# Patient Record
Sex: MALE | Race: WHITE | NOT HISPANIC OR LATINO | Employment: UNEMPLOYED | URBAN - METROPOLITAN AREA
[De-identification: names, ages, dates, MRNs, and addresses within clinical notes are randomized per-mention and may not be internally consistent; named-entity substitution may affect disease eponyms.]

---

## 2018-03-29 RX ORDER — ASPIRIN 325 MG
325 TABLET ORAL DAILY
COMMUNITY
End: 2020-02-09 | Stop reason: HOSPADM

## 2018-03-29 RX ORDER — ATORVASTATIN CALCIUM 10 MG/1
10 TABLET, FILM COATED ORAL EVERY EVENING
COMMUNITY
End: 2020-02-09 | Stop reason: HOSPADM

## 2018-03-29 RX ORDER — LISINOPRIL AND HYDROCHLOROTHIAZIDE 25; 20 MG/1; MG/1
1 TABLET ORAL DAILY
COMMUNITY
End: 2019-04-02 | Stop reason: HOSPADM

## 2018-03-30 ENCOUNTER — ANESTHESIA EVENT (OUTPATIENT)
Dept: PERIOP | Facility: HOSPITAL | Age: 48
End: 2018-03-30
Payer: COMMERCIAL

## 2018-04-02 ENCOUNTER — ANESTHESIA (OUTPATIENT)
Dept: PERIOP | Facility: HOSPITAL | Age: 48
End: 2018-04-02
Payer: COMMERCIAL

## 2018-04-02 ENCOUNTER — HOSPITAL ENCOUNTER (OUTPATIENT)
Facility: HOSPITAL | Age: 48
Setting detail: OUTPATIENT SURGERY
Discharge: HOME | End: 2018-04-02
Attending: SURGERY | Admitting: SURGERY
Payer: COMMERCIAL

## 2018-04-02 VITALS
TEMPERATURE: 98 F | HEART RATE: 94 BPM | SYSTOLIC BLOOD PRESSURE: 130 MMHG | BODY MASS INDEX: 32.14 KG/M2 | WEIGHT: 200 LBS | DIASTOLIC BLOOD PRESSURE: 80 MMHG | RESPIRATION RATE: 16 BRPM | HEIGHT: 66 IN | OXYGEN SATURATION: 92 %

## 2018-04-02 PROBLEM — K40.90 INDIRECT INGUINAL HERNIA: Status: ACTIVE | Noted: 2018-04-02

## 2018-04-02 PROCEDURE — C1781 MESH (IMPLANTABLE): HCPCS | Performed by: SURGERY

## 2018-04-02 DEVICE — LAPAROSCOPIC SELF-FIXATING MESH, LEFT ANATOMICAL
Type: IMPLANTABLE DEVICE | Site: INGUINAL | Status: FUNCTIONAL
Brand: PROGRIP

## 2018-04-02 RX ORDER — GLYCOPYRROLATE 0.2 MG/ML
INJECTION INTRAMUSCULAR; INTRAVENOUS AS NEEDED
Status: DISCONTINUED | OUTPATIENT
Start: 2018-04-02 | End: 2018-04-02 | Stop reason: SURG

## 2018-04-02 RX ORDER — PROPOFOL 10 MG/ML
INJECTION, EMULSION INTRAVENOUS AS NEEDED
Status: DISCONTINUED | OUTPATIENT
Start: 2018-04-02 | End: 2018-04-02 | Stop reason: SURG

## 2018-04-02 RX ORDER — ONDANSETRON 2 MG/ML
INJECTION INTRAMUSCULAR; INTRAVENOUS AS NEEDED
Status: DISCONTINUED | OUTPATIENT
Start: 2018-04-02 | End: 2018-04-02 | Stop reason: SURG

## 2018-04-02 RX ORDER — ROCURONIUM BROMIDE 10 MG/ML
INJECTION, SOLUTION INTRAVENOUS AS NEEDED
Status: DISCONTINUED | OUTPATIENT
Start: 2018-04-02 | End: 2018-04-02 | Stop reason: SURG

## 2018-04-02 RX ORDER — KETOROLAC TROMETHAMINE 30 MG/ML
INJECTION, SOLUTION INTRAMUSCULAR; INTRAVENOUS AS NEEDED
Status: DISCONTINUED | OUTPATIENT
Start: 2018-04-02 | End: 2018-04-02 | Stop reason: SURG

## 2018-04-02 RX ORDER — OXYCODONE HYDROCHLORIDE AND ACETAMINOPHEN 5; 325 MG/1; MG/1
2 TABLET ORAL EVERY 4 HOURS PRN
Status: DISCONTINUED | OUTPATIENT
Start: 2018-04-02 | End: 2018-04-02 | Stop reason: HOSPADM

## 2018-04-02 RX ORDER — SUCCINYLCHOLINE/SOD CL,ISO/PF 100 MG/5ML
SYRINGE (ML) INTRAVENOUS AS NEEDED
Status: DISCONTINUED | OUTPATIENT
Start: 2018-04-02 | End: 2018-04-02 | Stop reason: SURG

## 2018-04-02 RX ORDER — LIDOCAINE HYDROCHLORIDE 10 MG/ML
INJECTION, SOLUTION INFILTRATION; PERINEURAL AS NEEDED
Status: DISCONTINUED | OUTPATIENT
Start: 2018-04-02 | End: 2018-04-02 | Stop reason: SURG

## 2018-04-02 RX ORDER — SODIUM CHLORIDE 9 MG/ML
125 INJECTION, SOLUTION INTRAVENOUS CONTINUOUS
Status: DISCONTINUED | OUTPATIENT
Start: 2018-04-02 | End: 2018-04-02 | Stop reason: HOSPADM

## 2018-04-02 RX ORDER — MIDAZOLAM HYDROCHLORIDE 1 MG/ML
INJECTION INTRAMUSCULAR; INTRAVENOUS AS NEEDED
Status: DISCONTINUED | OUTPATIENT
Start: 2018-04-02 | End: 2018-04-02 | Stop reason: SURG

## 2018-04-02 RX ORDER — FENTANYL CITRATE 50 UG/ML
INJECTION, SOLUTION INTRAMUSCULAR; INTRAVENOUS AS NEEDED
Status: DISCONTINUED | OUTPATIENT
Start: 2018-04-02 | End: 2018-04-02 | Stop reason: SURG

## 2018-04-02 RX ORDER — ONDANSETRON 2 MG/ML
4 INJECTION INTRAMUSCULAR; INTRAVENOUS ONCE AS NEEDED
Status: DISCONTINUED | OUTPATIENT
Start: 2018-04-02 | End: 2018-04-02 | Stop reason: HOSPADM

## 2018-04-02 RX ORDER — FENTANYL CITRATE/PF 50 MCG/ML
25 SYRINGE (ML) INJECTION
Status: DISCONTINUED | OUTPATIENT
Start: 2018-04-02 | End: 2018-04-02 | Stop reason: HOSPADM

## 2018-04-02 RX ORDER — BUPIVACAINE HYDROCHLORIDE AND EPINEPHRINE 5; 5 MG/ML; UG/ML
INJECTION, SOLUTION EPIDURAL; INTRACAUDAL; PERINEURAL AS NEEDED
Status: DISCONTINUED | OUTPATIENT
Start: 2018-04-02 | End: 2018-04-02 | Stop reason: HOSPADM

## 2018-04-02 RX ORDER — MAGNESIUM HYDROXIDE 1200 MG/15ML
LIQUID ORAL AS NEEDED
Status: DISCONTINUED | OUTPATIENT
Start: 2018-04-02 | End: 2018-04-02 | Stop reason: HOSPADM

## 2018-04-02 RX ADMIN — CEFAZOLIN SODIUM 2000 MG: 2 SOLUTION INTRAVENOUS at 11:45

## 2018-04-02 RX ADMIN — SODIUM CHLORIDE 125 ML/HR: 0.9 INJECTION, SOLUTION INTRAVENOUS at 08:36

## 2018-04-02 RX ADMIN — ROCURONIUM BROMIDE 50 MG: 10 INJECTION INTRAVENOUS at 11:42

## 2018-04-02 RX ADMIN — NEOSTIGMINE METHYLSULFATE 3 MG: 1 INJECTION, SOLUTION INTRAMUSCULAR; INTRAVENOUS; SUBCUTANEOUS at 12:51

## 2018-04-02 RX ADMIN — SODIUM CHLORIDE: 0.9 INJECTION, SOLUTION INTRAVENOUS at 13:06

## 2018-04-02 RX ADMIN — GLYCOPYRROLATE 0.6 MG: 0.2 INJECTION, SOLUTION INTRAMUSCULAR; INTRAVENOUS at 12:51

## 2018-04-02 RX ADMIN — MIDAZOLAM HYDROCHLORIDE 2 MG: 1 INJECTION, SOLUTION INTRAMUSCULAR; INTRAVENOUS at 11:29

## 2018-04-02 RX ADMIN — Medication 100 MG: at 11:37

## 2018-04-02 RX ADMIN — FENTANYL CITRATE 100 MCG: 50 INJECTION, SOLUTION INTRAMUSCULAR; INTRAVENOUS at 11:31

## 2018-04-02 RX ADMIN — PROPOFOL 200 MG: 10 INJECTION, EMULSION INTRAVENOUS at 11:37

## 2018-04-02 RX ADMIN — ONDANSETRON HYDROCHLORIDE 4 MG: 2 INJECTION, SOLUTION INTRAVENOUS at 11:49

## 2018-04-02 RX ADMIN — DEXAMETHASONE SODIUM PHOSPHATE 4 MG: 10 INJECTION INTRAMUSCULAR; INTRAVENOUS at 11:49

## 2018-04-02 RX ADMIN — LIDOCAINE HYDROCHLORIDE 50 MG: 10 INJECTION, SOLUTION INFILTRATION; PERINEURAL at 11:37

## 2018-04-02 RX ADMIN — KETOROLAC TROMETHAMINE 30 MG: 30 INJECTION, SOLUTION INTRAMUSCULAR at 12:57

## 2018-04-02 RX ADMIN — FENTANYL CITRATE 50 MCG: 50 INJECTION, SOLUTION INTRAMUSCULAR; INTRAVENOUS at 11:50

## 2018-04-02 RX ADMIN — FENTANYL CITRATE 50 MCG: 50 INJECTION, SOLUTION INTRAMUSCULAR; INTRAVENOUS at 12:53

## 2018-04-02 NOTE — ANESTHESIA POSTPROCEDURE EVALUATION
Post-Op Assessment Note      CV Status:  Stable    Mental Status:  Alert and awake    Hydration Status:  Euvolemic    PONV Controlled:  Controlled    Airway Patency:  Patent  Airway: intubated    Post Op Vitals Reviewed: Yes          Staff: Anesthesiologist           /73 (04/02/18 1429)    Temp      Pulse 99 (04/02/18 1429)   Resp 15 (04/02/18 1429)    SpO2 99 % (04/02/18 1429)

## 2018-04-02 NOTE — DISCHARGE INSTRUCTIONS
Inguinal Hernia Repair   WHAT YOU NEED TO KNOW:   An inguinal hernia repair may be done open or laparoscopically  Open means your healthcare provider will make 1 large incision and fix your hernia  Laparoscopically means he will make 2 to 3 small incisions and fix your hernia  DISCHARGE INSTRUCTIONS:   Call 911 for any of the following:   · You feel lightheaded, short of breath, and have chest pain  · You cough up blood  · You have trouble breathing  Seek care immediately if:   · Your arm or leg feels warm, tender, and painful  It may look swollen and red  · Blood soaks through your bandage  · Your abdomen or groin feels hard and looks bigger than usual      · Your bowel movements are black, bloody, or tarry-looking  Contact your healthcare provider if:   · You have a fever above 101°F      · You develop a skin rash, hives, or itching  · Your incision is swollen, red, or draining pus or fluid  · You have nausea, or you are vomiting  · You cannot have a bowel movement  · You have trouble urinating  · Your pain does not get better after you take pain medicine  · You have questions or concerns about your condition or care  Medicines: You may need any of the following:  · Prescription pain medicine  may be given  Ask your healthcare provider how to take this medicine safely  Some prescription pain medicines contain acetaminophen  Do not take other medicines that contain acetaminophen without talking to your healthcare provider  Too much acetaminophen may cause liver damage  Prescription pain medicine may cause constipation  Ask your healthcare provider how to prevent or treat constipation  · NSAIDs , such as ibuprofen, help decrease swelling, pain, and fever  This medicine is available with or without a doctor's order  NSAIDs can cause stomach bleeding or kidney problems in certain people   If you take blood thinner medicine, always ask your healthcare provider if NSAIDs are safe for you  Always read the medicine label and follow directions  · Acetaminophen  decreases pain and fever  It is available without a doctor's order  Ask how much to take and how often to take it  Follow directions  Read the labels of all other medicines you are using to see if they also contain acetaminophen, or ask your doctor or pharmacist  Acetaminophen can cause liver damage if not taken correctly  Do not use more than 4 grams (4,000 milligrams) total of acetaminophen in one day  · Take your medicine as directed  Contact your healthcare provider if you think your medicine is not helping or if you have side effects  Tell him or her if you are allergic to any medicine  Keep a list of the medicines, vitamins, and herbs you take  Include the amounts, and when and why you take them  Bring the list or the pill bottles to follow-up visits  Carry your medicine list with you in case of an emergency  Care for your wound as directed: You can shower in 48 hours  Remove your bandage before you shower  It is normal to see a small amount of blood under the bandage  Carefully wash around your wound  It is okay to let soap and water run over your wound  Do not  scrub your wound  Gently pay your wound dry  If you have strips of medical tape over your incision, allow them to fall off on their own  It may take 7 to 10 days for them to fall off  Do not get in a bathtub, swimming pool, or hot tub until your healthcare provider says it is okay  Self-care:   · Eat a variety of healthy foods  Healthy foods include fruits, vegetables, whole-grain breads, low-fat dairy products, beans, lean meats, and fish  Healthy foods may help you heal faster  Ask if you need to be on a special diet  · Drink liquids as directed  Liquids may prevent constipation and straining during a bowel movement  This will help prevent pressure on your incision, and prevent another hernia   Ask how much liquid to drink each day and which liquids are best for you  · Apply ice  on your incision for 15 to 20 minutes every hour or as directed  Use an ice pack, or put crushed ice in a plastic bag  Cover it with a towel  Ice helps prevent tissue damage and decreases swelling and pain  · Take deep breaths and cough  10 times each hour  This will decrease your risk for a lung infection  Take a deep breath and hold it for as long as you can  Let the air out and then cough strongly  Deep breaths help open your airway  You may be given an incentive spirometer to help you take deep breaths  Put the plastic piece in your mouth and take a slow, deep breath  Then let the air out and cough  Repeat these steps 10 times every hour  Press a pillow lightly against your incision when you cough  This may decrease pain or discomfort  · Do not smoke  Nicotine and other chemicals in cigarettes and cigars can prevent your wound from healing  It can also increase your risk for another inguinal hernia  Ask your healthcare provider for information if you currently smoke and need help to quit  E-cigarettes or smokeless tobacco still contain nicotine  Talk to your healthcare provider before you use these products  Driving:  Do not drive for at least 1 week after surgery  Do not drive if you are taking prescription pain medication  Do not drive until it is comfortable to wear a seatbelt across your abdomen  Ask your healthcare provider when it is safe for you to drive  Activity: It is important to get out of bed and walk the day after your surgery  This will help prevent blood clots, move your bowels after surgery, and increase healing  Do not lift anything heavy until your healthcare provider says it is okay  This may put too much pressure on your incision and cause it to come apart  It may also increase your risk for another hernia  Do not play sports for 2 to 3 weeks  Ask your healthcare provider when you can return to work, school, and your normal activities  Follow up with your healthcare provider as directed:  Write down your questions so you remember to ask them during your visits  © 2017 [facility] Information is for End User's use only and may not be sold, redistributed or otherwise used for commercial purposes  All illustrations and images included in CareNotes® are the copyrighted property of [employer]  or [address]  The above information is an  only  It is not intended as medical advice for individual conditions or treatments  Talk to your doctor, nurse or pharmacist before following any medical regimen to see if it is safe and effective for you

## 2018-04-02 NOTE — OP NOTE
OPERATIVE REPORT  PATIENT NAME: Ingrid German    :  1970  MRN: [id number]  Pt Location: [facility] OR ROOM 03    SURGERY DATE: 2018    Surgeon(s) and Role:     * Rudy Alicea MD - Primary     * Cris Caruso MD - Assisting    Preop Diagnosis:  Unilateral inguinal hernia without obstruction or gangrene [K40 90]    Post-Op Diagnosis Codes:     * Unilateral inguinal hernia without obstruction or gangrene [K40 90]     * Indirect inguinal hernia [K40 90]  Left    Procedure(s) (LRB):  LAPAROSCOPIC INGUINAL HERNIA REPAIR WITH MESH (Left)    Specimen(s):  * No specimens in log *    Estimated Blood Loss:   Minimal    Drains:       Anesthesia Type:   General    Operative Indications:  Unilateral inguinal hernia without obstruction or gangrene [K40 90]  The patient is a pleasant 15-year-old male presenting with a symptomatic left inguinal hernia for which definitive repair is now indicated by a laparoscopic technique  Operative Findings:  The patient was found to have moderate-sized indirect left inguinal hernia  A left inguinal herniorrhaphy with mesh performed laparoscopically and the procedure completed uneventfully  The patient tolerated the procedure well  Complications:   None    Procedure and Technique:  The patient was taken to the operating room where they were properly identified, monitored and anesthetized with general endotracheal anesthesia  The received antibiotics perioperatively  Venodynes were placed prior to the induction of anesthesia for DVT prophylaxis  Herrera catheter placed  Abdomen prepped and draped under sterile conditions using aseptic technique  Time-out performed  Skin incised below the umbilicus  The anterior rectus sheath scored and the properitoneal space developed bluntly  A balloon advanced and inflated under direct laparoscopic visualization  The balloon removed and a 12 mm trocar placed  The pneumo-properitoneal space developed to 15 mm of mercury    Two additional working ports were placed  These were 5 mm ports  These were placed under direct laparoscopic visualization  The pubic tubercle defined medially  The dissection carried lateral to the internal ring and the anterior wall defined laterally  The hernia sac then dissected free from the cord structures  Careful attention paid to preserve the blood supply and the vas deferens  After complete reduction of the indirect sac, the hernia repair completed with placement of Pro  mesh to cover the indirect space, the direct space and the femoral space  The pneumopreperitoneum released  Trocars removed  The defect in the anterior rectus sheath was closed with a figure-of-eight suture of 0 Vicryl  Skin closed with subcuticular 4-0 Monocryl suture  Wounds infiltrated with 0 5% Marcaine  They were dressed sterilely  Patient extubated taken to recovery in stable condition     I was present for the entire procedure    Patient Disposition:  PACU     SIGNATURE: Meenakshi Colindres MD  DATE: April 2, 2018  TIME: 1:04 PM

## 2018-04-02 NOTE — ANESTHESIA PREPROCEDURE EVALUATION
Review of Systems/Medical History  Patient summary reviewed  Chart reviewed  No history of anesthetic complications     Cardiovascular  Hyperlipidemia, Hypertension controlled,    Pulmonary  Smoker ex-smoker  , COPD mild- PRN medicaiton ,        GI/Hepatic  Negative GI/hepatic ROS     Comment: Mild HB     Negative  ROS        Endo/Other    Obesity    GYN       Hematology  Negative hematology ROS      Musculoskeletal  Back pain , lumbar pain,        Neurology    CVA , no residual symptoms,    Psychology   Negative psychology ROS              Physical Exam    Airway    Mallampati score: II  TM Distance: >3 FB  Neck ROM: full     Dental   No notable dental hx     Cardiovascular  Rhythm: regular, Rate: normal, Cardiovascular exam normal    Pulmonary  Pulmonary exam normal Breath sounds clear to auscultation,     Other Findings        Anesthesia Plan  ASA Score- 2     Anesthesia Type- general with ASA Monitors  Additional Monitors:   Airway Plan: ETT  Plan Factors-Patient not instructed to abstain from smoking on day of procedure  Patient did not smoke on day of surgery  Induction- intravenous  Postoperative Plan- Plan for postoperative opioid use  Informed Consent- Anesthetic plan and risks discussed with patient

## 2018-04-02 NOTE — DISCHARGE SUMMARY
The patient underwent a laparoscopic left inguinal herniorrhaphy for the definitive treatment of their indirect left inguinal hernia  The convalesced that afternoon and were discharged home same day

## 2018-10-13 ENCOUNTER — HOSPITAL ENCOUNTER (EMERGENCY)
Facility: HOSPITAL | Age: 48
Discharge: HOME | End: 2018-10-13
Attending: EMERGENCY MEDICINE | Admitting: EMERGENCY MEDICINE
Payer: COMMERCIAL

## 2018-10-13 ENCOUNTER — APPOINTMENT (EMERGENCY)
Dept: CT IMAGING | Facility: HOSPITAL | Age: 48
End: 2018-10-13
Payer: COMMERCIAL

## 2018-10-13 VITALS
SYSTOLIC BLOOD PRESSURE: 111 MMHG | HEART RATE: 90 BPM | BODY MASS INDEX: 33.75 KG/M2 | TEMPERATURE: 98.7 F | WEIGHT: 210 LBS | RESPIRATION RATE: 18 BRPM | HEIGHT: 66 IN | OXYGEN SATURATION: 99 % | DIASTOLIC BLOOD PRESSURE: 66 MMHG

## 2018-10-13 DIAGNOSIS — M54.9 BACK PAIN: Primary | ICD-10-CM

## 2018-10-13 LAB
ANION GAP SERPL CALCULATED.3IONS-SCNC: 13 MMOL/L (ref 4–13)
BASOPHILS # BLD AUTO: 0.1 THOUSANDS/ΜL (ref 0–0.1)
BASOPHILS NFR BLD AUTO: 1 % (ref 0–1)
BILIRUB UR QL STRIP: NEGATIVE
BUN SERPL-MCNC: 24 MG/DL (ref 7–25)
CALCIUM SERPL-MCNC: 10.1 MG/DL (ref 8.6–10.5)
CHLORIDE SERPL-SCNC: 98 MMOL/L (ref 98–107)
CLARITY UR: CLEAR
CO2 SERPL-SCNC: 25 MMOL/L (ref 21–31)
COLOR UR: YELLOW
CREAT SERPL-MCNC: 1.19 MG/DL (ref 0.7–1.3)
EOSINOPHIL # BLD AUTO: 0.2 THOUSAND/ΜL (ref 0–0.61)
EOSINOPHIL NFR BLD AUTO: 2 % (ref 0–6)
ERYTHROCYTE [DISTWIDTH] IN BLOOD BY AUTOMATED COUNT: 12.9 % (ref 11.6–15.1)
GFR SERPL CREATININE-BSD FRML MDRD: 72 ML/MIN/1.73SQ M
GLUCOSE SERPL-MCNC: 103 MG/DL (ref 65–140)
GLUCOSE UR STRIP-MCNC: NEGATIVE MG/DL
HCT VFR BLD AUTO: 46.2 % (ref 42–52)
HGB BLD-MCNC: 15.8 G/DL (ref 12–17)
HGB UR QL STRIP.AUTO: NEGATIVE
KETONES UR STRIP-MCNC: NEGATIVE MG/DL
LEUKOCYTE ESTERASE UR QL STRIP: NEGATIVE
LYMPHOCYTES # BLD AUTO: 1.8 THOUSANDS/ΜL (ref 0.6–4.47)
LYMPHOCYTES NFR BLD AUTO: 21 % (ref 14–44)
MCH RBC QN AUTO: 31.3 PG (ref 26.8–34.3)
MCHC RBC AUTO-ENTMCNC: 34.2 G/DL (ref 31.4–37.4)
MCV RBC AUTO: 91 FL (ref 82–98)
MONOCYTES # BLD AUTO: 0.7 THOUSAND/ΜL (ref 0.17–1.22)
MONOCYTES NFR BLD AUTO: 8 % (ref 4–12)
NEUTROPHILS # BLD AUTO: 5.9 THOUSANDS/ΜL (ref 1.85–7.62)
NEUTS SEG NFR BLD AUTO: 68 % (ref 43–75)
NITRITE UR QL STRIP: NEGATIVE
NRBC BLD AUTO-RTO: 0 /100 WBCS
PH UR STRIP.AUTO: 6 [PH] (ref 5–8)
PLATELET # BLD AUTO: 256 THOUSANDS/UL (ref 149–390)
PMV BLD AUTO: 8.3 FL (ref 8.9–12.7)
POTASSIUM SERPL-SCNC: 3.6 MMOL/L (ref 3.5–5.5)
PROT UR STRIP-MCNC: NEGATIVE MG/DL
RBC # BLD AUTO: 5.05 MILLION/UL (ref 3.88–5.62)
SODIUM SERPL-SCNC: 136 MMOL/L (ref 134–143)
SP GR UR STRIP.AUTO: >=1.03 (ref 1–1.03)
UROBILINOGEN UR QL STRIP.AUTO: 0.2 E.U./DL
WBC # BLD AUTO: 8.7 THOUSAND/UL (ref 4.31–10.16)

## 2018-10-13 PROCEDURE — 36415 COLL VENOUS BLD VENIPUNCTURE: CPT | Performed by: EMERGENCY MEDICINE

## 2018-10-13 PROCEDURE — 81003 URINALYSIS AUTO W/O SCOPE: CPT | Performed by: EMERGENCY MEDICINE

## 2018-10-13 PROCEDURE — 80048 BASIC METABOLIC PNL TOTAL CA: CPT | Performed by: EMERGENCY MEDICINE

## 2018-10-13 PROCEDURE — 74176 CT ABD & PELVIS W/O CONTRAST: CPT

## 2018-10-13 PROCEDURE — 99284 EMERGENCY DEPT VISIT MOD MDM: CPT

## 2018-10-13 PROCEDURE — 96374 THER/PROPH/DIAG INJ IV PUSH: CPT

## 2018-10-13 PROCEDURE — 85025 COMPLETE CBC W/AUTO DIFF WBC: CPT | Performed by: EMERGENCY MEDICINE

## 2018-10-13 RX ORDER — KETOROLAC TROMETHAMINE 30 MG/ML
30 INJECTION, SOLUTION INTRAMUSCULAR; INTRAVENOUS ONCE
Status: COMPLETED | OUTPATIENT
Start: 2018-10-13 | End: 2018-10-13

## 2018-10-13 RX ORDER — IBUPROFEN 600 MG/1
600 TABLET ORAL EVERY 6 HOURS PRN
Qty: 30 TABLET | Refills: 0 | OUTPATIENT
Start: 2018-10-13

## 2018-10-13 RX ORDER — IBUPROFEN 600 MG/1
600 TABLET ORAL EVERY 8 HOURS PRN
Qty: 20 TABLET | Refills: 0 | Status: SHIPPED | OUTPATIENT
Start: 2018-10-13 | End: 2019-03-30

## 2018-10-13 RX ADMIN — KETOROLAC TROMETHAMINE 30 MG: 30 INJECTION, SOLUTION INTRAMUSCULAR at 17:03

## 2018-10-13 NOTE — DISCHARGE INSTRUCTIONS
Acute Low Back Pain   WHAT YOU NEED TO KNOW:   Acute low back pain is sudden discomfort in your lower back area that lasts for up to 6 weeks  The discomfort makes it difficult to tolerate activity  DISCHARGE INSTRUCTIONS:   Return to the emergency department if:   · You have severe pain  · You have sudden stiffness and heaviness on both buttocks down to both legs  · You have numbness or weakness in one leg, or pain in both legs  · You have numbness in your genital area or across your lower back  · You cannot control your urine or bowel movements  Contact your healthcare provider if:   · You have a fever  · You have pain at night or when you rest     · Your pain does not get better with treatment  · You have pain that worsens when you cough or sneeze  · You suddenly feel something pop or snap in your back  · You have questions or concerns about your condition or care  Medicines: The following medicines may be ordered by your healthcare provider:  · Acetaminophen  decreases pain  It is available without a doctor's order  Ask how much to take and how often to take it  Follow directions  Acetaminophen can cause liver damage if not taken correctly  · NSAIDs  help decrease swelling and pain  This medicine is available with or without a doctor's order  NSAIDs can cause stomach bleeding or kidney problems in certain people  If you take blood thinner medicine, always ask your healthcare provider if NSAIDs are safe for you  Always read the medicine label and follow directions  · Prescription pain medicine  may be given  Ask your healthcare provider how to take this medicine safely  · Muscle relaxers  decrease pain by relaxing the muscles in your lower spine  · Take your medicine as directed  Contact your healthcare provider if you think your medicine is not helping or if you have side effects  Tell him of her if you are allergic to any medicine   Keep a list of the medicines, vitamins, and herbs you take  Include the amounts, and when and why you take them  Bring the list or the pill bottles to follow-up visits  Carry your medicine list with you in case of an emergency  Self-care:   · Stay active  as much as you can without causing more pain  Bed rest could make your back pain worse  Start with some light exercises such as walking  Avoid heavy lifting until your pain is gone  Ask for more information about the activities or exercises that are right for you  · Ice  helps decrease swelling, pain, and muscle spams  Put crushed ice in a plastic bag  Cover it with a towel  Place it on your lower back for 20 to 30 minutes every 2 hours  Do this for about 2 to 3 days after your pain starts, or as directed  · Heat  helps decrease pain and muscle spasms  Start to use heat after treatment with ice has stopped  Use a small towel dampened with warm water or a heating pad, or sit in a warm bath  Apply heat on the area for 20 to 30 minutes every 2 hours for as many days as directed  Alternate heat and ice  Prevent acute low back pain:   · Use proper body mechanics  ¨ Bend at the hips and knees when you  objects  Do not bend from the waist  Use your leg muscles as you lift the load  Do not use your back  Keep the object close to your chest as you lift it  Try not to twist or lift anything above your waist     ¨ Change your position often when you stand for long periods of time  Rest one foot on a small box or footrest, and then switch to the other foot often  ¨ Try not to sit for long periods of time  When you do, sit in a straight-backed chair with your feet flat on the floor  Never reach, pull, or push while you are sitting  · Do exercises that strengthen your back muscles  Warm up before you exercise  Ask your healthcare provider the best exercises for you  · Maintain a healthy weight  Ask your healthcare provider how much you should weigh   Ask him to help you create a weight loss plan if you are overweight  Follow up with your healthcare provider as directed:  Return for a follow-up visit if you still have pain after 1 to 3 weeks of treatment  You may need to visit an orthopedist if your back pain lasts more than 12 weeks  Write down your questions so you remember to ask them during your visits  © 2017 [address]  Information is for End User's use only and may not be sold, redistributed or otherwise used for commercial purposes  All illustrations and images included in CareNotes® are the copyrighted property of [employer]  or [employer]  The above information is an  only  It is not intended as medical advice for individual conditions or treatments  Talk to your doctor, nurse or pharmacist before following any medical regimen to see if it is safe and effective for you

## 2018-10-13 NOTE — ED PROVIDER NOTES
History  Chief Complaint   Patient presents with    Flank Pain     42-year-old male with complaints of intermittent sharp left flank pain radiating into his left side of his abdomen x2 days  No nausea vomiting or diarrhea  No fever chills  No dysuria, frequency or hematuria  No cough or congestion  No chest pain or shortness of breath  The pain does not radiate to his legs  No urinary incontinence  No focal numbness or weakness  History provided by:  Patient  Flank Pain   Pain location:  L flank  Pain quality: sharp    Pain radiates to:  LUQ  Pain severity:  Moderate  Onset quality:  Sudden  Duration:  2 days  Timing:  Intermittent  Progression:  Worsening  Relieved by:  Nothing  Worsened by: Movement  Associated symptoms: no chest pain, no chills, no cough, no diarrhea, no dysuria, no fever, no hematuria, no nausea, no shortness of breath, no sore throat and no vomiting        Prior to Admission Medications   Prescriptions Last Dose Informant Patient Reported? Taking?   aspirin 325 mg tablet   Yes No   Sig: Take 325 mg by mouth daily   atorvastatin (LIPITOR) 10 mg tablet   Yes No   Sig: Take 10 mg by mouth every evening   lisinopril-hydrochlorothiazide (PRINZIDE,ZESTORETIC) 20-25 MG per tablet   Yes No   Sig: Take 1 tablet by mouth daily      Facility-Administered Medications: None       Past Medical History:   Diagnosis Date    Back pain     COPD (chronic obstructive pulmonary disease) (HCC)     Hyperlipidemia     Hypertension     Inguinal hernia, left     Mild heartburn     Obesity     Stroke ([address] ) approx 2015    Denies residual problems  Initially he only had trouble with two fingers on left side      Wears glasses        Past Surgical History:   Procedure Laterality Date    NO PAST SURGERIES      MI LAP,INGUINAL HERNIA REPR,INITIAL Left 4/2/2018    Procedure: LAPAROSCOPIC INGUINAL HERNIA REPAIR WITH MESH;  Surgeon: Roxanne Jimenez MD;  Location: [facility];  Service: General History reviewed  No pertinent family history  I have reviewed and agree with the history as documented  Social History   Substance Use Topics    Smoking status: Former Smoker     Packs/day: 1 00     Years: 15 00     Types: Cigarettes     Quit date: 2010    Smokeless tobacco: Never Used    Alcohol use No        Review of Systems   Constitutional: Negative for chills and fever  HENT: Negative for rhinorrhea and sore throat  Eyes: Negative for visual disturbance  Respiratory: Negative for cough and shortness of breath  Cardiovascular: Negative for chest pain and leg swelling  Gastrointestinal: Negative for abdominal pain, diarrhea, nausea and vomiting  Genitourinary: Positive for flank pain  Negative for dysuria and hematuria  Musculoskeletal: Negative for back pain and myalgias  Skin: Negative for rash  Neurological: Negative for dizziness and headaches  Psychiatric/Behavioral: Negative for confusion  All other systems reviewed and are negative  Physical Exam  Physical Exam   Constitutional: He is oriented to person, place, and time  He appears well-developed and well-nourished  HENT:   Nose: Nose normal    Mouth/Throat: Oropharynx is clear and moist  No oropharyngeal exudate  Eyes: Pupils are equal, round, and reactive to light  Conjunctivae and EOM are normal  No scleral icterus  Neck: Normal range of motion  Neck supple  No JVD present  No tracheal deviation present  Cardiovascular: Normal rate, regular rhythm and normal heart sounds  No murmur heard  Pulmonary/Chest: Effort normal and breath sounds normal  No respiratory distress  He has no wheezes  He has no rales  Abdominal: Soft  Bowel sounds are normal  There is no tenderness  There is no guarding  No CVA tenderness   Musculoskeletal: Normal range of motion  He exhibits no edema or tenderness     No spinal tenderness, negative straight leg raise   Neurological: He is alert and oriented to person, place, and time  No cranial nerve deficit or sensory deficit  He exhibits normal muscle tone  5/5 motor, nl sens, normal gait   Skin: Skin is warm and dry  Psychiatric: He has a normal mood and affect  His behavior is normal    Nursing note and vitals reviewed        Vital Signs  ED Triage Vitals [10/13/18 1648]   Temperature Pulse Respirations Blood Pressure SpO2   98 °F (36 7 °C) 100 18 139/72 95 %      Temp Source Heart Rate Source Patient Position - Orthostatic VS BP Location FiO2 (%)   Tympanic Monitor Sitting Left arm --      Pain Score       6           Vitals:    10/13/18 1648   BP: 139/72   Pulse: 100   Patient Position - Orthostatic VS: Sitting       Visual Acuity      ED Medications  Medications   ketorolac (TORADOL) injection 30 mg (30 mg Intravenous Given 10/13/18 1703)       Diagnostic Studies  Results Reviewed     Procedure Component Value Units Date/Time    UA w Reflex to Microscopic [[id number]]  (Normal) Collected:  10/13/18 1754    Lab Status:  Final result Specimen:  Urine from Urine, Clean Catch Updated:  10/13/18 1803     Color, UA Yellow     Clarity, UA Clear     Specific Gravity, UA >=1 030     pH, UA 6 0     Leukocytes, UA Negative     Nitrite, UA Negative     Protein, UA Negative mg/dl      Glucose, UA Negative mg/dl      Ketones, UA Negative mg/dl      Urobilinogen, UA 0 2 E U /dl      Bilirubin, UA Negative     Blood, UA Negative    Basic metabolic panel [[id number]] Collected:  10/13/18 1703    Lab Status:  Final result Specimen:  Blood from Arm, Left Updated:  10/13/18 1734     Sodium 136 mmol/L      Potassium 3 6 mmol/L      Chloride 98 mmol/L      CO2 25 mmol/L      ANION GAP 13 mmol/L      BUN 24 mg/dL      Creatinine 1 19 mg/dL      Glucose 103 mg/dL      Calcium 10 1 mg/dL      eGFR 72 ml/min/1 73sq m     Narrative:         National Kidney Disease Education Program recommendations are as follows:  GFR calculation is accurate only with a steady state creatinine  Chronic Kidney disease less than 60 ml/min/1 73 sq  meters  Kidney failure less than 15 ml/min/1 73 sq  meters  CBC and differential [[id number]]  (Abnormal) Collected:  10/13/18 1703    Lab Status:  Final result Specimen:  Blood from Arm, Left Updated:  10/13/18 1721     WBC 8 70 Thousand/uL      RBC 5 05 Million/uL      Hemoglobin 15 8 g/dL      Hematocrit 46 2 %      MCV 91 fL      MCH 31 3 pg      MCHC 34 2 g/dL      RDW 12 9 %      MPV 8 3 (L) fL      Platelets 039 Thousands/uL      nRBC 0 /100 WBCs      Neutrophils Relative 68 %      Lymphocytes Relative 21 %      Monocytes Relative 8 %      Eosinophils Relative 2 %      Basophils Relative 1 %      Neutrophils Absolute 5 90 Thousands/µL      Lymphocytes Absolute 1 80 Thousands/µL      Monocytes Absolute 0 70 Thousand/µL      Eosinophils Absolute 0 20 Thousand/µL      Basophils Absolute 0 10 Thousands/µL                  CT renal stone study abdomen pelvis wo contrast   Final Result by Yazan Hodges (10/13 1744)      1  No evidence of acute inflammatory process in the abdomen or pelvis on   this noncontrast exam   No nephrolithiasis, hydronephrosis, or visualized   ureteral stone  2   Hepatic steatosis  Signed by Yazan Hodges MD                 Procedures  Procedures       Phone Contacts  ED Phone Contact    ED Course  ED Course as of Oct 13 1807   Sat Oct 13, 2018   1805 Pt feels better after toradol - d/w with Rx ibuprofen                                MDM  CritCare Time    Disposition  Final diagnoses:   Back pain     Time reflects when diagnosis was documented in both MDM as applicable and the Disposition within this note     Time User Action Codes Description Comment    10/13/2018  5:24 PM Herbert Marina Add [M54 9] Back pain       ED Disposition     ED Disposition Condition Comment    Discharge  Katheryn Canales discharge to home/self care      Condition at discharge: Stable        Follow-up Information     Follow up With Specialties Details Why [place] Jose Mosqueda MD Family Medicine Schedule an appointment as soon as possible for a visit in 2 days Return if you worsen or any new problems occur  [address]            Patient's Medications   Discharge Prescriptions    IBUPROFEN (MOTRIN) 600 MG TABLET    Take 1 tablet (600 mg total) by mouth every 8 (eight) hours as needed for mild pain (with food)       Start Date: 10/13/2018End Date: --       Order Dose: 600 mg       Quantity: 20 tablet    Refills: 0     No discharge procedures on file      ED Provider  Electronically Signed by           Jessica Martines MD  10/13/18 5436

## 2018-10-16 ENCOUNTER — HOSPITAL ENCOUNTER (EMERGENCY)
Facility: HOSPITAL | Age: 48
Discharge: HOME | End: 2018-10-16
Attending: INTERNAL MEDICINE | Admitting: INTERNAL MEDICINE
Payer: COMMERCIAL

## 2018-10-16 VITALS
OXYGEN SATURATION: 96 % | BODY MASS INDEX: 33.75 KG/M2 | HEART RATE: 80 BPM | WEIGHT: 210 LBS | DIASTOLIC BLOOD PRESSURE: 78 MMHG | SYSTOLIC BLOOD PRESSURE: 123 MMHG | TEMPERATURE: 98.1 F | HEIGHT: 66 IN | RESPIRATION RATE: 16 BRPM

## 2018-10-16 DIAGNOSIS — R10.9 LEFT FLANK PAIN: Primary | ICD-10-CM

## 2018-10-16 DIAGNOSIS — S39.012A STRAIN OF MUSCLE, FASCIA AND TENDON OF LOWER BACK, INITIAL ENCOUNTER: ICD-10-CM

## 2018-10-16 PROCEDURE — 99283 EMERGENCY DEPT VISIT LOW MDM: CPT

## 2018-10-16 RX ORDER — METHOCARBAMOL 500 MG/1
500 TABLET, FILM COATED ORAL 2 TIMES DAILY
Qty: 20 TABLET | Refills: 1 | Status: SHIPPED | OUTPATIENT
Start: 2018-10-16 | End: 2019-04-09 | Stop reason: HOSPADM

## 2018-10-16 RX ORDER — METHOCARBAMOL 500 MG/1
500 TABLET, FILM COATED ORAL ONCE
Status: COMPLETED | OUTPATIENT
Start: 2018-10-16 | End: 2018-10-16

## 2018-10-16 RX ADMIN — METHOCARBAMOL 500 MG: 500 TABLET ORAL at 21:35

## 2018-10-17 NOTE — DISCHARGE INSTRUCTIONS
Musculoskeletal Pain   Musculoskeletal Pain   WHAT YOU NEED TO KNOW:   Muscle pain can be dull, achy, or sharp  You may have pain and tenderness to the touch as well  It can occur anywhere on your body and is often brought on by exercise  Muscle pain may occur from an injury, such as a sprain, tendonitis, or bone fracture  Muscle pain can also be the result of medical conditions, such as polymyositis, fibromyalgia, and connective tissue disorders  DISCHARGE INSTRUCTIONS:   Self care:   · Rest  as directed and avoid activity that causes pain  You may be able to return to normal activity when you can move without pain  Follow directions for rest and activity  You are at risk for injury for 3 weeks after your symptoms go away  · Ice  your painful muscle area to decrease pain and swelling  Use an ice pack, or put ice in a plastic bag and cover it with a towel  Always  put a cloth between the ice and your skin  Apply the ice as often as directed for the first 24 to 48 hours  · Compression  with a splint, brace, or elastic bandage helps decrease pain and swelling  This may be needed for muscle pain in arms or legs  A splint, brace, or bandage will also help protect the painful area when you move around  · Elevate  a painful arm or leg to reduce swelling and pain  Elevate your limb while you are sitting or lying down  Prop a painful leg on pillows to keep it above the level of your heart  Medicines:   · NSAIDs  help decrease swelling and pain or fever  This medicine is available with or without a doctor's order  NSAIDs can cause stomach bleeding or kidney problems in certain people  If you take blood thinner medicine, always ask your healthcare provider if NSAIDs are safe for you  Always read the medicine label and follow directions  · Acetaminophen  is used to decrease pain  It is available without a doctor's order  Ask your healthcare provider how much to take and when to take it  Follow directions  Acetaminophen can cause liver damage if not taken correctly  Do not take more than one medicine that contains acetaminophen unless directed  · Muscle relaxers  help relax your muscles to decrease pain and muscle spasms  · Steroids  may be given to decrease redness, pain, and swelling  · Take your medicine as directed  Contact your healthcare provider if you think your medicine is not helping or if you have side effects  Tell him if you are allergic to any medicine  Keep a list of the medicines, vitamins, and herbs you take  Include the amounts, and when and why you take them  Bring the list or the pill bottles to follow-up visits  Carry your medicine list with you in case of an emergency  Follow up with your healthcare provider as directed: You may need more tests to help healthcare providers find the cause of your muscle pain  You may need physical therapy to learn muscle strengthening exercises  Write down your questions so you remember to ask them during your visits  Contact your healthcare provider if:   · You have a fever  · You have trouble sleeping because of your pain  · Your painful area becomes more tender, red, and warm to the touch  · You have decreased movement of the painful area  · You have questions or concerns about your condition or care  Return to the emergency department if:   · You have increased severe pain when you move the painful muscle area  · You lose feeling in your painful muscle area  · You have new or worse swelling in the painful area  Your skin may feel tight  · You have increased muscle pain or pain that does not improve with treatment  © 2017 [employer] Information is for End User's use only and may not be sold, redistributed or otherwise used for commercial purposes  All illustrations and images included in CareNotes® are the copyrighted property of [employer]  or [employer]    The above information is an  only  It is not intended as medical advice for individual conditions or treatments  Talk to your doctor, nurse or pharmacist before following any medical regimen to see if it is safe and effective for you

## 2018-10-17 NOTE — ED NOTES
IV NOT PRESENT, APPEARS TO HAVE BEEN REMOVED UPON D/C DURING PRIOR ER VISIT       Saravanan Garay RN  79/58/93 4703

## 2018-10-17 NOTE — ED PROVIDER NOTES
History  Chief Complaint   Patient presents with    Flank Pain     69-year-old white male here with similar complaints he had 3 nights ago nights ago when he was evaluated upon return hospital   The area had a CT scan of his abdomen and pelvis for kidney stone, incomplete labs all of which were normal  He describes his pain being present for roughly 2 weeks  It is in the left flank area  More paraspinal in nature  He does not remember any injury, but has been caring for his mother who has had a recent amputation any requires a lot a lifting  He has chronic low back pain but this was higher up  Seems to radiate slightly to the left left upper quadrant area but not reproducible  Able to press on his abdomen without difficulty  No issues with bowel or bladder  He denies hematuria  Denies any fevers or chills  He appears quite comfortable  History provided by:  Patient   used: No        Prior to Admission Medications   Prescriptions Last Dose Informant Patient Reported? Taking?   aspirin 325 mg tablet   Yes Yes   Sig: Take 325 mg by mouth daily   atorvastatin (LIPITOR) 10 mg tablet   Yes Yes   Sig: Take 10 mg by mouth every evening   ibuprofen (MOTRIN) 600 mg tablet   No No   Sig: Take 1 tablet (600 mg total) by mouth every 8 (eight) hours as needed for mild pain (with food)   lisinopril-hydrochlorothiazide (PRINZIDE,ZESTORETIC) 20-25 MG per tablet   Yes Yes   Sig: Take 1 tablet by mouth daily      Facility-Administered Medications: None       Past Medical History:   Diagnosis Date    Back pain     [facility]chronic obstructive [facility] disease) ([facility] [address]     Hyperlipidemia     Hypertension     Inguinal hernia, left     Mild heartburn     Obesity     Stroke ([facility] [address] ) approx 2015    Denies residual problems  Initially he only had trouble with two fingers on left side      Wears glasses        Past Surgical History:   Procedure Laterality Date    NO PAST SURGERIES      IN LAP,INGUINAL HERNIA REPR,INITIAL Left 4/2/2018    Procedure: LAPAROSCOPIC INGUINAL HERNIA REPAIR WITH MESH;  Surgeon: Juana Coyne MD;  Location: [facility] OR;  Service: General       History reviewed  No pertinent family history  I have reviewed and agree with the history as documented  Social History   Substance Use Topics    Smoking status: Former Smoker     Packs/day: 1 00     Years: 15 00     Types: Cigarettes     Quit date: 2010    Smokeless tobacco: Never Used    Alcohol use No        Review of Systems   Constitutional: Negative for fatigue  HENT: Negative for sinus pain and sinus pressure  Respiratory: Negative for cough, chest tightness and shortness of breath  Cardiovascular: Negative for chest pain and palpitations  Gastrointestinal: Negative for abdominal pain, diarrhea and vomiting  Genitourinary: Negative for difficulty urinating and dysuria  Musculoskeletal: Positive for arthralgias and back pain  Skin: Negative for rash  Neurological: Negative for tremors and headaches  Psychiatric/Behavioral: Negative for behavioral problems  Physical Exam  Physical Exam   Constitutional: He is oriented to person, place, and time  He appears well-developed and well-nourished  HENT:   Nose: Nose normal    Mouth/Throat: Oropharynx is clear and moist  No oropharyngeal exudate  Eyes: Pupils are equal, round, and reactive to light  Conjunctivae and EOM are normal  No scleral icterus  Neck: Normal range of motion  Neck supple  No JVD present  No tracheal deviation present  Cardiovascular: Normal rate, regular rhythm and normal heart sounds  No murmur heard  Pulmonary/Chest: Effort normal and breath sounds normal  No respiratory distress  He has no wheezes  He has no rales  Abdominal: Soft  Bowel sounds are normal  There is no tenderness  There is no guarding  Obese   Musculoskeletal: Normal range of motion  He exhibits no edema or tenderness     Tenderness over floating rib left lower thoracic area  Reproducible with muscle spasm noted  There is discomfort with movement and flexion of the LS spine   Neurological: He is alert and oriented to person, place, and time  No cranial nerve deficit or sensory deficit  He exhibits normal muscle tone  5/5 motor, nl sens   Skin: Skin is warm and dry  Psychiatric: He has a normal mood and affect  His behavior is normal    Nursing note and vitals reviewed  Vital Signs  ED Triage Vitals [10/16/18 2033]   Temperature Pulse Respirations Blood Pressure SpO2   98 1 °F (36 7 °C) 86 16 123/78 94 %      Temp src Heart Rate Source Patient Position - Orthostatic VS BP Location FiO2 (%)   -- -- -- -- --      Pain Score       --           Vitals:    10/16/18 2033   BP: 123/78   Pulse: 86       Visual Acuity      ED Medications  Medications - No data to display    Diagnostic Studies  Results Reviewed     None                 No orders to display              Procedures  Procedures       Phone Contacts  ED Phone Contact    ED Course                               [facility]  CritCare Time    Disposition  Final diagnoses:   None     ED Disposition     None      Follow-up Information    None         Patient's Medications   Discharge Prescriptions    No medications on file     No discharge procedures on file      ED Provider  Electronically Signed by           Beau Hager DO  10/16/18 8954

## 2018-10-17 NOTE — ED NOTES
Ptm c/o pain in L flank, was seen at [facility] recently for same       Camilo Pinto RN  83/41/85 [id number]

## 2018-11-03 ENCOUNTER — APPOINTMENT (EMERGENCY)
Dept: CT IMAGING | Facility: HOSPITAL | Age: 48
End: 2018-11-03
Payer: COMMERCIAL

## 2018-11-03 ENCOUNTER — APPOINTMENT (EMERGENCY)
Dept: RADIOLOGY | Facility: HOSPITAL | Age: 48
End: 2018-11-03
Payer: COMMERCIAL

## 2018-11-03 ENCOUNTER — HOSPITAL ENCOUNTER (EMERGENCY)
Facility: HOSPITAL | Age: 48
Discharge: HOME | End: 2018-11-03
Attending: EMERGENCY MEDICINE | Admitting: EMERGENCY MEDICINE
Payer: COMMERCIAL

## 2018-11-03 VITALS
SYSTOLIC BLOOD PRESSURE: 103 MMHG | RESPIRATION RATE: 18 BRPM | HEART RATE: 68 BPM | TEMPERATURE: 97.8 F | WEIGHT: 210 LBS | OXYGEN SATURATION: 95 % | DIASTOLIC BLOOD PRESSURE: 65 MMHG | HEIGHT: 66 IN | BODY MASS INDEX: 33.75 KG/M2

## 2018-11-03 DIAGNOSIS — R42 DIZZINESS: Primary | ICD-10-CM

## 2018-11-03 LAB
ANION GAP SERPL CALCULATED.3IONS-SCNC: 9 MMOL/L (ref 4–13)
APTT PPP: 33 SECONDS (ref 24–36)
ATRIAL RATE: 83 BPM
BASOPHILS # BLD AUTO: 0.1 THOUSANDS/ΜL (ref 0–0.1)
BASOPHILS NFR BLD AUTO: 1 % (ref 0–1)
BILIRUB UR QL STRIP: NEGATIVE
BUN SERPL-MCNC: 17 MG/DL (ref 7–25)
CALCIUM SERPL-MCNC: 9.5 MG/DL (ref 8.6–10.5)
CHLORIDE SERPL-SCNC: 95 MMOL/L (ref 98–107)
CLARITY UR: CLEAR
CO2 SERPL-SCNC: 27 MMOL/L (ref 21–31)
COLOR UR: YELLOW
CREAT SERPL-MCNC: 1.14 MG/DL (ref 0.7–1.3)
EOSINOPHIL # BLD AUTO: 0.1 THOUSAND/ΜL (ref 0–0.61)
EOSINOPHIL NFR BLD AUTO: 2 % (ref 0–6)
ERYTHROCYTE [DISTWIDTH] IN BLOOD BY AUTOMATED COUNT: 12.6 % (ref 11.6–15.1)
GFR SERPL CREATININE-BSD FRML MDRD: 76 ML/MIN/1.73SQ M
GLUCOSE SERPL-MCNC: 127 MG/DL (ref 65–140)
GLUCOSE UR STRIP-MCNC: NEGATIVE MG/DL
HCT VFR BLD AUTO: 46.7 % (ref 42–52)
HGB BLD-MCNC: 16.2 G/DL (ref 12–17)
HGB UR QL STRIP.AUTO: NEGATIVE
INR PPP: 1.09 (ref 0.9–1.5)
KETONES UR STRIP-MCNC: NEGATIVE MG/DL
LEUKOCYTE ESTERASE UR QL STRIP: NEGATIVE
LYMPHOCYTES # BLD AUTO: 1.4 THOUSANDS/ΜL (ref 0.6–4.47)
LYMPHOCYTES NFR BLD AUTO: 24 % (ref 14–44)
MCH RBC QN AUTO: 31.6 PG (ref 26.8–34.3)
MCHC RBC AUTO-ENTMCNC: 34.7 G/DL (ref 31.4–37.4)
MCV RBC AUTO: 91 FL (ref 82–98)
MONOCYTES # BLD AUTO: 0.5 THOUSAND/ΜL (ref 0.17–1.22)
MONOCYTES NFR BLD AUTO: 8 % (ref 4–12)
NEUTROPHILS # BLD AUTO: 3.9 THOUSANDS/ΜL (ref 1.85–7.62)
NEUTS SEG NFR BLD AUTO: 65 % (ref 43–75)
NITRITE UR QL STRIP: NEGATIVE
NRBC BLD AUTO-RTO: 0 /100 WBCS
P AXIS: 49 DEGREES
PH UR STRIP.AUTO: 5.5 [PH] (ref 5–8)
PLATELET # BLD AUTO: 269 THOUSANDS/UL (ref 149–390)
PMV BLD AUTO: 8.6 FL (ref 8.9–12.7)
POTASSIUM SERPL-SCNC: 3.4 MMOL/L (ref 3.5–5.5)
PR INTERVAL: 146 MS
PROT UR STRIP-MCNC: NEGATIVE MG/DL
PROTHROMBIN TIME: 12.6 SECONDS (ref 10.1–12.9)
QRS AXIS: 18 DEGREES
QRSD INTERVAL: 98 MS
QT INTERVAL: 364 MS
QTC INTERVAL: 427 MS
RBC # BLD AUTO: 5.14 MILLION/UL (ref 3.88–5.62)
SODIUM SERPL-SCNC: 131 MMOL/L (ref 134–143)
SP GR UR STRIP.AUTO: 1.01 (ref 1–1.03)
T WAVE AXIS: 40 DEGREES
TROPONIN I SERPL-MCNC: <0.03 NG/ML
UROBILINOGEN UR QL STRIP.AUTO: 0.2 E.U./DL
VENTRICULAR RATE: 83 BPM
WBC # BLD AUTO: 6.1 THOUSAND/UL (ref 4.31–10.16)

## 2018-11-03 PROCEDURE — 93005 ELECTROCARDIOGRAM TRACING: CPT

## 2018-11-03 PROCEDURE — 70450 CT HEAD/BRAIN W/O DYE: CPT

## 2018-11-03 PROCEDURE — 96360 HYDRATION IV INFUSION INIT: CPT

## 2018-11-03 PROCEDURE — 36415 COLL VENOUS BLD VENIPUNCTURE: CPT | Performed by: EMERGENCY MEDICINE

## 2018-11-03 PROCEDURE — 99285 EMERGENCY DEPT VISIT HI MDM: CPT

## 2018-11-03 PROCEDURE — 85610 PROTHROMBIN TIME: CPT | Performed by: EMERGENCY MEDICINE

## 2018-11-03 PROCEDURE — 71046 X-RAY EXAM CHEST 2 VIEWS: CPT

## 2018-11-03 PROCEDURE — 84484 ASSAY OF TROPONIN QUANT: CPT | Performed by: EMERGENCY MEDICINE

## 2018-11-03 PROCEDURE — 80048 BASIC METABOLIC PNL TOTAL CA: CPT | Performed by: EMERGENCY MEDICINE

## 2018-11-03 PROCEDURE — 85730 THROMBOPLASTIN TIME PARTIAL: CPT | Performed by: EMERGENCY MEDICINE

## 2018-11-03 PROCEDURE — 93010 ELECTROCARDIOGRAM REPORT: CPT | Performed by: INTERNAL MEDICINE

## 2018-11-03 PROCEDURE — 81003 URINALYSIS AUTO W/O SCOPE: CPT | Performed by: EMERGENCY MEDICINE

## 2018-11-03 PROCEDURE — 85025 COMPLETE CBC W/AUTO DIFF WBC: CPT | Performed by: EMERGENCY MEDICINE

## 2018-11-03 RX ADMIN — SODIUM CHLORIDE 1000 ML: 0.9 INJECTION, SOLUTION INTRAVENOUS at 15:18

## 2018-11-03 NOTE — DISCHARGE INSTRUCTIONS
Dizziness   WHAT YOU NEED TO KNOW:   Dizziness is a feeling of being off balance or unsteady  Common causes of dizziness are an inner ear fluid imbalance or a lack of oxygen in your blood  Dizziness may be acute (lasts 3 days or less) or chronic (lasts longer than 3 days)  You may have dizzy spells that last from seconds to a few hours  DISCHARGE INSTRUCTIONS:   Return to the emergency department if:   · You have a headache and a stiff neck  · You have shaking chills and a fever  · You vomit over and over with no relief  · Your vomit or bowel movements are red or black  · You have pain in your chest, back, or abdomen  · You have numbness, especially in your face, arms, or legs  · You have trouble moving your arms or legs  · You are confused  Contact your healthcare provider if:   · You have a fever  · Your symptoms do not get better with treatment  · You have questions or concerns about your condition or care  Manage your symptoms:   · Do not drive  or operate heavy machinery when you are dizzy  · Get up slowly  from sitting or lying down  · Drink plenty of liquids  Liquids help prevent dehydration  Ask how much liquid to drink each day and which liquids are best for you  Follow up with your healthcare provider as directed:  Write down your questions so you remember to ask them during your visits  © 2017 [employer]  Information is for End User's use only and may not be sold, redistributed or otherwise used for commercial purposes  All illustrations and images included in CareNotes® are the copyrighted property of [employer]  or [employer]  The above information is an  only  It is not intended as medical advice for individual conditions or treatments  Talk to your doctor, nurse or pharmacist before following any medical regimen to see if it is safe and effective for you

## 2018-11-03 NOTE — ED PROVIDER NOTES
History  Chief Complaint   Patient presents with    Dizziness     26-year-old male with complaints of an episode of dizziness approximately 2:00 p m  Lasting several minutes and resolved  Patient states he felt lightheaded  No chest pain or shortness of breath  No nausea vomiting or diaphoresis  No back or abdominal pain  No headache  No focal numbness or weakness  No difficulty with speech or vision  No cough or congestion  Patient denies fever chills  No dysuria  No melena or hematochezia        History provided by:  Patient  Dizziness   Quality:  Lightheadedness  Relieved by:  None tried  Worsened by:  Nothing  Associated symptoms: no blood in stool, no chest pain, no diarrhea, no headaches, no nausea, no shortness of breath, no syncope, no vision changes, no vomiting and no weakness        Prior to Admission Medications   Prescriptions Last Dose Informant Patient Reported? Taking?   aspirin 325 mg tablet 11/2/2018 at Unknown time  Yes Yes   Sig: Take 325 mg by mouth daily   atorvastatin (LIPITOR) 10 mg tablet 11/2/2018 at Unknown time  Yes Yes   Sig: Take 10 mg by mouth every evening   ibuprofen (MOTRIN) 600 mg tablet   No No   Sig: Take 1 tablet (600 mg total) by mouth every 8 (eight) hours as needed for mild pain (with food)   lisinopril-hydrochlorothiazide (PRINZIDE,ZESTORETIC) 20-25 MG per tablet 11/3/2018 at Unknown time  Yes Yes   Sig: Take 1 tablet by mouth daily   methocarbamol (ROBAXIN) 500 mg tablet   No No   Sig: Take 1 tablet (500 mg total) by mouth 2 (two) times a day      Facility-Administered Medications: None       Past Medical History:   Diagnosis Date    Back pain     [facility] obstructive [facility] disease) [facility] [address] [facility]     Hyperlipidemia     Hypertension     Inguinal hernia, left     Mild heartburn     Obesity     Stroke ([facility] [address] ) approx 2015    Denies residual problems  Initially he only had trouble with two fingers on left side      Wears glasses        Past Surgical History: Procedure Laterality Date    NO PAST SURGERIES      ID LAP,INGUINAL HERNIA REPR,INITIAL Left 4/2/2018    Procedure: LAPAROSCOPIC INGUINAL HERNIA REPAIR WITH MESH;  Surgeon: Nehemias Pineda MD;  Location: [facility];  Service: General       History reviewed  No pertinent family history  I have reviewed and agree with the history as documented  Social History   Substance Use Topics    Smoking status: Former Smoker     Packs/day: 1 00     Years: 15 00     Types: Cigarettes     Quit date: 2010    Smokeless tobacco: Never Used    Alcohol use No        Review of Systems   Constitutional: Negative for chills and fever  HENT: Negative for rhinorrhea and sore throat  Eyes: Negative for visual disturbance  Respiratory: Negative for cough and shortness of breath  Cardiovascular: Negative for chest pain, leg swelling and syncope  Gastrointestinal: Negative for abdominal pain, blood in stool, diarrhea, nausea and vomiting  Genitourinary: Negative for dysuria  Musculoskeletal: Negative for back pain and myalgias  Skin: Negative for rash  Neurological: Positive for dizziness  Negative for syncope, facial asymmetry, speech difficulty, weakness, numbness and headaches  Psychiatric/Behavioral: Negative for confusion  All other systems reviewed and are negative  Physical Exam  Physical Exam   Constitutional: He is oriented to person, place, and time  He appears well-developed and well-nourished  HENT:   Nose: Nose normal    Mouth/Throat: Oropharynx is clear and moist  No oropharyngeal exudate  Eyes: Pupils are equal, round, and reactive to light  Conjunctivae and EOM are normal  No scleral icterus  Neck: Normal range of motion  Neck supple  No JVD present  No tracheal deviation present  Cardiovascular: Normal rate, regular rhythm and normal heart sounds  No murmur heard  Pulmonary/Chest: Effort normal and breath sounds normal  No respiratory distress  He has no wheezes   He has no rales    Abdominal: Soft  Bowel sounds are normal  There is no tenderness  There is no guarding  Musculoskeletal: Normal range of motion  He exhibits no edema or tenderness  Neurological: He is alert and oriented to person, place, and time  No cranial nerve deficit or sensory deficit  He exhibits normal muscle tone  Coordination normal    5/5 motor, nl sens, finger-to-nose okay, heal to shin okay   Skin: Skin is warm and dry  Psychiatric: He has a normal mood and affect  His behavior is normal    Nursing note and vitals reviewed        Vital Signs  ED Triage Vitals [11/03/18 1453]   Temperature Pulse Respirations Blood Pressure SpO2   100 2 °F (37 9 °C) 98 18 112/67 95 %      Temp Source Heart Rate Source Patient Position - Orthostatic VS BP Location FiO2 (%)   Tympanic Monitor Sitting Right arm --      Pain Score       No Pain           Vitals:    11/03/18 1453 11/03/18 1534 11/03/18 1535 11/03/18 1536   BP: 112/67 107/70 101/70 109/73   Pulse: 98 90 87 84   Patient Position - Orthostatic VS: Sitting Lying - Orthostatic VS Sitting - Orthostatic VS Standing - Orthostatic VS       Visual Acuity      ED Medications  Medications   sodium chloride 0 9 % bolus 1,000 mL (0 mL Intravenous Stopped 11/3/18 1625)       Diagnostic Studies  Results Reviewed     Procedure Component Value Units Date/Time    UA w Reflex to Microscopic [[id number]]  (Normal) Collected:  11/03/18 1631    Lab Status:  Final result Specimen:  Urine from Urine, Clean Catch Updated:  11/03/18 1645     Color, UA Yellow     Clarity, UA Clear     Specific Gravity, UA 1 010     pH, UA 5 5     Leukocytes, UA Negative     Nitrite, UA Negative     Protein, UA Negative mg/dl      Glucose, UA Negative mg/dl      Ketones, UA Negative mg/dl      Urobilinogen, UA 0 2 E U /dl      Bilirubin, UA Negative     Blood, UA Negative    Troponin I [[id number]]  (Normal) Collected:  11/03/18 1511    Lab Status:  Final result Specimen:  Blood from Arm, Right Updated: 11/03/18 1602     Troponin I <0 03 ng/mL     Basic metabolic panel [[id number]]  (Abnormal) Collected:  11/03/18 1511    Lab Status:  Final result Specimen:  Blood from Arm, Right Updated:  11/03/18 1549     Sodium 131 (L) mmol/L      Potassium 3 4 (L) mmol/L      Chloride 95 (L) mmol/L      CO2 27 mmol/L      ANION GAP 9 mmol/L      BUN 17 mg/dL      Creatinine 1 14 mg/dL      Glucose 127 mg/dL      Calcium 9 5 mg/dL      eGFR 76 ml/min/1 73sq m     Narrative:         National Kidney Disease Education Program recommendations are as follows:  GFR calculation is accurate only with a steady state creatinine  Chronic Kidney disease less than 60 ml/min/1 73 sq  meters  Kidney failure less than 15 ml/min/1 73 sq  meters  Protime-INR [[id number]]  (Normal) Collected:  11/03/18 1511    Lab Status:  Final result Specimen:  Blood from Arm, Right Updated:  11/03/18 1545     Protime 12 6 seconds      INR 1 09    APTT [[id number]]  (Normal) Collected:  11/03/18 1511    Lab Status:  Final result Specimen:  Blood from Arm, Right Updated:  11/03/18 1545     PTT 33 seconds     CBC and differential [[id number]]  (Abnormal) Collected:  11/03/18 1511    Lab Status:  Final result Specimen:  Blood from Arm, Right Updated:  11/03/18 1529     WBC 6 10 Thousand/uL      RBC 5 14 Million/uL      Hemoglobin 16 2 g/dL      Hematocrit 46 7 %      MCV 91 fL      MCH 31 6 pg      MCHC 34 7 g/dL      RDW 12 6 %      MPV 8 6 (L) fL      Platelets 401 Thousands/uL      nRBC 0 /100 WBCs      Neutrophils Relative 65 %      Lymphocytes Relative 24 %      Monocytes Relative 8 %      Eosinophils Relative 2 %      Basophils Relative 1 %      Neutrophils Absolute 3 90 Thousands/µL      Lymphocytes Absolute 1 40 Thousands/µL      Monocytes Absolute 0 50 Thousand/µL      Eosinophils Absolute 0 10 Thousand/µL      Basophils Absolute 0 10 Thousands/µL                  XR chest 2 views   Final Result by Elias Boogie MD (11/03 1612)      1    There is no acute pulmonary infiltrate  2   The heart is enlarged  Signed by Melissa Dural      CT head without contrast   Final Result by Rebecca Castro (11/03 9779)   No acute intracranial findings  If symptoms persist or if further imaging   is clinically indicated, consider follow-up CT or MRI  Signed by LUPIS Forbes  Procedures  Procedures       Phone Contacts  ED Phone Contact    ED Course  ED Course as of Nov 03 1654   Sat Nov 03, 2018   1514 EKG NSR 85bpm, nl QRS, NSSTTW changes    1638 Pt remains asymptomatic - awaiting UA and then d/c                    Stroke Assessment     Row Name 11/03/18 1514             NIH Stroke Scale    Interval Baseline      Level of Consciousness (1a ) 0      LOC Questions (1b ) 0      LOC Commands (1c ) 0      Best Gaze (2 ) 0      Visual (3 ) 0      Facial Palsy (4 ) 0      Motor Arm, Left (5a ) 0      Motor Arm, Right (5b ) 0      Motor Leg, Left (6a ) 0      Motor Leg, Right (6b ) 0      Limb Ataxia (7 ) 0      Sensory (8 ) 0      Best Language (9 ) 0      Dysarthria (10 ) 0      Extinction and Inattention (11 ) (Formerly Neglect) 0      Total 0                          MDM  CritCare Time    Disposition  Final diagnoses:   Dizziness     Time reflects when diagnosis was documented in both MDM as applicable and the Disposition within this note     Time User Action Codes Description Comment    11/3/2018  4:27 PM Herbert Marina Add [R42] Dizziness       ED Disposition     ED Disposition Condition Comment    Discharge  Katheryn Canales discharge to home/self care  Condition at discharge: Stable        Follow-up Information     Follow up With Specialties Details Why Chiqui Teresa MD Family Medicine Schedule an appointment as soon as possible for a visit See her primary care physician in 1-2 days  Return if you worsen any new problems occur   [address] [place], [place] [address]  [phone]            Patient's Medications   Discharge Prescriptions No medications on file     No discharge procedures on file      ED Provider  Electronically Signed by           Lyndsay La MD  11/03/18 9117

## 2018-11-27 ENCOUNTER — APPOINTMENT (EMERGENCY)
Dept: CT IMAGING | Facility: HOSPITAL | Age: 48
End: 2018-11-27
Payer: COMMERCIAL

## 2018-11-27 ENCOUNTER — HOSPITAL ENCOUNTER (EMERGENCY)
Facility: HOSPITAL | Age: 48
Discharge: STILL ADMITTED OR ADMINISTRATIVE | End: 2018-11-28
Attending: EMERGENCY MEDICINE | Admitting: EMERGENCY MEDICINE
Payer: COMMERCIAL

## 2018-11-27 VITALS
TEMPERATURE: 98.6 F | RESPIRATION RATE: 16 BRPM | SYSTOLIC BLOOD PRESSURE: 95 MMHG | HEART RATE: 78 BPM | DIASTOLIC BLOOD PRESSURE: 58 MMHG | OXYGEN SATURATION: 98 % | BODY MASS INDEX: 32.28 KG/M2 | WEIGHT: 200 LBS

## 2018-11-27 DIAGNOSIS — K37 APPENDICITIS: Primary | ICD-10-CM

## 2018-11-27 LAB
ALBUMIN SERPL BCP-MCNC: 4.6 G/DL (ref 3.5–5.7)
ALP SERPL-CCNC: 92 U/L (ref 40–150)
ALT SERPL W P-5'-P-CCNC: 43 U/L (ref 7–52)
ANION GAP SERPL CALCULATED.3IONS-SCNC: 11 MMOL/L (ref 4–13)
AST SERPL W P-5'-P-CCNC: 19 U/L (ref 13–39)
BASOPHILS # BLD AUTO: 0.1 THOUSANDS/ΜL (ref 0–0.1)
BASOPHILS NFR BLD AUTO: 1 % (ref 0–1)
BILIRUB SERPL-MCNC: 0.8 MG/DL (ref 0.2–1)
BILIRUB UR QL STRIP: NEGATIVE
BUN SERPL-MCNC: 22 MG/DL (ref 7–25)
CALCIUM SERPL-MCNC: 9.6 MG/DL (ref 8.6–10.5)
CHLORIDE SERPL-SCNC: 96 MMOL/L (ref 98–107)
CLARITY UR: CLEAR
CO2 SERPL-SCNC: 25 MMOL/L (ref 21–31)
COLOR UR: YELLOW
CREAT SERPL-MCNC: 1.29 MG/DL (ref 0.7–1.3)
EOSINOPHIL # BLD AUTO: 0.1 THOUSAND/ΜL (ref 0–0.61)
EOSINOPHIL NFR BLD AUTO: 1 % (ref 0–6)
ERYTHROCYTE [DISTWIDTH] IN BLOOD BY AUTOMATED COUNT: 12.8 % (ref 11.6–15.1)
GFR SERPL CREATININE-BSD FRML MDRD: 65 ML/MIN/1.73SQ M
GLUCOSE SERPL-MCNC: 133 MG/DL (ref 65–140)
GLUCOSE UR STRIP-MCNC: NEGATIVE MG/DL
HCT VFR BLD AUTO: 47.5 % (ref 42–52)
HGB BLD-MCNC: 16.4 G/DL (ref 12–17)
HGB UR QL STRIP.AUTO: NEGATIVE
KETONES UR STRIP-MCNC: NEGATIVE MG/DL
LEUKOCYTE ESTERASE UR QL STRIP: NEGATIVE
LYMPHOCYTES # BLD AUTO: 1.4 THOUSANDS/ΜL (ref 0.6–4.47)
LYMPHOCYTES NFR BLD AUTO: 11 % (ref 14–44)
MCH RBC QN AUTO: 31 PG (ref 26.8–34.3)
MCHC RBC AUTO-ENTMCNC: 34.4 G/DL (ref 31.4–37.4)
MCV RBC AUTO: 90 FL (ref 82–98)
MONOCYTES # BLD AUTO: 0.9 THOUSAND/ΜL (ref 0.17–1.22)
MONOCYTES NFR BLD AUTO: 7 % (ref 4–12)
NEUTROPHILS # BLD AUTO: 9.9 THOUSANDS/ΜL (ref 1.85–7.62)
NEUTS SEG NFR BLD AUTO: 80 % (ref 43–75)
NITRITE UR QL STRIP: NEGATIVE
NRBC BLD AUTO-RTO: 0 /100 WBCS
PH UR STRIP.AUTO: 6 [PH] (ref 5–8)
PLATELET # BLD AUTO: 292 THOUSANDS/UL (ref 149–390)
PMV BLD AUTO: 8.8 FL (ref 8.9–12.7)
POTASSIUM SERPL-SCNC: 3.9 MMOL/L (ref 3.5–5.5)
PROT SERPL-MCNC: 7.3 G/DL (ref 6.4–8.9)
PROT UR STRIP-MCNC: NEGATIVE MG/DL
RBC # BLD AUTO: 5.29 MILLION/UL (ref 3.88–5.62)
SODIUM SERPL-SCNC: 132 MMOL/L (ref 134–143)
SP GR UR STRIP.AUTO: >=1.03 (ref 1–1.03)
UROBILINOGEN UR QL STRIP.AUTO: 0.2 E.U./DL
WBC # BLD AUTO: 12.3 THOUSAND/UL (ref 4.31–10.16)

## 2018-11-27 PROCEDURE — 96365 THER/PROPH/DIAG IV INF INIT: CPT

## 2018-11-27 PROCEDURE — 81003 URINALYSIS AUTO W/O SCOPE: CPT | Performed by: EMERGENCY MEDICINE

## 2018-11-27 PROCEDURE — 99285 EMERGENCY DEPT VISIT HI MDM: CPT

## 2018-11-27 PROCEDURE — 80053 COMPREHEN METABOLIC PANEL: CPT | Performed by: EMERGENCY MEDICINE

## 2018-11-27 PROCEDURE — 74176 CT ABD & PELVIS W/O CONTRAST: CPT

## 2018-11-27 PROCEDURE — 96361 HYDRATE IV INFUSION ADD-ON: CPT

## 2018-11-27 PROCEDURE — 85025 COMPLETE CBC W/AUTO DIFF WBC: CPT | Performed by: EMERGENCY MEDICINE

## 2018-11-27 PROCEDURE — 36415 COLL VENOUS BLD VENIPUNCTURE: CPT | Performed by: EMERGENCY MEDICINE

## 2018-11-27 RX ORDER — SODIUM CHLORIDE 9 MG/ML
125 INJECTION, SOLUTION INTRAVENOUS CONTINUOUS
Status: DISCONTINUED | OUTPATIENT
Start: 2018-11-27 | End: 2018-11-28 | Stop reason: HOSPADM

## 2018-11-27 RX ADMIN — SODIUM CHLORIDE 125 ML/HR: 0.9 INJECTION, SOLUTION INTRAVENOUS at 21:49

## 2018-11-27 RX ADMIN — Medication 3.38 G: at 23:17

## 2018-11-28 ENCOUNTER — HOSPITAL ENCOUNTER (OUTPATIENT)
Facility: HOSPITAL | Age: 48
LOS: 1 days | Discharge: HOME | End: 2018-11-28
Attending: SURGERY | Admitting: SURGERY
Payer: COMMERCIAL

## 2018-11-28 ENCOUNTER — ANESTHESIA (INPATIENT)
Dept: PERIOP | Facility: HOSPITAL | Age: 48
End: 2018-11-28
Payer: COMMERCIAL

## 2018-11-28 ENCOUNTER — ANESTHESIA EVENT (INPATIENT)
Dept: PERIOP | Facility: HOSPITAL | Age: 48
End: 2018-11-28
Payer: COMMERCIAL

## 2018-11-28 VITALS
OXYGEN SATURATION: 92 % | HEART RATE: 104 BPM | SYSTOLIC BLOOD PRESSURE: 114 MMHG | DIASTOLIC BLOOD PRESSURE: 77 MMHG | BODY MASS INDEX: 32.3 KG/M2 | RESPIRATION RATE: 19 BRPM | TEMPERATURE: 98.9 F | HEIGHT: 66 IN | WEIGHT: 201 LBS

## 2018-11-28 DIAGNOSIS — K35.30 ACUTE APPENDICITIS WITH LOCALIZED PERITONITIS, WITHOUT PERFORATION, ABSCESS, OR GANGRENE: Primary | ICD-10-CM

## 2018-11-28 PROCEDURE — 44970 LAPAROSCOPY APPENDECTOMY: CPT | Performed by: SURGERY

## 2018-11-28 PROCEDURE — 88304 TISSUE EXAM BY PATHOLOGIST: CPT | Performed by: PATHOLOGY

## 2018-11-28 RX ORDER — KETOROLAC TROMETHAMINE 30 MG/ML
15 INJECTION, SOLUTION INTRAMUSCULAR; INTRAVENOUS EVERY 6 HOURS SCHEDULED
Status: DISCONTINUED | OUTPATIENT
Start: 2018-11-28 | End: 2018-11-28 | Stop reason: HOSPADM

## 2018-11-28 RX ORDER — BUPIVACAINE HYDROCHLORIDE 5 MG/ML
INJECTION, SOLUTION PERINEURAL AS NEEDED
Status: DISCONTINUED | OUTPATIENT
Start: 2018-11-28 | End: 2018-11-28 | Stop reason: HOSPADM

## 2018-11-28 RX ORDER — OXYCODONE HYDROCHLORIDE AND ACETAMINOPHEN 5; 325 MG/1; MG/1
2 TABLET ORAL EVERY 4 HOURS PRN
Status: DISCONTINUED | OUTPATIENT
Start: 2018-11-28 | End: 2018-11-28 | Stop reason: HOSPADM

## 2018-11-28 RX ORDER — OXYCODONE HYDROCHLORIDE AND ACETAMINOPHEN 5; 325 MG/1; MG/1
1 TABLET ORAL EVERY 4 HOURS PRN
Qty: 15 TABLET | Refills: 0 | Status: SHIPPED | OUTPATIENT
Start: 2018-11-28 | End: 2018-12-08

## 2018-11-28 RX ORDER — MIDAZOLAM HYDROCHLORIDE 1 MG/ML
INJECTION INTRAMUSCULAR; INTRAVENOUS AS NEEDED
Status: DISCONTINUED | OUTPATIENT
Start: 2018-11-28 | End: 2018-11-28 | Stop reason: SURG

## 2018-11-28 RX ORDER — PROPOFOL 10 MG/ML
INJECTION, EMULSION INTRAVENOUS AS NEEDED
Status: DISCONTINUED | OUTPATIENT
Start: 2018-11-28 | End: 2018-11-28 | Stop reason: SURG

## 2018-11-28 RX ORDER — ROCURONIUM BROMIDE 10 MG/ML
INJECTION, SOLUTION INTRAVENOUS AS NEEDED
Status: DISCONTINUED | OUTPATIENT
Start: 2018-11-28 | End: 2018-11-28 | Stop reason: SURG

## 2018-11-28 RX ORDER — SODIUM CHLORIDE, SODIUM LACTATE, POTASSIUM CHLORIDE, CALCIUM CHLORIDE 600; 310; 30; 20 MG/100ML; MG/100ML; MG/100ML; MG/100ML
125 INJECTION, SOLUTION INTRAVENOUS CONTINUOUS
Status: DISCONTINUED | OUTPATIENT
Start: 2018-11-28 | End: 2018-11-28 | Stop reason: HOSPADM

## 2018-11-28 RX ORDER — FENTANYL CITRATE 50 UG/ML
INJECTION, SOLUTION INTRAMUSCULAR; INTRAVENOUS AS NEEDED
Status: DISCONTINUED | OUTPATIENT
Start: 2018-11-28 | End: 2018-11-28 | Stop reason: SURG

## 2018-11-28 RX ORDER — HYDROMORPHONE HCL/PF 1 MG/ML
0.5 SYRINGE (ML) INJECTION
Status: DISCONTINUED | OUTPATIENT
Start: 2018-11-28 | End: 2018-11-28 | Stop reason: HOSPADM

## 2018-11-28 RX ORDER — ONDANSETRON 2 MG/ML
INJECTION INTRAMUSCULAR; INTRAVENOUS AS NEEDED
Status: DISCONTINUED | OUTPATIENT
Start: 2018-11-28 | End: 2018-11-28 | Stop reason: SURG

## 2018-11-28 RX ORDER — MAGNESIUM HYDROXIDE 1200 MG/15ML
LIQUID ORAL AS NEEDED
Status: DISCONTINUED | OUTPATIENT
Start: 2018-11-28 | End: 2018-11-28 | Stop reason: HOSPADM

## 2018-11-28 RX ORDER — LIDOCAINE HYDROCHLORIDE 10 MG/ML
INJECTION, SOLUTION INFILTRATION; PERINEURAL AS NEEDED
Status: DISCONTINUED | OUTPATIENT
Start: 2018-11-28 | End: 2018-11-28 | Stop reason: SURG

## 2018-11-28 RX ADMIN — ROCURONIUM BROMIDE 50 MG: 10 INJECTION INTRAVENOUS at 06:45

## 2018-11-28 RX ADMIN — FENTANYL CITRATE 50 MCG: 50 INJECTION INTRAMUSCULAR; INTRAVENOUS at 06:45

## 2018-11-28 RX ADMIN — Medication 3.38 G: at 05:54

## 2018-11-28 RX ADMIN — LIDOCAINE HYDROCHLORIDE 100 MG: 10 INJECTION, SOLUTION INFILTRATION; PERINEURAL at 06:45

## 2018-11-28 RX ADMIN — SUGAMMADEX 200 MG: 100 INJECTION, SOLUTION INTRAVENOUS at 07:40

## 2018-11-28 RX ADMIN — MIDAZOLAM HYDROCHLORIDE 1 MG: 1 INJECTION, SOLUTION INTRAMUSCULAR; INTRAVENOUS at 06:35

## 2018-11-28 RX ADMIN — ONDANSETRON HYDROCHLORIDE 4 MG: 2 INJECTION, SOLUTION INTRAVENOUS at 07:25

## 2018-11-28 RX ADMIN — MIDAZOLAM HYDROCHLORIDE 1 MG: 1 INJECTION, SOLUTION INTRAMUSCULAR; INTRAVENOUS at 06:40

## 2018-11-28 RX ADMIN — PROPOFOL 200 MG: 10 INJECTION, EMULSION INTRAVENOUS at 06:45

## 2018-11-28 RX ADMIN — FENTANYL CITRATE 50 MCG: 50 INJECTION INTRAMUSCULAR; INTRAVENOUS at 07:05

## 2018-11-28 RX ADMIN — SODIUM CHLORIDE, POTASSIUM CHLORIDE, SODIUM LACTATE AND CALCIUM CHLORIDE 125 ML/HR: 600; 310; 30; 20 INJECTION, SOLUTION INTRAVENOUS at 01:54

## 2018-11-28 RX ADMIN — PROPOFOL 100 MG: 10 INJECTION, EMULSION INTRAVENOUS at 07:35

## 2018-11-28 RX ADMIN — DEXAMETHASONE SODIUM PHOSPHATE 8 MG: 10 INJECTION INTRAMUSCULAR; INTRAVENOUS at 06:55

## 2018-11-28 RX ADMIN — SODIUM CHLORIDE, POTASSIUM CHLORIDE, SODIUM LACTATE AND CALCIUM CHLORIDE: 600; 310; 30; 20 INJECTION, SOLUTION INTRAVENOUS at 07:15

## 2018-11-28 RX ADMIN — SODIUM CHLORIDE, POTASSIUM CHLORIDE, SODIUM LACTATE AND CALCIUM CHLORIDE: 600; 310; 30; 20 INJECTION, SOLUTION INTRAVENOUS at 06:45

## 2018-11-28 NOTE — EMTALA/ACUTE CARE TRANSFER
[facility]   [address] [place] [id number]  [phone]  Dept: [phone]      [id number] TRANSFER CONSENT    NAME Hipolito CASTILLO 1970                              MRN [id number]    I have been informed of my rights regarding examination, treatment, and transfer   by Dr Angelica Day MD    Benefits: Specialized equipment and/or services available at the receiving facility (Include comment)________________________    Risks: Potential for delay in receiving treatment      Transfer Request   I acknowledge that my medical condition has been evaluated and explained to me by the emergency department physician or other qualified medical person and/or my attending physician who has recommended and offered to me further medical examination and treatment  I understand the Hospital's obligation with respect to the treatment and stabilization of my emergency medical condition  I nevertheless request to be transferred  I release the Hospital, the doctor, and any other persons caring for me from all responsibility or liability for any injury or ill effects that may result from my transfer and agree to accept all responsibility for the consequences of my choice to transfer, rather than receive stabilizing treatment at the Hospital  I understand that because the transfer is my request, my insurance may not provide reimbursement for the services  The Hospital will assist and direct me and my family in how to make arrangements for transfer, but the hospital is not liable for any fees charged by the transport service  In spite of this understanding, I refuse to consent to further medical examination and treatment which has been offered to me, and request transfer to    I authorize the performance of emergency medical procedures and treatments upon me in both transit and upon arrival at the receiving facility    Additionally, I authorize the release of any and all medical records to the receiving facility and request they be transported with me, if possible  I authorize the performance of emergency medical procedures and treatments upon me in both transit and upon arrival at the receiving facility  Additionally, I authorize the release of any and all medical records to the receiving facility and request they be transported with me, if possible  I understand that the safest mode of transportation during a medical emergency is an ambulance and that the Hospital advocates the use of this mode of transport  Risks of traveling to the receiving facility by car, including absence of medical control, life sustaining equipment, such as oxygen, and medical personnel has been explained to me and I fully understand them  (JOE CORRECT BOX BELOW)  [  ]  I consent to the stated transfer and to be transported by ambulance/helicopter  [  ]  I consent to the stated transfer, but refuse transportation by ambulance and accept full responsibility for my transportation by car  I understand the risks of non-ambulance transfers and I exonerate the Hospital and its staff from any deterioration in my condition that results from this refusal     X___________________________________________    DATE  18  TIME________  Signature of patient or legally responsible individual signing on patient behalf           RELATIONSHIP TO PATIENT_________________________          Provider Certification    NAME Memo Jay                                        [facility] 1970                              MRN [id number]    A medical screening exam was performed on the above named patient  Based on the examination:    Condition Necessitating Transfer The encounter diagnosis was Appendicitis      Patient Condition: The patient has been stabilized such that within reasonable medical probability, no material deterioration of the patient condition or the condition of the unborn child(raffi) is likely to result from the transfer    Reason for Transfer: Level of Care needed not available at this facility    Transfer Requirements: Facility     · Space available and qualified personnel available for treatment as acknowledged by    · Agreed to accept transfer and to provide appropriate medical treatment as acknowledged by          · Appropriate medical records of the examination and treatment of the patient are provided at the time of transfer   [facility], [address] _______  · Transfer will be performed by qualified personnel from    and appropriate transfer equipment as required, including the use of necessary and appropriate life support measures  Provider Certification: I have examined the patient and explained the following risks and benefits of being transferred/refusing transfer to the patient/family:  General risk, such as traffic hazards, adverse weather conditions, rough terrain or turbulence, possible failure of equipment (including vehicle or aircraft), or consequences of actions of persons outside the control of the transport personnel      Based on these reasonable risks and benefits to the patient and/or the unborn child(raffi), and based upon the information available at the time of the patients examination, I certify that the medical benefits reasonably to be expected from the provision of appropriate medical treatments at another medical facility outweigh the increasing risks, if any, to the individuals medical condition, and in the case of labor to the unborn child, from effecting the transfer      X____________________________________________ DATE 11/27/18        TIME_______      ORIGINAL - SEND TO MEDICAL RECORDS   COPY - SEND WITH PATIENT DURING TRANSFER

## 2018-11-28 NOTE — ANESTHESIA POSTPROCEDURE EVALUATION
Post-Op Assessment Note      CV Status:  Stable    Mental Status:  Alert    Hydration Status:  Stable    PONV Controlled:  Controlled    Airway Patency:  Patent    Post Op Vitals Reviewed: Yes          Staff: Anesthesiologist           BP (P) 95/52 (11/28/18 0808)    Temp (P) 99 8 °F (37 7 °C) (11/28/18 0808)    Pulse (!) (P) 108 (11/28/18 0808)   Resp (!) (P) 24 (11/28/18 0808)    SpO2 (P) 95 % (11/28/18 0808)

## 2018-11-28 NOTE — DISCHARGE INSTRUCTIONS
Laparoscopic Appendectomy   WHAT YOU NEED TO KNOW:   Laparoscopic appendectomy is surgery to remove your appendix  During this surgery, small incisions are made in your abdomen  A small scope and special tools are inserted through these incisions  A scope is a flexible tube with a light and camera on the end  DISCHARGE INSTRUCTIONS:   Medicines:   · Pain medicine: You may need medicine to take away or decrease pain  ¨ Learn how to take your medicine  Ask what medicine and how much you should take  Be sure you know how, when, and how often to take it  ¨ Do not wait until the pain is severe before you take your medicine  Tell caregivers if your pain does not decrease  ¨ Pain medicine can make you dizzy or sleepy  Prevent falls by calling someone when you get out of bed or if you need help  · Antibiotics: This medicine is given to fight or prevent an infection caused by bacteria  Always take your antibiotics exactly as ordered by your healthcare provider  Do not stop taking your medicine unless directed by your healthcare provider  Never save antibiotics or take leftover antibiotics that were given to you for another illness  · Take your medicine as directed  Contact your healthcare provider if you think your medicine is not helping or if you have side effects  Tell him or her if you are allergic to any medicine  Keep a list of the medicines, vitamins, and herbs you take  Include the amounts, and when and why you take them  Bring the list or the pill bottles to follow-up visits  Carry your medicine list with you in case of an emergency  Follow up with your healthcare provider as directed:  Write down your questions so you remember to ask them during your visits  Eat healthy foods:  Choose healthy foods from all the food groups every day  Include whole-grain bread, cereal, rice, and pasta  Eat a variety of fruits and vegetables, including dark green and orange vegetables   Include dairy products such as low-fat milk, yogurt, and cheese  Choose protein sources, such as lean beef and chicken, fish, beans, eggs, and nuts  Ask how many servings of fats, oils, and sweets you should have each day, and if you need to be on a special diet  Wound care:  When you are allowed to bathe or shower, carefully wash the incisions with soap and water  If you have bandages, change them any time they get wet or dirty  Ask your healthcare provider for more information about wound care  Contact your healthcare provider if:   · You are not able to have a bowel movement  · You have a fever  · You have chills, a cough, or feel weak and achy  · You have nausea or vomiting  · You have questions or concerns about your surgery, condition, or care  Seek care immediately or call 911 if:   · Blood soaks through your bandage  · You feel full and cannot burp or vomit  · You have pus or a foul-smelling odor coming from your wound  · Your vomit is greenish, looks like coffee grounds, or has blood in it  © 2017 [facility] Information is for End User's use only and may not be sold, redistributed or otherwise used for commercial purposes  All illustrations and images included in CareNotes® are the copyrighted property of [employer]  or [employer]  The above information is an  only  It is not intended as medical advice for individual conditions or treatments  Talk to your doctor, nurse or pharmacist before following any medical regimen to see if it is safe and effective for you

## 2018-11-28 NOTE — ED PROVIDER NOTES
History  Chief Complaint   Patient presents with    Abdominal Pain     Patient is a he 45-year-old male with history of hypertension and COPD who says that he has had pain in the right lower quadrant for the past 2 days  He says that his appetite has been normal and he has had no fever chills  He has been nauseous has vomited moves about normally  Patient says the pain is worse when he walks around  The pain is becoming worse he comes in this evening for evaluation            Prior to Admission Medications   Prescriptions Last Dose Informant Patient Reported? Taking?   aspirin 325 mg tablet   Yes No   Sig: Take 325 mg by mouth daily   atorvastatin (LIPITOR) 10 mg tablet   Yes No   Sig: Take 10 mg by mouth every evening   ibuprofen (MOTRIN) 600 mg tablet   No No   Sig: Take 1 tablet (600 mg total) by mouth every 8 (eight) hours as needed for mild pain (with food)   lisinopril-hydrochlorothiazide (PRINZIDE,ZESTORETIC) 20-25 MG per tablet   Yes No   Sig: Take 1 tablet by mouth daily   methocarbamol (ROBAXIN) 500 mg tablet   No No   Sig: Take 1 tablet (500 mg total) by mouth 2 (two) times a day      Facility-Administered Medications: None       Past Medical History:   Diagnosis Date    Back pain     COPD (chronic obstructive pulmonary disease) (HCC)     Hyperlipidemia     Hypertension     Inguinal hernia, left     Mild heartburn     Obesity     Stroke ([address] ) approx 2015    Denies residual problems  Initially he only had trouble with two fingers on left side   Wears glasses        Past Surgical History:   Procedure Laterality Date    NO PAST SURGERIES      [place] LAP,INGUINAL HERNIA REPR,INITIAL Left 4/2/2018    Procedure: LAPAROSCOPIC INGUINAL HERNIA REPAIR WITH MESH;  Surgeon: Nydia Hendrickson MD;  Location: [facility];  Service: General       History reviewed  No pertinent family history  I have reviewed and agree with the history as documented      Social History   Substance Use Topics    Smoking status: Former Smoker     Packs/day: 1 00     Years: 15 00     Types: Cigarettes     Quit date: 2010    Smokeless tobacco: Never Used    Alcohol use No        Review of Systems   Constitutional: Negative for chills, fatigue, fever and unexpected weight change  HENT: Negative for congestion and nosebleeds  Eyes: Negative for visual disturbance  Respiratory: Negative for chest tightness and shortness of breath  Cardiovascular: Negative for chest pain, palpitations and leg swelling  Gastrointestinal: Negative for abdominal pain, blood in stool, diarrhea, nausea and vomiting  Endocrine: Negative for cold intolerance and heat intolerance  Genitourinary: Negative for difficulty urinating  Musculoskeletal: Negative for arthralgias, back pain, gait problem, joint swelling and myalgias  Skin: Negative for rash  Neurological: Negative for dizziness, speech difficulty, weakness and headaches  Psychiatric/Behavioral: Negative for behavioral problems, confusion, self-injury and suicidal ideas  All other systems reviewed and are negative  Physical Exam  Physical Exam   Constitutional: He is oriented to person, place, and time  He appears well-developed and well-nourished  HENT:   Head: Normocephalic and atraumatic  Nose: Nose normal    Eyes: Pupils are equal, round, and reactive to light  EOM are normal    Neck: Normal range of motion  Neck supple  Cardiovascular: Normal rate, regular rhythm and normal heart sounds  Exam reveals no gallop and no friction rub  No murmur heard  Pulmonary/Chest: Effort normal and breath sounds normal  No respiratory distress  He has no wheezes  He has no rales  Abdominal: Soft  He exhibits no distension  There is no tenderness  There is no rebound and no guarding  Patient has moderate tenderness in the right lower quadrant  There is no rebound or guarding  He does not have CVA tenderness  Negative Rovsing's   Musculoskeletal: Normal range of motion  He exhibits no edema  Neurological: He is alert and oriented to person, place, and time  Skin: Skin is warm and dry  Psychiatric: He has a normal mood and affect  His behavior is normal  Judgment and thought content normal    Nursing note and vitals reviewed  Vital Signs  ED Triage Vitals   Temperature Pulse Respirations Blood Pressure SpO2   11/27/18 2124 11/27/18 2126 11/27/18 2126 11/27/18 2126 11/27/18 2126   98 7 °F (37 1 °C) 96 16 105/59 98 %      Temp src Heart Rate Source Patient Position - Orthostatic VS BP Location FiO2 (%)   -- -- -- -- --             Pain Score       11/27/18 2126       3           Vitals:    11/27/18 2126 11/27/18 2327   BP: 105/59 95/58   Pulse: 96 78       Visual Acuity      ED Medications  Medications   sodium chloride 0 9 % infusion (125 mL/hr Intravenous New Bag 11/27/18 2149)   piperacillin-tazobactam (ZOSYN) IVPB 3 375 g (3 375 g Intravenous New Bag 11/27/18 2317)       Diagnostic Studies  Results Reviewed     Procedure Component Value Units Date/Time    Comprehensive metabolic panel [[id number]]  (Abnormal) Collected:  11/27/18 2149    Lab Status:  Final result Specimen:  Blood from Arm, Right Updated:  11/27/18 2222     Sodium 132 (L) mmol/L      Potassium 3 9 mmol/L      Chloride 96 (L) mmol/L      CO2 25 mmol/L      ANION GAP 11 mmol/L      BUN 22 mg/dL      Creatinine 1 29 mg/dL      Glucose 133 mg/dL      Calcium 9 6 mg/dL      AST 19 U/L      ALT 43 U/L      Alkaline Phosphatase 92 U/L      Total Protein 7 3 g/dL      Albumin 4 6 g/dL      Total Bilirubin 0 80 mg/dL      eGFR 65 ml/min/1 73sq m     Narrative:         National Kidney Disease Education Program recommendations are as follows:  GFR calculation is accurate only with a steady state creatinine  Chronic Kidney disease less than 60 ml/min/1 73 sq  meters  Kidney failure less than 15 ml/min/1 73 sq  meters      UA w Reflex to Microscopic [[id number]]  (Normal) Collected:  11/27/18 2149    Lab Status:  Final result Specimen:  Urine from Urine, Clean Catch Updated:  11/27/18 2204     Color, UA Yellow     Clarity, UA Clear     Specific Gravity, UA >=1 030     pH, UA 6 0     Leukocytes, UA Negative     Nitrite, UA Negative     Protein, UA Negative mg/dl      Glucose, UA Negative mg/dl      Ketones, UA Negative mg/dl      Urobilinogen, UA 0 2 E U /dl      Bilirubin, UA Negative     Blood, UA Negative    CBC and differential [[id number]]  (Abnormal) Collected:  11/27/18 2149    Lab Status:  Final result Specimen:  Blood from Arm, Right Updated:  11/27/18 2203     WBC 12 30 (H) Thousand/uL      RBC 5 29 Million/uL      Hemoglobin 16 4 g/dL      Hematocrit 47 5 %      MCV 90 fL      MCH 31 0 pg      MCHC 34 4 g/dL      RDW 12 8 %      MPV 8 8 (L) fL      Platelets 572 Thousands/uL      nRBC 0 /100 WBCs      Neutrophils Relative 80 (H) %      Lymphocytes Relative 11 (L) %      Monocytes Relative 7 %      Eosinophils Relative 1 %      Basophils Relative 1 %      Neutrophils Absolute 9 90 (H) Thousands/µL      Lymphocytes Absolute 1 40 Thousands/µL      Monocytes Absolute 0 90 Thousand/µL      Eosinophils Absolute 0 10 Thousand/µL      Basophils Absolute 0 10 Thousands/µL                  CT abdomen pelvis wo contrast   Final Result by Zack Matos (11/27 2258)      1  Acute appendicitis  Case discussed with   [facility] at approximately 10:55 PM on 11/27/2018  Signed by LUPIS Orourke  Procedures  Procedures       Phone Contacts  ED Phone Contact    ED Course  ED Course as of Nov 27 2354   Tue Nov 27, 2018   2300 Discussed with Dr Álvarez    Will admit  to the hospital CT abdomen pelvis wo contrast                               MDM  CritCare Time    Disposition  Final diagnoses:   Appendicitis     Time reflects when diagnosis was documented in both MDM as applicable and the Disposition within this note     Time User Action Codes Description Comment    11/27/2018 11:02 PM Savanna Garcia Appendicitis       ED Disposition     ED Disposition Condition Comment    Transfer to Another Facility  Laxmi Rater should be transferred out to [facility]      MD Documentation      Most Recent Value   Patient Condition  The patient has been stabilized such that within reasonable medical probability, no material deterioration of the patient condition or the condition of the unborn child(raffi) is likely to result from the transfer   Reason for Transfer  Level of Care needed not available at this facility   Benefits of Transfer  Specialized equipment and/or services available at the receiving facility (Include comment)________________________   Risks of Transfer  Potential for delay in receiving treatment   Provider Certification  General risk, such as traffic hazards, adverse weather conditions, rough terrain or turbulence, possible failure of equipment (including vehicle or aircraft), or consequences of actions of persons outside the control of the transport personnel      Follow-up Information    None         Patient's Medications   Discharge Prescriptions    No medications on file     No discharge procedures on file      ED Provider  Electronically Signed by           Neftali Fournier MD  11/27/18 6442

## 2018-11-28 NOTE — ANESTHESIA PREPROCEDURE EVALUATION
Review of Systems/Medical History  Patient summary reviewed  Chart reviewed  No history of anesthetic complications     Cardiovascular  EKG reviewed, Exercise tolerance (METS): >4,  Hyperlipidemia, Hypertension ,    Pulmonary  COPD ,        GI/Hepatic            Endo/Other     GYN       Hematology   Musculoskeletal       Neurology    CVA , no residual symptoms,    Psychology           Physical Exam    Airway    Mallampati score: II  TM Distance: <3 FB  Neck ROM: full     Dental       Cardiovascular  Rhythm: regular, Rate: normal,     Pulmonary  Breath sounds clear to auscultation,     Other Findings  Poor deantition  Anesthesia Plan  ASA Score- 3     Anesthesia Type- general with ASA Monitors  Additional Monitors:   Airway Plan: ETT  Plan Factors-  Patient did not smoke on day of surgery  Induction- intravenous and rapid sequence induction  Postoperative Plan- Plan for postoperative opioid use  Planned trial extubation    Informed Consent- Anesthetic plan and risks discussed with patient

## 2018-11-28 NOTE — OP NOTE
OPERATIVE REPORT  PATIENT NAME: Andreina Radford    :  1970  MRN: [id number]  Pt Location: [facility] OR ROOM 01    SURGERY DATE: 2018    Surgeon(s) and Role:     Theresia Dancer, MD - Primary    Preop Diagnosis:  Acute appendicitis with localized peritonitis, without perforation, abscess, or gangrene [K35 30]    Post-Op Diagnosis Codes:     * Acute appendicitis with localized peritonitis, without perforation, abscess, or gangrene [K35 30]    Procedure(s) (LRB):  APPENDECTOMY LAPAROSCOPIC (N/A)    Specimen(s):  ID Type Source Tests Collected by Time Destination   1 : appendix Tissue Appendix TISSUE Meka Reardon MD 2018 0729        Estimated Blood Loss:   Minimal    Drains:  [REMOVED] Urethral Catheter Latex (Removed)   Site Assessment Clean;Skin intact 2018  7:52 AM   Collection Container Standard drainage bag 2018  7:52 AM   Securement Method Securing device (Describe) 2018  7:52 AM   Output (mL) 150 mL 2018  7:52 AM   Number of days: 0       Anesthesia Type:   General    Operative Indications:  Acute appendicitis with localized peritonitis, without perforation, abscess, or gangrene [K35 30]  The patient is a pleasant 45-year-old male presenting with signs and symptoms of acute appendicitis for which definitive treatment by laparoscopic appendectomy is now indicated  Operative Findings: At the time of laparoscopy, 4 quadrants of the abdomen were inspected  The right upper quadrant was normal   The left upper quadrant was normal   The left lower quadrant was normal   The right lower quadrant harbor an acutely inflamed appendix  A laparoscopic appendectomy performed in the routine fashion after which the right lower quadrant wound inspected  The good hemostasis was found and the procedure completed uneventfully      Complications:   None    Procedure and Technique:  The patient was taken to the operating room where they were properly identified monitored and anesthetized  They received antibiotics perioperatively  Venodyne's used for DVT prophylaxis  Herrera catheter placed preoperatively  Abdomen prepped and draped under sterile conditions using aseptic technique  Timeout performed  Skin incised at the umbilicus  Peritoneal cavity entered bluntly with a Usha clamp  12 mm trocar inserted and pneumoperitoneum created to 15 mmHg  5 mm 30° scope advanced  4 quadrants of the abdomen and inspected laparoscopically as well as the pelvis  Patient placed in steep Trendelenburg, left side down  2 additional working ports placed  These were 5 mm ports in the hypogastrium and left lower quadrant  The acutely inflamed appendix exposed  It was grasped  Window made in the mesoappendix  The mesoappendix divided with the Endo LEE ANN stapler  The base of the appendix controlled with the Endo LEE ANN stapler  The appendix placed in an Endobag and delivered through the umbilical trocar site  Pneumoperitoneum reestablished  The right lower quadrant wound irrigated and aspirated as was the pelvis  All pooled liquid aspirated out of the right upper quadrant  The patient flattened  Procedure completed with closure of the fascial defect at the umbilicus with a figure-of-eight suture of 0 Vicryl  Skin closed with subcuticular 4-0 Monocryl suture  Wounds infiltrated with half percent Marcaine and dressed sterilely  The patient extubated and taken to recovery in stable condition  Patient tolerated the procedure well       I was present for the entire procedure    Patient Disposition:  PACU     SIGNATURE: Ana Red MD  DATE: November 28, 2018  TIME: 9:02 AM

## 2018-11-28 NOTE — DISCHARGE INSTR - AVS FIRST PAGE
Discharge Instructions  Light activity for 2 weeks  No heavy lifting for 2 weeks  Max 10 lbs for 2 weeks  No driving for 3-7 days or until pain is well controlled  Remove dressing in 3-5 days  Surgical glue will fall off with time  You may shower over dressing, replace if soiled  Take discharge medications as prescribed  Notify our office for nausea, vomiting, fever, diarrhea, chest pain, trouble breathing  Follow up in our office in 2 weeks or sooner if needed    Call with additional questions or concerns [phone]

## 2018-11-28 NOTE — H&P
H&P Exam - General Surgery   Quinn Hatchet 50 y o  male MRN: [id number]  Unit/Bed#: -01 Encounter: [id number]    Assessment/Plan     Assessment:  The patient is a 68-year-old male presenting to the hospital signs and symptoms of acute appendicitis for which definitive treatment by laparoscopic appendectomy is now indicated  Plan:  The options, benefits, risks and alternatives to laparoscopic appendectomy were explained to the patient including the option for antibiotic therapy alone as an alternative to surgery  Risks related to anesthesia, bleeding, infection in 5-10% of cases necessitating additional surgical interventions were all explained in clear simple terms  All questions answered to the satisfaction of the patient and informed consent obtained to proceed  History of Present Illness     HPI:  Quinn Hatchet is a 50 y o  male who presents with signs and symptoms of acute appendicitis  Review of Systems   Constitutional: Positive for activity change and appetite change  HENT: Negative  Eyes: Negative  Respiratory: Negative  Cardiovascular: Negative  Gastrointestinal: Positive for abdominal distention and abdominal pain  Endocrine: Negative  Genitourinary: Negative  Musculoskeletal: Negative  Skin: Negative  Allergic/Immunologic: Negative  Neurological: Negative  Hematological: Negative  Psychiatric/Behavioral: Negative  Historical Information   Past Medical History:   Diagnosis Date    Back pain     [facility]     Hyperlipidemia     Hypertension     Inguinal hernia, left     Mild heartburn     Obesity     Stroke ([facility] [address] ) approx 2015    Denies residual problems  Initially he only had trouble with two fingers on left side      Wears glasses      Past Surgical History:   Procedure Laterality Date    NO PAST SURGERIES      [place] LAP,INGUINAL HERNIA REPR,INITIAL Left 4/2/2018    Procedure: LAPAROSCOPIC INGUINAL HERNIA REPAIR WITH MESH;  Surgeon: Myesha Singh MD;  Location: [facility];  Service: General     Social History   History   Alcohol Use No     History   Drug Use No     History   Smoking Status    Former Smoker    Packs/day: 1 00    Years: 15 00    Types: Cigarettes    Quit date: 2010   Smokeless Tobacco    Never Used     Family History: non-contributory    Meds/Allergies   all medications and allergies reviewed  No Known Allergies    Objective   First Vitals:   Blood Pressure: 93/71 (11/28/18 0055)  Pulse: 99 (11/28/18 0055)  Temperature: 97 8 °F (36 6 °C) (11/28/18 0055)  Temp Source: Temporal (11/28/18 0055)  Respirations: 18 (11/28/18 0055)  Height: 5' 6" (167 6 cm) (11/28/18 0055)  Weight - Scale: 91 2 kg (201 lb) (11/28/18 0055)  SpO2: 95 % (11/28/18 0055)    Current Vitals:   Blood Pressure: 93/71 (11/28/18 0055)  Pulse: 99 (11/28/18 0055)  Temperature: 97 8 °F (36 6 °C) (11/28/18 0055)  Temp Source: Temporal (11/28/18 0055)  Respirations: 18 (11/28/18 0055)  Height: 5' 6" (167 6 cm) (11/28/18 0055)  Weight - Scale: 91 2 kg (201 lb) (11/28/18 0055)  SpO2: 95 % (11/28/18 0055)    No intake or output data in the 24 hours ending 11/28/18 0610    Invasive Devices     Peripheral Intravenous Line            Peripheral IV 11/28/18 Right Antecubital less than 1 day                Physical Exam   Constitutional: He is oriented to person, place, and time  He appears well-developed and well-nourished  HENT:   Head: Normocephalic and atraumatic  Eyes: Pupils are equal, round, and reactive to light  Conjunctivae are normal    Neck: Normal range of motion  Neck supple  Cardiovascular: Normal rate and regular rhythm  Pulmonary/Chest: Effort normal and breath sounds normal    Abdominal: Soft  Bowel sounds are normal    The patient has focal right lower quadrant abdominal pain over McBurney's point  No true peritoneal signs  Musculoskeletal: Normal range of motion     Neurological: He is alert and oriented to person, place, and time  Skin: Skin is warm and dry  Psychiatric: His behavior is normal  Judgment and thought content normal    Nursing note and vitals reviewed  Lab Results: I have personally reviewed pertinent lab results  Imaging: I have personally reviewed pertinent reports  EKG, Pathology, and Other Studies: I have personally reviewed pertinent reports  Code Status: Level 1 - Full Code  Advance Directive and Living Will:      Power of :    POLST:      Counseling / Coordination of Care  Total floor / unit time spent today 35 minutes  Greater than 50% of total time was spent with the patient and / or family counseling and / or coordination of care  A description of the counseling / coordination of care: 15

## 2018-11-28 NOTE — NURSING NOTE
Pt received to med surg  Vss  Pt denies pain, denies shortness of breath  Pt made comfortable  Incisions to abd x3 CDI  Personal needs and call bell within reach, will continue to monitor

## 2018-11-28 NOTE — DISCHARGE INSTRUCTIONS
Appendicitis   WHAT YOU SHOULD KNOW:   Appendicitis is inflammation of the appendix  The appendix is a small pouch that is attached to the large intestine on the lower right side of the abdomen  AFTER YOU LEAVE:   Medicines:   · Pain medicine: You may be given medicine to take away or decrease pain  Do not wait until the pain is severe before you take your medicine  · Antibiotics: This medicine is given to fight or prevent an infection caused by bacteria  Always take your antibiotics exactly as ordered by your healthcare provider  Do not stop taking your medicine unless directed by your healthcare provider  Never save antibiotics or take leftover antibiotics that were given to you for another illness  · Take your medicine as directed  Call your healthcare provider if you think your medicine is not helping or if you have side effects  Tell him if you are allergic to any medicine  Keep a list of the medicines, vitamins, and herbs you take  Include the amounts, and when and why you take them  Bring the list or the pill bottles to follow-up visits  Carry your medicine list with you in case of an emergency  Follow up with your healthcare provider in 2 weeks:  Write down your questions so you remember to ask them during your visits  Activity:  You may need to limit activity for 4 to 6 weeks after surgery  Ask your healthcare provider what activities you can do  Contact your healthcare provider if:   · You have abdominal pain that does not go away, even after you take medicine  · You have chills, a cough, or feel weak and achy  · You have trouble having a bowel movement or have diarrhea  · You have questions or concerns about your condition or care  Seek care immediately or call 911 if:   · You have a fever  · You have severe pain in your abdomen  · You are vomiting and cannot keep food down    © 2014 [employer] is for End User's use only and may not be sold, redistributed or otherwise used for commercial purposes  All illustrations and images included in CareNotes® are the copyrighted property of [employer]  or [employer]  The above information is an  only  It is not intended as medical advice for individual conditions or treatments  Talk to your doctor, nurse or pharmacist before following any medical regimen to see if it is safe and effective for you

## 2018-11-28 NOTE — PLAN OF CARE
Problem: PAIN - ADULT  Goal: Verbalizes/displays adequate comfort level or baseline comfort level  Interventions:  - Encourage patient to monitor pain and request assistance  - Assess pain using appropriate pain scale  - Administer analgesics based on type and severity of pain and evaluate response  - Implement non-pharmacological measures as appropriate and evaluate response  - Consider cultural and social influences on pain and pain management  - Notify physician/advanced practitioner if interventions unsuccessful or patient reports new pain  Outcome: Progressing      Problem: INFECTION - ADULT  Goal: Absence or prevention of progression during hospitalization  INTERVENTIONS:  - Assess and monitor for signs and symptoms of infection  - Monitor lab/diagnostic results  - Monitor all insertion sites, i e  indwelling lines, tubes, and drains  - Monitor endotracheal (as able) and nasal secretions for changes in amount and color  - [place] appropriate cooling/warming therapies per order  - Administer medications as ordered  - Instruct and encourage patient and family to use good hand hygiene technique  - Identify and instruct in appropriate isolation precautions for identified infection/condition  Outcome: Progressing    Goal: Absence of fever/infection during neutropenic period  INTERVENTIONS:  - Monitor WBC  - Implement neutropenic guidelines  Outcome: Progressing      Problem: SAFETY ADULT  Goal: Patient will remain free of falls  INTERVENTIONS:  - Assess patient frequently for physical needs  -  Identify cognitive and physical deficits and behaviors that affect risk of falls    -  [place] fall precautions as indicated by assessment   - Educate patient/family on patient safety including physical limitations  - Instruct patient to call for assistance with activity based on assessment  - Modify environment to reduce risk of injury  - Consider OT/PT consult to assist with strengthening/mobility  Outcome: Progressing    Goal: Maintain or return to baseline ADL function  INTERVENTIONS:  -  Assess patient's ability to carry out ADLs; assess patient's baseline for ADL function and identify physical deficits which impact ability to perform ADLs (bathing, care of mouth/teeth, toileting, grooming, dressing, etc )  - Assess/evaluate cause of self-care deficits   - Assess range of motion  - Assess patient's mobility; develop plan if impaired  - Assess patient's need for assistive devices and provide as appropriate  - Encourage maximum independence but intervene and supervise when necessary  ¯ Involve family in performance of ADLs  ¯ Assess for home care needs following discharge   ¯ Request OT consult to assist with ADL evaluation and planning for discharge  ¯ Provide patient education as appropriate  Outcome: Progressing    Goal: Maintain or return mobility status to optimal level  INTERVENTIONS:  - Assess patient's baseline mobility status (ambulation, transfers, stairs, etc )    - Identify cognitive and physical deficits and behaviors that affect mobility  - Identify mobility aids required to assist with transfers and/or ambulation (gait belt, sit-to-stand, lift, walker, cane, etc )  - [place] fall precautions as indicated by assessment  - Record patient progress and toleration of activity level on Mobility SBAR; progress patient to next Phase/Stage  - Instruct patient to call for assistance with activity based on assessment  - Request Rehabilitation consult to assist with strengthening/weightbearing, etc   Outcome: Progressing      Problem: DISCHARGE PLANNING  Goal: Discharge to home or other facility with appropriate resources  INTERVENTIONS:  - Identify barriers to discharge w/patient and caregiver  - Arrange for needed discharge resources and transportation as appropriate  - Identify discharge learning needs (meds, wound care, etc )  - Arrange for interpretive services to assist at discharge as needed  - Refer to Case Management Department for coordinating discharge planning if the patient needs post-hospital services based on physician/advanced practitioner order or complex needs related to functional status, cognitive ability, or social support system  Outcome: Progressing      Problem: Knowledge Deficit  Goal: Patient/family/caregiver demonstrates understanding of disease process, treatment plan, medications, and discharge instructions  Complete learning assessment and assess knowledge base    Interventions:  - Provide teaching at level of understanding  - Provide teaching via preferred learning methods  Outcome: Progressing

## 2018-12-04 ENCOUNTER — HOSPITAL ENCOUNTER (EMERGENCY)
Facility: HOSPITAL | Age: 48
Discharge: HOME | End: 2018-12-04
Attending: EMERGENCY MEDICINE | Admitting: EMERGENCY MEDICINE
Payer: COMMERCIAL

## 2018-12-04 ENCOUNTER — APPOINTMENT (EMERGENCY)
Dept: CT IMAGING | Facility: HOSPITAL | Age: 48
End: 2018-12-04
Payer: COMMERCIAL

## 2018-12-04 VITALS
SYSTOLIC BLOOD PRESSURE: 96 MMHG | OXYGEN SATURATION: 90 % | HEIGHT: 66 IN | RESPIRATION RATE: 16 BRPM | BODY MASS INDEX: 32.14 KG/M2 | HEART RATE: 78 BPM | TEMPERATURE: 98.5 F | DIASTOLIC BLOOD PRESSURE: 61 MMHG | WEIGHT: 200 LBS

## 2018-12-04 DIAGNOSIS — R42 DIZZINESS: Primary | ICD-10-CM

## 2018-12-04 LAB
ALBUMIN SERPL BCP-MCNC: 4.1 G/DL (ref 3.5–5.7)
ALP SERPL-CCNC: 82 U/L (ref 40–150)
ALT SERPL W P-5'-P-CCNC: 59 U/L (ref 7–52)
ANION GAP SERPL CALCULATED.3IONS-SCNC: 10 MMOL/L (ref 4–13)
AST SERPL W P-5'-P-CCNC: 21 U/L (ref 13–39)
ATRIAL RATE: 77 BPM
BASOPHILS # BLD AUTO: 0.1 THOUSANDS/ΜL (ref 0–0.1)
BASOPHILS NFR BLD AUTO: 1 % (ref 0–1)
BILIRUB SERPL-MCNC: 0.9 MG/DL (ref 0.2–1)
BUN SERPL-MCNC: 18 MG/DL (ref 7–25)
CALCIUM SERPL-MCNC: 9.6 MG/DL (ref 8.6–10.5)
CHLORIDE SERPL-SCNC: 99 MMOL/L (ref 98–107)
CO2 SERPL-SCNC: 26 MMOL/L (ref 21–31)
CREAT SERPL-MCNC: 1.04 MG/DL (ref 0.7–1.3)
EOSINOPHIL # BLD AUTO: 0.1 THOUSAND/ΜL (ref 0–0.61)
EOSINOPHIL NFR BLD AUTO: 2 % (ref 0–6)
ERYTHROCYTE [DISTWIDTH] IN BLOOD BY AUTOMATED COUNT: 12.6 % (ref 11.6–15.1)
GFR SERPL CREATININE-BSD FRML MDRD: 85 ML/MIN/1.73SQ M
GLUCOSE SERPL-MCNC: 103 MG/DL (ref 65–140)
HCT VFR BLD AUTO: 44.9 % (ref 42–52)
HGB BLD-MCNC: 15.2 G/DL (ref 12–17)
LYMPHOCYTES # BLD AUTO: 1.4 THOUSANDS/ΜL (ref 0.6–4.47)
LYMPHOCYTES NFR BLD AUTO: 16 % (ref 14–44)
MCH RBC QN AUTO: 31 PG (ref 26.8–34.3)
MCHC RBC AUTO-ENTMCNC: 33.9 G/DL (ref 31.4–37.4)
MCV RBC AUTO: 92 FL (ref 82–98)
MONOCYTES # BLD AUTO: 0.6 THOUSAND/ΜL (ref 0.17–1.22)
MONOCYTES NFR BLD AUTO: 6 % (ref 4–12)
NEUTROPHILS # BLD AUTO: 6.5 THOUSANDS/ΜL (ref 1.85–7.62)
NEUTS SEG NFR BLD AUTO: 75 % (ref 43–75)
NRBC BLD AUTO-RTO: 0 /100 WBCS
P AXIS: 36 DEGREES
PLATELET # BLD AUTO: 349 THOUSANDS/UL (ref 149–390)
PMV BLD AUTO: 8.1 FL (ref 8.9–12.7)
POTASSIUM SERPL-SCNC: 4 MMOL/L (ref 3.5–5.5)
PR INTERVAL: 128 MS
PROT SERPL-MCNC: 7 G/DL (ref 6.4–8.9)
QRS AXIS: 28 DEGREES
QRSD INTERVAL: 100 MS
QT INTERVAL: 370 MS
QTC INTERVAL: 418 MS
RBC # BLD AUTO: 4.9 MILLION/UL (ref 3.88–5.62)
SODIUM SERPL-SCNC: 135 MMOL/L (ref 134–143)
T WAVE AXIS: 35 DEGREES
VENTRICULAR RATE: 77 BPM
WBC # BLD AUTO: 8.7 THOUSAND/UL (ref 4.31–10.16)

## 2018-12-04 PROCEDURE — 93010 ELECTROCARDIOGRAM REPORT: CPT | Performed by: INTERNAL MEDICINE

## 2018-12-04 PROCEDURE — 96360 HYDRATION IV INFUSION INIT: CPT

## 2018-12-04 PROCEDURE — 93005 ELECTROCARDIOGRAM TRACING: CPT

## 2018-12-04 PROCEDURE — 36415 COLL VENOUS BLD VENIPUNCTURE: CPT | Performed by: EMERGENCY MEDICINE

## 2018-12-04 PROCEDURE — 80053 COMPREHEN METABOLIC PANEL: CPT | Performed by: EMERGENCY MEDICINE

## 2018-12-04 PROCEDURE — 99284 EMERGENCY DEPT VISIT MOD MDM: CPT

## 2018-12-04 PROCEDURE — 85025 COMPLETE CBC W/AUTO DIFF WBC: CPT | Performed by: EMERGENCY MEDICINE

## 2018-12-04 PROCEDURE — 70450 CT HEAD/BRAIN W/O DYE: CPT

## 2018-12-04 RX ORDER — SODIUM CHLORIDE 9 MG/ML
125 INJECTION, SOLUTION INTRAVENOUS CONTINUOUS
Status: DISCONTINUED | OUTPATIENT
Start: 2018-12-04 | End: 2018-12-04 | Stop reason: HOSPADM

## 2018-12-04 RX ADMIN — SODIUM CHLORIDE 125 ML/HR: 0.9 INJECTION, SOLUTION INTRAVENOUS at 15:36

## 2018-12-04 NOTE — DISCHARGE INSTRUCTIONS
Dizziness, Ambulatory Care   GENERAL INFORMATION:   Dizziness  is a term used to describe a feeling of being off balance or unsteady  Common causes of dizziness are an inner ear fluid imbalance or a lack of oxygen in your blood  Your dizziness may be acute (lasts 3 days or less) or chronic (lasts longer than 3 days)  You may have dizzy spells that last from seconds to a few hours  Common symptoms related to dizziness include the following:   · A feeling that your surroundings are moving even though you are standing still    · Ringing in your ears or hearing loss     · Feeling faint or lightheaded     · Weakness or unsteadiness     · Double vision or eye movements you cannot control    · Nausea or vomiting     · Confusion  Seek immediate care for the following symptoms:   · You have a headache that does not go away with medicine  · You have shaking chills and a fever  · You vomit over and over with no relief  · Your vomit or bowel movements are red or black  · You have pain in your chest, back, or abdomen  · You have numbness, especially in your face, arms, or legs  · You have trouble moving your arms or legs  Treatment for dizziness  depends on the cause of your dizziness  You may need medicines to decrease your dizziness or nausea  You may need to be admitted to the hospital for treatment  Manage your symptoms:  Get up slowly from sitting or lying down  Do not drive or operate machinery when you are dizzy  Follow up with your healthcare provider as directed:  Write down your questions so you remember to ask them during your visits  CARE AGREEMENT:   You have the right to help plan your care  Learn about your health condition and how it may be treated  Discuss treatment options with your caregivers to decide what care you want to receive  You always have the right to refuse treatment  The above information is an  only   It is not intended as medical advice for individual conditions or treatments  Talk to your doctor, nurse or pharmacist before following any medical regimen to see if it is safe and effective for you  © 2014 [address] is for End User's use only and may not be sold, redistributed or otherwise used for commercial purposes  All illustrations and images included in CareNotes® are the copyrighted property of A D A M [employer]  or [employer]

## 2018-12-04 NOTE — ED PROVIDER NOTES
History  Chief Complaint   Patient presents with    Dizziness     brief episode of dizziness lasting several minutes about 45 minutes ago     Patient is a 58-year-old male with a history of hypertension and COPD his father had a CVA in the distant past says he was driving have new when he had an episode where he began to feel lightheaded and dizzy  Patient says that this episode lasted for few minutes he pulled over to the side  At that time patient denies having had any focal neurologic deficits  This time patient says he feels perfectly fine and no longer has any symptoms whatsoever  Unless the emergency department for further evaluation  Patient no chest pain or shortness of breath  During that time patient says he did not try to speak  He says he was able to move all his extremities  He had no focal numbness or tingling  Patient denies any visual difficulties  Patient has no GI complaints  Of note patient had been in the emergency department approximately 4 weeks ag  the ER workup was normal at that time             Prior to Admission Medications   Prescriptions Last Dose Informant Patient Reported?  Taking?   aspirin 325 mg tablet   Yes No   Sig: Take 325 mg by mouth daily   atorvastatin (LIPITOR) 10 mg tablet   Yes No   Sig: Take 10 mg by mouth every evening   ibuprofen (MOTRIN) 600 mg tablet   No No   Sig: Take 1 tablet (600 mg total) by mouth every 8 (eight) hours as needed for mild pain (with food)   Patient not taking: Reported on 11/28/2018    lisinopril-hydrochlorothiazide (PRINZIDE,ZESTORETIC) 20-25 MG per tablet   Yes No   Sig: Take 1 tablet by mouth daily   methocarbamol (ROBAXIN) 500 mg tablet   No No   Sig: Take 1 tablet (500 mg total) by mouth 2 (two) times a day   Patient not taking: Reported on 11/28/2018    oxyCODONE-acetaminophen (PERCOCET) 5-325 mg per tablet   No No   Sig: Take 1 tablet by mouth every 4 (four) hours as needed for moderate pain for up to 10 days Max Daily Amount: 6 tablets      Facility-Administered Medications: None       Past Medical History:   Diagnosis Date    Back pain     COPD (chronic obstructive pulmonary disease) (HCC)     Hyperlipidemia     Hypertension     Inguinal hernia, left     Mild heartburn     Obesity     Stroke ([address] ) approx 2015    Denies residual problems  Initially he only had trouble with two fingers on left side   Wears glasses        Past Surgical History:   Procedure Laterality Date    NO PAST SURGERIES      TX LAP,APPENDECTOMY N/A 11/28/2018    Procedure: APPENDECTOMY LAPAROSCOPIC;  Surgeon: Broderick Zamorano MD;  Location: [facility] MAIN OR;  Service: General    TX [address] Left 4/2/2018    Procedure: LAPAROSCOPIC INGUINAL HERNIA REPAIR WITH MESH;  Surgeon: Broderick Zamorano MD;  Location: [facility] Main OR;  Service: General       History reviewed  No pertinent family history  I have reviewed and agree with the history as documented  Social History   Substance Use Topics    Smoking status: Former Smoker     Packs/day: 1 00     Years: 15 00     Types: Cigarettes     Quit date: 2010    Smokeless tobacco: Never Used    Alcohol use No        Review of Systems   Constitutional: Negative for chills, fatigue, fever and unexpected weight change  HENT: Negative for congestion and nosebleeds  Eyes: Negative for visual disturbance  Respiratory: Negative for chest tightness and shortness of breath  Cardiovascular: Negative for chest pain, palpitations and leg swelling  Gastrointestinal: Negative for abdominal pain, blood in stool, diarrhea, nausea and vomiting  Endocrine: Negative for cold intolerance and heat intolerance  Genitourinary: Negative for difficulty urinating  Musculoskeletal: Negative for arthralgias, back pain, gait problem, joint swelling and myalgias  Skin: Negative for rash  Neurological: Negative for dizziness, speech difficulty, weakness and headaches     Psychiatric/Behavioral: Negative for behavioral problems, confusion, self-injury and suicidal ideas  All other systems reviewed and are negative  Physical Exam  Physical Exam   Constitutional: He is oriented to person, place, and time  He appears well-developed and well-nourished  HENT:   Head: Normocephalic and atraumatic  Nose: Nose normal    Eyes: Pupils are equal, round, and reactive to light  EOM are normal    Neck: Normal range of motion  Neck supple  Cardiovascular: Normal rate, regular rhythm and normal heart sounds  Exam reveals no gallop and no friction rub  No murmur heard  Pulmonary/Chest: Effort normal and breath sounds normal  No respiratory distress  He has no wheezes  He has no rales  Abdominal: Soft  He exhibits no distension  There is no tenderness  There is no rebound and no guarding  Musculoskeletal: Normal range of motion  He exhibits no edema  Neurological: He is alert and oriented to person, place, and time  Skin: Skin is warm and dry  Psychiatric: He has a normal mood and affect  His behavior is normal  Judgment and thought content normal    Nursing note and vitals reviewed        Vital Signs  ED Triage Vitals [12/04/18 1530]   Temperature Pulse Respirations Blood Pressure SpO2   98 5 °F (36 9 °C) 82 16 91/64 98 %      Temp Source Heart Rate Source Patient Position - Orthostatic VS BP Location FiO2 (%)   Tympanic Monitor Lying Left arm --      Pain Score       No Pain           Vitals:    12/04/18 1530 12/04/18 1613   BP: 91/64 133/80   Pulse: 82 (!) 111   Patient Position - Orthostatic VS: Lying Sitting       Visual Acuity      ED Medications  Medications   sodium chloride 0 9 % infusion (125 mL/hr Intravenous New Bag 12/4/18 1536)       Diagnostic Studies  Results Reviewed     Procedure Component Value Units Date/Time    Comprehensive metabolic panel [[id number]]  (Abnormal) Collected:  12/04/18 1535    Lab Status:  Final result Specimen:  Blood from Arm, Right Updated:  12/04/18 9642 Sodium 135 mmol/L      Potassium 4 0 mmol/L      Chloride 99 mmol/L      CO2 26 mmol/L      ANION GAP 10 mmol/L      BUN 18 mg/dL      Creatinine 1 04 mg/dL      Glucose 103 mg/dL      Calcium 9 6 mg/dL      AST 21 U/L      ALT 59 (H) U/L      Alkaline Phosphatase 82 U/L      Total Protein 7 0 g/dL      Albumin 4 1 g/dL      Total Bilirubin 0 90 mg/dL      eGFR 85 ml/min/1 73sq m     Narrative:         National Kidney Disease Education Program recommendations are as follows:  GFR calculation is accurate only with a steady state creatinine  Chronic Kidney disease less than 60 ml/min/1 73 sq  meters  Kidney failure less than 15 ml/min/1 73 sq  meters  CBC and differential [[id number]]  (Abnormal) Collected:  12/04/18 1535    Lab Status:  Final result Specimen:  Blood from Arm, Right Updated:  12/04/18 1549     WBC 8 70 Thousand/uL      RBC 4 90 Million/uL      Hemoglobin 15 2 g/dL      Hematocrit 44 9 %      MCV 92 fL      MCH 31 0 pg      MCHC 33 9 g/dL      RDW 12 6 %      MPV 8 1 (L) fL      Platelets 030 Thousands/uL      nRBC 0 /100 WBCs      Neutrophils Relative 75 %      Lymphocytes Relative 16 %      Monocytes Relative 6 %      Eosinophils Relative 2 %      Basophils Relative 1 %      Neutrophils Absolute 6 50 Thousands/µL      Lymphocytes Absolute 1 40 Thousands/µL      Monocytes Absolute 0 60 Thousand/µL      Eosinophils Absolute 0 10 Thousand/µL      Basophils Absolute 0 10 Thousands/µL                  CT head without contrast   Final Result by Alexis Giles MD (12/04 1609)   No acute intracranial abnormality  Signed by Alexis Giles MD                 Procedures  Procedures       Phone Contacts  ED Phone Contact    ED Course  ED Course as of Dec 04 1628   Tue Dec 04, 2018   1624 Patient remains at his baseline  He is already on aspirin  He is in sinus rhythm    And his ER workup has been normal   Will discharge home follow up with [facility]    Disposition  Final diagnoses:   Dizziness     Time reflects when diagnosis was documented in both MDM as applicable and the Disposition within this note     Time User Action Codes Description Comment    12/4/2018  4:26 PM Ran Morales Add [R42] Dizziness       ED Disposition     ED Disposition Condition Comment    Discharge  [employer] [employer] discharge to home/self care  Condition at discharge: Good        Follow-up Information    None         Patient's Medications   Discharge Prescriptions    No medications on file     No discharge procedures on file      ED Provider  Electronically Signed by           Dasha Barreto MD  12/04/18 9381

## 2019-03-30 ENCOUNTER — HOSPITAL ENCOUNTER (INPATIENT)
Facility: HOSPITAL | Age: 49
LOS: 3 days | Discharge: HOME | DRG: 207 | End: 2019-04-02
Attending: EMERGENCY MEDICINE | Admitting: INTERNAL MEDICINE
Payer: COMMERCIAL

## 2019-03-30 DIAGNOSIS — N17.9 ACUTE RENAL FAILURE (ARF) (HCC): Primary | ICD-10-CM

## 2019-03-30 DIAGNOSIS — I95.9 HYPOTENSION: ICD-10-CM

## 2019-03-30 DIAGNOSIS — I10 HYPERTENSION, UNSPECIFIED TYPE: ICD-10-CM

## 2019-03-30 PROBLEM — E78.5 HYPERLIPIDEMIA: Status: ACTIVE | Noted: 2019-03-30

## 2019-03-30 PROBLEM — R42 DIZZINESS: Status: ACTIVE | Noted: 2019-03-30

## 2019-03-30 LAB
ANION GAP SERPL CALCULATED.3IONS-SCNC: 10 MMOL/L (ref 4–13)
BASOPHILS # BLD AUTO: 0.07 THOUSANDS/ΜL (ref 0–0.1)
BASOPHILS NFR BLD AUTO: 1 % (ref 0–1)
BUN SERPL-MCNC: 24 MG/DL (ref 5–25)
CALCIUM SERPL-MCNC: 9.4 MG/DL (ref 8.3–10.1)
CHLORIDE SERPL-SCNC: 100 MMOL/L (ref 100–108)
CO2 SERPL-SCNC: 28 MMOL/L (ref 21–32)
CREAT SERPL-MCNC: 3.02 MG/DL (ref 0.6–1.3)
EOSINOPHIL # BLD AUTO: 0.13 THOUSAND/ΜL (ref 0–0.61)
EOSINOPHIL NFR BLD AUTO: 1 % (ref 0–6)
ERYTHROCYTE [DISTWIDTH] IN BLOOD BY AUTOMATED COUNT: 12.2 % (ref 11.6–15.1)
GFR SERPL CREATININE-BSD FRML MDRD: 23 ML/MIN/1.73SQ M
GLUCOSE SERPL-MCNC: 102 MG/DL (ref 65–140)
HCT VFR BLD AUTO: 48.5 % (ref 36.5–49.3)
HGB BLD-MCNC: 16.4 G/DL (ref 12–17)
IMM GRANULOCYTES # BLD AUTO: 0.04 THOUSAND/UL (ref 0–0.2)
IMM GRANULOCYTES NFR BLD AUTO: 0 % (ref 0–2)
LYMPHOCYTES # BLD AUTO: 1.41 THOUSANDS/ΜL (ref 0.6–4.47)
LYMPHOCYTES NFR BLD AUTO: 12 % (ref 14–44)
MCH RBC QN AUTO: 31.1 PG (ref 26.8–34.3)
MCHC RBC AUTO-ENTMCNC: 33.8 G/DL (ref 31.4–37.4)
MCV RBC AUTO: 92 FL (ref 82–98)
MONOCYTES # BLD AUTO: 0.79 THOUSAND/ΜL (ref 0.17–1.22)
MONOCYTES NFR BLD AUTO: 7 % (ref 4–12)
NEUTROPHILS # BLD AUTO: 9.13 THOUSANDS/ΜL (ref 1.85–7.62)
NEUTS SEG NFR BLD AUTO: 79 % (ref 43–75)
NRBC BLD AUTO-RTO: 0 /100 WBCS
PLATELET # BLD AUTO: 307 THOUSANDS/UL (ref 149–390)
PMV BLD AUTO: 10.3 FL (ref 8.9–12.7)
POTASSIUM SERPL-SCNC: 3.6 MMOL/L (ref 3.5–5.3)
RBC # BLD AUTO: 5.28 MILLION/UL (ref 3.88–5.62)
SODIUM SERPL-SCNC: 138 MMOL/L (ref 136–145)
TROPONIN I SERPL-MCNC: <0.02 NG/ML
WBC # BLD AUTO: 11.57 THOUSAND/UL (ref 4.31–10.16)

## 2019-03-30 PROCEDURE — 99223 1ST HOSP IP/OBS HIGH 75: CPT | Performed by: PHYSICIAN ASSISTANT

## 2019-03-30 PROCEDURE — 85025 COMPLETE CBC W/AUTO DIFF WBC: CPT | Performed by: EMERGENCY MEDICINE

## 2019-03-30 PROCEDURE — 84484 ASSAY OF TROPONIN QUANT: CPT | Performed by: EMERGENCY MEDICINE

## 2019-03-30 PROCEDURE — 96360 HYDRATION IV INFUSION INIT: CPT

## 2019-03-30 PROCEDURE — 99285 EMERGENCY DEPT VISIT HI MDM: CPT

## 2019-03-30 PROCEDURE — 93005 ELECTROCARDIOGRAM TRACING: CPT

## 2019-03-30 PROCEDURE — 80048 BASIC METABOLIC PNL TOTAL CA: CPT | Performed by: EMERGENCY MEDICINE

## 2019-03-30 PROCEDURE — 36415 COLL VENOUS BLD VENIPUNCTURE: CPT | Performed by: EMERGENCY MEDICINE

## 2019-03-30 RX ORDER — SODIUM CHLORIDE 9 MG/ML
125 INJECTION, SOLUTION INTRAVENOUS CONTINUOUS
Status: DISCONTINUED | OUTPATIENT
Start: 2019-03-30 | End: 2019-04-01

## 2019-03-30 RX ORDER — ACETAMINOPHEN 325 MG/1
650 TABLET ORAL EVERY 6 HOURS PRN
Status: DISCONTINUED | OUTPATIENT
Start: 2019-03-30 | End: 2019-04-02 | Stop reason: HOSPADM

## 2019-03-30 RX ORDER — ONDANSETRON 2 MG/ML
4 INJECTION INTRAMUSCULAR; INTRAVENOUS EVERY 6 HOURS PRN
Status: DISCONTINUED | OUTPATIENT
Start: 2019-03-30 | End: 2019-04-02 | Stop reason: HOSPADM

## 2019-03-30 RX ORDER — ATORVASTATIN CALCIUM 10 MG/1
10 TABLET, FILM COATED ORAL EVERY EVENING
Status: DISCONTINUED | OUTPATIENT
Start: 2019-03-30 | End: 2019-04-02 | Stop reason: HOSPADM

## 2019-03-30 RX ADMIN — SODIUM CHLORIDE 125 ML/HR: 0.9 INJECTION, SOLUTION INTRAVENOUS at 22:08

## 2019-03-30 RX ADMIN — ATORVASTATIN CALCIUM 10 MG: 10 TABLET, FILM COATED ORAL at 22:33

## 2019-03-30 RX ADMIN — SODIUM CHLORIDE 1000 ML: 0.9 INJECTION, SOLUTION INTRAVENOUS at 20:26

## 2019-03-31 LAB
ANION GAP SERPL CALCULATED.3IONS-SCNC: 11 MMOL/L (ref 4–13)
ATRIAL RATE: 101 BPM
BACTERIA UR QL AUTO: ABNORMAL /HPF
BILIRUB UR QL STRIP: NEGATIVE
BUN SERPL-MCNC: 23 MG/DL (ref 5–25)
CALCIUM SERPL-MCNC: 8.7 MG/DL (ref 8.3–10.1)
CHLORIDE SERPL-SCNC: 103 MMOL/L (ref 100–108)
CLARITY UR: ABNORMAL
CO2 SERPL-SCNC: 26 MMOL/L (ref 21–32)
COLOR UR: YELLOW
CREAT SERPL-MCNC: 1.98 MG/DL (ref 0.6–1.3)
ERYTHROCYTE [DISTWIDTH] IN BLOOD BY AUTOMATED COUNT: 12.4 % (ref 11.6–15.1)
GFR SERPL CREATININE-BSD FRML MDRD: 39 ML/MIN/1.73SQ M
GLUCOSE SERPL-MCNC: 87 MG/DL (ref 65–140)
GLUCOSE UR STRIP-MCNC: NEGATIVE MG/DL
HCT VFR BLD AUTO: 45.8 % (ref 36.5–49.3)
HGB BLD-MCNC: 15.2 G/DL (ref 12–17)
HGB UR QL STRIP.AUTO: NEGATIVE
KETONES UR STRIP-MCNC: NEGATIVE MG/DL
LEUKOCYTE ESTERASE UR QL STRIP: NEGATIVE
MCH RBC QN AUTO: 31.1 PG (ref 26.8–34.3)
MCHC RBC AUTO-ENTMCNC: 33.2 G/DL (ref 31.4–37.4)
MCV RBC AUTO: 94 FL (ref 82–98)
NITRITE UR QL STRIP: NEGATIVE
NON-SQ EPI CELLS URNS QL MICRO: ABNORMAL /HPF
P AXIS: 72 DEGREES
PH UR STRIP.AUTO: 6 [PH]
PLATELET # BLD AUTO: 261 THOUSANDS/UL (ref 149–390)
PMV BLD AUTO: 10.7 FL (ref 8.9–12.7)
POTASSIUM SERPL-SCNC: 3.2 MMOL/L (ref 3.5–5.3)
PR INTERVAL: 132 MS
PROT UR STRIP-MCNC: NEGATIVE MG/DL
QRS AXIS: 78 DEGREES
QRSD INTERVAL: 94 MS
QT INTERVAL: 310 MS
QTC INTERVAL: 401 MS
RBC # BLD AUTO: 4.88 MILLION/UL (ref 3.88–5.62)
RBC #/AREA URNS AUTO: ABNORMAL /HPF
SODIUM SERPL-SCNC: 140 MMOL/L (ref 136–145)
SP GR UR STRIP.AUTO: 1.02 (ref 1–1.03)
T WAVE AXIS: 58 DEGREES
UROBILINOGEN UR QL STRIP.AUTO: 0.2 E.U./DL
VENTRICULAR RATE: 101 BPM
WBC # BLD AUTO: 8.77 THOUSAND/UL (ref 4.31–10.16)
WBC #/AREA URNS AUTO: ABNORMAL /HPF

## 2019-03-31 PROCEDURE — 93010 ELECTROCARDIOGRAM REPORT: CPT | Performed by: INTERNAL MEDICINE

## 2019-03-31 PROCEDURE — 81001 URINALYSIS AUTO W/SCOPE: CPT | Performed by: PHYSICIAN ASSISTANT

## 2019-03-31 PROCEDURE — 85027 COMPLETE CBC AUTOMATED: CPT | Performed by: PHYSICIAN ASSISTANT

## 2019-03-31 PROCEDURE — 80048 BASIC METABOLIC PNL TOTAL CA: CPT | Performed by: PHYSICIAN ASSISTANT

## 2019-03-31 PROCEDURE — 99232 SBSQ HOSP IP/OBS MODERATE 35: CPT | Performed by: INTERNAL MEDICINE

## 2019-03-31 PROCEDURE — 99254 IP/OBS CNSLTJ NEW/EST MOD 60: CPT | Performed by: INTERNAL MEDICINE

## 2019-03-31 RX ORDER — ASPIRIN 81 MG/1
81 TABLET, CHEWABLE ORAL DAILY
Status: DISCONTINUED | OUTPATIENT
Start: 2019-03-31 | End: 2019-04-02 | Stop reason: HOSPADM

## 2019-03-31 RX ORDER — POTASSIUM CHLORIDE 20 MEQ/1
40 TABLET, EXTENDED RELEASE ORAL ONCE
Status: COMPLETED | OUTPATIENT
Start: 2019-03-31 | End: 2019-03-31

## 2019-03-31 RX ADMIN — ASPIRIN 81 MG 81 MG: 81 TABLET ORAL at 13:00

## 2019-03-31 RX ADMIN — ATORVASTATIN CALCIUM 10 MG: 10 TABLET, FILM COATED ORAL at 17:51

## 2019-03-31 RX ADMIN — POTASSIUM CHLORIDE 40 MEQ: 1500 TABLET, EXTENDED RELEASE ORAL at 11:00

## 2019-03-31 RX ADMIN — SODIUM CHLORIDE 125 ML/HR: 0.9 INJECTION, SOLUTION INTRAVENOUS at 16:48

## 2019-04-01 LAB
ANION GAP SERPL CALCULATED.3IONS-SCNC: 7 MMOL/L (ref 4–13)
BASOPHILS # BLD AUTO: 0.04 THOUSANDS/ΜL (ref 0–0.1)
BASOPHILS NFR BLD AUTO: 1 % (ref 0–1)
BUN SERPL-MCNC: 17 MG/DL (ref 5–25)
CALCIUM SERPL-MCNC: 8.8 MG/DL (ref 8.3–10.1)
CHLORIDE SERPL-SCNC: 107 MMOL/L (ref 100–108)
CO2 SERPL-SCNC: 26 MMOL/L (ref 21–32)
CORTIS SERPL-MCNC: 4.3 UG/DL
CREAT SERPL-MCNC: 1.28 MG/DL (ref 0.6–1.3)
EOSINOPHIL # BLD AUTO: 0.2 THOUSAND/ΜL (ref 0–0.61)
EOSINOPHIL NFR BLD AUTO: 4 % (ref 0–6)
ERYTHROCYTE [DISTWIDTH] IN BLOOD BY AUTOMATED COUNT: 11.9 % (ref 11.6–15.1)
GFR SERPL CREATININE-BSD FRML MDRD: 66 ML/MIN/1.73SQ M
GLUCOSE SERPL-MCNC: 102 MG/DL (ref 65–140)
HCT VFR BLD AUTO: 43.9 % (ref 36.5–49.3)
HGB BLD-MCNC: 14.6 G/DL (ref 12–17)
IMM GRANULOCYTES # BLD AUTO: 0.02 THOUSAND/UL (ref 0–0.2)
IMM GRANULOCYTES NFR BLD AUTO: 0 % (ref 0–2)
LYMPHOCYTES # BLD AUTO: 1.76 THOUSANDS/ΜL (ref 0.6–4.47)
LYMPHOCYTES NFR BLD AUTO: 35 % (ref 14–44)
MAGNESIUM SERPL-MCNC: 1.8 MG/DL (ref 1.6–2.6)
MCH RBC QN AUTO: 31.3 PG (ref 26.8–34.3)
MCHC RBC AUTO-ENTMCNC: 33.3 G/DL (ref 31.4–37.4)
MCV RBC AUTO: 94 FL (ref 82–98)
MONOCYTES # BLD AUTO: 0.5 THOUSAND/ΜL (ref 0.17–1.22)
MONOCYTES NFR BLD AUTO: 10 % (ref 4–12)
NEUTROPHILS # BLD AUTO: 2.48 THOUSANDS/ΜL (ref 1.85–7.62)
NEUTS SEG NFR BLD AUTO: 50 % (ref 43–75)
NRBC BLD AUTO-RTO: 0 /100 WBCS
PLATELET # BLD AUTO: 192 THOUSANDS/UL (ref 149–390)
PMV BLD AUTO: 10.3 FL (ref 8.9–12.7)
POTASSIUM SERPL-SCNC: 4 MMOL/L (ref 3.5–5.3)
RBC # BLD AUTO: 4.67 MILLION/UL (ref 3.88–5.62)
SODIUM SERPL-SCNC: 140 MMOL/L (ref 136–145)
WBC # BLD AUTO: 5 THOUSAND/UL (ref 4.31–10.16)

## 2019-04-01 PROCEDURE — 84165 PROTEIN E-PHORESIS SERUM: CPT | Performed by: PATHOLOGY

## 2019-04-01 PROCEDURE — 99232 SBSQ HOSP IP/OBS MODERATE 35: CPT | Performed by: HOSPITALIST

## 2019-04-01 PROCEDURE — 99232 SBSQ HOSP IP/OBS MODERATE 35: CPT | Performed by: INTERNAL MEDICINE

## 2019-04-01 PROCEDURE — 82533 TOTAL CORTISOL: CPT | Performed by: PHYSICIAN ASSISTANT

## 2019-04-01 PROCEDURE — 80048 BASIC METABOLIC PNL TOTAL CA: CPT | Performed by: PHYSICIAN ASSISTANT

## 2019-04-01 PROCEDURE — 85025 COMPLETE CBC W/AUTO DIFF WBC: CPT | Performed by: INTERNAL MEDICINE

## 2019-04-01 PROCEDURE — 83735 ASSAY OF MAGNESIUM: CPT | Performed by: INTERNAL MEDICINE

## 2019-04-01 PROCEDURE — 84165 PROTEIN E-PHORESIS SERUM: CPT | Performed by: INTERNAL MEDICINE

## 2019-04-01 RX ADMIN — ASPIRIN 81 MG 81 MG: 81 TABLET ORAL at 08:06

## 2019-04-01 RX ADMIN — ATORVASTATIN CALCIUM 10 MG: 10 TABLET, FILM COATED ORAL at 17:27

## 2019-04-02 ENCOUNTER — TELEPHONE (OUTPATIENT)
Dept: NEPHROLOGY | Facility: CLINIC | Age: 49
End: 2019-04-02

## 2019-04-02 VITALS
OXYGEN SATURATION: 97 % | RESPIRATION RATE: 18 BRPM | BODY MASS INDEX: 32.35 KG/M2 | SYSTOLIC BLOOD PRESSURE: 151 MMHG | HEART RATE: 72 BPM | TEMPERATURE: 97.6 F | WEIGHT: 201.28 LBS | HEIGHT: 66 IN | DIASTOLIC BLOOD PRESSURE: 95 MMHG

## 2019-04-02 DIAGNOSIS — N17.9 AKI (ACUTE KIDNEY INJURY) (HCC): Primary | ICD-10-CM

## 2019-04-02 LAB
ANION GAP SERPL CALCULATED.3IONS-SCNC: 7 MMOL/L (ref 4–13)
BUN SERPL-MCNC: 17 MG/DL (ref 5–25)
CALCIUM SERPL-MCNC: 9.1 MG/DL (ref 8.3–10.1)
CHLORIDE SERPL-SCNC: 106 MMOL/L (ref 100–108)
CO2 SERPL-SCNC: 28 MMOL/L (ref 21–32)
CREAT SERPL-MCNC: 1.16 MG/DL (ref 0.6–1.3)
GFR SERPL CREATININE-BSD FRML MDRD: 74 ML/MIN/1.73SQ M
GLUCOSE SERPL-MCNC: 96 MG/DL (ref 65–140)
POTASSIUM SERPL-SCNC: 3.9 MMOL/L (ref 3.5–5.3)
SODIUM SERPL-SCNC: 141 MMOL/L (ref 136–145)

## 2019-04-02 PROCEDURE — 99239 HOSP IP/OBS DSCHRG MGMT >30: CPT | Performed by: HOSPITALIST

## 2019-04-02 PROCEDURE — 80048 BASIC METABOLIC PNL TOTAL CA: CPT | Performed by: INTERNAL MEDICINE

## 2019-04-02 PROCEDURE — 99232 SBSQ HOSP IP/OBS MODERATE 35: CPT | Performed by: INTERNAL MEDICINE

## 2019-04-02 RX ORDER — HYDROCHLOROTHIAZIDE 12.5 MG/1
12.5 TABLET ORAL DAILY
Qty: 30 TABLET | Refills: 0 | Status: SHIPPED | OUTPATIENT
Start: 2019-04-02 | End: 2019-04-09

## 2019-04-02 RX ORDER — LISINOPRIL 5 MG/1
5 TABLET ORAL DAILY
Status: DISCONTINUED | OUTPATIENT
Start: 2019-04-02 | End: 2019-04-02 | Stop reason: HOSPADM

## 2019-04-02 RX ADMIN — ASPIRIN 81 MG 81 MG: 81 TABLET ORAL at 08:03

## 2019-04-02 RX ADMIN — LISINOPRIL 5 MG: 5 TABLET ORAL at 12:43

## 2019-04-03 LAB
ALBUMIN SERPL ELPH-MCNC: 3.68 G/DL (ref 3.5–5)
ALBUMIN SERPL ELPH-MCNC: 57.5 % (ref 52–65)
ALPHA1 GLOB SERPL ELPH-MCNC: 0.29 G/DL (ref 0.1–0.4)
ALPHA1 GLOB SERPL ELPH-MCNC: 4.5 % (ref 2.5–5)
ALPHA2 GLOB SERPL ELPH-MCNC: 0.71 G/DL (ref 0.4–1.2)
ALPHA2 GLOB SERPL ELPH-MCNC: 11.1 % (ref 7–13)
BETA GLOB ABNORMAL SERPL ELPH-MCNC: 0.38 G/DL (ref 0.4–0.8)
BETA1 GLOB SERPL ELPH-MCNC: 6 % (ref 5–13)
BETA2 GLOB SERPL ELPH-MCNC: 5.7 % (ref 2–8)
BETA2+GAMMA GLOB SERPL ELPH-MCNC: 0.36 G/DL (ref 0.2–0.5)
GAMMA GLOB ABNORMAL SERPL ELPH-MCNC: 0.97 G/DL (ref 0.5–1.6)
GAMMA GLOB SERPL ELPH-MCNC: 15.2 % (ref 12–22)
IGG/ALB SER: 1.35 {RATIO} (ref 1.1–1.8)
PROT PATTERN SERPL ELPH-IMP: ABNORMAL
PROT SERPL-MCNC: 6.4 G/DL (ref 6.4–8.2)

## 2019-04-08 ENCOUNTER — TELEPHONE (OUTPATIENT)
Dept: NEPHROLOGY | Facility: CLINIC | Age: 49
End: 2019-04-08

## 2019-04-09 ENCOUNTER — OFFICE VISIT (OUTPATIENT)
Dept: NEPHROLOGY | Facility: CLINIC | Age: 49
End: 2019-04-09
Payer: COMMERCIAL

## 2019-04-09 VITALS
SYSTOLIC BLOOD PRESSURE: 140 MMHG | HEART RATE: 74 BPM | DIASTOLIC BLOOD PRESSURE: 92 MMHG | HEIGHT: 66 IN | BODY MASS INDEX: 32.78 KG/M2 | WEIGHT: 204 LBS

## 2019-04-09 DIAGNOSIS — I12.9 BENIGN HYPERTENSION WITH CKD (CHRONIC KIDNEY DISEASE), STAGE II: ICD-10-CM

## 2019-04-09 DIAGNOSIS — N18.2 CKD (CHRONIC KIDNEY DISEASE), STAGE II: Primary | ICD-10-CM

## 2019-04-09 DIAGNOSIS — N18.2 BENIGN HYPERTENSION WITH CKD (CHRONIC KIDNEY DISEASE), STAGE II: ICD-10-CM

## 2019-04-09 DIAGNOSIS — E87.6 HYPOKALEMIA: ICD-10-CM

## 2019-04-09 DIAGNOSIS — I10 HYPERTENSION, UNSPECIFIED TYPE: ICD-10-CM

## 2019-04-09 LAB
BUN SERPL-MCNC: 17 MG/DL (ref 7–25)
BUN/CREAT SERPL: NORMAL (CALC) (ref 6–22)
CALCIUM SERPL-MCNC: 9.6 MG/DL (ref 8.6–10.3)
CHLORIDE SERPL-SCNC: 101 MMOL/L (ref 98–110)
CO2 SERPL-SCNC: 30 MMOL/L (ref 20–32)
CREAT SERPL-MCNC: 1.2 MG/DL (ref 0.6–1.35)
GLUCOSE SERPL-MCNC: 97 MG/DL (ref 65–139)
POTASSIUM SERPL-SCNC: 3.7 MMOL/L (ref 3.5–5.3)
SL AMB EGFR AFRICAN AMERICAN: 82 ML/MIN/1.73M2
SL AMB EGFR NON AFRICAN AMERICAN: 71 ML/MIN/1.73M2
SODIUM SERPL-SCNC: 139 MMOL/L (ref 135–146)

## 2019-04-09 PROCEDURE — 99214 OFFICE O/P EST MOD 30 MIN: CPT | Performed by: NURSE PRACTITIONER

## 2019-04-09 RX ORDER — HYDROCHLOROTHIAZIDE 12.5 MG/1
25 TABLET ORAL DAILY
Qty: 30 TABLET | Refills: 0 | Status: SHIPPED | OUTPATIENT
Start: 2019-04-09 | End: 2020-02-07 | Stop reason: ALTCHOICE

## 2019-08-20 ENCOUNTER — HOSPITAL ENCOUNTER (OUTPATIENT)
Dept: RADIOLOGY | Facility: HOSPITAL | Age: 49
Discharge: HOME | End: 2019-08-20
Payer: COMMERCIAL

## 2019-08-20 DIAGNOSIS — M54.50 BILATERAL LOW BACK PAIN WITHOUT SCIATICA, UNSPECIFIED CHRONICITY: ICD-10-CM

## 2019-08-20 PROBLEM — M79.642 HAND PAIN, LEFT: Status: ACTIVE | Noted: 2019-08-20

## 2019-08-20 PROCEDURE — 72110 X-RAY EXAM L-2 SPINE 4/>VWS: CPT

## 2019-08-27 ENCOUNTER — EVALUATION (OUTPATIENT)
Dept: PHYSICAL THERAPY | Facility: CLINIC | Age: 49
End: 2019-08-27
Payer: COMMERCIAL

## 2019-08-27 DIAGNOSIS — M54.50 CHRONIC BILATERAL LOW BACK PAIN WITHOUT SCIATICA: Primary | ICD-10-CM

## 2019-08-27 DIAGNOSIS — M54.50 BILATERAL LOW BACK PAIN WITHOUT SCIATICA, UNSPECIFIED CHRONICITY: ICD-10-CM

## 2019-08-27 DIAGNOSIS — G89.29 CHRONIC BILATERAL LOW BACK PAIN WITHOUT SCIATICA: Primary | ICD-10-CM

## 2019-08-27 PROCEDURE — 97110 THERAPEUTIC EXERCISES: CPT | Performed by: PHYSICAL THERAPIST

## 2019-08-27 PROCEDURE — 97162 PT EVAL MOD COMPLEX 30 MIN: CPT | Performed by: PHYSICAL THERAPIST

## 2019-08-27 NOTE — PROGRESS NOTES
PT Evaluation     Today's date: 2019  Patient name: Owen Sexton  : 1970  MRN: [id number]  Referring provider: Khushbu Hernandez MD  Dx:   Encounter Diagnosis     ICD-10-CM    1  Chronic bilateral low back pain without sciatica M54 5     G89 29    2  Bilateral low back pain without sciatica, unspecified chronicity M54 5 Ambulatory referral to Physical Therapy                  Assessment  Assessment details: Patient presents with signs and symptoms correlating with referring diagnosis  No further referral appears necessary at this time based upon examination results  He presents with a movement impairment diagnosis of lumbar hypomobility into flexion  Which presents with decreased ROM, pain throughout the day, gait changes, impaired physical strength and positive response to repeated flexion movement  He requires skilled PT at this time to return to PLOF without pain and ability to bend and move as he wants to  Negative prognostic indicators: chronicity and comorbidities  Positive prognostic indicators: good motivations  Please contact me if you have any further questions or recommendations  Thank you very much for the kind referral       Impairments: abnormal muscle tone, abnormal or restricted ROM, activity intolerance, difficulty understanding, impaired balance, impaired physical strength, pain with function and poor posture     Symptom irritability: moderateUnderstanding of Dx/Px/POC: good   Prognosis: good    Goals  STGs  1  Decrease pain by 20% in 2-4 weeks  2  Improve lumbar spine ROM by 10 degrees in 2-4 weeks  3  Improve hip strength by 1/3 grade in 2-4 weeks  LTGs  1  Decrease pain by 60% in 6-8 weeks  2  Improve sitting tolerance to >30 minutes in 6-8 weeks  3  Perform flexion activities without pain in 6-8 weeks          Plan  Patient would benefit from: skilled physical therapy  Referral necessary: No  Planned modality interventions: TENS, cryotherapy and thermotherapy: hydrocollator packs  Planned therapy interventions: balance, manual therapy, activity modification, neuromuscular re-education, patient education, therapeutic activities, therapeutic exercise, home exercise program, graded exercise, functional ROM exercises and coordination  Frequency: 2x week  Duration in weeks: 8  Treatment plan discussed with: patient        Subjective Evaluation    History of Present Illness  Mechanism of injury: Pt states that the pain started 20 years ago when he was roller blading and fell right on his butt  He was then checked out with no issues at that time but consistently had pain he states he does not know what physical therapy would do for him, but is interested in trying  Pt is stating his PC is when he is sitting for long periods of time and has pain directly along the spine with out radiating symptoms  He reports that the pain is better when he is standing and lying down  History of stroke with lasting symptoms on L side of his body  Recurrent probem    Quality of life: good    Pain  Current pain ratin  At best pain ratin  At worst pain rating: 10  Quality: sharp  Aggravating factors: standing, stair climbing and lifting  Progression: no change    Social Support  Stairs in house: yes (15)   Lives with: brother      Employment status: not working    Diagnostic Tests  X-ray: normal  Treatments  Previous treatment: physical therapy (17 neck pain moving )  Patient Goals  Patient goals for therapy: increased strength, return to work, increased motion and improved balance          Objective     Active Range of Motion     Lumbar   Flexion: 54 degrees  with pain  Extension: 23 degrees  with pain Restriction level: minimal  Left lateral flexion: 18 degrees    with pain  Right lateral flexion: 17 degrees  with pain    Additional Active Range of Motion Details  Observation: forward posture avoids flexion ROM   Balance: 10 seconds B w/ min sway  Gait: dependence on walking cane for balance  Squat: 100 degrees  Reflexes: 2+ odd recoil following kick out   Sensation: WNL  Myotomes:  WNL    Slump: + B SLR: +B Lashae: - Faddir: - Scours:    -   : SIJ Compression:- Thigh thrust-: Sacral thrust: -    LUPILLO: pinch during mobility testing of lower lumbar spine and decreased mobility into PA  Palpation: min increase in pain during palpation   Repeated movements into ext increased pain to 5/10 sitting flexion decreased pain 0/10          Flowsheet Rows      Most Recent Value   PT/OT G-Codes   Current Score  57   Projected Score  63            Precautions: h/o stroke w/ weakness on L side, and slow walking speed increased fall risk    Daily Treatment Diary       Manuals 8/27                                                            Exercise Diary          Bike           Lumbar flex roll out          DKC          Bridges          Clamshells          Seated lumbar flexion stretch           TB walk out          Hip 3 ways standing abd and flex GTB 10x         SLS           Marching           Repeated flexion seated 20x                                                                                                             Modalities                                  Date 8/27            FOTO IE             Re-eval IE

## 2019-09-03 ENCOUNTER — OFFICE VISIT (OUTPATIENT)
Dept: PHYSICAL THERAPY | Facility: CLINIC | Age: 49
End: 2019-09-03
Payer: COMMERCIAL

## 2019-09-03 DIAGNOSIS — M54.50 BILATERAL LOW BACK PAIN WITHOUT SCIATICA, UNSPECIFIED CHRONICITY: ICD-10-CM

## 2019-09-03 DIAGNOSIS — M54.50 CHRONIC BILATERAL LOW BACK PAIN WITHOUT SCIATICA: Primary | ICD-10-CM

## 2019-09-03 DIAGNOSIS — G89.29 CHRONIC BILATERAL LOW BACK PAIN WITHOUT SCIATICA: Primary | ICD-10-CM

## 2019-09-03 PROCEDURE — 97112 NEUROMUSCULAR REEDUCATION: CPT | Performed by: PHYSICAL THERAPIST

## 2019-09-03 PROCEDURE — 97110 THERAPEUTIC EXERCISES: CPT | Performed by: PHYSICAL THERAPIST

## 2019-09-03 NOTE — PROGRESS NOTES
Daily Note     Today's date: 9/3/2019  Patient name: Rikki Runner  : 1970  MRN: [id number]  Referring provider: Chapo Gonzalez MD  Dx:   Encounter Diagnosis     ICD-10-CM    1  Chronic bilateral low back pain without sciatica M54 5     G89 29    2  Bilateral low back pain without sciatica, unspecified chronicity M54 5                   Subjective: Pt states that he is feeling about the same, but has noticed a little improvement here and there, but states he is just starting therapy so can not say its on the right track  Pt states that he is having some difficulty with HEP as he has had previous hernia repair on L side with pain when he is doing repeated forward flexion  Objective: See treatment diary below      Assessment: Tolerated treatment well  Patient exhibited good technique with therapeutic exercises with tolerance to all new interventions today  Pt required build up to full flexion interventions starting with ball roll out and then progressing to repeated seated flexion  Plan: Progress treatment as tolerated         Precautions: h/o stroke w/ weakness on L side, and slow walking speed increased fall risk    Daily Treatment Diary     Date 8/27 9/3           FOTO IE             Re-eval IE              Manuals 8/27 9/3                                                           Exercise Diary  9/3        Bike   5'        Lumbar flex roll out  15x5"        DKC  5"x10        Bridges  2x10        Clamshells  5"x20 ea        Seated lumbar flexion stretch   10x10"        TB walk out  2x10x5"        Hip 3 ways standing abd and flex GTB 10x GTB 10x        SLS   5x20"        Marching   20x        Repeated flexion seated 20x 20x                                                                                                            Modalities

## 2019-09-06 ENCOUNTER — OFFICE VISIT (OUTPATIENT)
Dept: PHYSICAL THERAPY | Facility: CLINIC | Age: 49
End: 2019-09-06
Payer: COMMERCIAL

## 2019-09-06 DIAGNOSIS — G89.29 CHRONIC BILATERAL LOW BACK PAIN WITHOUT SCIATICA: Primary | ICD-10-CM

## 2019-09-06 DIAGNOSIS — M54.50 BILATERAL LOW BACK PAIN WITHOUT SCIATICA, UNSPECIFIED CHRONICITY: ICD-10-CM

## 2019-09-06 DIAGNOSIS — M54.50 CHRONIC BILATERAL LOW BACK PAIN WITHOUT SCIATICA: Primary | ICD-10-CM

## 2019-09-06 PROCEDURE — 97110 THERAPEUTIC EXERCISES: CPT | Performed by: PHYSICAL THERAPIST

## 2019-09-06 NOTE — PROGRESS NOTES
Daily Note     Today's date: 2019  Patient name: Mercedes Higginbotham  : 1970  MRN: [id number]  Referring provider: Mirela Yuen MD  Dx:   Encounter Diagnosis     ICD-10-CM    1  Chronic bilateral low back pain without sciatica M54 5     G89 29    2  Bilateral low back pain without sciatica, unspecified chronicity M54 5        Start Time: 1330  Stop Time: 1422  Total time in clinic (min): 52 minutes    Subjective: Pt states he is having just as much pain as last time with no complaints following last session  Objective: See treatment diary below      Assessment: Tolerated treatment well  Patient exhibited good technique with therapeutic exercises with no complaints of pain during the treatment and was able to progress interventions  He did have pain following flexion stretch but completed repeated flexion seated again 2x10 with relief of symptoms  Continue to progress with stability interventions next visit  Plan: Progress note during next visit        Precautions: h/o stroke w/ weakness on L side, and slow walking speed increased fall risk    Daily Treatment Diary     Date 8/27 9/3 9/6          FOTO IE             Re-eval IE              Manuals 8/27 9/3 9/6                                                          Exercise Diary  9/3 9/6       Bike   5' 6'       Lumbar flex roll out  15x5" 15x5"       DKC  5"x10        Bridges  2x10        Clamshells  5"x20 ea        Seated lumbar flexion stretch   10x10" 10x10"  hold      BTBx2 walk out  2x10x5" 2x10x5"press       Hip 3 ways standing abd and flex GTB 10x GTB 10x GTB 2x10       SLS   5x20" 5x20" foam       Marching   20x 20x standing       Repeated flexion seated 20x 20x 10x4       Side step   5 laps GTB       Wall sits   10x15"                                                                                       Modalities

## 2019-09-16 ENCOUNTER — APPOINTMENT (OUTPATIENT)
Dept: PHYSICAL THERAPY | Facility: CLINIC | Age: 49
End: 2019-09-16
Payer: COMMERCIAL

## 2019-09-18 ENCOUNTER — APPOINTMENT (OUTPATIENT)
Dept: PHYSICAL THERAPY | Facility: CLINIC | Age: 49
End: 2019-09-18
Payer: COMMERCIAL

## 2019-09-23 ENCOUNTER — APPOINTMENT (OUTPATIENT)
Dept: PHYSICAL THERAPY | Facility: CLINIC | Age: 49
End: 2019-09-23
Payer: COMMERCIAL

## 2019-09-23 NOTE — PROGRESS NOTES
Pt Dc/d 9/23 as non compliant with attendance policy and no showed twice patient may return to PT with new script

## 2019-09-25 ENCOUNTER — APPOINTMENT (OUTPATIENT)
Dept: PHYSICAL THERAPY | Facility: CLINIC | Age: 49
End: 2019-09-25
Payer: COMMERCIAL

## 2020-02-04 ENCOUNTER — HOSPITAL ENCOUNTER (EMERGENCY)
Facility: HOSPITAL | Age: 50
Discharge: HOME | DRG: 174 | End: 2020-02-04
Attending: EMERGENCY MEDICINE
Payer: COMMERCIAL

## 2020-02-04 VITALS
SYSTOLIC BLOOD PRESSURE: 173 MMHG | DIASTOLIC BLOOD PRESSURE: 118 MMHG | WEIGHT: 210.32 LBS | RESPIRATION RATE: 18 BRPM | OXYGEN SATURATION: 95 % | HEART RATE: 89 BPM | TEMPERATURE: 97.8 F | BODY MASS INDEX: 35 KG/M2

## 2020-02-04 DIAGNOSIS — G89.29 CHRONIC BILATERAL LOW BACK PAIN WITHOUT SCIATICA: Primary | ICD-10-CM

## 2020-02-04 DIAGNOSIS — M54.50 CHRONIC BILATERAL LOW BACK PAIN WITHOUT SCIATICA: Primary | ICD-10-CM

## 2020-02-04 DIAGNOSIS — I10 HYPERTENSION: ICD-10-CM

## 2020-02-04 DIAGNOSIS — M25.511 ACUTE PAIN OF RIGHT SHOULDER: ICD-10-CM

## 2020-02-04 PROCEDURE — 99283 EMERGENCY DEPT VISIT LOW MDM: CPT

## 2020-02-04 PROCEDURE — 99284 EMERGENCY DEPT VISIT MOD MDM: CPT | Performed by: EMERGENCY MEDICINE

## 2020-02-04 RX ORDER — LIDOCAINE 50 MG/G
1 PATCH TOPICAL ONCE
Status: DISCONTINUED | OUTPATIENT
Start: 2020-02-04 | End: 2020-02-05 | Stop reason: HOSPADM

## 2020-02-04 RX ORDER — HYDROCHLOROTHIAZIDE 12.5 MG/1
25 TABLET ORAL DAILY
Status: DISCONTINUED | OUTPATIENT
Start: 2020-02-05 | End: 2020-02-04

## 2020-02-04 RX ORDER — LIDOCAINE 50 MG/G
1 PATCH TOPICAL DAILY
Qty: 5 PATCH | Refills: 0 | Status: SHIPPED | OUTPATIENT
Start: 2020-02-04 | End: 2020-02-07 | Stop reason: ALTCHOICE

## 2020-02-04 RX ORDER — METOPROLOL TARTRATE 50 MG/1
50 TABLET, FILM COATED ORAL ONCE
Status: COMPLETED | OUTPATIENT
Start: 2020-02-04 | End: 2020-02-04

## 2020-02-04 RX ORDER — HYDROCHLOROTHIAZIDE 12.5 MG/1
25 TABLET ORAL ONCE
Status: COMPLETED | OUTPATIENT
Start: 2020-02-04 | End: 2020-02-04

## 2020-02-04 RX ADMIN — METOPROLOL TARTRATE 50 MG: 50 TABLET, FILM COATED ORAL at 20:58

## 2020-02-04 RX ADMIN — LIDOCAINE 1 PATCH: 50 PATCH TOPICAL at 20:57

## 2020-02-04 RX ADMIN — HYDROCHLOROTHIAZIDE 25 MG: 12.5 TABLET ORAL at 20:58

## 2020-02-05 NOTE — ED PROVIDER NOTES
History  Chief Complaint   Patient presents with    Shoulder Pain     right shoulder pain for one week, denies specific injury, fulll ROM    Back Pain     lower back for "20 something years" denies any changing factorsd but states "they never give me answers so i Delbert Baker see if they will tonight"     71-year-old male with a history of hypertension and chronic back pain presents with right shoulder pain and back pain  Patient states that he has had the right shoulder pain for the last few days  He denies trauma  He also has had back pain for years and has been to physical therapy without relief  He denies any worsening pain  No radiation of the pain to the legs, weakness or numbness  No bowel or bladder incontinence  He states he is not taking anything for the pain because I do not like to take pills he has had an x-ray of his lumbar spine a few months ago which just showed degenerative joint changes  In triage she is significantly hypertensive  He states he is taking his medications  He denies headaches, chest pain or shortness of breath  No focal weakness  History provided by:  Patient   used: No    Shoulder Pain   Location:  Shoulder  Shoulder location:  R shoulder  Injury: no    Pain details:     Quality:  Aching    Severity:  Unable to specify    Onset quality:  Gradual    Duration:  2 days    Timing:  Constant    Progression:  Unchanged  Prior injury to area:  No  Relieved by:  None tried  Worsened by:   Movement  Ineffective treatments:  None tried  Associated symptoms: back pain    Associated symptoms: no fatigue, no fever, no muscle weakness, no neck pain, no numbness, no stiffness, no swelling and no tingling    Back Pain   Location:  Lumbar spine  Quality:  Aching  Radiates to:  Does not radiate  Pain severity:  Unable to specify  Pain is:  Same all the time  Onset quality:  Gradual  Timing:  Constant  Progression:  Unchanged  Chronicity:  Chronic  Context: not falling, not lifting heavy objects, not MCA, not MVA, not recent illness, not recent injury and not twisting    Relieved by:  None tried  Worsened by: Movement  Ineffective treatments:  None tried  Associated symptoms: no bladder incontinence, no bowel incontinence, no chest pain, no fever, no headaches, no leg pain, no numbness, no paresthesias, no perianal numbness, no tingling and no weakness    Risk factors: no hx of cancer and not obese        Prior to Admission Medications   Prescriptions Last Dose Informant Patient Reported? Taking?   aspirin 325 mg tablet  Self Yes Yes   Sig: Take 325 mg by mouth daily   atorvastatin (LIPITOR) 10 mg tablet  Self Yes No   Sig: Take 10 mg by mouth every evening   cyclobenzaprine (FLEXERIL) 5 mg tablet Not Taking at Unknown time  No No   Sig: Take 1 tablet (5 mg total) by mouth 3 (three) times a day as needed for muscle spasms for up to 30 doses   Patient not taking: Reported on 2/4/2020   hydrochlorothiazide (HYDRODIURIL) 12 5 mg tablet  Self No Yes   Sig: Take 2 tablets (25 mg total) by mouth daily   hydrochlorothiazide (HYDRODIURIL) 25 mg tablet   No Yes   Sig: Take 1 tablet (25 mg total) by mouth daily   naproxen (NAPROSYN) 500 mg tablet Not Taking at Unknown time  No No   Sig: Take 1 tablet (500 mg total) by mouth 2 (two) times a day with meals   Patient not taking: Reported on 2/4/2020      Facility-Administered Medications: None       Past Medical History:   Diagnosis Date    Back pain     COPD (chronic obstructive pulmonary disease) (HCC)     Hyperlipidemia     Hypertension     Inguinal hernia, left     Mild heartburn     Obesity     Stroke ([address] ) approx 2015    Denies residual problems  Initially he only had trouble with two fingers on left side      Wears glasses        Past Surgical History:   Procedure Laterality Date    APPENDECTOMY      HERNIA REPAIR      NO PAST SURGERIES      [place] LAP,APPENDECTOMY N/A 11/28/2018    Procedure: APPENDECTOMY LAPAROSCOPIC; Surgeon: Joe Brown MD;  Location: [address] [place] [address] OR;  Service: General    NC [address] Left 4/2/2018    Procedure: LAPAROSCOPIC INGUINAL HERNIA REPAIR WITH MESH;  Surgeon: Joe Brown MD;  Location: [facility] OR;  Service: General       History reviewed  No pertinent family history  I have reviewed and agree with the history as documented  Social History     Tobacco Use    Smoking status: Former Smoker     Packs/day: 1 00     Years: 15 00     Pack years: 15 00     Types: Cigarettes     Last attempt to quit: 2010     Years since quitting: 10 0    Smokeless tobacco: Former User   Substance Use Topics    Alcohol use: Not Currently    Drug use: No        Review of Systems   Constitutional: Negative  Negative for fatigue and fever  HENT: Negative  Eyes: Negative  Respiratory: Negative  Cardiovascular: Negative  Negative for chest pain  Gastrointestinal: Negative  Negative for bowel incontinence  Genitourinary: Negative  Negative for bladder incontinence  Musculoskeletal: Positive for back pain  Negative for neck pain and stiffness  Skin: Negative  Allergic/Immunologic: Negative  Neurological: Negative  Negative for tingling, weakness, numbness, headaches and paresthesias  Hematological: Negative  Psychiatric/Behavioral: Negative  All other systems reviewed and are negative  Physical Exam  Physical Exam   Constitutional: He is oriented to person, place, and time  He appears well-developed and well-nourished  Non-toxic appearance  He does not have a sickly appearance  He does not appear ill  No distress  HENT:   Head: Normocephalic and atraumatic  Right Ear: External ear normal    Left Ear: External ear normal    Eyes: Pupils are equal, round, and reactive to light  Conjunctivae are normal  No scleral icterus  Cardiovascular: Normal rate, regular rhythm and normal heart sounds     Pulmonary/Chest: Effort normal and breath sounds normal  Abdominal: Soft  Bowel sounds are normal  He exhibits no distension and no mass  There is no tenderness  No hernia  Musculoskeletal: Normal range of motion  He exhibits no edema or deformity  Right shoulder: Normal         Lumbar back: He exhibits tenderness and pain  He exhibits normal range of motion, no bony tenderness, no swelling, no edema, no deformity, no laceration and no spasm  Back:    Neurological: He is alert and oriented to person, place, and time  He has normal strength and normal reflexes  He displays normal reflexes  He exhibits normal muscle tone  Skin: Skin is warm and dry  No rash noted  He is not diaphoretic  No erythema  No pallor  Psychiatric: He has a normal mood and affect  Nursing note and vitals reviewed        Vital Signs  ED Triage Vitals   Temperature Pulse Respirations Blood Pressure SpO2   02/04/20 1852 02/04/20 1852 02/04/20 1852 02/04/20 1853 02/04/20 1852   97 8 °F (36 6 °C) (!) 115 18 (!) 186/114 100 %      Temp src Heart Rate Source Patient Position - Orthostatic VS BP Location FiO2 (%)   -- 02/04/20 1852 02/04/20 2037 02/04/20 2037 --    Monitor Sitting Left arm       Pain Score       02/04/20 1852       5           Vitals:    02/04/20 1852 02/04/20 1853 02/04/20 2037 02/04/20 2144   BP:  (!) 186/114 (!) 188/125 (!) 173/118   Pulse: (!) 115  98 89   Patient Position - Orthostatic VS:   Sitting Standing         Visual Acuity      ED Medications  Medications   lidocaine (LIDODERM) 5 % patch 1 patch (1 patch Topical Medication Applied 2/4/20 2057)   metoprolol tartrate (LOPRESSOR) tablet 50 mg (50 mg Oral Given 2/4/20 2058)   hydrochlorothiazide (HYDRODIURIL) tablet 25 mg (25 mg Oral Given 2/4/20 2058)       Diagnostic Studies  Results Reviewed     None                 No orders to display              Procedures  Procedures         ED Course                               MDM  Number of Diagnoses or Management Options  Diagnosis management comments: 59-year-old male presents with ongoing lumbar back pain which is chronic  No increase in pain or radiation of pain  No bowel or bladder incontinence  He also complains of new right shoulder pain that is atraumatic  He has not taken anything for either pain because he does not like to take pills  He has gone to physical therapy which has not helped  During triage it was found that he is significantly hypertensive  He does have a history of hypertension and states he is taking his meds  He has no headache, visual changes, focal neuro deficits, chest pain or shortness of breath  This time I do not feel any imaging is needed for his chronic low back pain as he has already had plain x-rays which show DJD  He has a normal right shoulder exam at this time  He most likely has some arthritis there also  Will try oral blood pressure meds to try to lower his blood pressure a little bit here  He was urged to follow up with his family doctor in the next few days regarding his blood pressure  Will also refer patient to the comprehensive spine program        Disposition  Final diagnoses:   Chronic bilateral low back pain without sciatica   Acute pain of right shoulder   Hypertension     Time reflects when diagnosis was documented in both MDM as applicable and the Disposition within this note     Time User Action Codes Description Comment    2/4/2020  9:54 PM Austyn Miranda Add [M54 5,  G89 29] Chronic bilateral low back pain without sciatica     2/4/2020  9:54 PM Mila BARRIENTOS Add [L35 361] Acute pain of right shoulder     2/4/2020  9:54 PM Richa Mai,[address] Hypertension       ED Disposition     ED Disposition Condition Date/Time Comment    Discharge Good yoshi Feb 4, 2020  9:54 PM Zell Lanes discharge to home/self care              Follow-up Information     Follow up With Specialties Details Why Contact Info Additional Information    [facility] Physical Therapy Schedule an appointment as soon as possible for a visit in 2 days  [phone]    Leeanne Reyes MD Family Medicine Schedule an appointment as soon as possible for a visit in 2 days  [address] [facility] [phone]             Patient's Medications   Discharge Prescriptions    LIDOCAINE (LIDODERM) 5 %    Apply 1 patch topically daily Remove & Discard patch within 12 hours or as directed by MD       Start Date: 2/4/2020  End Date: --       Order Dose: 1 patch       Quantity: 5 patch    Refills: 0     No discharge procedures on file      ED Provider  Electronically Signed by           Martina Moses DO  02/04/20 6552

## 2020-02-07 ENCOUNTER — APPOINTMENT (EMERGENCY)
Dept: NON INVASIVE DIAGNOSTICS | Facility: HOSPITAL | Age: 50
DRG: 174 | End: 2020-02-07
Attending: INTERNAL MEDICINE
Payer: COMMERCIAL

## 2020-02-07 ENCOUNTER — APPOINTMENT (INPATIENT)
Dept: NON INVASIVE DIAGNOSTICS | Facility: HOSPITAL | Age: 50
DRG: 174 | End: 2020-02-07
Payer: COMMERCIAL

## 2020-02-07 ENCOUNTER — APPOINTMENT (EMERGENCY)
Dept: RADIOLOGY | Facility: HOSPITAL | Age: 50
DRG: 174 | End: 2020-02-07
Payer: COMMERCIAL

## 2020-02-07 ENCOUNTER — HOSPITAL ENCOUNTER (INPATIENT)
Facility: HOSPITAL | Age: 50
LOS: 2 days | Discharge: HOME | DRG: 174 | End: 2020-02-09
Attending: EMERGENCY MEDICINE | Admitting: INTERNAL MEDICINE
Payer: COMMERCIAL

## 2020-02-07 DIAGNOSIS — I21.3 ACUTE ST ELEVATION MYOCARDIAL INFARCTION (STEMI) (HCC): ICD-10-CM

## 2020-02-07 DIAGNOSIS — R07.9 CHEST PAIN: ICD-10-CM

## 2020-02-07 DIAGNOSIS — E78.5 HYPERLIPIDEMIA, UNSPECIFIED HYPERLIPIDEMIA TYPE: ICD-10-CM

## 2020-02-07 DIAGNOSIS — I25.10 CAD (CORONARY ARTERY DISEASE): ICD-10-CM

## 2020-02-07 DIAGNOSIS — I21.19 ACUTE ST ELEVATION MYOCARDIAL INFARCTION (STEMI) OF INFERIOR WALL (HCC): ICD-10-CM

## 2020-02-07 DIAGNOSIS — I10 ESSENTIAL HYPERTENSION: Primary | ICD-10-CM

## 2020-02-07 LAB
ALBUMIN SERPL BCP-MCNC: 3.4 G/DL (ref 3.5–5)
ALP SERPL-CCNC: 91 U/L (ref 46–116)
ALT SERPL W P-5'-P-CCNC: 45 U/L (ref 12–78)
ANION GAP SERPL CALCULATED.3IONS-SCNC: 10 MMOL/L (ref 4–13)
APTT PPP: 28 SECONDS (ref 23–37)
AST SERPL W P-5'-P-CCNC: 20 U/L (ref 5–45)
ATRIAL RATE: 91 BPM
ATRIAL RATE: 93 BPM
ATRIAL RATE: 95 BPM
BASOPHILS # BLD AUTO: 0.05 THOUSANDS/ΜL (ref 0–0.1)
BASOPHILS NFR BLD AUTO: 1 % (ref 0–1)
BILIRUB SERPL-MCNC: 0.6 MG/DL (ref 0.2–1)
BUN SERPL-MCNC: 17 MG/DL (ref 5–25)
CALCIUM SERPL-MCNC: 8.1 MG/DL (ref 8.3–10.1)
CHLORIDE SERPL-SCNC: 101 MMOL/L (ref 100–108)
CO2 SERPL-SCNC: 28 MMOL/L (ref 21–32)
CREAT SERPL-MCNC: 1.18 MG/DL (ref 0.6–1.3)
EOSINOPHIL # BLD AUTO: 0.13 THOUSAND/ΜL (ref 0–0.61)
EOSINOPHIL NFR BLD AUTO: 2 % (ref 0–6)
ERYTHROCYTE [DISTWIDTH] IN BLOOD BY AUTOMATED COUNT: 12 % (ref 11.6–15.1)
GFR SERPL CREATININE-BSD FRML MDRD: 72 ML/MIN/1.73SQ M
GLUCOSE SERPL-MCNC: 151 MG/DL (ref 65–140)
HCT VFR BLD AUTO: 46.5 % (ref 36.5–49.3)
HGB BLD-MCNC: 16.2 G/DL (ref 12–17)
IMM GRANULOCYTES # BLD AUTO: 0.02 THOUSAND/UL (ref 0–0.2)
IMM GRANULOCYTES NFR BLD AUTO: 0 % (ref 0–2)
INR PPP: 0.97 (ref 0.84–1.19)
KCT BLD-ACNC: 316 SEC (ref 89–137)
LYMPHOCYTES # BLD AUTO: 1.33 THOUSANDS/ΜL (ref 0.6–4.47)
LYMPHOCYTES NFR BLD AUTO: 24 % (ref 14–44)
MCH RBC QN AUTO: 31 PG (ref 26.8–34.3)
MCHC RBC AUTO-ENTMCNC: 34.8 G/DL (ref 31.4–37.4)
MCV RBC AUTO: 89 FL (ref 82–98)
MONOCYTES # BLD AUTO: 0.62 THOUSAND/ΜL (ref 0.17–1.22)
MONOCYTES NFR BLD AUTO: 11 % (ref 4–12)
NEUTROPHILS # BLD AUTO: 3.35 THOUSANDS/ΜL (ref 1.85–7.62)
NEUTS SEG NFR BLD AUTO: 62 % (ref 43–75)
NRBC BLD AUTO-RTO: 0 /100 WBCS
P AXIS: 38 DEGREES
P AXIS: 50 DEGREES
P AXIS: 54 DEGREES
PLATELET # BLD AUTO: 235 THOUSANDS/UL (ref 149–390)
PMV BLD AUTO: 10 FL (ref 8.9–12.7)
POTASSIUM SERPL-SCNC: 3.2 MMOL/L (ref 3.5–5.3)
PR INTERVAL: 136 MS
PR INTERVAL: 138 MS
PR INTERVAL: 142 MS
PROT SERPL-MCNC: 7.2 G/DL (ref 6.4–8.2)
PROTHROMBIN TIME: 13 SECONDS (ref 11.6–14.5)
QRS AXIS: 20 DEGREES
QRS AXIS: 30 DEGREES
QRS AXIS: 40 DEGREES
QRSD INTERVAL: 100 MS
QRSD INTERVAL: 104 MS
QRSD INTERVAL: 108 MS
QT INTERVAL: 328 MS
QT INTERVAL: 334 MS
QT INTERVAL: 350 MS
QTC INTERVAL: 412 MS
QTC INTERVAL: 415 MS
QTC INTERVAL: 431 MS
RBC # BLD AUTO: 5.22 MILLION/UL (ref 3.88–5.62)
SODIUM SERPL-SCNC: 139 MMOL/L (ref 136–145)
SPECIMEN SOURCE: ABNORMAL
T WAVE AXIS: 18 DEGREES
T WAVE AXIS: 74 DEGREES
T WAVE AXIS: 76 DEGREES
TROPONIN I SERPL-MCNC: 0.04 NG/ML
VENTRICULAR RATE: 91 BPM
VENTRICULAR RATE: 93 BPM
VENTRICULAR RATE: 95 BPM
WBC # BLD AUTO: 5.5 THOUSAND/UL (ref 4.31–10.16)

## 2020-02-07 PROCEDURE — 85347 COAGULATION TIME ACTIVATED: CPT

## 2020-02-07 PROCEDURE — 99152 MOD SED SAME PHYS/QHP 5/>YRS: CPT | Performed by: INTERNAL MEDICINE

## 2020-02-07 PROCEDURE — 99285 EMERGENCY DEPT VISIT HI MDM: CPT | Performed by: EMERGENCY MEDICINE

## 2020-02-07 PROCEDURE — 85610 PROTHROMBIN TIME: CPT | Performed by: EMERGENCY MEDICINE

## 2020-02-07 PROCEDURE — 80053 COMPREHEN METABOLIC PANEL: CPT | Performed by: EMERGENCY MEDICINE

## 2020-02-07 PROCEDURE — 96374 THER/PROPH/DIAG INJ IV PUSH: CPT

## 2020-02-07 PROCEDURE — 027034Z DILATION OF CORONARY ARTERY, ONE ARTERY WITH DRUG-ELUTING INTRALUMINAL DEVICE, PERCUTANEOUS APPROACH: ICD-10-PCS | Performed by: INTERNAL MEDICINE

## 2020-02-07 PROCEDURE — 93454 CORONARY ARTERY ANGIO S&I: CPT | Performed by: INTERNAL MEDICINE

## 2020-02-07 PROCEDURE — 85025 COMPLETE CBC W/AUTO DIFF WBC: CPT | Performed by: EMERGENCY MEDICINE

## 2020-02-07 PROCEDURE — B2111ZZ FLUOROSCOPY OF MULTIPLE CORONARY ARTERIES USING LOW OSMOLAR CONTRAST: ICD-10-PCS | Performed by: INTERNAL MEDICINE

## 2020-02-07 PROCEDURE — 93005 ELECTROCARDIOGRAM TRACING: CPT

## 2020-02-07 PROCEDURE — C1894 INTRO/SHEATH, NON-LASER: HCPCS | Performed by: INTERNAL MEDICINE

## 2020-02-07 PROCEDURE — C1769 GUIDE WIRE: HCPCS | Performed by: INTERNAL MEDICINE

## 2020-02-07 PROCEDURE — ND001 PR NO DOCUMENTATION

## 2020-02-07 PROCEDURE — 36415 COLL VENOUS BLD VENIPUNCTURE: CPT | Performed by: EMERGENCY MEDICINE

## 2020-02-07 PROCEDURE — C1874 STENT, COATED/COV W/DEL SYS: HCPCS

## 2020-02-07 PROCEDURE — 93306 TTE W/DOPPLER COMPLETE: CPT

## 2020-02-07 PROCEDURE — 99291 CRITICAL CARE FIRST HOUR: CPT

## 2020-02-07 PROCEDURE — 96375 TX/PRO/DX INJ NEW DRUG ADDON: CPT

## 2020-02-07 PROCEDURE — C1887 CATHETER, GUIDING: HCPCS | Performed by: INTERNAL MEDICINE

## 2020-02-07 PROCEDURE — 99153 MOD SED SAME PHYS/QHP EA: CPT | Performed by: INTERNAL MEDICINE

## 2020-02-07 PROCEDURE — C1725 CATH, TRANSLUMIN NON-LASER: HCPCS | Performed by: INTERNAL MEDICINE

## 2020-02-07 PROCEDURE — 84484 ASSAY OF TROPONIN QUANT: CPT | Performed by: EMERGENCY MEDICINE

## 2020-02-07 PROCEDURE — 92941 PRQ TRLML REVSC TOT OCCL AMI: CPT | Performed by: INTERNAL MEDICINE

## 2020-02-07 PROCEDURE — NC001 PR NO CHARGE: Performed by: INTERNAL MEDICINE

## 2020-02-07 PROCEDURE — C9606 PERC D-E COR REVASC W AMI S: HCPCS | Performed by: INTERNAL MEDICINE

## 2020-02-07 PROCEDURE — 93010 ELECTROCARDIOGRAM REPORT: CPT | Performed by: INTERNAL MEDICINE

## 2020-02-07 PROCEDURE — 96361 HYDRATE IV INFUSION ADD-ON: CPT

## 2020-02-07 PROCEDURE — 85730 THROMBOPLASTIN TIME PARTIAL: CPT | Performed by: EMERGENCY MEDICINE

## 2020-02-07 PROCEDURE — 93010 ELECTROCARDIOGRAM REPORT: CPT

## 2020-02-07 RX ORDER — HEPARIN SODIUM 1000 [USP'U]/ML
INJECTION, SOLUTION INTRAVENOUS; SUBCUTANEOUS CODE/TRAUMA/SEDATION MEDICATION
Status: COMPLETED | OUTPATIENT
Start: 2020-02-07 | End: 2020-02-07

## 2020-02-07 RX ORDER — VERAPAMIL HYDROCHLORIDE 2.5 MG/ML
INJECTION, SOLUTION INTRAVENOUS CODE/TRAUMA/SEDATION MEDICATION
Status: COMPLETED | OUTPATIENT
Start: 2020-02-07 | End: 2020-02-07

## 2020-02-07 RX ORDER — MIDAZOLAM HYDROCHLORIDE 2 MG/2ML
INJECTION, SOLUTION INTRAMUSCULAR; INTRAVENOUS CODE/TRAUMA/SEDATION MEDICATION
Status: COMPLETED | OUTPATIENT
Start: 2020-02-07 | End: 2020-02-07

## 2020-02-07 RX ORDER — KETOROLAC TROMETHAMINE 30 MG/ML
15 INJECTION, SOLUTION INTRAMUSCULAR; INTRAVENOUS ONCE
Status: COMPLETED | OUTPATIENT
Start: 2020-02-07 | End: 2020-02-07

## 2020-02-07 RX ORDER — NITROGLYCERIN 20 MG/100ML
INJECTION INTRAVENOUS CODE/TRAUMA/SEDATION MEDICATION
Status: COMPLETED | OUTPATIENT
Start: 2020-02-07 | End: 2020-02-07

## 2020-02-07 RX ORDER — SODIUM CHLORIDE 9 MG/ML
INJECTION, SOLUTION INTRAVENOUS
Status: COMPLETED | OUTPATIENT
Start: 2020-02-07 | End: 2020-02-07

## 2020-02-07 RX ORDER — SODIUM CHLORIDE 9 MG/ML
100 INJECTION, SOLUTION INTRAVENOUS CONTINUOUS
Status: DISPENSED | OUTPATIENT
Start: 2020-02-07 | End: 2020-02-07

## 2020-02-07 RX ORDER — FENTANYL CITRATE 50 UG/ML
INJECTION, SOLUTION INTRAMUSCULAR; INTRAVENOUS CODE/TRAUMA/SEDATION MEDICATION
Status: COMPLETED | OUTPATIENT
Start: 2020-02-07 | End: 2020-02-07

## 2020-02-07 RX ORDER — HEPARIN SODIUM 1000 [USP'U]/ML
4000 INJECTION, SOLUTION INTRAVENOUS; SUBCUTANEOUS ONCE
Status: COMPLETED | OUTPATIENT
Start: 2020-02-07 | End: 2020-02-07

## 2020-02-07 RX ORDER — LIDOCAINE HYDROCHLORIDE 10 MG/ML
INJECTION, SOLUTION EPIDURAL; INFILTRATION; INTRACAUDAL; PERINEURAL CODE/TRAUMA/SEDATION MEDICATION
Status: COMPLETED | OUTPATIENT
Start: 2020-02-07 | End: 2020-02-07

## 2020-02-07 RX ORDER — ATORVASTATIN CALCIUM 80 MG/1
80 TABLET, FILM COATED ORAL EVERY EVENING
Status: DISCONTINUED | OUTPATIENT
Start: 2020-02-07 | End: 2020-02-09 | Stop reason: HOSPADM

## 2020-02-07 RX ORDER — ASPIRIN 81 MG/1
81 TABLET, CHEWABLE ORAL DAILY
Status: DISCONTINUED | OUTPATIENT
Start: 2020-02-07 | End: 2020-02-09 | Stop reason: HOSPADM

## 2020-02-07 RX ADMIN — FENTANYL CITRATE 50 MCG: 50 INJECTION, SOLUTION INTRAMUSCULAR; INTRAVENOUS at 07:59

## 2020-02-07 RX ADMIN — SODIUM CHLORIDE 100 ML/HR: 9 INJECTION, SOLUTION INTRAVENOUS at 07:55

## 2020-02-07 RX ADMIN — IOHEXOL 100 ML: 350 INJECTION, SOLUTION INTRAVENOUS at 08:21

## 2020-02-07 RX ADMIN — ATORVASTATIN CALCIUM 80 MG: 80 TABLET, FILM COATED ORAL at 17:24

## 2020-02-07 RX ADMIN — IOHEXOL 30 ML: 350 INJECTION, SOLUTION INTRAVENOUS at 08:28

## 2020-02-07 RX ADMIN — NITROGLYCERIN 200 MCG: 20 INJECTION INTRAVENOUS at 08:01

## 2020-02-07 RX ADMIN — METOPROLOL TARTRATE 25 MG: 25 TABLET ORAL at 17:25

## 2020-02-07 RX ADMIN — HEPARIN SODIUM 4000 UNITS: 1000 INJECTION INTRAVENOUS; SUBCUTANEOUS at 08:02

## 2020-02-07 RX ADMIN — SODIUM CHLORIDE 100 ML/HR: 0.9 INJECTION, SOLUTION INTRAVENOUS at 09:30

## 2020-02-07 RX ADMIN — TICAGRELOR 180 MG: 90 TABLET ORAL at 07:38

## 2020-02-07 RX ADMIN — HEPARIN SODIUM 4000 UNITS: 1000 INJECTION, SOLUTION INTRAVENOUS; SUBCUTANEOUS at 07:40

## 2020-02-07 RX ADMIN — PERFLUTREN 1 ML/MIN: 6.52 INJECTION, SUSPENSION INTRAVENOUS at 10:05

## 2020-02-07 RX ADMIN — LIDOCAINE HYDROCHLORIDE 1 ML: 10 INJECTION, SOLUTION EPIDURAL; INFILTRATION; INTRACAUDAL; PERINEURAL at 07:59

## 2020-02-07 RX ADMIN — METOPROLOL TARTRATE 25 MG: 25 TABLET ORAL at 23:16

## 2020-02-07 RX ADMIN — MIDAZOLAM 2 MG: 1 INJECTION INTRAMUSCULAR; INTRAVENOUS at 07:59

## 2020-02-07 RX ADMIN — METOPROLOL TARTRATE 25 MG: 25 TABLET ORAL at 10:31

## 2020-02-07 RX ADMIN — SODIUM CHLORIDE 100 ML/HR: 0.9 INJECTION, SOLUTION INTRAVENOUS at 17:33

## 2020-02-07 RX ADMIN — VERAPAMIL HYDROCHLORIDE 2.5 MG: 2.5 INJECTION INTRAVENOUS at 08:02

## 2020-02-07 RX ADMIN — HEPARIN SODIUM 8000 UNITS: 1000 INJECTION INTRAVENOUS; SUBCUTANEOUS at 08:11

## 2020-02-07 RX ADMIN — KETOROLAC TROMETHAMINE 15 MG: 30 INJECTION, SOLUTION INTRAMUSCULAR at 23:27

## 2020-02-07 RX ADMIN — ASPIRIN 81 MG 81 MG: 81 TABLET ORAL at 10:31

## 2020-02-07 NOTE — H&P
History and Physical - Cardiology   Peyton Sánchez 52 y o  male MRN: [id number]  Unit/Bed#: [facility] Encounter: [id number]  02/07/20  8:33 AM        History of Present Illness     HPI: Peyton Sánchez is a 52y o  year old male with a history of HHTN and hypercholesterolemia  He presented to the ER this AM with acute SSCP and ECG showing ST elevations in the inferior leads  A MI alert was immediately called  He arrived in the cath lab with CP down to 1-2 / 10 but persistent ST elevations on telemetry  He denies DM, smoking  Review of Systems:  Positive for CP      Historical Information   Past Medical History:   Diagnosis Date    Back pain     [facility] disease) [address] [address]     Hyperlipidemia     Hypertension     Inguinal hernia, left     Mild heartburn     Obesity     Stroke ([facility] ) approx 2015    Denies residual problems  Initially he only had trouble with two fingers on left side   Wears glasses      Past Surgical History:   Procedure Laterality Date    APPENDECTOMY      HERNIA REPAIR      NO PAST SURGERIES      [place] LAP,APPENDECTOMY N/A 11/28/2018    Procedure: APPENDECTOMY LAPAROSCOPIC;  Surgeon: Erick Hidalgo MD;  Location: [address] [place] [address] MAIN OR;  Service: General    IA [address] Left 4/2/2018    Procedure: LAPAROSCOPIC INGUINAL HERNIA REPAIR WITH MESH;  Surgeon: Erick Hidalgo MD;  Location: [facility] OR;  Service: General     Social History     Substance and Sexual Activity   Alcohol Use Not Currently     Social History     Substance and Sexual Activity   Drug Use No     Social History     Tobacco Use   Smoking Status Former Smoker    Packs/day: 1 00    Years: 15 00    Pack years: 15 00    Types: Cigarettes    Last attempt to quit: 2010    Years since quitting: 10 1   Smokeless Tobacco Former User     Family History: History reviewed  No pertinent family history      Meds/Allergies     Current Facility-Administered Medications:     fentanyl citrate (PF) 100 MCG/2ML, , Intravenous, Code/Trauma/Sedation Med, Liset White MD, 50 mcg at 02/07/20 0759    heparin (porcine) injection, , , Code/Trauma/Sedation Med, Liset White MD, 8,000 Units at 02/07/20 0811    iohexol (OMNIPAQUE) 350 MG/ML injection (SINGLE-DOSE), , Intravenous, Code/Trauma/Sedation Med, Liset White MD, 30 mL at 02/07/20 0828    lidocaine (PF) (XYLOCAINE-MPF) 1 % injection, , , Code/Trauma/Sedation Med, Liset White MD, 1 mL at 02/07/20 0759    midazolam (VERSED) injection, , , Code/Trauma/Sedation Med, Liset White MD, 2 mg at 02/07/20 0759    nitroGLYcerin (TRIDIL) 50 mg in 250 mL infusion (premix), , Intra-arterial, Code/Trauma/Sedation Med, Liset White MD, 200 mcg at 02/07/20 0801    sodium chloride 0 9 % infusion, , , Code/Trauma/Sedation Continuous Med, Liset White MD, Last Rate: 100 mL/hr at 02/07/20 0755, 100 mL/hr at 02/07/20 0755    [COMPLETED] ticagrelor (BRILINTA) tablet 180 mg, 180 mg, Oral, Once, 180 mg at 02/07/20 0738 **AND** [START ON 2/8/2020] ticagrelor (BRILINTA) tablet 90 mg, 90 mg, Oral, Q12H [facility], Petty Brown,     verapamil (ISOPTIN) injection, , , Code/Trauma/Sedation Med, Liset White MD, 2 5 mg at 02/07/20 0802    Current Outpatient Medications:     aspirin 325 mg tablet, Take 325 mg by mouth daily, Disp: , Rfl:     atorvastatin (LIPITOR) 10 mg tablet, Take 10 mg by mouth every evening, Disp: , Rfl:     hydrochlorothiazide (HYDRODIURIL) 25 mg tablet, Take 1 tablet (25 mg total) by mouth daily, Disp: 90 tablet, Rfl: 3  No Known Allergies    Objective   Vitals: Blood pressure (!) 174/107, pulse 97, resp  rate 16, weight 100 kg (220 lb 14 4 oz), SpO2 98 %  , Body mass index is 36 76 kg/m² ,   Orthostatic Blood Pressures      Most Recent Value   Blood Pressure  (!) 174/107 filed at 02/07/2020 0737   Patient Position - Orthostatic VS  Sitting filed at 02/07/2020 0737            Intake/Output Summary (Last 24 hours) at 2/7/2020 6300  Last data filed at 2/7/2020 0814  Gross per 24 hour   Intake    Output 425 ml   Net -425 ml       Invasive Devices     Peripheral Intravenous Line            Peripheral IV 02/07/20 Left Antecubital less than 1 day    Peripheral IV 02/07/20 Right Antecubital less than 1 day                    Physical Exam:  GEN: , alert and oriented x 3, pleasant and cooperative   HEENT: pupils equal, round, and reactive to light; extraocular muscles intact  NECK: supple, no carotid bruits or JVD  HEART: regular rhythm, normal S1 and S2, no murmurs, clicks, gallops or rubs   LUNGS: clear to auscultation bilaterally; no wheezes, rales, or rhonchi   ABDOMEN: normal bowel sounds, soft, no tenderness, no distention  EXTREMITIES: peripheral pulses normal; no clubbing, cyanosis, or edema  NEURO: no focal findings   SKIN: normal without suspicious lesions on exposed skin    Lab Results:   Admission on 02/07/2020   Component Date Value    WBC 02/07/2020 5 50     RBC 02/07/2020 5 22     Hemoglobin 02/07/2020 16 2     Hematocrit 02/07/2020 46 5     MCV 02/07/2020 89     MCH 02/07/2020 31 0     MCHC 02/07/2020 34 8     RDW 02/07/2020 12 0     MPV 02/07/2020 10 0     Platelets 18/90/5512 235     nRBC 02/07/2020 0     Neutrophils Relative 02/07/2020 62     Immat GRANS % 02/07/2020 0     Lymphocytes Relative 02/07/2020 24     Monocytes Relative 02/07/2020 11     Eosinophils Relative 02/07/2020 2     Basophils Relative 02/07/2020 1     Neutrophils Absolute 02/07/2020 3 35     Immature Grans Absolute 02/07/2020 0 02     Lymphocytes Absolute 02/07/2020 1 33     Monocytes Absolute 02/07/2020 0 62     Eosinophils Absolute 02/07/2020 0 13     Basophils Absolute 02/07/2020 0 05     Sodium 02/07/2020 139     Potassium 02/07/2020 3 2*    Chloride 02/07/2020 101     CO2 02/07/2020 28     ANION GAP 02/07/2020 10     BUN 02/07/2020 17     Creatinine 02/07/2020 1 18     Glucose 02/07/2020 151*    Calcium 02/07/2020 8 1*  AST 02/07/2020 20     ALT 02/07/2020 45     Alkaline Phosphatase 02/07/2020 91     Total Protein 02/07/2020 7 2     Albumin 02/07/2020 3 4*    Total Bilirubin 02/07/2020 0 60     eGFR 02/07/2020 72     Troponin I 02/07/2020 0 04     Protime 02/07/2020 13 0     INR 02/07/2020 0 97     PTT 02/07/2020 28     Ventricular Rate 02/07/2020 95     Atrial Rate 02/07/2020 95     ID Interval 02/07/2020 136     QRSD Interval 02/07/2020 108     QT Interval 02/07/2020 328     QTC Interval 02/07/2020 412     P Axis 02/07/2020 54     QRS Axis 02/07/2020 40     T Wave Axis 02/07/2020 74     Ventricular Rate 02/07/2020 93     Atrial Rate 02/07/2020 93     ID Interval 02/07/2020 142     QRSD Interval 02/07/2020 100     QT Interval 02/07/2020 334     QTC Interval 02/07/2020 415     P Axis 02/07/2020 50     QRS Axis 02/07/2020 30     T Wave Mitchell 02/07/2020 76        Imaging: I have personally reviewed pertinent reports  No results found  Assessment:  1  Acute inferior wall MI / CP  2  HTN  3  Hypercholesterolemia          Chief Complaint   Patient presents with    Chest Pain     Pt arrives c/o chest pain since he woke up at 0600  Pt states he woke up and noticed he had chest pain, not that he was awoken  Reports MANUEL when walking up stairs  Pt denies n/v/d  Denies diaphoresis  Denies dizziness  Reports numbness in left arm, but has resolved  Received 325 mg ASA  and 1 SL nitro prehospital  Reports pain went from at 5 to a 0 with SL nitro  Chest pain [R07 9]     Plan:  CARDIAC CATH SHOWED A CRITICAL DISTAL RCA STENOSIS WITH KATHLEEN 2 FLOW  SUCCESSFUL PCI WAS PERFORMED WITH PLACEMENT OF A 3 0 X 23 MM JUAN  KATHLEEN 3 FLOW WAS RE-ESTABLISHED AND PAIN RESOLVED  HE DID HAVE MILD PERSISTENT ST ELEVATIONS  1  ASA 81 mg daily  2  Brilinta 90 mg BID  3  Lipitor 80 mg daily  4  Check echo for LV function  5   Lopressor 25 mg every 6 hours    Counseling / Coordination of Care  Total floor / unit time spent today 60 minutes  Greater than 50% of total time was spent with the patient and / or family counseling and / or coordination of care

## 2020-02-07 NOTE — PROGRESS NOTES
Pastoral Care Progress Note    2020  Patient: Alecia Velarde : 1970  Admission Date & Time: 2020 0720  MRN: [id number] [facility]: [id number]                     Chaplaincy Interventions Utilized:   Exploration: Explored relational needs & resources and Facilitated story telling    Explored meaningful relationship and listened to patient's story  He expressed his abundio as not being of one denomination but rather abundio in God who is for all  Collaboration: Encouraged adherence to treatment plan    Relationship Building: Cultivated a relationship of care and support    Ritual: Provided prayer  Prayed with patient and a hospital staff that was there to see patient  Chaplaincy Outcomes Achieved:  Expressed gratitude, Expressed peace and Identified meaningful connections       Spiritual Coping Strategies Utilized:   Spiritual comfort    Patient expressed abundio in a God that uses people to help others       [address] Involvement Patient not active with [employer]   Spiritual Beliefs/Perceptions   Concept of God Accepting   Relationship with God Close   Support Systems Children;Family members   Coping Responses   Patient Coping Accepting   Plan of Care   Assessment Completed by: Unit visit

## 2020-02-08 PROBLEM — I21.11 ST ELEVATION MYOCARDIAL INFARCTION INVOLVING RIGHT CORONARY ARTERY (HCC): Status: ACTIVE | Noted: 2020-02-08

## 2020-02-08 LAB
ANION GAP SERPL CALCULATED.3IONS-SCNC: 11 MMOL/L (ref 4–13)
BUN SERPL-MCNC: 11 MG/DL (ref 5–25)
CALCIUM SERPL-MCNC: 8.4 MG/DL (ref 8.3–10.1)
CHLORIDE SERPL-SCNC: 106 MMOL/L (ref 100–108)
CO2 SERPL-SCNC: 25 MMOL/L (ref 21–32)
CREAT SERPL-MCNC: 1.06 MG/DL (ref 0.6–1.3)
ERYTHROCYTE [DISTWIDTH] IN BLOOD BY AUTOMATED COUNT: 12.4 % (ref 11.6–15.1)
GFR SERPL CREATININE-BSD FRML MDRD: 82 ML/MIN/1.73SQ M
GLUCOSE SERPL-MCNC: 106 MG/DL (ref 65–140)
HCT VFR BLD AUTO: 48.7 % (ref 36.5–49.3)
HGB BLD-MCNC: 16.5 G/DL (ref 12–17)
MCH RBC QN AUTO: 30.6 PG (ref 26.8–34.3)
MCHC RBC AUTO-ENTMCNC: 33.9 G/DL (ref 31.4–37.4)
MCV RBC AUTO: 90 FL (ref 82–98)
PLATELET # BLD AUTO: 222 THOUSANDS/UL (ref 149–390)
PMV BLD AUTO: 9.7 FL (ref 8.9–12.7)
POTASSIUM SERPL-SCNC: 3.6 MMOL/L (ref 3.5–5.3)
RBC # BLD AUTO: 5.4 MILLION/UL (ref 3.88–5.62)
SODIUM SERPL-SCNC: 142 MMOL/L (ref 136–145)
TROPONIN I SERPL-MCNC: 6.27 NG/ML
WBC # BLD AUTO: 5.26 THOUSAND/UL (ref 4.31–10.16)

## 2020-02-08 PROCEDURE — 80061 LIPID PANEL: CPT | Performed by: PHYSICIAN ASSISTANT

## 2020-02-08 PROCEDURE — 85027 COMPLETE CBC AUTOMATED: CPT | Performed by: INTERNAL MEDICINE

## 2020-02-08 PROCEDURE — 80048 BASIC METABOLIC PNL TOTAL CA: CPT | Performed by: INTERNAL MEDICINE

## 2020-02-08 PROCEDURE — 99232 SBSQ HOSP IP/OBS MODERATE 35: CPT | Performed by: INTERNAL MEDICINE

## 2020-02-08 PROCEDURE — 84484 ASSAY OF TROPONIN QUANT: CPT | Performed by: INTERNAL MEDICINE

## 2020-02-08 PROCEDURE — 90686 IIV4 VACC NO PRSV 0.5 ML IM: CPT

## 2020-02-08 PROCEDURE — 90471 IMMUNIZATION ADMIN: CPT

## 2020-02-08 RX ORDER — ACETAMINOPHEN 325 MG/1
650 TABLET ORAL EVERY 6 HOURS PRN
Status: DISCONTINUED | OUTPATIENT
Start: 2020-02-08 | End: 2020-02-09 | Stop reason: HOSPADM

## 2020-02-08 RX ORDER — CYCLOBENZAPRINE HCL 10 MG
10 TABLET ORAL 3 TIMES DAILY PRN
Status: DISCONTINUED | OUTPATIENT
Start: 2020-02-08 | End: 2020-02-08

## 2020-02-08 RX ORDER — CYCLOBENZAPRINE HCL 10 MG
5 TABLET ORAL 3 TIMES DAILY PRN
Status: DISCONTINUED | OUTPATIENT
Start: 2020-02-08 | End: 2020-02-09 | Stop reason: HOSPADM

## 2020-02-08 RX ORDER — METOPROLOL TARTRATE 50 MG/1
50 TABLET, FILM COATED ORAL EVERY 12 HOURS SCHEDULED
Status: DISCONTINUED | OUTPATIENT
Start: 2020-02-08 | End: 2020-02-09 | Stop reason: HOSPADM

## 2020-02-08 RX ADMIN — TICAGRELOR 90 MG: 90 TABLET ORAL at 20:04

## 2020-02-08 RX ADMIN — METOPROLOL TARTRATE 50 MG: 50 TABLET, FILM COATED ORAL at 20:03

## 2020-02-08 RX ADMIN — INFLUENZA VIRUS VACCINE 0.5 ML: 15; 15; 15; 15 SUSPENSION INTRAMUSCULAR at 11:51

## 2020-02-08 RX ADMIN — CYCLOBENZAPRINE HYDROCHLORIDE 5 MG: 10 TABLET, FILM COATED ORAL at 17:59

## 2020-02-08 RX ADMIN — ACETAMINOPHEN 650 MG: 325 TABLET ORAL at 17:58

## 2020-02-08 RX ADMIN — ATORVASTATIN CALCIUM 80 MG: 80 TABLET, FILM COATED ORAL at 17:59

## 2020-02-08 RX ADMIN — ASPIRIN 81 MG 81 MG: 81 TABLET ORAL at 08:22

## 2020-02-08 RX ADMIN — TICAGRELOR 90 MG: 90 TABLET ORAL at 08:22

## 2020-02-08 NOTE — PROGRESS NOTES
Report called to Sherren Baumgartner   transferred via w/c on telemetry  Denied pain, belongings sent    Family accompanied pt to E4

## 2020-02-08 NOTE — PROGRESS NOTES
Progress Note - Cardiology   Srinath Hitchcock 52 y o  male MRN: [id number]  Unit/Bed#: ICU 08 Encounter: [id number]    Assessment:  1  Inferior STEMI   - s/p JUAN to RCA   - on Ticagrelor, aspirin, statin, and metoprolol    - Hg and Cr stable  2  HTN   - continue metoprolol   3  HLD   - continue statin   - Needs lipid panel     Plan:  1  Continue DAPT, statin, metoprolol  2  Will switch to Toprol Xl 50 mg bid  3  Transfer to floor to observe one more night   4  F/u with cardiology upon discharge  Subjective/Objective     Subjective: Doing well  Denies any chest pain, shortness of breath, dizzziness, palpitations, right hand pain  Objective:    Vitals: /97   Pulse 56   Temp 97 5 °F (36 4 °C) (Temporal)   Resp (!) 11   Ht 5' 6" (1 676 m)   Wt 97 3 kg (214 lb 8 1 oz)   SpO2 98%   BMI 34 62 kg/m²   Vitals:    02/07/20 0737 02/07/20 1031   Weight: 100 kg (220 lb 14 4 oz) 97 3 kg (214 lb 8 1 oz)     Orthostatic Blood Pressures      Most Recent Value   Blood Pressure  137/97 filed at 02/08/2020 0944   Patient Position - Orthostatic VS  Lying filed at 02/08/2020 0400        Objective:   /97   Pulse 56   Temp 97 5 °F (36 4 °C) (Temporal)   Resp (!) 11   Ht 5' 6" (1 676 m)   Wt 97 3 kg (214 lb 8 1 oz)   SpO2 98%   BMI 34 62 kg/m²    Physical Exam:  GEN: NAD, Alert and oriented, well appearing  HEENT:Head, neck, ears, oral pharynx: Mucus membranes moist, oral pharynx clear, nares clear  External ears normal  EYES: Pupils equal, sclera anicteric  NECK: No JVD  CARDIOVASCULAR: RRR, No murmur, rub, gallops S1,S2  LUNGS: Clear To auscultation bilaterally  ABDOMEN: Soft, nondistended  EXTREMITIES/VASCULAR: No edema    PSYCH: Normal Affect  NEURO: Grossly intact, moving all extremiteis equal, face symetric  HEME: No bleeding, bruising, petechia  SKIN: No significant rashes     Intake/Output Summary (Last 24 hours) at 2/8/2020 1004  Last data filed at 2/8/2020 0801  Gross per 24 hour   Intake 1151 67 ml Output 1925 ml   Net -773 33 ml     No current facility-administered medications on file prior to encounter        Current Outpatient Medications on File Prior to Encounter   Medication Sig Dispense Refill    aspirin 325 mg tablet Take 325 mg by mouth daily      atorvastatin (LIPITOR) 10 mg tablet Take 10 mg by mouth every evening      hydrochlorothiazide (HYDRODIURIL) 25 mg tablet Take 1 tablet (25 mg total) by mouth daily 90 tablet 3     Scheduled Meds:  Current Facility-Administered Medications:  aspirin 81 mg Oral Daily Ciro Peck MD   atorvastatin 80 mg Oral QPM Ciro Peck MD   influenza vaccine 0 5 mL Intramuscular Once Geneizaiah Johnson DO   metoprolol tartrate 25 mg Oral Q6H Ciro Peck MD   ticagrelor 90 mg Oral Q12H [facility] Petty Brown DO     Continuous Infusions:   PRN Meds:     Invasive Devices     Peripheral Intravenous Line            Peripheral IV 02/07/20 Left Antecubital 1 day    Peripheral IV 02/07/20 Right Antecubital 1 day                Review of Systems - History obtained from the patient  General ROS: negative for - fatigue, fever or weight loss  Psychological ROS: negative for - anxiety or disorientation  Ophthalmic ROS: negative for - blurry vision or double vision  ENT ROS: negative for - epistaxis  Allergy and Immunology ROS: negative for - hives  Hematological and Lymphatic ROS: negative for - bleeding problems or bruising  Endocrine ROS: negative for - palpitations  Respiratory ROS: no cough, shortness of breath, or wheezing  Cardiovascular ROS: negative for - dyspnea on exertion, palpitations, shortness of breath, edema, loss of consciousness, orthopnea or paroxysmal nocturnal dyspnea  Gastrointestinal ROS: no abdominal pain, change in bowel habits, or black or bloody stools  Genito-Urinary ROS: no dysuria, trouble voiding, or hematuria  Musculoskeletal ROS: negative for - joint pain; + back pain (chronic)  Neurological ROS: no TIA or stroke symptoms  Dermatological ROS: negative for rash      Lab Results:   CBC with diff:   Results from last 7 days   Lab Units 02/08/20  0540   WBC Thousand/uL 5 26   RBC Million/uL 5 40   HEMOGLOBIN g/dL 16 5   HEMATOCRIT % 48 7   MCV fL 90   MCH pg 30 6   MCHC g/dL 33 9   RDW % 12 4   MPV fL 9 7   PLATELETS Thousands/uL 222     CMP:   Results from last 7 days   Lab Units 02/08/20  0540 02/07/20  0729   SODIUM mmol/L 142 139   POTASSIUM mmol/L 3 6 3 2*   CHLORIDE mmol/L 106 101   CO2 mmol/L 25 28   BUN mg/dL 11 17   CREATININE mg/dL 1 06 1 18   CALCIUM mg/dL 8 4 8 1*   AST U/L  --  20   ALT U/L  --  45   ALK PHOS U/L  --  91   EGFR ml/min/1 73sq m 82 72     Troponin:   0   Lab Value Date/Time    TROPONINI 6 27 (H) 02/08/2020 0540    TROPONINI 0 04 02/07/2020 0729    TROPONINI <0 02 03/30/2019 2026    TROPONINI <0 03 11/03/2018 1511     BNP:   Results from last 7 days   Lab Units 02/08/20  0540   POTASSIUM mmol/L 3 6   CHLORIDE mmol/L 106   CO2 mmol/L 25   BUN mg/dL 11   CREATININE mg/dL 1 06   CALCIUM mg/dL 8 4   EGFR ml/min/1 73sq m 82     Coags:   Results from last 7 days   Lab Units 02/07/20  0730   PTT seconds 28   INR  0 97     Magnesium:     Lipid Profile:     Imaging: ECG shows sinus rhythm with inferior infarct age indeterminate

## 2020-02-09 VITALS
TEMPERATURE: 97.5 F | OXYGEN SATURATION: 98 % | HEART RATE: 69 BPM | SYSTOLIC BLOOD PRESSURE: 159 MMHG | DIASTOLIC BLOOD PRESSURE: 90 MMHG | HEIGHT: 66 IN | BODY MASS INDEX: 34.47 KG/M2 | WEIGHT: 214.51 LBS | RESPIRATION RATE: 18 BRPM

## 2020-02-09 LAB
CHOLEST SERPL-MCNC: 171 MG/DL (ref 50–200)
HDLC SERPL-MCNC: 32 MG/DL
LDLC SERPL CALC-MCNC: 100 MG/DL (ref 0–100)
TRIGL SERPL-MCNC: 195 MG/DL

## 2020-02-09 PROCEDURE — NC001 PR NO CHARGE: Performed by: PHYSICIAN ASSISTANT

## 2020-02-09 PROCEDURE — 99239 HOSP IP/OBS DSCHRG MGMT >30: CPT | Performed by: PHYSICIAN ASSISTANT

## 2020-02-09 RX ORDER — ACETAMINOPHEN 325 MG/1
650 TABLET ORAL EVERY 6 HOURS PRN
Qty: 30 TABLET | Refills: 0
Start: 2020-02-09 | End: 2020-02-13

## 2020-02-09 RX ORDER — ATORVASTATIN CALCIUM 80 MG/1
80 TABLET, FILM COATED ORAL EVERY EVENING
Qty: 30 TABLET | Refills: 5 | Status: SHIPPED | OUTPATIENT
Start: 2020-02-09 | End: 2020-08-26 | Stop reason: SDUPTHER

## 2020-02-09 RX ORDER — METOPROLOL TARTRATE 50 MG/1
50 TABLET, FILM COATED ORAL EVERY 12 HOURS SCHEDULED
Qty: 60 TABLET | Refills: 5 | Status: SHIPPED | OUTPATIENT
Start: 2020-02-09 | End: 2020-07-15 | Stop reason: SDUPTHER

## 2020-02-09 RX ORDER — ASPIRIN 81 MG/1
81 TABLET, CHEWABLE ORAL DAILY
Refills: 0
Start: 2020-02-09 | End: 2021-02-23 | Stop reason: SDUPTHER

## 2020-02-09 RX ADMIN — METOPROLOL TARTRATE 50 MG: 50 TABLET, FILM COATED ORAL at 09:04

## 2020-02-09 RX ADMIN — ACETAMINOPHEN 650 MG: 325 TABLET ORAL at 09:07

## 2020-02-09 RX ADMIN — CYCLOBENZAPRINE HYDROCHLORIDE 5 MG: 10 TABLET, FILM COATED ORAL at 09:07

## 2020-02-09 RX ADMIN — TICAGRELOR 90 MG: 90 TABLET ORAL at 09:05

## 2020-02-09 RX ADMIN — ASPIRIN 81 MG 81 MG: 81 TABLET ORAL at 09:04

## 2020-02-09 NOTE — PROGRESS NOTES
Progress Note - Cardiology   Zell Lanes 52 y o  male MRN: [id number]  Unit/Bed#: E4 -01 Encounter: [id number]        Asessment/Plan:  1  Inferior wall ST-elevation MI   - Status post JUAN-RCA   - Echo 2/7/2020-overall LVEF 50% with mod hypokinesia of the basal inferoseptal and entire inferior wall   2  Dyslipidemia:  Updated Lipid panel pending  3  Hypertension:  Currently SBP trending 150's on Lopressor (new Rx)  4  Prior tobacco abuse and probable COPD without exacerbation    · Overall doing well and stable for discharge  · For BP resume prehospital dose of HCTZ  · Office will contact the patient to arrange follow-up  · Importance of uninterrupted dual antiplatelet therapy was stressed to the patient  · Discharge regimen includes beta-blocker/aspirin/statin/Brilinta and order placed for outpatient cardiac rehab      Subjective:  Overall comfortable though still bothered by some chronic back pain  Sisi Franks for discharge    Vitals:  Vitals:    02/07/20 0737 02/07/20 1031   Weight: 100 kg (220 lb 14 4 oz) 97 3 kg (214 lb 8 1 oz)   ,  Vitals:    02/08/20 1555 02/08/20 1918 02/08/20 2100 02/09/20 0726   BP: 148/97 169/89 154/80 159/90   BP Location: Right arm Left arm Left arm Right arm   Pulse: 72 76 58 69   Resp: 18 18 18 18   Temp: 97 8 °F (36 6 °C) 97 5 °F (36 4 °C) 98 1 °F (36 7 °C) 97 5 °F (36 4 °C)   TempSrc: Temporal Temporal Tympanic Temporal   SpO2: 97% 99% 96% 98%   Weight:       Height:           Exam:  General:  Alert normally conversant and comfortable-appearing  Heart:  Regular with controlled rate and no pathologic murmur or rub  Lungs:  Clear throughout  Right wrist (site of vascular access for catheterization):  Normal capillary refill and pulse    No evidence of ecchymosis or hematoma   Lower Limbs:  No edema           Telemetry:       Normal sinus rhythm and otherwise unremarkable with ventricular rate trending in the 60s    Medications:    Current Facility-Administered Medications:    acetaminophen (TYLENOL) tablet 650 mg, 650 mg, Oral, Q6H PRN, Demetra Overcast, PA-C, 650 mg at 02/09/20 0907    aspirin chewable tablet 81 mg, 81 mg, Oral, Daily, Demetra Overcast, PA-C, 81 mg at 02/09/20 4759    atorvastatin (LIPITOR) tablet 80 mg, 80 mg, Oral, QPM, Demetra Overcast, PA-C, 80 mg at 02/08/20 1759    cyclobenzaprine (FLEXERIL) tablet 5 mg, 5 mg, Oral, TID PRN, Demetra Overcast, PA-C, 5 mg at 02/09/20 8646    metoprolol tartrate (LOPRESSOR) tablet 50 mg, 50 mg, Oral, Q12H [address], Demetra Overcast, PA-C, 50 mg at 02/09/20 0680    [COMPLETED] ticagrelor (BRILINTA) tablet 180 mg, 180 mg, Oral, Once, 180 mg at 02/07/20 0738 **AND** ticagrelor (BRILINTA) tablet 90 mg, 90 mg, Oral, Q12H [address], Demetra Overcast, PA-C, 90 mg at 02/09/20 7196      Labs/Data:        Results from last 7 days   Lab Units 02/08/20  0540 02/07/20  0729   WBC Thousand/uL 5 26 5 50   HEMOGLOBIN g/dL 16 5 16 2   HEMATOCRIT % 48 7 46 5   PLATELETS Thousands/uL 222 235     Results from last 7 days   Lab Units 02/08/20  0540 02/07/20  0729   POTASSIUM mmol/L 3 6 3 2*   CHLORIDE mmol/L 106 101   CO2 mmol/L 25 28   BUN mg/dL 11 17   TROPONIN I ng/mL 6 27* 0 04

## 2020-02-09 NOTE — DISCHARGE SUMMARY
Discharge Summary    Nikole Pino 52 y o  male MRN: [id number]    Unit/Bed#: E4 -01 Encounter: [id number]      Admission Date: 2/7/2020   Discharge Date: 2/9/2020      Admitting Diagnosis: Chest pain [R07 9]  Acute ST elevation myocardial infarction (STEMI) [facility]) [I21 3]  Discharge Diagnosis: Principal Problem:    ST elevation myocardial infarction involving right coronary artery ([address] )  Active Problems:    Hypertension    CAD (coronary artery disease)    Acute ST elevation myocardial infarction (STEMI) of inferior wall (HCC)       Condition at Discharge: stable   Disposition: Home  Planned Readmission: No    HPI/Reason For Admission: This gentleman presented to the emergency department on the morning of 2/7 with initial ECG showing signs of ST elevation in the inferior leads  As such an MI alert was initiated    Hospital Course: The patient presented to the hospital as discussed above  He was urgently taken to the cardiac catheterization lab where angiography revealed a tubular 85% stenosis of the RCA with KATHLEEN 2 flow  This was successfully treated with PCI and insertion of drug-eluting stent with 0% residual stenosis (for report of catheterization below)  Subsequent echocardiogram showed overall preserved LVEF with inferior wall motion abnormality (see report below)  The patient was observed in the hospital with an overall uncomplicated stay  Lipid profile was obtained though results are pending at the time of this dictation  His pre-hospital dose of Lipitor was escalated to 80 mg  The importance of uninterrupted dual antiplatelet therapy (aspirin +Plavix) was expressed to the patient  Lopressor was added to his regimen  The patient will be contacted by our office for outpatient reassessment and an order has been placed for cardiac rehabilitation        Procedures Performed:   Orders Placed This Encounter   Procedures    Cardiac catheterization    Cardiac catheterization    ED ECG Documentation Only    ED ECG Documentation Only       Complications:  None    Other Significant Findings/Treatment:   Cardiac catheterization   CORONARY VESSELS:   --  Left main: Normal   --  LAD: The vessel was large sized  Angiography showed mild atherosclerosis  --  1st diagonal: The vessel was medium sized  --  Circumflex: The vessel was medium sized  Angiography showed minor luminal irregularities  --  Ramus intermedius: The vessel was large sized  Angiography showed mild atherosclerosis  --  RCA: The vessel was large sized (dominant)  Angiography showed moderate atherosclerosis  --  Proximal RCA: There was a discrete 40 % stenosis  --  Distal RCA: There was a tubular 85 % stenosis  There was KATHLEEN grade 2 flow through the vessel (partial perfusion)  This is a likely culprit for the patient's clinical presentation  An intervention was performed  1ST LESION INTERVENTIONS:  A successful drug-eluting stent procedure was performed on the 85 % lesion in the distal RCA  Following intervention there was a 0 % residual stenosis  This was not a bifurcation lesion  This was an ACC/AHA type B "moderate risk" lesion for intervention  There was no evidence of the transient no-reflow phenomenon  The residual lesion demonstrated no evidence of thrombus  There was KATHLEEN 2  flow before the procedure and KATHLEEN 3 flow after the procedure  A Xience Jerica Rx 3 0 x 23mm drug-eluting stent was placed across the lesion and deployed at a maximum inflation pressure of 12 bayron  ADVERSE OUTCOMES:  There were no complications  IMPRESSIONS:  Acute inferior ST elevation MI  S/P JUAN placement to the distal RCA  1  ASA 81 mg daily  2  Brilinta 90 mg BID  3  Lipitor 80 mg daily  4  Lopressor 25 mg every 6 hours  5  Check echo     Prepared and signed by   Juan Daniel Palmer MD  Signed 02/07/2020 12:54:37    Echocardiogram  SUMMARY     LEFT VENTRICLE:  Systolic function was mildly reduced by visual assessment   Ejection fraction was estimated to be 50 %  There was moderate hypokinesis of the basal inferoseptal and entire inferior wall(s)  Doppler parameters were consistent with abnormal left ventricular relaxation (grade 1 diastolic dysfunction)  LEFT VENTRICLE: Size was normal  Systolic function was mildly reduced by visual assessment  Ejection fraction was estimated to be 50 %  There was moderate hypokinesis of the basal inferoseptal and entire inferior wall(s)  Cardiac wall  motion was otherwise normal  Wall thickness was normal  DOPPLER: Doppler parameters were consistent with abnormal left ventricular relaxation (grade 1 diastolic dysfunction)  RIGHT VENTRICLE: The size was normal  Systolic function was normal  Wall thickness was normal      LEFT ATRIUM: Size was normal      RIGHT ATRIUM: Size was normal      MITRAL VALVE: Valve structure was normal  There was normal leaflet separation  DOPPLER: The transmitral velocity was within the normal range  There was no evidence for stenosis  There was no regurgitation  AORTIC VALVE: The valve was trileaflet  Leaflets exhibited normal thickness and normal cuspal separation  DOPPLER: Transaortic velocity was within the normal range  There was no evidence for stenosis  There was no regurgitation  TRICUSPID VALVE: The valve structure was normal  There was normal leaflet separation  DOPPLER: The transtricuspid velocity was within the normal range  There was no evidence for stenosis  There was no regurgitation  PULMONIC VALVE: Leaflets exhibited normal thickness, no calcification, and normal cuspal separation  DOPPLER: The transpulmonic velocity was within the normal range  There was no regurgitation  PERICARDIUM: There was no pericardial effusion  The pericardium was normal in appearance  AORTA: The root exhibited normal size  SYSTEMIC VEINS: IVC: The inferior vena cava was normal in size and course   Respirophasic changes were normal         Discharge Medications:  See after visit summary for reconciled discharge medications provided to patient and family  Discharge instructions/Information to patient and family:   See after visit summary for information provided to patient and family  Provisions for Follow-Up Care:  See after visit summary for information related to follow-up care and any pertinent home health orders  Discharge Statement   I had direct contact with the patient on the day of discharge  I spent 63 minutes discharging the patient  This time was spent on the day of discharge including: education, examination, paperwork  All questions were answered to patient satisfaction

## 2020-02-09 NOTE — PLAN OF CARE
Problem: Prexisting or High Potential for Compromised Skin Integrity  Goal: Skin integrity is maintained or improved  Description  INTERVENTIONS:  - Identify patients at risk for skin breakdown  - Assess and monitor skin integrity  - Assess and monitor nutrition and hydration status  - Monitor labs   - Assess for incontinence   - Turn and reposition patient  - Assist with mobility/ambulation  - Relieve pressure over bony prominences  - Avoid friction and shearing  - Provide appropriate hygiene as needed including keeping skin clean and dry  - Evaluate need for skin moisturizer/barrier cream  - Collaborate with interdisciplinary team   - Patient/family teaching  - Consider wound care consult   Outcome: Progressing     Problem: Potential for Falls  Goal: Patient will remain free of falls  Description  INTERVENTIONS:  - Assess patient frequently for physical needs  -  Identify cognitive and physical deficits and behaviors that affect risk of falls    -  [place] fall precautions as indicated by assessment   - Educate patient/family on patient safety including physical limitations  - Instruct patient to call for assistance with activity based on assessment  - Modify environment to reduce risk of injury  - Consider OT/PT consult to assist with strengthening/mobility  Outcome: Progressing     Problem: CARDIOVASCULAR - ADULT  Goal: Maintains optimal cardiac output and hemodynamic stability  Description  INTERVENTIONS:  - Monitor I/O, vital signs and rhythm  - Monitor for S/S and trends of decreased cardiac output  - Administer and titrate ordered vasoactive medications to optimize hemodynamic stability  - Assess quality of pulses, skin color and temperature  - Assess for signs of decreased coronary artery perfusion  - Instruct patient to report change in severity of symptoms  Outcome: Progressing  Goal: Absence of cardiac dysrhythmias or at baseline rhythm  Description  INTERVENTIONS:  - Continuous cardiac monitoring, vital signs, obtain 12 lead EKG if ordered  - Administer antiarrhythmic and heart rate control medications as ordered  - Monitor electrolytes and administer replacement therapy as ordered  Outcome: Progressing     Problem: RESPIRATORY - ADULT  Goal: Achieves optimal ventilation and oxygenation  Description  INTERVENTIONS:  - Assess for changes in respiratory status  - Assess for changes in mentation and behavior  - Position to facilitate oxygenation and minimize respiratory effort  - Oxygen administered by appropriate delivery if ordered  - Initiate smoking cessation education as indicated  - Encourage broncho-pulmonary hygiene including cough, deep breathe, Incentive Spirometry  - Assess the need for suctioning and aspirate as needed  - Assess and instruct to report SOB or any respiratory difficulty  - Respiratory Therapy support as indicated  Outcome: Progressing     Problem: HEMATOLOGIC - ADULT  Goal: Maintains hematologic stability  Description  INTERVENTIONS  - Assess for signs and symptoms of bleeding or hemorrhage  - Monitor labs  - Administer supportive blood products/factors as ordered and appropriate  Outcome: Progressing     Problem: COPING  Goal: Will report anxiety at manageable levels  Description  INTERVENTIONS:  - Administer medication as ordered  - Teach and encourage coping skills  - Provide emotional support  - Assess patient/family for anxiety and ability to cope  Outcome: Progressing     Problem: SAFETY ADULT  Goal: Maintain or return mobility status to optimal level  Description  INTERVENTIONS:  - Assess patient's baseline mobility status (ambulation, transfers, stairs, etc )    - Identify cognitive and physical deficits and behaviors that affect mobility  - Identify mobility aids required to assist with transfers and/or ambulation (gait belt, sit-to-stand, lift, walker, cane, etc )  - [place] fall precautions as indicated by assessment  - Record patient progress and toleration of activity level on Mobility SBAR; progress patient to next Phase/Stage  - Instruct patient to call for assistance with activity based on assessment  - Consider rehabilitation consult to assist with strengthening/weightbearing, etc   Outcome: Progressing     Problem: DISCHARGE PLANNING  Goal: Discharge to home or other facility with appropriate resources  Description  INTERVENTIONS:  - Identify barriers to discharge w/patient and caregiver  - Arrange for needed discharge resources and transportation as appropriate  - Identify discharge learning needs (meds, wound care, etc )  - Arrange for interpretive services to assist at discharge as needed  - Refer to Case Management Department for coordinating discharge planning if the patient needs post-hospital services based on physician/advanced practitioner order or complex needs related to functional status, cognitive ability, or social support system  Outcome: Progressing     Problem: Knowledge Deficit  Goal: Patient/family/caregiver demonstrates understanding of disease process, treatment plan, medications, and discharge instructions  Description  Complete learning assessment and assess knowledge base    Interventions:  - Provide teaching at level of understanding  - Provide teaching via preferred learning methods  Outcome: Progressing

## 2020-02-09 NOTE — NURSING NOTE
Pt discharged to home, stable at time of discharge  Meds picked up from [facility]  Instructions provided to patient, patient verbalized understanding

## 2020-02-09 NOTE — DISCHARGE INSTRUCTIONS
1  Please see the post cardiac catheterization dishcarge instructions  No lifting greater than 10 lbs  or strenuous activity for 48 hrs  2  Remove band aid tomorrow  Shower and wash area (wrist) gently with soap and water- beginning tomorrow  Rinse and pat dry  Apply new water seal band aid  Repeat this process for 5 days  No powders, creams lotions or antibiotic ointments for 5 days  No tub baths, hot tubs/sauna or swimming for 5 days  3  Please call our office ([phone]) if you have any fever, redness, swelling, discharge from your wrist/access site  4  No driving for 1 day  --------------------------------------------------------------------------------        Coronary Artery Disease   WHAT YOU NEED TO KNOW:   Coronary artery disease (CAD) is narrowing of the arteries to your heart caused by a buildup of plaque  Plaque is made up of cholesterol and other substances  The narrowing in your arteries decreases the amount of blood that can flow to your heart  This causes your heart to get less oxygen  DISCHARGE INSTRUCTIONS:   Call 911 for any of the following:   · You have any of the following signs of a heart attack:      ¨ Squeezing, pressure, or pain in your chest that lasts longer than 5 minutes or returns    ¨ Discomfort or pain in your back, neck, jaw, stomach, or arm     ¨ Trouble breathing    ¨ Nausea or vomiting    ¨ Lightheadedness or a sudden cold sweat, especially with chest pain or trouble breathing    Contact your healthcare provider if:   · You have chest pain that is more frequent, or you have chest pain at rest     · You have questions or concerns about your condition or care  Cardiac rehabilitation:  Your healthcare provider may recommend that you attend cardiac rehabilitation (rehab)  This is a program run by specialists who will help you safely strengthen your heart and reduce the risk for more heart disease   The plan includes exercise, relaxation, stress management, and heart-healthy nutrition  Healthcare providers will also check to make sure any medicines you are taking are working  Medicines: You may  need any of the following:  · Blood pressure medicines  are given to lower your blood pressure  These medicines may include ACE inhibitors and beta-blockers  ACE inhibitors help keep your blood vessels relaxed and open, which helps keep blood flowing into your heart  Beta-blockers keep your heart pumping strongly and regularly  This helps keep your heart from working too hard to get oxygen  · Cholesterol medicines  help lower blood cholesterol levels  · Nitrates , such as nitroglycerin, relax the arteries of your heart so it gets more oxygen  They help to relieve your chest pain  · Antiplatelets , such as aspirin, help prevent blood clots  Take your antiplatelet medicine exactly as directed  These medicines make it more likely for you to bleed or bruise  If you are told to take aspirin, do not take acetaminophen or ibuprofen instead  · Blood thinners    help prevent blood clots  Examples of blood thinners include heparin and warfarin  Clots can cause strokes, heart attacks, and death  The following are general safety guidelines to follow while you are taking a blood thinner:    ¨ Watch for bleeding and bruising while you take blood thinners  Watch for bleeding from your gums or nose  Watch for blood in your urine and bowel movements  Use a soft washcloth on your skin, and a soft toothbrush to brush your teeth  This can keep your skin and gums from bleeding  If you shave, use an electric shaver  Do not play contact sports  ¨ Tell your dentist and other healthcare providers that you take anticoagulants  Wear a bracelet or necklace that says you take this medicine  ¨ Do not start or stop any medicines unless your healthcare provider tells you to  Many medicines cannot be used with blood thinners       ¨ Tell your healthcare provider right away if you forget to take the medicine, or if you take too much  ¨ Warfarin  is a blood thinner that you may need to take  The following are things you should be aware of if you take warfarin  § Foods and medicines can affect the amount of warfarin in your blood  Do not make major changes to your diet while you take warfarin  Warfarin works best when you eat about the same amount of vitamin K every day  Vitamin K is found in green leafy vegetables and certain other foods  Ask for more information about what to eat when you are taking warfarin  § You will need to see your healthcare provider for follow-up visits when you are on warfarin  You will need regular blood tests  These tests are used to decide how much medicine you need  · Do not take certain medicines without asking your healthcare provider first   These include NSAIDs, herbal or vitamin supplements, or hormones (estrogen or progestin)  · Take your medicine as directed  Contact your healthcare provider if you think your medicine is not helping or if you have side effects  Tell him or her if you are allergic to any medicine  Keep a list of the medicines, vitamins, and herbs you take  Include the amounts, and when and why you take them  Bring the list or the pill bottles to follow-up visits  Carry your medicine list with you in case of an emergency  Follow up with your healthcare provider as directed: You may need to return for other tests  You may also be referred to a cardiac surgeon  Write down your questions so you remember to ask them during your visits  Manage CAD:   · Do not smoke  Nicotine and other chemicals in cigarettes and cigars can cause heart and lung damage  Ask your healthcare provider for information if you currently smoke and need help to quit  E-cigarettes or smokeless tobacco still contain nicotine  Talk to your healthcare provider before you use these products  · Exercise regularly    Exercise at least 30 minutes each day, on most days of the week  Exercise helps to lower high cholesterol and high blood pressure  It can also help you maintain a healthy weight  Ask your healthcare provider about the kind of exercise you should do and how to get started  · Maintain a healthy weight  If you are overweight, talk to your healthcare provider about how to lose weight  A weight loss of 10% can improve your heart health  · Eat heart-healthy foods  Include fresh fruits and vegetables in your meal plan  Choose low-fat foods, such as skim or 1% fat milk, low-fat cheese and yogurt, fish, chicken (without skin), and lean meats  Eat two 4-ounce servings of fish high in omega-3 fats each week, such as salmon, fresh tuna, and herring  Do not eat foods that are high in sodium, such as canned foods, potato chips, salty snacks, and cold cuts  Put less table salt on your food  · Limit or do not drink alcohol  A drink of alcohol is 12 ounces of beer, 5 ounces of wine, or 1½ ounces of liquor  · Manage other health conditions  Follow your healthcare provider's advice on how to manage other conditions that can affect your heart health  These include diabetes, high blood pressure, and high cholesterol  You may need to take medicines for these conditions and make other lifestyle changes  · Ask if you should have a flu vaccine  The flu can be dangerous for a person who has CAD  The flu vaccine is available every year in the fall  © 2017 [address] [employer] Information is for End User's use only and may not be sold, redistributed or otherwise used for commercial purposes  All illustrations and images included in CareNotes® are the copyrighted property of [employer]  or [employer]  The above information is an  only  It is not intended as medical advice for individual conditions or treatments   Talk to your doctor, nurse or pharmacist before following any medical regimen to see if it is safe and effective for you  Safe Use of Antiplatelet Medication   WHAT YOU NEED TO KNOW:   Antiplatelets are medicines that help prevent or treat conditions caused by blood clots  These conditions include stroke, transient ischemic attack (TIA), heart attack, heart vessel disease, or peripheral artery disease  Platelets help your blood to clot  Antiplatelets keep your platelets from sticking together where placque is in your blood vessels  You may need to take more than one antiplatelet medicine at the same time  DISCHARGE INSTRUCTIONS:   Return to the emergency department if:   · You have a nose bleed that lasts longer than 10 minutes  · You have bleeding that will not stop  · You cough up or vomit blood  · You have a sudden, severe headache or cannot move one side of your body  · You are dizzy and cannot keep your balance  · You are confused or have trouble speaking  · You have difficulty breathing or chest pain  Contact your healthcare provider if:   · You forget to take your medicine or you take too much  · You have nausea, and you are vomiting  · You bleed when you brush your teeth or blow your nose  · Your urine or bowel movements are dark in color or have blood in them  · You have excessive bruising or your legs have reddish-purple spots that look like a rash  · You have a sore throat, fever, and chills  · You become pregnant  · You have questions or concerns about your condition or care  Take antiplatelets safely:   · Always  take your medicine exactly as prescribed by your healthcare provider  Take your medicine at the same time every day  This will help you remember to take your medicine and keep a steady level of medicine in your body  · Take 1 dose of medicine at a time  If you forget to take a dose, do not  take 2 doses  Too much antiplatelet medicine in your body may increase your risk of bleeding and change medicine levels in your blood      · Do not stop taking your medicine  Follow your healthcare provider's directions  · Do not take NSAIDs  Many over-the-counter medicines contain NSAIDs or aspirin  Medicines, such as ibuprofen, can increase your risk of bleeding  · Do not start or stop taking any other medicines or supplements  Ask your healthcare provider before changing any medicine or supplement  Certain medicines and supplements can cause bleeding or cause your antiplatelets to not work properly  Safety measures for antiplatelet therapy:  Tell all of your healthcare providers that you are taking antiplatelets  Keep a current list of medicines and their dosages with you at all times  · Wear a bracelet or necklace that says you take this medicine  · Use a soft washcloth and a soft toothbrush  If you shave, use an electric razor  Avoid activities that can cause bruising or bleeding  · You may need regular blood tests so your healthcare provider can decide how much medicine you need  The dose may need to be changed because of your test results  Write down changes to your dose of medicine  · Avoid drinking alcohol while taking antiplatelets  Alcohol can increase your risk of bleeding  Follow up with your healthcare provider as directed:  Write down your questions so you remember to ask them during your visits  © 2017 [address] [facility] Information is for End User's use only and may not be sold, redistributed or otherwise used for commercial purposes  All illustrations and images included in CareNotes® are the copyrighted property of A D A M [employer]  or [employer]ar Bailey  The above information is an  only  It is not intended as medical advice for individual conditions or treatments  Talk to your doctor, nurse or pharmacist before following any medical regimen to see if it is safe and effective for you

## 2020-02-10 ENCOUNTER — TELEPHONE (OUTPATIENT)
Dept: CARDIOLOGY CLINIC | Facility: CLINIC | Age: 50
End: 2020-02-10

## 2020-02-10 NOTE — UTILIZATION REVIEW
Initial Clinical Review    Admission: Date/Time/Statement: Admission Orders (From admission, onward)     Ordered        02/07/20 0905  Inpatient Admission  Once                   Orders Placed This Encounter   Procedures    Inpatient Admission     Standing Status:   Standing     Number of Occurrences:   1     Order Specific Question:   Admitting Physician     Answer:   Jennifer Bush [[id number]]     Order Specific Question:   Level of Care     Answer:   Level 1 Stepdown [13]     Order Specific Question:   Estimated length of stay     Answer:   More than 2 Midnights     Order Specific Question:   Certification     Answer:   I certify that inpatient services are medically necessary for this patient for a duration of greater than two midnights  See H&P and MD Progress Notes for additional information about the patient's course of treatment  ED Arrival Information     Expected Arrival Acuity Means of Arrival Escorted By Service Admission Type    - 2/7/2020 07:20 [facility]) Cardiology Emergency    Arrival Complaint    chest pain        Chief Complaint   Patient presents with    Chest Pain     Pt arrives c/o chest pain since he woke up at 0600  Pt states he woke up and noticed he had chest pain, not that he was awoken  Reports MANUEL when walking up stairs  Pt denies n/v/d  Denies diaphoresis  Denies dizziness  Reports numbness in left arm, but has resolved  Received 325 mg ASA  and 1 SL nitro prehospital  Reports pain went from at 5 to a 0 with SL nitro  Assessment/Plan:    52year old male presents to ed from home via ems for evaluation and treatment of chest pain  Associated with transient numbness of his left arm  Ekg shows Acute MI  Troponin p 4 to 6 27  Received aspirin prior to arrival by ems  Continued treatment in ed with brilinta, heparin gtt, iv fentanyl, iv tridil infusion, iv verapamil, iv  9% ns 100/hr     Admit to inpatient for ST elevation MI involving the right coronary artery  Plan telemetry, consult cardiology, cardiac cath, brilintin, aspirin, metoprolol      Consult cardiology      CARDIAC CATH SHOWED A CRITICAL DISTAL RCA STENOSIS WITH KATHLEEN 2 FLOW  SUCCESSFUL PCI WAS PERFORMED WITH PLACEMENT OF A 3 0 X 23 MM JUAN  KATHLEEN 3 FLOW WAS RE-ESTABLISHED AND PAIN RESOLVED  HE DID HAVE MILD PERSISTENT ST ELEVATIONS      1  ASA 81 mg daily  2  Brilinta 90 mg BID  3  Lipitor 80 mg daily  4  Check echo for LV function  5   Lopressor 25 mg every 6 hours        ED Triage Vitals   02/07/20 0926 02/07/20 0737 02/07/20 0737 02/07/20 0737 02/07/20 0737   98 9 °F (37 2 °C) 97 16 (!) 174/107 98 %      Temporal Monitor         Pain Score       02/07/20 0737       1       02/07/20 97 3 kg (214 lb 8 1 oz)     Additional Vital Signs:      Date/Time  Temp  Pulse  Resp  BP  MAP (mmHg)  SpO2  O2 Device    02/09/20 0726  97 5 °F (36 4 °C)  69  18  159/90    98 %  None (Room air)    02/08/20 2100  98 1 °F (36 7 °C)  58  18  154/80    96 %  None (Room air)    02/08/20 1918  97 5 °F (36 4 °C)  76  18  169/89    99 %  None (Room air)    02/08/20 1555  97 8 °F (36 6 °C)  72  18  148/97    97 %  None (Room air)    02/08/20 1219  97 6 °F (36 4 °C)  63  20  153/95    98 %  None (Room air)    02/08/20 1200    68  23  Abnormal   150/96  115  99 %      02/08/20 1100    62  14      99 %      02/08/20 1046  97 4 °F (36 3 °C)  Abnormal                 02/08/20 1000    56  11  Abnormal       98 %  None (Room air)    02/08/20 0944        137/97  113        02/08/20 0900    58  13      97 %      02/08/20 0831    62  16      98 %      02/08/20 0822        184/94  Abnormal   130        02/08/20 0800    56  13      97 %      02/08/20 0727  97 5 °F (36 4 °C)                02/08/20 0611    49  Abnormal     110/78          02/08/20 0500    64  13  110/78  96  95 %  None (Room air)    02/08/20 0400    52  Abnormal   15  118/88  104  97 %      02/08/20 0346 97 6 °F (36 4 °C)                02/08/20 0300    56  14  140/89  107  95 %      02/08/20 0200    54  Abnormal   13  123/90  100  97 %      02/08/20 0100    54  Abnormal   13  135/77  96  96 %      02/08/20 0000    66  14  124/78  95  97 %      02/07/20 2327  97 9 °F (36 6 °C)                02/07/20 2300    78  31  Abnormal   151/100  125  99 %  None (Room air)    02/07/20 2200    60  15  127/85  99  96 %      02/07/20 2100    58  15  146/96  121  96 %      02/07/20 2000    56  14  135/78  97  97 %  None (Room air)              Pertinent Labs/Diagnostic Test Results:     Collection Time Result Time Vent R Atrial R CA Int  QRSD Int  QT Int  QTC Int   P Axis QRS Axis T Wave Ax    02/07/20 07:24:12 02/07/20 07:50:36 93 93 142 100 334 415 50 30 76         Normal sinus rhythm  ST elevation consider inferior injury or acute infarct  ACUTE MI    Abnormal ECG        Results from last 7 days   Lab Units 02/08/20  0540 02/07/20  0729   WBC Thousand/uL 5 26 5 50   HEMOGLOBIN g/dL 16 5 16 2   HEMATOCRIT % 48 7 46 5   PLATELETS Thousands/uL 222 235   NEUTROS ABS Thousands/µL  --  3 35         Results from last 7 days   Lab Units 02/08/20  0540 02/07/20  0729   SODIUM mmol/L 142 139   POTASSIUM mmol/L 3 6 3 2*   CHLORIDE mmol/L 106 101   CO2 mmol/L 25 28   ANION GAP mmol/L 11 10   BUN mg/dL 11 17   CREATININE mg/dL 1 06 1 18   EGFR ml/min/1 73sq m 82 72   CALCIUM mg/dL 8 4 8 1*     Results from last 7 days   Lab Units 02/07/20  0729   AST U/L 20   ALT U/L 45   ALK PHOS U/L 91   TOTAL PROTEIN g/dL 7 2   ALBUMIN g/dL 3 4*   TOTAL BILIRUBIN mg/dL 0 60         Results from last 7 days   Lab Units 02/08/20  0540 02/07/20  0729   GLUCOSE RANDOM mg/dL 106 151*     Results from last 7 days   Lab Units 02/08/20  0540 02/07/20  0729   TROPONIN I ng/mL 6 27* 0 04         Results from last 7 days   Lab Units 02/07/20  0730   PROTIME seconds 13 0   INR  0 97   PTT seconds 28       ED Treatment:   Medication Administration from 02/07/2020 0720 to 02/07/2020 0900       Date/Time Order Dose Route Action     02/07/2020 0738 ticagrelor (BRILINTA) tablet 180 mg 180 mg Oral Given     02/07/2020 0740 heparin (porcine) injection 4,000 Units 4,000 Units Intravenous Given     02/07/2020 0755 sodium chloride 0 9 % infusion 100 mL/hr Intravenous New Bag     02/07/2020 0759 lidocaine (PF) (XYLOCAINE-MPF) 1 % injection 1 mL Infiltration Given     02/07/2020 0759 fentanyl citrate (PF) 100 MCG/2ML 50 mcg Intravenous Given     02/07/2020 0759 midazolam (VERSED) injection 2 mg Intravenous Given     02/07/2020 0801 nitroGLYcerin (TRIDIL) 50 mg in 250 mL infusion (premix) 200 mcg Intra-arterial Given     02/07/2020 0802 verapamil (ISOPTIN) injection 2 5 mg Intravenous Given     02/07/2020 0811 heparin (porcine) injection 8,000 Units Intravenous Given     02/07/2020 0802 heparin (porcine) injection 4,000 Units Intravenous Given        Past Medical History:   Diagnosis Date    Acute ST elevation myocardial infarction (STEMI) of inferior wall ([facility])     cath with PCI/JUAN - RCA 2/7/2020    Back pain     CAD (coronary artery disease)     [facility]ronic obstructive [facility]) [facility] [address] [facility]     Hyperlipidemia     Hypertension     Inguinal hernia, left     Mild heartburn     Obesity     Stroke ([facility] [address] ) approx 2015    Denies residual problems  Initially he only had trouble with two fingers on left side   Wears glasses      Present on Admission:   Hypertension   CAD (coronary artery disease)   Acute ST elevation myocardial infarction (STEMI) of inferior wall ([facility])      Admitting Diagnosis:     Chest pain [R07 9]  Acute ST elevation myocardial infarction (STEMI) ([facility] [address] ) [I21 3]    Age/Sex: 52 y o  male       Network Utilization Review Department  [email or web address]  ATTENTION: Please call with any questions or concerns to [phone] and carefully listen to the prompts so that you are directed to the right person  All voicemails are confidential   Makeda Burton all requests for admission clinical reviews, approved or denied determinations and any other requests to dedicated fax number below belonging to the campus where the patient is receiving treatment   List of dedicated fax numbers for the Facilities:  [address] [place] [address] DENIALS (Administrative/Medical Necessity) [phone]   [address]  (Maternity/NICU/Pediatrics) [phone]   Cody Larsen [phone]   [facility] [phone]   Arpan Hardin [phone]   [facility] [phone]   [address] [place] [address] [phone]   [facility]  [phone]   [address] [facility] [address] [facility]  [address] [facility] [address] [facility] [phone]

## 2020-02-11 ENCOUNTER — APPOINTMENT (EMERGENCY)
Dept: RADIOLOGY | Facility: HOSPITAL | Age: 50
End: 2020-02-11
Payer: COMMERCIAL

## 2020-02-11 ENCOUNTER — HOSPITAL ENCOUNTER (OUTPATIENT)
Facility: HOSPITAL | Age: 50
Setting detail: OBSERVATION
Discharge: HOME | End: 2020-02-12
Attending: EMERGENCY MEDICINE | Admitting: HOSPITALIST
Payer: COMMERCIAL

## 2020-02-11 DIAGNOSIS — E87.6 HYPOKALEMIA: ICD-10-CM

## 2020-02-11 DIAGNOSIS — I21.9 MYOCARDIAL INFARCTION (HCC): ICD-10-CM

## 2020-02-11 DIAGNOSIS — I10 HTN (HYPERTENSION): ICD-10-CM

## 2020-02-11 DIAGNOSIS — M79.602 LEFT ARM PAIN: Primary | ICD-10-CM

## 2020-02-11 DIAGNOSIS — I25.10 CAD (CORONARY ARTERY DISEASE): ICD-10-CM

## 2020-02-11 LAB
ALBUMIN SERPL BCP-MCNC: 3.9 G/DL (ref 3.5–5)
ALP SERPL-CCNC: 100 U/L (ref 46–116)
ALT SERPL W P-5'-P-CCNC: 63 U/L (ref 12–78)
ANION GAP SERPL CALCULATED.3IONS-SCNC: 12 MMOL/L (ref 4–13)
APTT PPP: 28 SECONDS (ref 23–37)
AST SERPL W P-5'-P-CCNC: 26 U/L (ref 5–45)
BASOPHILS # BLD AUTO: 0.06 THOUSANDS/ΜL (ref 0–0.1)
BASOPHILS NFR BLD AUTO: 1 % (ref 0–1)
BILIRUB SERPL-MCNC: 1.79 MG/DL (ref 0.2–1)
BUN SERPL-MCNC: 15 MG/DL (ref 5–25)
CALCIUM SERPL-MCNC: 8.9 MG/DL (ref 8.3–10.1)
CHLORIDE SERPL-SCNC: 100 MMOL/L (ref 100–108)
CK MB SERPL-MCNC: 1.3 NG/ML (ref 0–5)
CK MB SERPL-MCNC: <1 % (ref 0–2.5)
CK SERPL-CCNC: 186 U/L (ref 39–308)
CO2 SERPL-SCNC: 25 MMOL/L (ref 21–32)
CREAT SERPL-MCNC: 1.29 MG/DL (ref 0.6–1.3)
EOSINOPHIL # BLD AUTO: 0.14 THOUSAND/ΜL (ref 0–0.61)
EOSINOPHIL NFR BLD AUTO: 2 % (ref 0–6)
ERYTHROCYTE [DISTWIDTH] IN BLOOD BY AUTOMATED COUNT: 12 % (ref 11.6–15.1)
GFR SERPL CREATININE-BSD FRML MDRD: 65 ML/MIN/1.73SQ M
GLUCOSE SERPL-MCNC: 102 MG/DL (ref 65–140)
HCT VFR BLD AUTO: 49.8 % (ref 36.5–49.3)
HGB BLD-MCNC: 17.3 G/DL (ref 12–17)
IMM GRANULOCYTES # BLD AUTO: 0.02 THOUSAND/UL (ref 0–0.2)
IMM GRANULOCYTES NFR BLD AUTO: 0 % (ref 0–2)
INR PPP: 1.02 (ref 0.84–1.19)
LYMPHOCYTES # BLD AUTO: 1.74 THOUSANDS/ΜL (ref 0.6–4.47)
LYMPHOCYTES NFR BLD AUTO: 21 % (ref 14–44)
MAGNESIUM SERPL-MCNC: 2.2 MG/DL (ref 1.6–2.6)
MCH RBC QN AUTO: 30.6 PG (ref 26.8–34.3)
MCHC RBC AUTO-ENTMCNC: 34.7 G/DL (ref 31.4–37.4)
MCV RBC AUTO: 88 FL (ref 82–98)
MONOCYTES # BLD AUTO: 0.5 THOUSAND/ΜL (ref 0.17–1.22)
MONOCYTES NFR BLD AUTO: 6 % (ref 4–12)
NEUTROPHILS # BLD AUTO: 5.86 THOUSANDS/ΜL (ref 1.85–7.62)
NEUTS SEG NFR BLD AUTO: 70 % (ref 43–75)
NRBC BLD AUTO-RTO: 0 /100 WBCS
PLATELET # BLD AUTO: 287 THOUSANDS/UL (ref 149–390)
PMV BLD AUTO: 10 FL (ref 8.9–12.7)
POTASSIUM SERPL-SCNC: 3.3 MMOL/L (ref 3.5–5.3)
PROT SERPL-MCNC: 8 G/DL (ref 6.4–8.2)
PROTHROMBIN TIME: 13.5 SECONDS (ref 11.6–14.5)
RBC # BLD AUTO: 5.65 MILLION/UL (ref 3.88–5.62)
SODIUM SERPL-SCNC: 137 MMOL/L (ref 136–145)
TROPONIN I SERPL-MCNC: 0.74 NG/ML
WBC # BLD AUTO: 8.32 THOUSAND/UL (ref 4.31–10.16)

## 2020-02-11 PROCEDURE — 99285 EMERGENCY DEPT VISIT HI MDM: CPT

## 2020-02-11 PROCEDURE — 99220 PR INITIAL OBSERVATION CARE/DAY 70 MINUTES: CPT | Performed by: PHYSICIAN ASSISTANT

## 2020-02-11 PROCEDURE — 85610 PROTHROMBIN TIME: CPT | Performed by: NURSE PRACTITIONER

## 2020-02-11 PROCEDURE — 93005 ELECTROCARDIOGRAM TRACING: CPT

## 2020-02-11 PROCEDURE — 82550 ASSAY OF CK (CPK): CPT | Performed by: NURSE PRACTITIONER

## 2020-02-11 PROCEDURE — 71046 X-RAY EXAM CHEST 2 VIEWS: CPT

## 2020-02-11 PROCEDURE — 82553 CREATINE MB FRACTION: CPT | Performed by: NURSE PRACTITIONER

## 2020-02-11 PROCEDURE — 84484 ASSAY OF TROPONIN QUANT: CPT | Performed by: PHYSICIAN ASSISTANT

## 2020-02-11 PROCEDURE — 84484 ASSAY OF TROPONIN QUANT: CPT | Performed by: NURSE PRACTITIONER

## 2020-02-11 PROCEDURE — 85025 COMPLETE CBC W/AUTO DIFF WBC: CPT | Performed by: NURSE PRACTITIONER

## 2020-02-11 PROCEDURE — 99285 EMERGENCY DEPT VISIT HI MDM: CPT | Performed by: NURSE PRACTITIONER

## 2020-02-11 PROCEDURE — 36415 COLL VENOUS BLD VENIPUNCTURE: CPT | Performed by: NURSE PRACTITIONER

## 2020-02-11 PROCEDURE — 85730 THROMBOPLASTIN TIME PARTIAL: CPT | Performed by: NURSE PRACTITIONER

## 2020-02-11 PROCEDURE — 83735 ASSAY OF MAGNESIUM: CPT | Performed by: NURSE PRACTITIONER

## 2020-02-11 PROCEDURE — 80053 COMPREHEN METABOLIC PANEL: CPT | Performed by: NURSE PRACTITIONER

## 2020-02-11 RX ORDER — HYDROCHLOROTHIAZIDE 25 MG/1
25 TABLET ORAL DAILY
Status: DISCONTINUED | OUTPATIENT
Start: 2020-02-12 | End: 2020-02-12 | Stop reason: HOSPADM

## 2020-02-11 RX ORDER — METOPROLOL TARTRATE 50 MG/1
50 TABLET, FILM COATED ORAL EVERY 12 HOURS SCHEDULED
Status: DISCONTINUED | OUTPATIENT
Start: 2020-02-11 | End: 2020-02-12 | Stop reason: HOSPADM

## 2020-02-11 RX ORDER — ATORVASTATIN CALCIUM 80 MG/1
80 TABLET, FILM COATED ORAL EVERY EVENING
Status: DISCONTINUED | OUTPATIENT
Start: 2020-02-11 | End: 2020-02-12 | Stop reason: HOSPADM

## 2020-02-11 RX ORDER — ACETAMINOPHEN 325 MG/1
650 TABLET ORAL EVERY 6 HOURS PRN
Status: DISCONTINUED | OUTPATIENT
Start: 2020-02-11 | End: 2020-02-12 | Stop reason: HOSPADM

## 2020-02-11 RX ORDER — POTASSIUM CHLORIDE 20 MEQ/1
20 TABLET, EXTENDED RELEASE ORAL ONCE
Status: COMPLETED | OUTPATIENT
Start: 2020-02-11 | End: 2020-02-11

## 2020-02-11 RX ORDER — ASPIRIN 81 MG/1
81 TABLET, CHEWABLE ORAL DAILY
Status: DISCONTINUED | OUTPATIENT
Start: 2020-02-12 | End: 2020-02-12 | Stop reason: HOSPADM

## 2020-02-11 RX ORDER — MAGNESIUM HYDROXIDE/ALUMINUM HYDROXICE/SIMETHICONE 120; 1200; 1200 MG/30ML; MG/30ML; MG/30ML
30 SUSPENSION ORAL EVERY 6 HOURS PRN
Status: DISCONTINUED | OUTPATIENT
Start: 2020-02-11 | End: 2020-02-12 | Stop reason: HOSPADM

## 2020-02-11 RX ORDER — ONDANSETRON 2 MG/ML
4 INJECTION INTRAMUSCULAR; INTRAVENOUS EVERY 6 HOURS PRN
Status: DISCONTINUED | OUTPATIENT
Start: 2020-02-11 | End: 2020-02-12 | Stop reason: HOSPADM

## 2020-02-11 RX ADMIN — METOPROLOL TARTRATE 50 MG: 50 TABLET, FILM COATED ORAL at 23:38

## 2020-02-11 RX ADMIN — ATORVASTATIN CALCIUM 80 MG: 80 TABLET, FILM COATED ORAL at 23:38

## 2020-02-11 RX ADMIN — TICAGRELOR 90 MG: 90 TABLET ORAL at 23:38

## 2020-02-11 RX ADMIN — POTASSIUM CHLORIDE 20 MEQ: 1500 TABLET, EXTENDED RELEASE ORAL at 22:41

## 2020-02-12 VITALS
RESPIRATION RATE: 18 BRPM | HEART RATE: 66 BPM | BODY MASS INDEX: 32.77 KG/M2 | OXYGEN SATURATION: 97 % | WEIGHT: 203.93 LBS | TEMPERATURE: 97.3 F | SYSTOLIC BLOOD PRESSURE: 114 MMHG | DIASTOLIC BLOOD PRESSURE: 79 MMHG | HEIGHT: 66 IN

## 2020-02-12 LAB
ANION GAP SERPL CALCULATED.3IONS-SCNC: 11 MMOL/L (ref 4–13)
ATRIAL RATE: 71 BPM
BUN SERPL-MCNC: 18 MG/DL (ref 5–25)
CALCIUM SERPL-MCNC: 9 MG/DL (ref 8.3–10.1)
CHLORIDE SERPL-SCNC: 100 MMOL/L (ref 100–108)
CO2 SERPL-SCNC: 25 MMOL/L (ref 21–32)
CREAT SERPL-MCNC: 1.38 MG/DL (ref 0.6–1.3)
GFR SERPL CREATININE-BSD FRML MDRD: 60 ML/MIN/1.73SQ M
GLUCOSE SERPL-MCNC: 100 MG/DL (ref 65–140)
P AXIS: 51 DEGREES
POTASSIUM SERPL-SCNC: 3.4 MMOL/L (ref 3.5–5.3)
PR INTERVAL: 144 MS
QRS AXIS: 22 DEGREES
QRSD INTERVAL: 102 MS
QT INTERVAL: 386 MS
QTC INTERVAL: 419 MS
SODIUM SERPL-SCNC: 136 MMOL/L (ref 136–145)
T WAVE AXIS: 9 DEGREES
TROPONIN I SERPL-MCNC: 0.37 NG/ML
TROPONIN I SERPL-MCNC: 0.59 NG/ML
VENTRICULAR RATE: 71 BPM

## 2020-02-12 PROCEDURE — 93010 ELECTROCARDIOGRAM REPORT: CPT | Performed by: INTERNAL MEDICINE

## 2020-02-12 PROCEDURE — 84484 ASSAY OF TROPONIN QUANT: CPT | Performed by: PHYSICIAN ASSISTANT

## 2020-02-12 PROCEDURE — 80048 BASIC METABOLIC PNL TOTAL CA: CPT | Performed by: PHYSICIAN ASSISTANT

## 2020-02-12 PROCEDURE — 93005 ELECTROCARDIOGRAM TRACING: CPT

## 2020-02-12 PROCEDURE — 99217 PR OBSERVATION CARE DISCHARGE MANAGEMENT: CPT | Performed by: HOSPITALIST

## 2020-02-12 RX ORDER — NITROGLYCERIN 0.4 MG/1
0.4 TABLET SUBLINGUAL
Qty: 100 TABLET | Refills: 1 | Status: SHIPPED | OUTPATIENT
Start: 2020-02-12 | End: 2022-03-29 | Stop reason: SDUPTHER

## 2020-02-12 RX ADMIN — ENOXAPARIN SODIUM 40 MG: 40 INJECTION SUBCUTANEOUS at 08:13

## 2020-02-12 RX ADMIN — ASPIRIN 81 MG 81 MG: 81 TABLET ORAL at 08:12

## 2020-02-12 RX ADMIN — METOPROLOL TARTRATE 50 MG: 50 TABLET, FILM COATED ORAL at 08:12

## 2020-02-12 RX ADMIN — TICAGRELOR 90 MG: 90 TABLET ORAL at 08:12

## 2020-02-12 RX ADMIN — HYDROCHLOROTHIAZIDE 25 MG: 25 TABLET ORAL at 08:12

## 2020-02-12 NOTE — DISCHARGE SUMMARY
Discharge- Vince Huber 1970, 52 y o  male MRN: [id number]    Unit/Bed#: E4 -01 Encounter: [id number]    Primary Care Provider: Al Forde MD   Date and time admitted to hospital: 2/11/2020  8:13 PM        * Left arm pain  Assessment & Plan  Trop trending down  So likely this is just residual from recent heart attack  He is pain free  I will send home with sublingual nitroglycerin        Discharging Physician / Practitioner: Ruddy Velez DO  PCP: Al Forde MD  Admission Date:   Admission Orders (From admission, onward)     Ordered        02/11/20 2205  Place in Observation (expected length of stay for this patient is less than two midnights)  Once                   Discharge Date: 02/12/20    Resolved Problems  Date Reviewed: 2/12/2020    None          ·       Reason for Admission:  Left arm pain      Hospital Course:     Vince Huber is a 52 y o  male patient who originally presented to the hospital on 2/11/2020 due to left arm pain  He had a recent heart attack with stent placement  He received 1 sublingual nitroglycerin by EMS and his pain resolved  His was in observation  His troponins are elevated but they are trending down and they are lower than when he was here last week for his M I  So I think he is just having some residual pain from the previous MI  I do not think this is a new 1  He has been pain free  He is very anxious to leave  I will give him a prescription for sublingual nitroglycerin with instructions to take up to 3 tablets and if no relief, then to come immediately to the ER  He is agreeable with the plan    Please see above list of diagnoses and related plan for additional information         Condition at Discharge: fair       Discharge Day Visit / Exam:     Subjective:  Feels better  No chest pain   No arm pain      Vitals: Blood Pressure: 117/78 (02/12/20 0737)  Pulse: 65 (02/12/20 0737)  Temperature: (!) 96 7 °F (35 9 °C) (02/12/20 0737)  Temp Source: Temporal (02/12/20 0737)  Respirations: 18 (02/12/20 0737)  Height: 5' 6" (167 6 cm) (02/11/20 2313)  Weight - Scale: 92 5 kg (203 lb 14 8 oz) (02/11/20 2313)  SpO2: 98 % (02/12/20 0737)    Exam:     Physical Exam   HENT:   Head: Normocephalic and atraumatic  Eyes: Pupils are equal, round, and reactive to light  EOM are normal    Cardiovascular: Normal rate and regular rhythm  Exam reveals no gallop and no friction rub  No murmur heard  Pulmonary/Chest: Effort normal and breath sounds normal  He has no wheezes  He has no rales  Abdominal: Soft  Bowel sounds are normal  There is no tenderness  Musculoskeletal: He exhibits no edema  Nursing note and vitals reviewed            Discharge instructions/Information to patient and family:   See after visit summary for information provided to patient and family  Provisions for Follow-Up Care:  See after visit summary for information related to follow-up care and any pertinent home health orders  Disposition:     Home       Discharge Statement:  I spent 20 minutes discharging the patient  This time was spent on the day of discharge  I had direct contact with the patient on the day of discharge  Greater than 50% of the total time was spent examining patient, answering all patient questions, arranging and discussing plan of care with patient as well as directly providing post-discharge instructions  Additional time then spent on discharge activities  Discharge Medications:  See after visit summary for reconciled discharge medications provided to patient and family        ** Please Note: This note has been constructed using a voice recognition system **

## 2020-02-12 NOTE — ED PROVIDER NOTES
History  Chief Complaint   Patient presents with    Arm Pain     pt reports L arm pain approx 1 5 hours ago  pt has cardiac history and was discharged yesterday for an MI and stent placement  pt here for tye quiles a 52year old male who was discharged yesterday from [address] after having an MI with 1 stent placed  Pt states that today he had left bicep arm pain about 1 5 hours ago  He denies taking anything for pain and denies any pain at this time  He states he walked here to the ED  Pt is not able to state what meds he is on or who his cardiologist is  He denies n/diaphoresis, radiation of pain or chest pain  He does state he needs his 8 oclock medication but doesn't know which one  History provided by:  Patient and medical records   used: No        Prior to Admission Medications   Prescriptions Last Dose Informant Patient Reported?  Taking?   acetaminophen (TYLENOL) 325 mg tablet 2/10/2020 at Unknown time  No Yes   Sig: Take 2 tablets (650 mg total) by mouth every 6 (six) hours as needed for mild pain for up to 4 days   aspirin 81 mg chewable tablet 2/11/2020 at Unknown time  No Yes   Sig: Chew 1 tablet (81 mg total) daily   atorvastatin (LIPITOR) 80 mg tablet 2/10/2020 at Unknown time  No Yes   Sig: Take 1 tablet (80 mg total) by mouth every evening   hydrochlorothiazide (HYDRODIURIL) 25 mg tablet 2/10/2020 at Unknown time  No Yes   Sig: Take 1 tablet (25 mg total) by mouth daily   metoprolol tartrate (LOPRESSOR) 50 mg tablet 2/10/2020 at Unknown time  No Yes   Sig: Take 1 tablet (50 mg total) by mouth every 12 (twelve) hours   ticagrelor (BRILINTA) 90 MG 2/10/2020 at Unknown time  No Yes   Sig: Take 1 tablet (90 mg total) by mouth every 12 (twelve) hours      Facility-Administered Medications: None       Past Medical History:   Diagnosis Date    Acute ST elevation myocardial infarction (STEMI) of inferior wall (HCC)     cath with PCI/JUAN - RCA 2/7/2020    Back pain     CAD (coronary artery disease)     COPD (chronic obstructive pulmonary disease) (HCC)     Hyperlipidemia     Hypertension     Inguinal hernia, left     Mild heartburn     Obesity     Stroke ([address] ) approx 2015    Denies residual problems  Initially he only had trouble with two fingers on left side   Wears glasses        Past Surgical History:   Procedure Laterality Date    APPENDECTOMY      HERNIA REPAIR      NO PAST SURGERIES      MO LAP,APPENDECTOMY N/A 11/28/2018    Procedure: APPENDECTOMY LAPAROSCOPIC;  Surgeon: Trice Fitzpatrick MD;  Location: [address] [place] [address] MAIN OR;  Service: General    MO [address] Left 4/2/2018    Procedure: LAPAROSCOPIC INGUINAL HERNIA REPAIR WITH MESH;  Surgeon: Trice Fitzpatrick MD;  Location: [facility];  Service: General       History reviewed  No pertinent family history  I have reviewed and agree with the history as documented  Social History     Tobacco Use    Smoking status: Former Smoker     Packs/day: 1 00     Years: 15 00     Pack years: 15 00     Types: Cigarettes     Last attempt to quit: 2010     Years since quitting: 10 1    Smokeless tobacco: Former User   Substance Use Topics    Alcohol use: Not Currently    Drug use: No       Review of Systems   Musculoskeletal:        Left arm pain    All other systems reviewed and are negative  Physical Exam  Physical Exam   Constitutional: He is oriented to person, place, and time  He appears well-developed and well-nourished  No distress  HENT:   Head: Normocephalic and atraumatic  Eyes: Pupils are equal, round, and reactive to light  EOM are normal    Neck: Normal range of motion  Neck supple  Cardiovascular: Normal rate, regular rhythm and normal heart sounds  Pulmonary/Chest: Effort normal and breath sounds normal    Abdominal: Soft  Bowel sounds are normal  He exhibits no distension  There is no tenderness  Musculoskeletal: Normal range of motion     Neurological: He is alert and oriented to person, place, and time  Skin: Skin is warm and dry  Capillary refill takes less than 2 seconds  He is not diaphoretic  Psychiatric: He has a normal mood and affect  His behavior is normal  Judgment and thought content normal    Nursing note and vitals reviewed        Vital Signs  ED Triage Vitals   Temperature Pulse Respirations Blood Pressure SpO2   02/11/20 1824 02/11/20 1824 02/11/20 1824 02/11/20 1824 02/11/20 1824   98 °F (36 7 °C) 95 18 149/88 97 %      Temp Source Heart Rate Source Patient Position - Orthostatic VS BP Location FiO2 (%)   02/11/20 2313 02/11/20 2053 02/11/20 2053 02/11/20 2053 --   Temporal Monitor Sitting Left arm       Pain Score       02/11/20 1824       No Pain           Vitals:    02/11/20 1824 02/11/20 2053 02/11/20 2313   BP: 149/88 140/87 155/86   Pulse: 95 69 78   Patient Position - Orthostatic VS:  Sitting Sitting         Visual Acuity      ED Medications  Medications   acetaminophen (TYLENOL) tablet 650 mg (has no administration in time range)   aspirin chewable tablet 81 mg (has no administration in time range)   atorvastatin (LIPITOR) tablet 80 mg (80 mg Oral Given 2/11/20 2338)   hydrochlorothiazide (HYDRODIURIL) tablet 25 mg (has no administration in time range)   metoprolol tartrate (LOPRESSOR) tablet 50 mg (50 mg Oral Given 2/11/20 2338)   ticagrelor (BRILINTA) tablet 90 mg (90 mg Oral Given 2/11/20 2338)   enoxaparin (LOVENOX) subcutaneous injection 40 mg (has no administration in time range)   ondansetron (ZOFRAN) injection 4 mg (has no administration in time range)   aluminum-magnesium hydroxide-simethicone (MYLANTA) 200-200-20 mg/5 mL oral suspension 30 mL (has no administration in time range)   potassium chloride (K-DUR,KLOR-CON) CR tablet 20 mEq (20 mEq Oral Given 2/11/20 2241)       Diagnostic Studies  Results Reviewed     Procedure Component Value Units Date/Time    Troponin I [[id number]]  (Abnormal) Collected:  02/11/20 2357    Lab Status:  Final result Specimen: Blood from Arm, Left Updated:  02/12/20 0040     Troponin I 0 59 ng/mL     Magnesium [[id number]]  (Normal) Collected:  02/11/20 2049    Lab Status:  Final result Specimen:  Blood from Arm, Right Updated:  02/11/20 2145     Magnesium 2 2 mg/dL     CKMB [[id number]]  (Normal) Collected:  02/11/20 2049    Lab Status:  Final result Specimen:  Blood from Arm, Right Updated:  02/11/20 2145     CK-MB Index <1 0 %      CK-MB 1 3 ng/mL     CK Total with Reflex CKMB [[id number]]  (Normal) Collected:  02/11/20 2049    Lab Status:  Final result Specimen:  Blood from Arm, Right Updated:  02/11/20 2145     Total  U/L     Comprehensive metabolic panel [[id number]]  (Abnormal) Collected:  02/11/20 2049    Lab Status:  Final result Specimen:  Blood from Arm, Right Updated:  02/11/20 2135     Sodium 137 mmol/L      Potassium 3 3 mmol/L      Chloride 100 mmol/L      CO2 25 mmol/L      ANION GAP 12 mmol/L      BUN 15 mg/dL      Creatinine 1 29 mg/dL      Glucose 102 mg/dL      Calcium 8 9 mg/dL      AST 26 U/L      ALT 63 U/L      Alkaline Phosphatase 100 U/L      Total Protein 8 0 g/dL      Albumin 3 9 g/dL      Total Bilirubin 1 79 mg/dL      eGFR 65 ml/min/1 73sq m     Narrative:       [facility] guidelines for Chronic Kidney Disease (CKD):     Stage 1 with normal or high GFR (GFR > 90 mL/min/1 73 square meters)    Stage 2 Mild CKD (GFR = 60-89 mL/min/1 73 square meters)    Stage 3A Moderate CKD (GFR = 45-59 mL/min/1 73 square meters)    Stage 3B Moderate CKD (GFR = 30-44 mL/min/1 73 square meters)    Stage 4 Severe CKD (GFR = 15-29 mL/min/1 73 square meters)    Stage 5 End Stage CKD (GFR <15 mL/min/1 73 square meters)  Note: GFR calculation is accurate only with a steady state creatinine    Troponin I [[id number]]  (Abnormal) Collected:  02/11/20 2049    Lab Status:  Final result Specimen:  Blood from Arm, Right Updated:  02/11/20 2119     Troponin I 0 74 ng/mL     Protime-INR [[id number]]  (Normal) Collected:  02/11/20 2049    Lab Status:  Final result Specimen:  Blood from Arm, Right Updated:  02/11/20 2110     Protime 13 5 seconds      INR 1 02    APTT [[id number]]  (Normal) Collected:  02/11/20 2049    Lab Status:  Final result Specimen:  Blood from Arm, Right Updated:  02/11/20 2110     PTT 28 seconds     CBC and differential [[id number]]  (Abnormal) Collected:  02/11/20 2049    Lab Status:  Final result Specimen:  Blood from Arm, Right Updated:  02/11/20 2057     WBC 8 32 Thousand/uL      RBC 5 65 Million/uL      Hemoglobin 17 3 g/dL      Hematocrit 49 8 %      MCV 88 fL      MCH 30 6 pg      MCHC 34 7 g/dL      RDW 12 0 %      MPV 10 0 fL      Platelets 613 Thousands/uL      nRBC 0 /100 WBCs      Neutrophils Relative 70 %      Immat GRANS % 0 %      Lymphocytes Relative 21 %      Monocytes Relative 6 %      Eosinophils Relative 2 %      Basophils Relative 1 %      Neutrophils Absolute 5 86 Thousands/µL      Immature Grans Absolute 0 02 Thousand/uL      Lymphocytes Absolute 1 74 Thousands/µL      Monocytes Absolute 0 50 Thousand/µL      Eosinophils Absolute 0 14 Thousand/µL      Basophils Absolute 0 06 Thousands/µL                  XR chest 2 views   ED Interpretation by Arizona Gaucher, CRNP (02/11 2149)   Preliminary reading   No acute process seen   Waiting on rad read                  Procedures  ECG 12 Lead Documentation Only  Date/Time: 2/11/2020 6:39 PM  Performed by: Arizona Gaucher, CRNP  Authorized by: Arizona Gaucher, CRNP     Indications / Diagnosis:  Left arm pain recent MI wtih stent   ECG reviewed by me, the ED Provider: yes (Dr Dee Sprague )    Patient location:  ED  Previous ECG:     Previous ECG:  Compared to current    Comparison ECG info:  EKG 2/7/2020 with ST elevations (MI)   today no ST elevation however there is inverted T III    Similarity:  Changes noted    Comparison to cardiac monitor: Yes    Interpretation:     Interpretation: abnormal    Rate:     ECG rate:  71    ECG rate assessment: normal    Rhythm:     Rhythm: sinus rhythm    Ectopy:     Ectopy: none    QRS:     QRS axis:  Normal    QRS intervals: Wide  Conduction:     Conduction: normal    ST segments:     ST segments:  Normal  T waves:     T waves: inverted      Inverted:  III             ED Course  ED Course as of Feb 12 0131 Tue Feb 11, 2020 2129 Trop 0 74 decreased from 6 27 3 days ago  Pt has no current chest pain at this time  2139 CBC  hgb 17 3  pt most likely slightly dehydrated  K 3 3  - will replete       2142 Heart risk score 7 - trop 0 74 however is improving from previous EKG improved with III t wave inversion  Due to HR score 7 pt will be placed in observation for serial trops and monitor  Repeat labs - K                HEART Risk Score      Most Recent Value   History  1 Filed at: 02/11/2020 2142   ECG  1 Filed at: 02/11/2020 2142   Age  1 Filed at: 02/11/2020 2142   Risk Factors  2 Filed at: 02/11/2020 2142   Troponin  2 Filed at: 02/11/2020 2142   Heart Score Risk Calculator   History  1 Filed at: 02/11/2020 2142   ECG  1 Filed at: 02/11/2020 2142   Age  1 Filed at: 02/11/2020 2142   Risk Factors  2 Filed at: 02/11/2020 2142   Troponin  2 Filed at: 02/11/2020 2142   HEART Score  7 Filed at: 02/11/2020 2142   HEART Score  7 Filed at: 02/11/2020 2142                            MDM  Number of Diagnoses or Management Options  Diagnosis management comments: Recent MI with stent placement  Left arm pain    Plan  EKG  Tele  Labs  IV  CXR        Amount and/or Complexity of Data Reviewed  Clinical lab tests: ordered and reviewed  Review and summarize past medical records: yes          Disposition  Final diagnoses:   Hypokalemia   Myocardial infarction ([facility] [address] ) - with stent placement   CAD (coronary artery disease)   HTN (hypertension)   Left arm pain     Time reflects when diagnosis was documented in both MDM as applicable and the Disposition within this note     Time User Action Codes Description Comment 2/11/2020  9:40 PM Mila Sic Add [E87 6] Hypokalemia     2/11/2020  9:41 PM Cadet Sic Add [I21 9] Myocardial infarction ([facility] [address] )     2/11/2020  9:41 PM Cadet Sic Add [I25 10] CAD (coronary artery disease)     2/11/2020  9:42 PM Cadet Sic Add [I10] HTN (hypertension)     2/11/2020 10:00 PM Mila Sic Add [C11 131] Left arm pain     2/11/2020 10:00 PM Uldevon Michael [E87 6] Hypokalemia     2/11/2020 10:00 PM Mila Sic Modify [I47 386] Left arm pain     2/11/2020 10:00 PM Mila Sic Modify [I21 9] Myocardial infarction [facility]) with stent placement      ED Disposition     ED Disposition Condition Date/Time Comment    Admit Stable Tue Feb 11, 2020 10:04 PM Case was discussed with PASHA  and the patient's admission status was agreed to be Admission Status: observation status to the service of Dr Lanora Koyanagi   Follow-up Information    None         Current Discharge Medication List      CONTINUE these medications which have NOT CHANGED    Details   acetaminophen (TYLENOL) 325 mg tablet Take 2 tablets (650 mg total) by mouth every 6 (six) hours as needed for mild pain for up to 4 days  Qty: 30 tablet, Refills: 0    Associated Diagnoses: Acute ST elevation myocardial infarction (STEMI) of inferior wall (HCC)      aspirin 81 mg chewable tablet Chew 1 tablet (81 mg total) daily  Refills: 0    Associated Diagnoses: CAD (coronary artery disease); Acute ST elevation myocardial infarction (STEMI) of inferior wall (HCC)      atorvastatin (LIPITOR) 80 mg tablet Take 1 tablet (80 mg total) by mouth every evening  Qty: 30 tablet, Refills: 5    Associated Diagnoses: CAD (coronary artery disease); Acute ST elevation myocardial infarction (STEMI) of inferior wall ([facility] [address] );  Hyperlipidemia, unspecified hyperlipidemia type      hydrochlorothiazide (HYDRODIURIL) 25 mg tablet Take 1 tablet (25 mg total) by mouth daily  Qty: 90 tablet, Refills: 3    Associated Diagnoses: Hypertension, unspecified type      metoprolol tartrate (LOPRESSOR) 50 mg tablet Take 1 tablet (50 mg total) by mouth every 12 (twelve) hours  Qty: 60 tablet, Refills: 5    Associated Diagnoses: Essential hypertension; CAD (coronary artery disease); Acute ST elevation myocardial infarction (STEMI) of inferior wall (HCC)      ticagrelor (BRILINTA) 90 MG Take 1 tablet (90 mg total) by mouth every 12 (twelve) hours  Qty: 60 tablet, Refills: 11    Comments: BIN# [id number]       [facility]# [id number] PCN# [id number]            ID# [id number]  Associated Diagnoses: Acute ST elevation myocardial infarction (STEMI) of inferior wall (HCC)           No discharge procedures on file  PDMP Review     None          ED Provider  Electronically Signed by                            2/9/2020 cardiac cath note  This gentleman presented to the emergency department on the morning of 2/7 with initial ECG showing signs of ST elevation in the inferior leads  As such an MI alert was initiated    The patient presented to the hospital as discussed above  He was urgently taken to the cardiac catheterization lab where angiography revealed a tubular 85% stenosis of the RCA with KATHLEEN 2 flow  This was successfully treated with PCI and insertion of drug-eluting stent with 0% residual stenosis (for report of catheterization below)  Subsequent echocardiogram showed overall preserved LVEF with inferior wall motion abnormality (see report below)  The patient was observed in the hospital with an overall uncomplicated stay  Lipid profile was obtained though results are pending at the time of this dictation  His pre-hospital dose of Lipitor was escalated to 80 mg  The importance of uninterrupted dual antiplatelet therapy (aspirin +Plavix) was expressed to the patient  Lopressor was added to his regimen  The patient will be contacted by our office for outpatient reassessment and an order has been placed for cardiac rehabilitation       JOSLYN Patterson  02/12/20 0131

## 2020-02-12 NOTE — H&P
[facility] Internal Medicine  H&P- Osiris Harrison 1970, 52 y o  male MRN: [id number]    Unit/Bed#: [facility] Encounter: [id number]    Primary Care Provider: Radha Lowry MD   Date and time admitted to hospital: 2/11/2020  8:13 PM        * Left arm pain  Assessment & Plan  Patient presents with acute onset left arm pain  Denies radiation to chest or shoulder   Denies injury, resolved spontaneously   Concern for cardiac etiology given recent MI on 2/7/2020  Troponin 0 74, down from 6 27 following MI-continue to trend x 3   EKG with T wave inversion in III, continue to trend with troponins  Monitor on telemetry     CAD (coronary artery disease)  Assessment & Plan  History of STEMI on 2/7/2020   PCI to RCA  Placed on ASA and brillinta, metoprolol and Lipitor       Hyperlipidemia  Assessment & Plan  Continue daily statin    Hypokalemia  Assessment & Plan  Potassium 3 3 at time of presentation   repleted in ED, recheck in AM     CKD (chronic kidney disease), stage II  Assessment & Plan  Creatinine stable at 1 29  Avoid nephrotoxins and hypotension       VTE Prophylaxis: Enoxaparin (Lovenox)  / sequential compression device   Code Status:  Full code  POLST: POLST form is not discussed and not completed at this time  Discussion with family:  None    Anticipated Length of Stay:  Patient will be admitted on an Observation basis with an anticipated length of stay of  less than 2 midnights  Justification for Hospital Stay:  ACS rule out    Total Time for Visit, including Counseling / Coordination of Care: 1 hour  Greater than 50% of this total time spent on direct patient counseling and coordination of care  Chief Complaint:   Arm pain    History of Present Illness:    Osiris Harrison is a 52 y o  male with past medical history of STEMI, hypertension, hyperlipidemia , CKD who presents with arm pain  Patient reports he developed acute onset left arm pain this evening    Patient reports the pain is just above his elbow and does not radiate  Patient denies any injuries to his arm  Patient reports the pain resolved spontaneously since being in the ED  Patient denies any associated shortness of breath, nausea or diaphoresis  Patient denies any radiation of the pain to his chest   Patient does note he was discharged yesterday after having an MI, 1 stent was placed  Patient reports he has been compliant with his aspirin and Brilinta  Review of Systems:    Review of Systems   Constitutional: Negative for chills and fever  HENT: Negative for trouble swallowing  Eyes: Negative for visual disturbance  Respiratory: Negative for cough and shortness of breath  Cardiovascular: Negative for chest pain and leg swelling  Gastrointestinal: Negative for abdominal pain and vomiting  Genitourinary: Negative for difficulty urinating  Musculoskeletal: Positive for arthralgias  Negative for back pain  Skin: Negative for rash  Neurological: Negative for dizziness and weakness  Hematological: Negative  Psychiatric/Behavioral: Negative for confusion  Past Medical and Surgical History:     Past Medical History:   Diagnosis Date    Acute ST elevation myocardial infarction (STEMI) of inferior wall ([address] )     cath with PCI/JUAN - RCA 2/7/2020    Back pain     CAD (coronary artery disease)     COPD (chronic obstructive pulmonary disease) (HCC)     Hyperlipidemia     Hypertension     Inguinal hernia, left     Mild heartburn     Obesity     Stroke ([address] ) approx 2015    Denies residual problems  Initially he only had trouble with two fingers on left side      Wears glasses        Past Surgical History:   Procedure Laterality Date    APPENDECTOMY      HERNIA REPAIR      NO PAST SURGERIES      HI LAP,APPENDECTOMY N/A 11/28/2018    Procedure: APPENDECTOMY LAPAROSCOPIC;  Surgeon: Lelia Aguilar MD;  Location: [facility] MAIN OR;  Service: General    HI [address] Left 4/2/2018    Procedure: [place] Given INGUINAL HERNIA REPAIR WITH MESH;  Surgeon: Denzel Barajas MD;  Location: [facility] OR;  Service: General       Meds/Allergies:    Prior to Admission medications    Medication Sig Start Date End Date Taking? Authorizing Provider   acetaminophen (TYLENOL) 325 mg tablet Take 2 tablets (650 mg total) by mouth every 6 (six) hours as needed for mild pain for up to 4 days 2/9/20 2/13/20 Yes Karolina Ramirez PA-C   aspirin 81 mg chewable tablet Chew 1 tablet (81 mg total) daily 2/9/20  Yes Karolina Ramirez PA-C   atorvastatin (LIPITOR) 80 mg tablet Take 1 tablet (80 mg total) by mouth every evening 2/9/20  Yes Karolina Ramirez PA-C   hydrochlorothiazide (HYDRODIURIL) 25 mg tablet Take 1 tablet (25 mg total) by mouth daily 12/16/19  Yes Vu Woods MD   metoprolol tartrate (LOPRESSOR) 50 mg tablet Take 1 tablet (50 mg total) by mouth every 12 (twelve) hours 2/9/20  Yes Karolina Ramirez PA-C   ticagrelor (BRILINTA) 90 MG Take 1 tablet (90 mg total) by mouth every 12 (twelve) hours 2/9/20  Yes Karolina Ramirez PA-C     I have reviewed home medications with patient personally  Allergies: No Known Allergies    Social History:     Marital Status: Single   Occupation:  Unknown  Patient Pre-hospital Living Situation:  Home  Patient Pre-hospital Level of Mobility:  Ambulatory  Patient Pre-hospital Diet Restrictions:  None  Substance Use History:   Social History     Substance and Sexual Activity   Alcohol Use Not Currently     Social History     Tobacco Use   Smoking Status Former Smoker    Packs/day: 1 00    Years: 15 00    Pack years: 15 00    Types: Cigarettes    Last attempt to quit: 2010    Years since quitting: 10 1   Smokeless Tobacco Former User     Social History     Substance and Sexual Activity   Drug Use No       Family History:    History reviewed  No pertinent family history      Physical Exam:     Vitals:   Blood Pressure: 155/86 (02/11/20 2313)  Pulse: 78 (02/11/20 2313)  Temperature: (!) 97 4 °F (36 3 °C) (02/11/20 2313)  Temp Source: Temporal (02/11/20 2313)  Respirations: 17 (02/11/20 2313)  Height: 5' 6" (167 6 cm) (02/11/20 2313)  Weight - Scale: 92 5 kg (203 lb 14 8 oz) (02/11/20 2313)  SpO2: 96 % (02/11/20 2313)    Physical Exam   Constitutional: He is oriented to person, place, and time  He appears well-nourished  No distress  HENT:   Head: Normocephalic and atraumatic  Mouth/Throat: Oropharynx is clear and moist    Eyes: Conjunctivae and EOM are normal  No scleral icterus  Neck: Neck supple  Cardiovascular: Normal rate, regular rhythm and normal heart sounds  No murmur heard  Pulmonary/Chest: Effort normal and breath sounds normal  No respiratory distress  He has no wheezes  Abdominal: Soft  Bowel sounds are normal  He exhibits no distension  There is no tenderness  Musculoskeletal: He exhibits no edema or deformity  Neurological: He is alert and oriented to person, place, and time  Skin: Skin is warm and dry  Psychiatric: He has a normal mood and affect  His behavior is normal  Thought content normal    Vitals reviewed  Additional Data:     Lab Results: I have personally reviewed pertinent reports  Results from last 7 days   Lab Units 02/11/20 2049   WBC Thousand/uL 8 32   HEMOGLOBIN g/dL 17 3*   HEMATOCRIT % 49 8*   PLATELETS Thousands/uL 287   NEUTROS PCT % 70   LYMPHS PCT % 21   MONOS PCT % 6   EOS PCT % 2     Results from last 7 days   Lab Units 02/12/20  0322 02/11/20 2049   SODIUM mmol/L 136 137   POTASSIUM mmol/L 3 4* 3 3*   CHLORIDE mmol/L 100 100   CO2 mmol/L 25 25   BUN mg/dL 18 15   CREATININE mg/dL 1 38* 1 29   ANION GAP mmol/L 11 12   CALCIUM mg/dL 9 0 8 9   ALBUMIN g/dL  --  3 9   TOTAL BILIRUBIN mg/dL  --  1 79*   ALK PHOS U/L  --  100   ALT U/L  --  63   AST U/L  --  26   GLUCOSE RANDOM mg/dL 100 102     Results from last 7 days   Lab Units 02/11/20  2049   INR  1 02                   Imaging: I have personally reviewed pertinent reports        XR chest 2 views   ED Interpretation by Gabrielle Unger, [address] (02/11 2149)   Preliminary reading   No acute process seen   Waiting on rad read           EKG, Pathology, and Other Studies Reviewed on Admission:   · EKG:  Normal sinus rhythm, T-wave inversion in III    Allscripts / Epic Records Reviewed: Yes     ** Please Note: This note has been constructed using a voice recognition system   **

## 2020-02-12 NOTE — PLAN OF CARE
Problem: PAIN - ADULT  Goal: Verbalizes/displays adequate comfort level or baseline comfort level  Description  Interventions:  - Encourage patient to monitor pain and request assistance  - Assess pain using appropriate pain scale  - Administer analgesics based on type and severity of pain and evaluate response  - Implement non-pharmacological measures as appropriate and evaluate response  - Consider cultural and social influences on pain and pain management  - Notify physician/advanced practitioner if interventions unsuccessful or patient reports new pain  Outcome: Progressing     Problem: INFECTION - ADULT  Goal: Absence or prevention of progression during hospitalization  Description  INTERVENTIONS:  - Assess and monitor for signs and symptoms of infection  - Monitor lab/diagnostic results  - Monitor all insertion sites, i e  indwelling lines, tubes, and drains  - Monitor endotracheal if appropriate and nasal secretions for changes in amount and color  - [place] appropriate cooling/warming therapies per order  - Administer medications as ordered  - Instruct and encourage patient and family to use good hand hygiene technique  - Identify and instruct in appropriate isolation precautions for identified infection/condition  Outcome: Progressing  Goal: Absence of fever/infection during neutropenic period  Description  INTERVENTIONS:  - Monitor WBC    Outcome: Progressing     Problem: SAFETY ADULT  Goal: Patient will remain free of falls  Description  INTERVENTIONS:  - Assess patient frequently for physical needs  -  Identify cognitive and physical deficits and behaviors that affect risk of falls    -  [place] fall precautions as indicated by assessment   - Educate patient/family on patient safety including physical limitations  - Instruct patient to call for assistance with activity based on assessment  - Modify environment to reduce risk of injury  - Consider OT/PT consult to assist with strengthening/mobility  Outcome: Progressing  Goal: Maintain or return to baseline ADL function  Description  INTERVENTIONS:  -  Assess patient's ability to carry out ADLs; assess patient's baseline for ADL function and identify physical deficits which impact ability to perform ADLs (bathing, care of mouth/teeth, toileting, grooming, dressing, etc )  - Assess/evaluate cause of self-care deficits   - Assess range of motion  - Assess patient's mobility; develop plan if impaired  - Assess patient's need for assistive devices and provide as appropriate  - Encourage maximum independence but intervene and supervise when necessary  - Involve family in performance of ADLs  - Assess for home care needs following discharge   - Consider OT consult to assist with ADL evaluation and planning for discharge  - Provide patient education as appropriate  Outcome: Progressing  Goal: Maintain or return mobility status to optimal level  Description  INTERVENTIONS:  - Assess patient's baseline mobility status (ambulation, transfers, stairs, etc )    - Identify cognitive and physical deficits and behaviors that affect mobility  - Identify mobility aids required to assist with transfers and/or ambulation (gait belt, sit-to-stand, lift, walker, cane, etc )  - [place] fall precautions as indicated by assessment  - Record patient progress and toleration of activity level on Mobility SBAR; progress patient to next Phase/Stage  - Instruct patient to call for assistance with activity based on assessment  - Consider rehabilitation consult to assist with strengthening/weightbearing, etc   Outcome: Progressing     Problem: DISCHARGE PLANNING  Goal: Discharge to home or other facility with appropriate resources  Description  INTERVENTIONS:  - Identify barriers to discharge w/patient and caregiver  - Arrange for needed discharge resources and transportation as appropriate  - Identify discharge learning needs (meds, wound care, etc )  - Arrange for interpretive services to assist at discharge as needed  - Refer to Case Management Department for coordinating discharge planning if the patient needs post-hospital services based on physician/advanced practitioner order or complex needs related to functional status, cognitive ability, or social support system  Outcome: Progressing     Problem: Knowledge Deficit  Goal: Patient/family/caregiver demonstrates understanding of disease process, treatment plan, medications, and discharge instructions  Description  Complete learning assessment and assess knowledge base    Interventions:  - Provide teaching at level of understanding  - Provide teaching via preferred learning methods  Outcome: Progressing

## 2020-02-12 NOTE — ASSESSMENT & PLAN NOTE
Patient presents with acute onset left arm pain  Denies radiation to chest or shoulder   Denies injury, resolved spontaneously   Concern for cardiac etiology given recent MI on 2/7/2020  Troponin 0 74, down from 6 27 following MI-continue to trend x 3   EKG with T wave inversion in III, continue to trend with troponins  Monitor on telemetry

## 2020-02-12 NOTE — UTILIZATION REVIEW
Initial Clinical Review    Admission: Date/Time/Statement: Admission Orders (From admission, onward)     Ordered        02/11/20 2205  Place in Observation (expected length of stay for this patient is less than two midnights)  Once                   Orders Placed This Encounter   Procedures    Place in Observation (expected length of stay for this patient is less than two midnights)     Standing Status:   Standing     Number of Occurrences:   1     Order Specific Question:   Admitting Physician     Answer:   Lloyd Momin [[id number]]     Order Specific Question:   Level of Care     Answer:   Med Surg [[id number]]     ED Arrival Information     Expected Arrival Acuity Means of Arrival Escorted By Service Admission Type    - 2/11/2020 18:08 Urgent Walk-In Self Hospitalist Urgent    Arrival Complaint    Arm Pain        Chief Complaint   Patient presents with    Arm Pain     pt reports L arm pain approx 1 5 hours ago  pt has cardiac history and was discharged yesterday for an MI and stent placement  pt here for eval      Assessment/Plan:  53 yo male with recent STEMI s/p stent placement presents to ED from home with Left arm pain  Denies SOB, nausea, diaphoresis  Discharged from hospital yesterday  Initial trop 0 74 (improved from prior - 6 27 on 2/8), EKG w/ T wave inversion in III, K 3 3  Heart score 7  Admitted to OBS with R/O ACS and Hypokalemia  Plan: monitor on Tele, ACS R/O, Replete K         ED Triage Vitals   Temperature Pulse Respirations Blood Pressure SpO2   02/11/20 1824 02/11/20 1824 02/11/20 1824 02/11/20 1824 02/11/20 1824   98 °F (36 7 °C) 95 18 149/88 97 %      Temp Source Heart Rate Source Patient Position - Orthostatic VS BP Location FiO2 (%)   02/11/20 2313 02/11/20 2053 02/11/20 2053 02/11/20 2053 --   Temporal Monitor Sitting Left arm       Pain Score       02/11/20 1824       No Pain        Wt Readings from Last 1 Encounters:   02/11/20 92 5 kg (203 lb 14 8 oz)     Additional Vital Signs:   02/12/20 0737  96 7 °F (35 9 °C)   65  18  117/78   98 % None (Room air) Sitting   02/11/20 2313  97 4 °F (36 3 °C)l   78  17  155/86   96 % None (Room air) Sitting   02/11/20 2053    69  17  140/87   98 % None (Room air) Sitting       Pertinent Labs/Diagnostic Test Results:   Lab Units 02/11/20  2049   WBC Thousand/uL 8 32   HEMOGLOBIN g/dL 17 3*   HEMATOCRIT % 49 8*   PLATELETS Thousands/uL 287   NEUTROS ABS Thousands/µL 5 86     Lab Units 02/12/20  0322 02/11/20  2049   SODIUM mmol/L 136 137   POTASSIUM mmol/L 3 4* 3 3*   CHLORIDE mmol/L 100 100   CO2 mmol/L 25 25   ANION GAP mmol/L 11 12   BUN mg/dL 18 15   CREATININE mg/dL 1 38* 1 29   EGFR ml/min/1 73sq m 60 65   CALCIUM mg/dL 9 0 8 9   MAGNESIUM mg/dL  --  2 2     Lab Units 02/11/20  2049   AST U/L 26   ALT U/L 63   ALK PHOS U/L 100   TOTAL PROTEIN g/dL 8 0   ALBUMIN g/dL 3 9   TOTAL BILIRUBIN mg/dL 1 79*     Lab Units 02/12/20  0322 02/11/20  2049   GLUCOSE RANDOM mg/dL 100 102     Results from last 7 days   Lab Units 02/11/20  2049   CK TOTAL U/L 186   CK MB INDEX % <1 0   CK MB ng/mL 1 3     Lab Units 02/12/20  0322 02/11/20  2357 02/11/20  2049   TROPONIN I ng/mL 0 37* 0 59* 0 74*     Lab Units 02/11/20  2049   PROTIME seconds 13 5   INR  1 02   PTT seconds 28     2/11 CXR: No acute cardiopulmonary disease  2/11 EKG: NSR    ED Treatment:   Medication Administration from 02/11/2020 1808 to 02/11/2020 2308       Date/Time Order Dose Route Action     02/11/2020 2241 potassium chloride (K-DUR,KLOR-CON) CR tablet 20 mEq 20 mEq Oral Given        Past Medical History:   Diagnosis Date    Acute ST elevation myocardial infarction (STEMI) of inferior wall (HCC)     cath with PCI/JUAN - RCA 2/7/2020    Back pain     CAD (coronary artery disease)     [facility] obstructive [facility]     Hyperlipidemia     Hypertension     Inguinal hernia, left     Mild heartburn     Obesity     Stroke ([facility] [address] ) approx 2015    Denies residual problems   Initially he only had trouble with two fingers on left side   Wears glasses      Present on Admission:   Hyperlipidemia   CKD (chronic kidney disease), stage II   Hypokalemia      Admitting Diagnosis: Hypokalemia [E87 6]  Arm pain [M79 603]  Myocardial infarction (HCC) [I21 9]  HTN (hypertension) [I10]  CAD (coronary artery disease) [I25 10]  Left arm pain [M79 602]     Age/Sex: 52 y o  male  Admission Orders:  Scheduled Medications:  Medications:  aspirin 81 mg Oral Daily   atorvastatin 80 mg Oral QPM   enoxaparin 40 mg Subcutaneous Daily   hydrochlorothiazide 25 mg Oral Daily   metoprolol tartrate 50 mg Oral Q12H [facility] HOME   ticagrelor 90 mg Oral Q12H [facility]     Continuous IV Infusions:  PRN Meds:  acetaminophen 650 mg Oral Q6H PRN   aluminum-magnesium hydroxide-simethicone 30 mL Oral Q6H PRN   ondansetron 4 mg Intravenous Q6H PRN     TELE  SCDs    Network Utilization Review Department  [email or web address]  ATTENTION: Please call with any questions or concerns to [phone] and carefully listen to the prompts so that you are directed to the right person  All voicemails are confidential   Drew Grider all requests for admission clinical reviews, approved or denied determinations and any other requests to dedicated fax number below belonging to the campus where the patient is receiving treatment   List of dedicated fax numbers for the Facilities:  [address] DENIALS (Administrative/Medical Necessity) [phone]   [address] [place] [address] (Maternity/NICU/Pediatrics) [phone]   [place] Moya [phone]     [address] [phone]   [place] [phone]   [facility] [address] [facility] [phone]   [facility]  [phone]   [address] [facility] [address]  [address] [facility] [address] [facility][note 3] [place] [phone]

## 2020-02-12 NOTE — PLAN OF CARE
Problem: PAIN - ADULT  Goal: Verbalizes/displays adequate comfort level or baseline comfort level  Description  Interventions:  - Encourage patient to monitor pain and request assistance  - Assess pain using appropriate pain scale  - Administer analgesics based on type and severity of pain and evaluate response  - Implement non-pharmacological measures as appropriate and evaluate response  - Consider cultural and social influences on pain and pain management  - Notify physician/advanced practitioner if interventions unsuccessful or patient reports new pain  Outcome: Progressing     Problem: INFECTION - ADULT  Goal: Absence or prevention of progression during hospitalization  Description  INTERVENTIONS:  - Assess and monitor for signs and symptoms of infection  - Monitor lab/diagnostic results  - Monitor all insertion sites, i e  indwelling lines, tubes, and drains  - Monitor endotracheal if appropriate and nasal secretions for changes in amount and color  - [place] appropriate cooling/warming therapies per order  - Administer medications as ordered  - Instruct and encourage patient and family to use good hand hygiene technique  - Identify and instruct in appropriate isolation precautions for identified infection/condition  Outcome: Progressing  Goal: Absence of fever/infection during neutropenic period  Description  INTERVENTIONS:  - Monitor WBC    Outcome: Progressing     Problem: SAFETY ADULT  Goal: Patient will remain free of falls  Description  INTERVENTIONS:  - Assess patient frequently for physical needs  -  Identify cognitive and physical deficits and behaviors that affect risk of falls    -  [place] fall precautions as indicated by assessment   - Educate patient/family on patient safety including physical limitations  - Instruct patient to call for assistance with activity based on assessment  - Modify environment to reduce risk of injury  - Consider OT/PT consult to assist with strengthening/mobility  Outcome: Progressing  Goal: Maintain or return to baseline ADL function  Description  INTERVENTIONS:  -  Assess patient's ability to carry out ADLs; assess patient's baseline for ADL function and identify physical deficits which impact ability to perform ADLs (bathing, care of mouth/teeth, toileting, grooming, dressing, etc )  - Assess/evaluate cause of self-care deficits   - Assess range of motion  - Assess patient's mobility; develop plan if impaired  - Assess patient's need for assistive devices and provide as appropriate  - Encourage maximum independence but intervene and supervise when necessary  - Involve family in performance of ADLs  - Assess for home care needs following discharge   - Consider OT consult to assist with ADL evaluation and planning for discharge  - Provide patient education as appropriate  Outcome: Progressing  Goal: Maintain or return mobility status to optimal level  Description  INTERVENTIONS:  - Assess patient's baseline mobility status (ambulation, transfers, stairs, etc )    - Identify cognitive and physical deficits and behaviors that affect mobility  - Identify mobility aids required to assist with transfers and/or ambulation (gait belt, sit-to-stand, lift, walker, cane, etc )  - [place] fall precautions as indicated by assessment  - Record patient progress and toleration of activity level on Mobility SBAR; progress patient to next Phase/Stage  - Instruct patient to call for assistance with activity based on assessment  - Consider rehabilitation consult to assist with strengthening/weightbearing, etc   Outcome: Progressing     Problem: DISCHARGE PLANNING  Goal: Discharge to home or other facility with appropriate resources  Description  INTERVENTIONS:  - Identify barriers to discharge w/patient and caregiver  - Arrange for needed discharge resources and transportation as appropriate  - Identify discharge learning needs (meds, wound care, etc )  - Arrange for interpretive services to assist at discharge as needed  - Refer to Case Management Department for coordinating discharge planning if the patient needs post-hospital services based on physician/advanced practitioner order or complex needs related to functional status, cognitive ability, or social support system  Outcome: Progressing     Problem: Knowledge Deficit  Goal: Patient/family/caregiver demonstrates understanding of disease process, treatment plan, medications, and discharge instructions  Description  Complete learning assessment and assess knowledge base    Interventions:  - Provide teaching at level of understanding  - Provide teaching via preferred learning methods  Outcome: Progressing

## 2020-02-12 NOTE — ASSESSMENT & PLAN NOTE
Trop trending down  So likely this is just residual from recent heart attack      He is pain free  I will send home with sublingual nitroglycerin

## 2020-02-13 LAB
ATRIAL RATE: 64 BPM
ATRIAL RATE: 70 BPM
P AXIS: 46 DEGREES
P AXIS: 58 DEGREES
PR INTERVAL: 150 MS
PR INTERVAL: 150 MS
QRS AXIS: -5 DEGREES
QRS AXIS: 0 DEGREES
QRSD INTERVAL: 104 MS
QRSD INTERVAL: 104 MS
QT INTERVAL: 400 MS
QT INTERVAL: 428 MS
QTC INTERVAL: 432 MS
QTC INTERVAL: 441 MS
T WAVE AXIS: -2 DEGREES
T WAVE AXIS: 7 DEGREES
VENTRICULAR RATE: 64 BPM
VENTRICULAR RATE: 70 BPM

## 2020-02-13 PROCEDURE — 93010 ELECTROCARDIOGRAM REPORT: CPT | Performed by: INTERNAL MEDICINE

## 2020-02-26 ENCOUNTER — OFFICE VISIT (OUTPATIENT)
Dept: CARDIOLOGY CLINIC | Facility: CLINIC | Age: 50
End: 2020-02-26
Payer: COMMERCIAL

## 2020-02-26 VITALS
HEIGHT: 66 IN | HEART RATE: 72 BPM | SYSTOLIC BLOOD PRESSURE: 130 MMHG | DIASTOLIC BLOOD PRESSURE: 80 MMHG | BODY MASS INDEX: 32.85 KG/M2 | WEIGHT: 204.4 LBS

## 2020-02-26 DIAGNOSIS — I25.10 CORONARY ARTERY DISEASE DUE TO LIPID RICH PLAQUE: ICD-10-CM

## 2020-02-26 DIAGNOSIS — N18.2 BENIGN HYPERTENSION WITH CKD (CHRONIC KIDNEY DISEASE), STAGE II: ICD-10-CM

## 2020-02-26 DIAGNOSIS — I25.83 CORONARY ARTERY DISEASE DUE TO LIPID RICH PLAQUE: ICD-10-CM

## 2020-02-26 DIAGNOSIS — I21.19 ACUTE ST ELEVATION MYOCARDIAL INFARCTION (STEMI) OF INFERIOR WALL (HCC): ICD-10-CM

## 2020-02-26 DIAGNOSIS — I10 ESSENTIAL HYPERTENSION: Primary | ICD-10-CM

## 2020-02-26 DIAGNOSIS — I21.11 ST ELEVATION MYOCARDIAL INFARCTION INVOLVING RIGHT CORONARY ARTERY (HCC): ICD-10-CM

## 2020-02-26 DIAGNOSIS — I12.9 BENIGN HYPERTENSION WITH CKD (CHRONIC KIDNEY DISEASE), STAGE II: ICD-10-CM

## 2020-02-26 DIAGNOSIS — E78.5 HYPERLIPIDEMIA, UNSPECIFIED HYPERLIPIDEMIA TYPE: ICD-10-CM

## 2020-02-26 PROCEDURE — 99214 OFFICE O/P EST MOD 30 MIN: CPT

## 2020-02-26 PROCEDURE — 1111F DSCHRG MED/CURRENT MED MERGE: CPT

## 2020-02-26 PROCEDURE — 3075F SYST BP GE 130 - 139MM HG: CPT

## 2020-02-26 PROCEDURE — 1036F TOBACCO NON-USER: CPT

## 2020-02-26 PROCEDURE — 3008F BODY MASS INDEX DOCD: CPT

## 2020-02-26 PROCEDURE — 3079F DIAST BP 80-89 MM HG: CPT

## 2020-02-26 NOTE — PROGRESS NOTES
Cardiology   MD Osiris Chin MD Ather Mansoor, MD Inis Calamity, DO, Laine Mayberry DO, [facility] [place]  -------------------------------------------------------------------  [facility]  [address] [place] [id number]  [phone]  [phone]  02/26/20  Williams Guillen  YOB: 1970   MRN: [id number]      Referring Physian: Evan Mei MD  [address] [facility], [facility] [address] RIVERA: Williams Guillen is a 52 y o  male with coronary artery disease status post ST-elevation MI in February 2020 with subsequent PCI to the distal RCA with a 3 0 x 23 mm drug-eluting stent, hypertension, dyslipidemia who presents today for office follow-up  He is doing well  Denies any symptoms of chest pain or arm pain at this time  He is currently on aspirin, Brilinta, Lipitor 80, metoprolol 50 mg  And hydrochlorothiazide 25 mg  His echocardiogram in the hospital showed ejection fraction about 50%, with moderate hypokinesis of the basal inferoseptal in the entire inferior wall  There is grade 1 diastolic dysfunction      Review of Systems   Constitutional: Negative for chills and fever  HENT: Negative for facial swelling and sore throat  Eyes: Negative for visual disturbance  Respiratory: Negative for cough, chest tightness, shortness of breath and wheezing  Cardiovascular: Negative for chest pain, palpitations and leg swelling  Gastrointestinal: Negative for abdominal pain, blood in stool, constipation, diarrhea, nausea and vomiting  Endocrine: Negative for cold intolerance and heat intolerance  Genitourinary: Negative for decreased urine volume, difficulty urinating, dysuria and hematuria  Musculoskeletal: Negative for arthralgias, back pain and myalgias  Skin: Negative for rash  Neurological: Negative for dizziness, syncope, weakness and numbness  Psychiatric/Behavioral: Negative for agitation, behavioral problems and confusion  The patient is not nervous/anxious  OBJECTIVE  Vitals:    02/26/20 1046   BP: 130/80   Pulse: 72       Physical Exam   General appearance: alert and oriented, in no acute distress  Head: Normocephalic, without obvious abnormality, atraumatic  Eyes: conjunctivae/corneas clear  Anicteric  Neck: no adenopathy, no carotid bruit, no JVD, supple, symmetrical, trachea midline and thyroid not enlarged, no tenderness  Lungs: clear to auscultation bilaterally  Heart: regular rate and rhythm, S1, S2 normal, no murmur, no click, rub or gallop  Abdomen: soft, non-tender; bowel sounds normal; no masses,  no organomegaly  Extremities: extremities normal, warm and well-perfused; no cyanosis, clubbing, or edema  Skin: Skin color, texture, turgor normal  No rashes or lesions     EKG:  No results found for this visit on 02/26/20  IMPRESSION:  1  Coronary artery disease status post STEMI with PCI to RCA February 2020 on aspirin and Brilinta  2  Hypertension  3  Dyslipidemia    DISCUSSION/RECOMMENDATIONS:   At this time he is stable  I would continue his current medications as described above   He is going to start cardiac rehab tomorrow   He is currently without any angina type symptoms   His blood pressure is slightly on the higher side today  Will continue lifestyle and dietary modifications and reassess this in the future   I would like for him to follow-up in about 3 months      Ben Fischer DO, [facility] [place]  --------------------------------------------------------------------------------  TREADMILL STRESS  No results found for this or any previous visit    ----------------------------------------------------------------------------------------------  NUCLEAR STRESS TEST: No results found for this or any previous visit    No results found for this or any previous visit     --------------------------------------------------------------------------------  CATH:    Results for orders placed during the hospital encounter of 20   Cardiac catheterization    Narrative [address]  [address]  [place], [address]  [phone]    [facility]    Invasive Cardiovascular Lab Complete Report    Patient: Yamilka Brito  MR number: [id number]  Account number: [de-identified]  Study date: 2020  Gender: Male  : 1970  Height: 65 in  Weight: 209 9 lb  BSA: 2 02 mï¾²    Allergies: NO KNOWN ALLERGIES    Diagnostic Cardiologist:  Joan Forrester MD  Interventional Cardiologist:  Joan Forrester MD  Primary Physician:  Dana Manrique MD    SUMMARY    CORONARY CIRCULATION:  Left main: Normal   LAD: The vessel was large sized  Angiography showed mild atherosclerosis  Circumflex: The vessel was medium sized  Angiography showed minor luminal irregularities  Ramus intermedius: The vessel was large sized  Angiography showed mild atherosclerosis  RCA: The vessel was large sized (dominant)  Angiography showed moderate atherosclerosis  Distal RCA: There was a tubular 85 % stenosis  There was KATHLEEN grade 2 flow through the vessel (partial perfusion)  This is a likely culprit for the patient's clinical presentation  An intervention was performed  1ST LESION INTERVENTIONS:  A successful drug-eluting stent procedure was performed on the 85 % lesion in the distal RCA  Following intervention there was a 0 % residual stenosis  This was not a bifurcation lesion  This was an ACC/AHA type B "moderate risk" lesion for intervention  There was no evidence of the transient no-reflow phenomenon  The residual lesion demonstrated no evidence of thrombus  There was KATHLEEN 2  flow before the procedure and KATHLEEN 3 flow after the procedure  A Xience Jerica Rx 3 0 x 23mm drug-eluting stent was placed across the lesion and deployed at a maximum inflation pressure of 12 bayron  ADVERSE OUTCOMES:  There were no complications  INDICATIONS:  Myocardial infarction with ST elevation (STEMI)   Acute inferior wall MI     INDICATIONS:  --  Myocardial infarction with ST elevation (STEMI)  Acute inferior wall MI     PROCEDURES PERFORMED    --  Left coronary angiography  --  Right coronary angiography  --  Acute Myocardial Infarct  --  Mod Sedation Same Physician Initial 15min  --  Coronary Catheterization (w/o [facility])  --  Mod Sedation Same Physician Add 15min  --  Mod Sedation Same Physician Add 15min  --  AMI PCI (JUAN, PTCRA, PTCA) Single  --  Intervention on distal RCA: drug-eluting stent  PROCEDURE: The risks and alternatives of the procedures and conscious sedation were explained to the patient and informed consent was obtained  The patient was brought to the cath lab and placed on the table  The planned puncture sites  were prepped and draped in the usual sterile fashion  --  Right radial artery access  After performing an Nomi's test to verify adequate ulnar artery supply to the hand, the radial site was prepped  The puncture site was infiltrated with local anesthetic  The vessel was accessed using the  modified Seldinger technique, a wire was advanced into the vessel, and a sheath was advanced over the wire into the vessel  --  Left coronary artery angiography  A catheter was advanced over a guidewire into the aorta and positioned in the left coronary artery ostium under fluoroscopic guidance  Angiography was performed  --  Right coronary artery angiography  A catheter was advanced over a guidewire into the aorta and positioned in the right coronary artery ostium under fluoroscopic guidance  Angiography was performed  --  Acute Myocardial Infarct  --  Mod Sedation Same Physician Initial 15min  --  Coronary Catheterization (w/o [facility])  --  Mod Sedation Same Physician Add 15min  --  Mod Sedation Same Physician Add 15min  LESION INTERVENTION: A successful drug-eluting stent procedure was performed on the 85 % lesion in the distal RCA   Following intervention there was a 0 % residual stenosis  This was not a bifurcation lesion  This was an ACC/AHA type B  "moderate risk" lesion for intervention  There was no evidence of the transient no-reflow phenomenon  The residual lesion demonstrated no evidence of thrombus  There was KATHLEEN 2 flow before the procedure and KATHLEEN 3 flow after the procedure  --  Vessel setup was performed  A Launcher 6Fr Jr 4 0 guiding catheter was used to cannulate the vessel  --  Vessel setup was performed  A Runthrough NS 180cm wire was used to cross the lesion  --  Balloon angioplasty was performed, using a Trek Rx 2 5 x 15mm balloon, with 2 inflations and a maximum inflation pressure of 14 bayron  --  A Xience Jerica Rx 3 0 x 23mm drug-eluting stent was placed across the lesion and deployed at a maximum inflation pressure of 12 bayron  --  Balloon angioplasty was performed, with 3 inflations and a maximum inflation pressure of 19 bayron  INTERVENTIONS:  --  AMI PCI (JUAN, PTCRA, PTCA) Single  PROCEDURE COMPLETION: The patient tolerated the procedure well and was discharged from the cath lab  TIMING: Test started at 07:52  Test concluded at 08:31  HEMOSTASIS: The sheath was removed  The site was compressed with a Hemoband  device  Hemostasis was obtained  MEDICATIONS GIVEN: 0 9 NSS, infusion rate of 100 ml/hr, IV, at 07:52  Fentanyl (1OOmcg/2 ml), 50 mcg, IV, at 07:57  Versed (2mg/2ml), 2 mg, IV, at 07:57  1% Lidocaine, 1 ml, subcutaneously, at 07:57  Nitroglycerin (200mcg/ml), 200 mcg, at 07:59  Verapamil (5mg/2ml), 2 5 mg, IV, at 07:59  Heparin 1000 units/ml, 4,000 units, IV, at 07:59  Heparin 1000 units/ml, 8,000 units, IV, at 08:07  CONTRAST GIVEN: 100 ml Omnipaque (350mg I /ml)  30  ml Omnipaque (350mg I /ml)  RADIATION EXPOSURE: Fluoroscopy time: 9 38 min  CORONARY VESSELS:   --  Left main: Normal   --  LAD: The vessel was large sized  Angiography showed mild atherosclerosis  --  1st diagonal: The vessel was medium sized    --  Circumflex: The vessel was medium sized  Angiography showed minor luminal irregularities  --  Ramus intermedius: The vessel was large sized  Angiography showed mild atherosclerosis  --  RCA: The vessel was large sized (dominant)  Angiography showed moderate atherosclerosis  --  Proximal RCA: There was a discrete 40 % stenosis  --  Distal RCA: There was a tubular 85 % stenosis  There was KATHLEEN grade 2 flow through the vessel (partial perfusion)  This is a likely culprit for the patient's clinical presentation  An intervention was performed  IMPRESSIONS:  Acute inferior ST elevation MI  S/P JUAN placement to the distal RCA  1  ASA 81 mg daily  2  Brilinta 90 mg BID  3  Lipitor 80 mg daily  4  Lopressor 25 mg every 6 hours  5  Check echo    Prepared and signed by    Jocelyn Mosqueda MD  Signed 2020 12:54:37    Study diagram    Angiographic findings  Native coronary lesions:  ï¾·Proximal RCA: Lesion 1: discrete, 40 % stenosis  ï¾·Distal RCA: Lesion 1: tubular, 85 % stenosis  Intervention results  Native coronary lesions:  ï¾·Successful drug-eluting stent of the 85 % stenosis in distal RCA  0 % residual stenosis  Stent: Erlene Factor Rx 3 0 x 23mm drug-eluting  Hemodynamic tables    Pressures:  Baseline  Pressures:  - HR: 93  Pressures:  - Rhythm:  Pressures:  -- Aortic Pressure (S/D/M): 139/98/118    Outputs:  Baseline  Outputs:  -- CALCULATIONS: [age]  Outputs:  -- CALCULATIONS: Body Surface Area: 2 02  Outputs:  -- CALCULATIONS: Height in cm: 165 00  Outputs:  -- CALCULATIONS: Sex: Male  Outputs:  -- CALCULATIONS: Weight in k 40       --------------------------------------------------------------------------------  ECHO:   Results for orders placed during the hospital encounter of 20   Echo Complete with Contrast if Indicated    Elena Casey 48  Josh Ruiz 35    \[facility]\"", [address]  [phone]    Transthoracic Echocardiogram  2D, M-mode, Doppler, and Color Doppler    Study date:  2020    Patient: Eduar Camacho  MR number: [id number]  Account number: [de-identified]  : 1970  Age: 52 years  Gender: Male  Status: Inpatient  Location: Intensive care unit  Height: 65 in  Weight: 220 4 lb  BP: 174/ 107 mmHg    Indications: Acute myocardial infarction  Diagnoses: I25 119 - Atherosclerotic heart disease of native coronary artery with unspecified angina pectoris    Sonographer:  Sarah Alcaraz RDCS  Primary Physician:  Evi Almazan MD  Referring Physician:  Valentina Dillon MD  Group:  [id number]  Interpreting Physician:  SUDHA Schulz     SUMMARY    LEFT VENTRICLE:  Systolic function was mildly reduced by visual assessment  Ejection fraction was estimated to be 50 %  There was moderate hypokinesis of the basal inferoseptal and entire inferior wall(s)  Doppler parameters were consistent with abnormal left ventricular relaxation (grade 1 diastolic dysfunction)  SUMMARY MEASUREMENTS  2D measurements:  Unspecified Anatomy:   %FS was 31 7 %  Ao Diam was 3 3 cm   EDV(Teich) was 75 6 ml   EF(Teich) was 60 1 %  ESV(Teich) was 30 2 ml   HR_4Ch_Q was 76 8 BPM   IVSd was 1 2 cm  LA Diam was 3 9 cm  LAAs A2C was 14 8 cm2  LAAs A4C was 10 3  cm2  LAESV A-L A2C was 41 9 ml  LAESV A-L A4C was 27 6 ml  LAESV Index (A-L) was 19 3 ml/m2  LAESV MOD A2C was 41 ml  LAESV MOD A4C was 26 4 ml  LAESV(A-L) was 39 7 ml  LAESV(MOD BP) was 38 4 ml  LAESVInd MOD BP was 18 6 ml/m2  LALs A2C was 4 4 cm  LALs A4C was 3 3 cm  LVCO_4Ch_Q was 3 L/min  LVEF_4Ch_Q was 60 5 %  LVIDd was 4 1 cm  LVIDs was 2 8 cm  LVLd_4Ch_Q was 7 2 cm  LVLs_4Ch_Q was 5 9 cm  LVPWd was 1 1 cm  LVSV_4Ch_Q was 38 7 ml  LVVED_4Ch_Q was  64 ml  LVVES_4Ch_Q was 25 3 ml  RAAs A4C was 9 1 cm2  RAESV A-L was 21 6 ml   RAESV MOD was 21 1 ml  RALs was 3 3 cm  RVIDd was 2 2 cm    RWT was 0 5    SV(Teich) was 45 4 ml   CW measurements:  Unspecified Anatomy:   TR regurgitation  TRICUSPID VALVE: The valve structure was normal  There was normal leaflet separation  DOPPLER: The transtricuspid velocity was within the normal range  There was no evidence for stenosis  There was no regurgitation  PULMONIC VALVE: Leaflets exhibited normal thickness, no calcification, and normal cuspal separation  DOPPLER: The transpulmonic velocity was within the normal range  There was no regurgitation  PERICARDIUM: There was no pericardial effusion  The pericardium was normal in appearance  AORTA: The root exhibited normal size  SYSTEMIC VEINS: IVC: The inferior vena cava was normal in size and course  Respirophasic changes were normal     SYSTEM MEASUREMENT TABLES    2D  %FS: 31 7 %  Ao Diam: 3 3 cm  EDV(Teich): 75 6 ml  EF(Teich): 60 1 %  ESV(Teich): 30 2 ml  HR_4Ch_Q: 76 8 BPM  IVSd: 1 2 cm  LA Diam: 3 9 cm  LAAs A2C: 14 8 cm2  LAAs A4C: 10 3 cm2  LAESV A-L A2C: 41 9 ml  LAESV A-L A4C: 27 6 ml  LAESV Index (A-L): 19 3 ml/m2  LAESV MOD A2C: 41 ml  LAESV MOD A4C: 26 4 ml  LAESV(A-L): 39 7 ml  LAESV(MOD BP): 38 4 ml  LAESVInd MOD BP: 18 6 ml/m2  LALs A2C: 4 4 cm  LALs A4C: 3 3 cm  LVCO_4Ch_Q: 3 L/min  LVEF_4Ch_Q: 60 5 %  LVIDd: 4 1 cm  LVIDs: 2 8 cm  LVLd_4Ch_Q: 7 2 cm  LVLs_4Ch_Q: 5 9 cm  LVPWd: 1 1 cm  LVSV_4Ch_Q: 38 7 ml  LVVED_4Ch_Q: 64 ml  LVVES_4Ch_Q: 25 3 ml  RAAs A4C: 9 1 cm2  RAESV A-L: 21 6 ml  RAESV MOD: 21 1 ml  RALs: 3 3 cm  RVIDd: 2 2 cm  RWT: 0 5  SV(Teich): 45 4 ml    CW  TR Vmax: 2 2 m/s  TR maxP 8 mmHg    PW  E' Av 1 m/s  E' Lat: 0 1 m/s  E' Sept: 0 1 m/s  E/E' Av 9  E/E' Lat: 5 3  E/E' Sept: 10 1  MV A Maury: 0 8 m/s  MV Dec Cotton: 3 3 m/s2  MV DecT: 210 3 ms  MV E Maury: 0 7 m/s  MV E/A Ratio: 0 9    [facility] [place] [facility] Accredited Echocardiography Laboratory    Prepared and electronically signed by    SUDHA Ulloa    Signed 2020 15:06:29       No results found for this or any previous visit   --------------------------------------------------------------------------------  HOLTER  No results found for this or any previous visit  No results found for this or any previous visit   --------------------------------------------------------------------------------  CAROTIDS  No results found for this or any previous visit    --------------------------------------------------------------------------------  Diagnoses and all orders for this visit:    Essential hypertension    Benign hypertension with CKD (chronic kidney disease), stage II    ST elevation myocardial infarction involving right coronary artery ([facility] )    Coronary artery disease due to lipid rich plaque    Acute ST elevation myocardial infarction (STEMI) of inferior wall (HCC)    Hyperlipidemia, unspecified hyperlipidemia type       ======================================================    Past Medical History:   Diagnosis Date    [facility] ST [facility]     cath with PCI/JUAN - RCA 2/7/2020    Back pain     CAD (coronary artery disease)     COPD (chronic obstructive pulmonary disease) ([facility] )     Hyperlipidemia     Hypertension     Inguinal hernia, left     Mild heartburn     Obesity     Stroke ([place] [address] ) approx 2015    Denies residual problems  Initially he only had trouble with two fingers on left side      Wears glasses      Past Surgical History:   Procedure Laterality Date    APPENDECTOMY      HERNIA REPAIR      NO PAST SURGERIES      [place] LAP,APPENDECTOMY N/A 11/28/2018    Procedure: APPENDECTOMY LAPAROSCOPIC;  Surgeon: Radha Foss MD;  Location: [facility] MAIN OR;  Service: General    [place] Chinogisele [address]yy[address] 19 Left 4/2/2018    Procedure: LAPAROSCOPIC INGUINAL HERNIA REPAIR WITH MESH;  Surgeon: Radha Foss MD;  Location: [facility] Main OR;  Service: General         Medications  Current Outpatient Medications   Medication Sig Dispense Refill    aspirin 81 mg chewable tablet Chew 1 tablet (81 mg total) daily  0    atorvastatin (LIPITOR) 80 mg tablet Take 1 tablet (80 mg total) by mouth every evening 30 tablet 5    hydrochlorothiazide (HYDRODIURIL) 25 mg tablet Take 1 tablet (25 mg total) by mouth daily 90 tablet 3    metoprolol tartrate (LOPRESSOR) 50 mg tablet Take 1 tablet (50 mg total) by mouth every 12 (twelve) hours 60 tablet 5    nitroglycerin (NITROSTAT) 0 4 mg SL tablet Place 1 tablet (0 4 mg total) under the tongue every 5 (five) minutes as needed for chest pain If no relief after 3 tablets, come immediately to  tablet 1    ticagrelor (BRILINTA) 90 MG Take 1 tablet (90 mg total) by mouth every 12 (twelve) hours 60 tablet 11     No current facility-administered medications for this visit           No Known Allergies    Social History     Socioeconomic History    Marital status: Single     Spouse name: Not on file    Number of children: Not on file    Years of education: Not on file    Highest education level: Not on file   Occupational History    Not on file   Social Needs    Financial resource strain: Not on file    Food insecurity:     Worry: Not on file     Inability: Not on file    Transportation needs:     Medical: Not on file     Non-medical: Not on file   Tobacco Use    Smoking status: Former Smoker     Packs/day: 1 00     Years: 15 00     Pack years: 15 00     Types: Cigarettes     Last attempt to quit: 2010     Years since quitting: 10 1    Smokeless tobacco: Former User   Substance and Sexual Activity    Alcohol use: Not Currently    Drug use: No    Sexual activity: Not on file   Lifestyle    Physical activity:     Days per week: Not on file     Minutes per session: Not on file    Stress: Not on file   Relationships    Social connections:     Talks on phone: Not on file     Gets together: Not on file     Attends Congregation service: Not on file     Active member of club or organization: Not on file     Attends meetings of clubs or organizations: Not on file     Relationship status: Not on file    Intimate partner violence:     Fear of current or ex partner: Not on file     Emotionally abused: Not on file     Physically abused: Not on file     Forced sexual activity: Not on file   Other Topics Concern    Not on file   Social History Narrative    Not on file        History reviewed  No pertinent family history  Lab Results   Component Value Date    WBC 8 32 02/11/2020    HGB 17 3 (H) 02/11/2020    HCT 49 8 (H) 02/11/2020    MCV 88 02/11/2020     02/11/2020      Lab Results   Component Value Date    SODIUM 136 02/12/2020    K 3 4 (L) 02/12/2020     02/12/2020    CO2 25 02/12/2020    BUN 18 02/12/2020    CREATININE 1 38 (H) 02/12/2020    GLUC 100 02/12/2020    CALCIUM 9 0 02/12/2020      No results found for: HGBA1C   No results found for: CHOL  Lab Results   Component Value Date    HDL 32 (L) 02/08/2020     Lab Results   Component Value Date    LDLCALC 100 02/08/2020     Lab Results   Component Value Date    TRIG 195 (H) 02/08/2020     No results found for: [place], [place]   Lab Results   Component Value Date    INR 1 02 02/11/2020    INR 0 97 02/07/2020    INR 1 09 11/03/2018    PROTIME 13 5 02/11/2020    PROTIME 13 0 02/07/2020    PROTIME 12 6 11/03/2018          Patient Active Problem List    Diagnosis Date Noted    Hypertension     CAD (coronary artery disease)     Acute ST elevation myocardial infarction (STEMI) of inferior wall (HCC)     ST elevation myocardial infarction involving right coronary artery ([address] ) 02/08/2020    Left arm pain 08/20/2019    Bilateral low back pain without sciatica 08/20/2019    CKD (chronic kidney disease), stage II 04/09/2019    Hypokalemia 04/09/2019    Dizziness 03/30/2019    Benign hypertension with CKD (chronic kidney disease), stage II 03/30/2019    Hyperlipidemia 03/30/2019    Indirect inguinal hernia 04/02/2018       Portions of the record may have been created with voice recognition software  Occasional wrong word or "sound a like" substitutions may have occurred due to the inherent limitations of voice recognition software  Read the chart carefully and recognize, using context, where substitutions have occurred          Deven Allen DO, [facility] [place]  2/26/2020 11:23 AM

## 2020-02-27 ENCOUNTER — CLINICAL SUPPORT (OUTPATIENT)
Dept: CARDIAC REHAB | Age: 50
End: 2020-02-27
Payer: COMMERCIAL

## 2020-02-27 DIAGNOSIS — I21.19 ACUTE ST ELEVATION MYOCARDIAL INFARCTION (STEMI) OF INFERIOR WALL (HCC): ICD-10-CM

## 2020-02-27 PROCEDURE — 93797 PHYS/QHP OP CAR RHAB WO ECG: CPT

## 2020-02-27 NOTE — PROGRESS NOTES
CARDIAC REHAB ASSESSMENT    Today's date: 2020  Patient name: Srinath Hitchcock     : 1970       MRN: [id number]  PCP: Garcia Dudley MD  Referring Physician: Yusuf Anaya MD  Cardiologist: Uzair Blanco DO  Surgeon:   Dx:   Encounter Diagnosis   Name Primary?  Acute ST elevation myocardial infarction (STEMI) of inferior wall ([facility])        Date of onset: 20  Cultural needs: none    Height:    Wt Readings from Last 1 Encounters:   20 92 7 kg (204 lb 6 4 oz)      Weight:   Ht Readings from Last 1 Encounters:   20 5' 6" (1 676 m)     Medical History:   Past Medical History:   Diagnosis Date    Acute ST elevation myocardial infarction (STEMI) of inferior wall ([facility] )     cath with PCI/JUAN - RCA 2020    Back pain     CAD (coronary artery disease)     COPD (chronic obstructive pulmonary disease) ([facility])     Hyperlipidemia     Hypertension     Inguinal hernia, left     Mild heartburn     Obesity     Stroke ([facility] ) approx 2015    Denies residual problems  Initially he only had trouble with two fingers on left side   Wears glasses          Physical Limitations: r shoulder pain (arthritis)    Risk Factors   Cholesterol: Yes  Smoking: Former user  Quit 10 years ago, smoked 1ppd x 20years  HTN: Yes  DM: No  Obesity: Yes   Inactivity: Yes  Stress:  perceived  stress: 5/10   Stressors:nothing in particular   Goals for Stress Management: walks, music    Family History:No family history on file  Allergies: Patient has no known allergies    ETOH:   Social History     Substance and Sexual Activity   Alcohol Use Not Currently         Current Medications:   Current Outpatient Medications   Medication Sig Dispense Refill    aspirin 81 mg chewable tablet Chew 1 tablet (81 mg total) daily  0    atorvastatin (LIPITOR) 80 mg tablet Take 1 tablet (80 mg total) by mouth every evening 30 tablet 5    hydrochlorothiazide (HYDRODIURIL) 25 mg tablet Take 1 tablet (25 mg total) by mouth daily 90 tablet 3    metoprolol tartrate (LOPRESSOR) 50 mg tablet Take 1 tablet (50 mg total) by mouth every 12 (twelve) hours 60 tablet 5    nitroglycerin (NITROSTAT) 0 4 mg SL tablet Place 1 tablet (0 4 mg total) under the tongue every 5 (five) minutes as needed for chest pain If no relief after 3 tablets, come immediately to  tablet 1    ticagrelor (BRILINTA) 90 MG Take 1 tablet (90 mg total) by mouth every 12 (twelve) hours 60 tablet 11     No current facility-administered medications for this visit  Functional Status Prior to Diagnosis for Treatment   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  Recreation: walking around town  ADLs: No limitations  [place]: No limitations  Exercise: walking around town - doesn't drive  Other:     Current Functional Status  Occupation: collecting scrap metal  Recreation: walking around town  ADLs:No limitations other than back pain  [place]: No limitations  Exercise: resumed walking since he's been home  Other: stairs are not an issue    Patient Specific Goals: Wt loss, increased fitness    Short Term Program Goals: dietary modifications improved energy/stamina with ADLs exercise 120-150 mins/wk wt loss 1-2 ppw    Long Term Goals: Improved [facility] Activity Status score  Improved functional capacity  Improved Quality of Life - [facility] score reduced  Improved lipid profile  Reduced body fat%  Reduced waist circumference  Reduced stress  weight loss goal of 20-30lbs  improved Rate Your Plate Score    Ability to reach goals/rehabilitation potential:  Good    Projected return to function: 12 weeks  Objective tests: sub-max TM ETT      Nutritional   Reviewed details of Rate your Plate  Discussed key elements of heart healthy eating  Reviewed patient goals for dietary modifications and their clinical implications  Reviewed most recent lipid profile       Goals for dietary modification: choose lean cuts of meat  poultry without the skin  low fat ground meat and poultry  eliminate processed meats  reduce portions of meat to 3 oz  increase fish intake  more meatless meals  low fat dairy   reduced fat cheese  increase whole grains  increase fruits and vegetables  eliminate butter  low sodium  improved snack choices  more nuts/seeds  reduce sweets/frozen desserts  heathier choices while dining out      Emotional/Social  Patient reports he/she is coping well with good social support and denies depression or anxiety    SOCIAL SUPPORT NETWORK    Marital status: single    Rate 1-5:    Marriage: n/a   Family: 5   Financial: 2   Relationships: 3   Spirituality: 3   Intellectual: 3      Domestic Violence Screening: No    Comments: current event: was going to bed and started with chest pressure and went down L arm - called 911  Carries NTG - not used -   CVA 4 years ago

## 2020-02-27 NOTE — PROGRESS NOTES
Cardiac Rehabilitation Plan of Care   Care Plan       Today's date: 2020   Visits: Initial Evaluation  Patient name: Ariel Zuluaga      : 1970  Age: 52 y o  MRN: [id number]  Referring Physician: Jay Smith MD  Cardiologist: Bijan Nixon DO  Provider: Taye Lee  Clinician: Dk Thorpe, MS, CEP, CCRP    Dx:   Encounter Diagnosis   Name Primary?  Acute ST elevation myocardial infarction (STEMI) of inferior wall [facility])      Date of onset: 20      SUMMARY OF PROGRESS:  Mendel Holly denies chest pressure/L arm pain since his stent  His daughter carries his NTG  I reviewed proper use  He states he does not drive and walks wherever he needs to go  He does not participate in formal exercise  He is a former smoker - quit 10 years ago  He does live with his daughter who is a smoker  I discussed health effects of smoking  PHQ-9 = 0, PIERO-7 = 0 - he denies depression/anxiety  We discussed CAD risk factors and lifestyle modification  We reviewed his current dietary habits and goals of heart heatlhy eating for wt loss, lipid management and BP control  BMI=33 1, wt = 204 6, waist circ  = 44 5  His resting BP was slightly elevated today 144/90  He completed 2:30 of stage II (3 1 METs) in the submaximal TM ETT  Test terminated @ RHR+30 (91)  Exercise /90  NSR with no ectopy  He is scheduled to begin CR on  and will attend 3x/wk x 36 sessions plus risk factor education        Medication compliance: Yes   Comments:   Fall Risk: Low   Comments:     EKG changes: none      EXERCISE ASSESSMENT and PLAN    Current Exercise Program in Rehab:       Frequency: 3 days/week        Minutes: 30-40         METS: 2 0-3 2            HR: 100-125   RPE: 4-5         Modalities: Treadmill, Airdyne bike, UBE, Lifecycle and Recumbent bike      Exercise Progression 30 Day Goals :    Frequency: 3 days/week   Minutes: 40   METS: 2 2-4 0   HR: 100-125    RPE: 4-6   Modalities: Treadmill, Airdyne bike, UBE, Lifecycle and Recumbent bike    Strength trainin-3 days / week  12-15 repetitions  1-2 sets per modality    Modalities: Leg Press, Chest Press, Pull Downs, Lateral Raise, Arm Extension and Arm Curl    Progressing: In Progress    Home Exercise: Type: walking, Frequency:  5-7days/week, Duration: 15-30 mins - (primary mode of transportation)    Goals: 10% improvement in functional capacity, improved DASI score by 10%, Increase in peak CR METs by 40% and Exercise 5 days/wk, >150mins/wk  Education: Benefit of exercise for CAD risk factors, signs and sxs and RPE scale   Plan:education on home exercise guidelines, home exercise 30+ mins 2 days opposite CR and Education class: Risk Factors for Heart Disease  Readiness to change: Preparation:  (Getting ready to change)       NUTRITION ASSESSMENT AND PLAN    Weight control:    Starting weight: 204 6   Current weight:     Waist circumference:    Startin 5   Current:    Diabetes: N/A  Lipid management: Discussed diet and lipid management and Last lipid profile 20  Chol 171    HDL 32    Goals:reduced BMI to < 25, HDL >40, TRG <150, decreased body fat% <25%   , reduced waist circumference <40 inches, Improved Rate Your Plate score  >53 and 2 5-5%  wt loss  Education: heart healthy eating  low sodium diet  diet and lipid management  wt  loss  healthy choices while dining out  portion control  Progressing: In Progress  Plan: Education class: Reading Food Labels, Education Class: Heart Healthy Eating, Increase PUFA and MUFA, Decrease SFA, Increase whole grains, increase fruits/vegs, eliminate processed meats, reduce red meat 1x/wk, swtich to low fat dairy and Reduce added sugars <25g/day  Readiness to change: Contemplation:  (Acknowledging that there is a problem but not yet ready or sure of wanting to make a change)      PSYCHOSOCIAL ASSESSMENT AND PLAN    Emotional:  Depression assessment:  PHQ-9 = 0 =No Depression            Anxiety measure:  PIERO-7 = 0  = Not anxious  Self-reported stress level: 5   Social support: Excellent  Goals:  Reduce perceived stress to 1-3/10, Physical Fitness in [facility] Score < 3, Pain in [facility] Score < 3, Overall Health in [facility] Score < 3, Quality of Life in [facility] Score < 3  and Change in Health in [facility] Score < 3   Education: benefits of positive support system and coping mechanisms  Progressing: In Progress  Plan: Class: Stress and Your Health, Class: Relaxation and Practice relaxation techniques  Readiness to change: Preparation:  (Getting ready to change)       OTHER CORE COMPONENTS     Tobacco:   Social History     Tobacco Use   Smoking Status Former Smoker    Packs/day:  00    Years: 15 00    Pack years: 15 00    Types: Cigarettes    Last attempt to quit:     Years since quitting: 10    Smokeless Tobacco Former User       Tobacco Use Intervention: Referral to tobacco expert:   N/A  Quit 10 years ago    Blood pressure:    Restin/90   Exercise: 150/90    Goals: consistent BP < 130/80, reduced dietary sodium <2300mg, consistent exercise >150 mins/wk and medication compliance  Education:  understanding HTN and CAD and low sodium diet and HTN  Progressing: In Progress  Plan: Class: Understanding Heart Disease and Class: Common Heart Medications  Readiness to change: Contemplation:  (Acknowledging that there is a problem but not yet ready or sure of wanting to make a change)

## 2020-03-02 ENCOUNTER — CLINICAL SUPPORT (OUTPATIENT)
Dept: CARDIAC REHAB | Age: 50
End: 2020-03-02
Payer: COMMERCIAL

## 2020-03-02 DIAGNOSIS — I21.19 ACUTE ST ELEVATION MYOCARDIAL INFARCTION (STEMI) OF INFERIOR WALL (HCC): ICD-10-CM

## 2020-03-02 PROCEDURE — 93798 PHYS/QHP OP CAR RHAB W/ECG: CPT

## 2020-03-02 NOTE — PROGRESS NOTES
EXERCISE SESSION DETAIL Patient is a 72y old  Female who presents with a chief complaint of sob (03 Jun 2019 13:35)    HPI:  Briefly, 71 yo F with HFrEF (EF 35%) s/p CRT-D, asthma (not on BB d/t severity), HTN, HLD, atrial fibrillation on eliquis, presents with sob x 1 week in setting of doing some renovation at home. Reports she started to experience some wheezing which she attributed to the dust. She used her inhaler and felt better but worsened over several days and began to wheeze so came to hospital on 5/31. Noted to be wheezing on exam and was started on prednisone. Of note, labs were notable for BNP of 25 and otherwise unremarkable. She is compliant with all her medications and reports she has an appt with Dr. Hobson on 6/13. Reports weight is usually 180-185 pounds.     PAST MEDICAL & SURGICAL HISTORY:  Vertigo  Atrial flutter  Diverticulitis  Cardiomyopathy  Kidney stone  Cardiac Pacemaker  HTN - Hypertension  Gout  Diabetes  Congestive Heart Failure  Asthma  S/P cholecystectomy  AICD (Automatic Cardioverter/Defibrillator) Present: inserted in Aug, 2008. Due for battery change in 1 month. ( [employer]) . Inserted by Dr Duffy      FAMILY HISTORY:  Family history of brain tumor      Home Medications:  apixaban 5 mg oral tablet: 1 tab(s) orally every 12 hours (03 Jun 2019 12:24)  Aspirin Enteric Coated 81 mg oral delayed release tablet: 1 tab(s) orally once a day (03 Jun 2019 12:24)  atorvastatin 20 mg oral tablet: 1 tab(s) orally once a day (at bedtime) (03 Jun 2019 12:24)  cholecalciferol oral tablet:  (03 Jun 2019 12:24)  DuoNeb 0.5 mg-2.5 mg/3 mL inhalation solution: 3 milliliter(s) inhaled every 6 hours, As Needed (03 Jun 2019 12:24)  enalapril 10 mg oral tablet: 1 tab(s) orally 2 times a day (03 Jun 2019 12:24)  guaiFENesin 100 mg/5 mL oral liquid: 5 milliliter(s) orally every 6 hours, As needed, Cough (03 Jun 2019 12:24)  Lantus Solostar Pen 100 units/mL subcutaneous solution: 12 unit(s) subcutaneous once a day (at bedtime) (03 Jun 2019 12:24)  magnesium oxide 500 mg oral tablet: 1 tab(s) orally 3 times a day (03 Jun 2019 12:24)  Pepcid AC 10 mg oral tablet: 1 tab(s) orally once a day (03 Jun 2019 12:24)  potassium chloride 20 mEq/15 mL oral liquid: 5 milliliter(s) orally once a day, As Needed- cramping (03 Jun 2019 12:24)  repaglinide 0.5 mg oral tablet: 1 tab(s) orally 3 times a day (before meals), As Needed    *patient only takes when her blood sugar is really high* (03 Jun 2019 12:24)  spironolactone 25 mg oral tablet: 1 tab(s) orally once a day (03 Jun 2019 12:24)  tiotropium 18 mcg inhalation capsule: 1 cap(s) inhaled once a day (03 Jun 2019 12:24)  Tylenol Extra Strength 500 mg oral tablet: 2 tab(s) orally , As Needed (03 Jun 2019 12:24)  Ventolin HFA 90 mcg/inh inhalation aerosol: 2 puff(s) inhaled every 4 to 6 hours, As Needed (03 Jun 2019 12:24)  Vitamin C:  (03 Jun 2019 12:24)  ZyrTEC 10 mg oral tablet: 1 tab(s) orally once a day (03 Jun 2019 12:24)      Medications:  acetaminophen   Tablet .. 650 milliGRAM(s) Oral every 6 hours PRN  ALBUTerol/ipratropium for Nebulization 3 milliLiter(s) Nebulizer every 6 hours  apixaban 5 milliGRAM(s) Oral every 12 hours  aspirin enteric coated 81 milliGRAM(s) Oral daily  atorvastatin 20 milliGRAM(s) Oral at bedtime  dextrose 40% Gel 15 Gram(s) Oral once PRN  dextrose 5%. 1000 milliLiter(s) IV Continuous <Continuous>  dextrose 50% Injectable 12.5 Gram(s) IV Push once  docosanol 10% Cream 1 Application(s) Topical daily  enalapril 10 milliGRAM(s) Oral two times a day  furosemide    Tablet 40 milliGRAM(s) Oral two times a day  glucagon  Injectable 1 milliGRAM(s) IntraMuscular once PRN  guaiFENesin    Syrup 100 milliGRAM(s) Oral every 6 hours PRN  insulin glargine Injectable (LANTUS) 12 Unit(s) SubCutaneous at bedtime  insulin lispro (HumaLOG) corrective regimen sliding scale   SubCutaneous three times a day before meals  insulin lispro Injectable (HumaLOG) 3 Unit(s) SubCutaneous three times a day before meals  loratadine 10 milliGRAM(s) Oral daily  magnesium oxide 400 milliGRAM(s) Oral three times a day with meals  montelukast 10 milliGRAM(s) Oral daily  pantoprazole    Tablet 40 milliGRAM(s) Oral before breakfast  predniSONE   Tablet 60 milliGRAM(s) Oral daily  spironolactone 25 milliGRAM(s) Oral daily      Review of systems:  10 point review of systems completed and are negative unless noted in HPI    Vitals:  T(C): 36.8 (06-03-19 @ 13:57), Max: 36.8 (06-03-19 @ 13:57)  HR: 85 (06-03-19 @ 17:10) (81 - 93)  BP: 97/63 (06-03-19 @ 17:10) (97/63 - 135/78)  BP(mean): --  RR: 18 (06-03-19 @ 13:57) (16 - 18)  SpO2: 100% (06-03-19 @ 13:57) (94% - 100%)    Daily     Daily         I&O's Summary    02 Jun 2019 07:01  -  03 Jun 2019 07:00  --------------------------------------------------------  IN: 1200 mL / OUT: 0 mL / NET: 1200 mL    03 Jun 2019 07:01  -  03 Jun 2019 18:34  --------------------------------------------------------  IN: 960 mL / OUT: 0 mL / NET: 960 mL    Physical Exam:  Appearance: No Acute Distress  HEENT: PERRL  Neck: JVP approx 8 cm w/ mild HJR  Cardiovascular: Normal S1 S2, No murmurs/rubs/gallops  Respiratory: b/l insp wheezing but good air movement  Gastrointestinal: Soft, Non-tender	  Skin: No cyanosis	  Neurologic: Non-focal  Extremities: minimal LE edema  Psychiatry: A & O x 3, Mood & affect appropriate    Labs:                        11.7   11.1  )-----------( 214      ( 02 Jun 2019 23:58 )             35.1     06-03    140  |  98  |  42<H>  ----------------------------<  109<H>  4.8   |  27  |  1.15    Ca    9.8      03 Jun 2019 06:42

## 2020-03-04 ENCOUNTER — CLINICAL SUPPORT (OUTPATIENT)
Dept: CARDIAC REHAB | Age: 50
End: 2020-03-04
Payer: COMMERCIAL

## 2020-03-04 DIAGNOSIS — I21.19 ACUTE ST ELEVATION MYOCARDIAL INFARCTION (STEMI) OF INFERIOR WALL (HCC): ICD-10-CM

## 2020-03-04 PROCEDURE — 93798 PHYS/QHP OP CAR RHAB W/ECG: CPT

## 2020-03-06 ENCOUNTER — CLINICAL SUPPORT (OUTPATIENT)
Dept: CARDIAC REHAB | Age: 50
End: 2020-03-06
Payer: COMMERCIAL

## 2020-03-06 DIAGNOSIS — I21.19 ACUTE ST ELEVATION MYOCARDIAL INFARCTION (STEMI) OF INFERIOR WALL (HCC): ICD-10-CM

## 2020-03-06 PROCEDURE — 93798 PHYS/QHP OP CAR RHAB W/ECG: CPT

## 2020-03-09 ENCOUNTER — CLINICAL SUPPORT (OUTPATIENT)
Dept: CARDIAC REHAB | Age: 50
End: 2020-03-09
Payer: COMMERCIAL

## 2020-03-09 DIAGNOSIS — I21.19 ACUTE ST ELEVATION MYOCARDIAL INFARCTION (STEMI) OF INFERIOR WALL (HCC): ICD-10-CM

## 2020-03-09 PROCEDURE — 93798 PHYS/QHP OP CAR RHAB W/ECG: CPT

## 2020-03-11 ENCOUNTER — CLINICAL SUPPORT (OUTPATIENT)
Dept: CARDIAC REHAB | Age: 50
End: 2020-03-11
Payer: COMMERCIAL

## 2020-03-11 DIAGNOSIS — I21.19 ACUTE ST ELEVATION MYOCARDIAL INFARCTION (STEMI) OF INFERIOR WALL (HCC): ICD-10-CM

## 2020-03-11 PROCEDURE — 93798 PHYS/QHP OP CAR RHAB W/ECG: CPT

## 2020-03-13 ENCOUNTER — CLINICAL SUPPORT (OUTPATIENT)
Dept: CARDIAC REHAB | Age: 50
End: 2020-03-13
Payer: COMMERCIAL

## 2020-03-13 DIAGNOSIS — I21.19 ACUTE ST ELEVATION MYOCARDIAL INFARCTION (STEMI) OF INFERIOR WALL (HCC): ICD-10-CM

## 2020-03-13 PROCEDURE — 93798 PHYS/QHP OP CAR RHAB W/ECG: CPT

## 2020-03-16 ENCOUNTER — CLINICAL SUPPORT (OUTPATIENT)
Dept: CARDIAC REHAB | Age: 50
End: 2020-03-16
Payer: COMMERCIAL

## 2020-03-16 DIAGNOSIS — I21.19 ACUTE ST ELEVATION MYOCARDIAL INFARCTION (STEMI) OF INFERIOR WALL (HCC): ICD-10-CM

## 2020-03-16 PROCEDURE — 93798 PHYS/QHP OP CAR RHAB W/ECG: CPT

## 2020-03-18 ENCOUNTER — APPOINTMENT (OUTPATIENT)
Dept: CARDIAC REHAB | Age: 50
End: 2020-03-18
Payer: COMMERCIAL

## 2020-03-20 ENCOUNTER — APPOINTMENT (OUTPATIENT)
Dept: CARDIAC REHAB | Age: 50
End: 2020-03-20
Payer: COMMERCIAL

## 2020-03-23 ENCOUNTER — CLINICAL SUPPORT (OUTPATIENT)
Dept: CARDIAC REHAB | Age: 50
End: 2020-03-23
Payer: COMMERCIAL

## 2020-03-23 DIAGNOSIS — I21.19 ACUTE ST ELEVATION MYOCARDIAL INFARCTION (STEMI) OF INFERIOR WALL (HCC): ICD-10-CM

## 2020-03-23 PROCEDURE — 93798 PHYS/QHP OP CAR RHAB W/ECG: CPT

## 2020-03-25 ENCOUNTER — CLINICAL SUPPORT (OUTPATIENT)
Dept: CARDIAC REHAB | Age: 50
End: 2020-03-25
Payer: COMMERCIAL

## 2020-03-25 DIAGNOSIS — I21.19 ACUTE ST ELEVATION MYOCARDIAL INFARCTION (STEMI) OF INFERIOR WALL (HCC): ICD-10-CM

## 2020-03-25 PROCEDURE — 93798 PHYS/QHP OP CAR RHAB W/ECG: CPT

## 2020-03-25 NOTE — PROGRESS NOTES
Cardiac Rehabilitation Plan of Care   30 day       Today's date: 3/25/2020   Visits: 10  Patient name: Latisha Gallardo      : 1970  Age: 52 y o  MRN: [id number]  Referring Physician: Rex Gregory PA-C  Cardiologist: Ryan Fraser DO  Provider: Martin Taylor  Clinician: Skye Shelton MS, CEP    Dx:   Encounter Diagnosis   Name Primary?  Acute ST elevation myocardial infarction (STEMI) of inferior wall [facility])      Date of onset: 20      SUMMARY OF PROGRESS: This is Curtis's 30 day note  He has completed 10 sessions and tolerates exercise weel  He completes 40 min of exercise at 1 8-4 7 METs without cardiac complaints  He states he does not drive and walks wherever he needs to go  Jenny Lewis states that he walks 15-16 blocks about every other day but he walks very slow, therefore, I encouraged him to  the pace to elevate heart rate while walking  Jenny Lewis has been eating less meat then usual and is choosing more turkey instead of red meat, but refuses to give up sausage every other week  I recommended that he add some fruits and vegetables, since he reports only having 1-2 bananas each day  Jenny Lewis continues to abstain from smoking and denies any stress, depression or anxiety  Will continue to progress exercise as tolerated and encourage Jenny Lewis to increase his fruits, vegetable, and hydration        Medication compliance: Yes   Comments:   Fall Risk: Low   Comments:     EKG changes: NSR, rare PVCs      EXERCISE ASSESSMENT and PLAN    Current Exercise Program in Rehab:       Frequency: 3 days/week        Minutes: 40        METS: 1 8-4 7            HR: 100-130   RPE: 3-5         Modalities: Treadmill, Airdyne bike, UBE and Lifecycle      Exercise Progression 30 Day Goals :    Frequency: 3 days/week   Minutes: 40   METS: 2 5-4 5   HR: 100-125    RPE: 4-6   Modalities: Treadmill, Airdyne bike, UBE and Lifecycle    Strength trainin-3 days / week  12-15 repetitions  1-2 sets per modality   Will be added following at least 8 weeks post surgery and 8-10 monitored sessions   Modalities: Leg Press, Chest Press, Pull Downs, Lateral Raise, Arm Extension and Arm Curl    Progressing:  Yes - increased intensity and duration of exercise    Home Exercise: Type: walking (slow), Frequency:  5-7days/week, Duration: 15-30 mins - (primary mode of transportation)    Goals: 10% improvement in functional capacity, improved DASI score by 10%, Increase in peak CR METs by 40% and Exercise 5 days/wk, >150mins/wk  Education: Benefit of exercise for CAD risk factors, signs and sxs and RPE scale   Plan:education on home exercise guidelines, home exercise 30+ mins 2 days opposite CR and Education class: Risk Factors for Heart Disease  Readiness to change: Action:  (Changing behavior)      NUTRITION ASSESSMENT AND PLAN    Weight control:    Starting weight: 204 6   Current weight:   207  Waist circumference:    Startin 5   Current:    Diabetes: N/A  Lipid management: Discussed diet and lipid management and Last lipid profile 20  Chol 171    HDL 32    Goals:reduced BMI to < 25, HDL >40, TRG <150, decreased body fat% <25%   , reduced waist circumference <40 inches, Improved Rate Your Plate score  >62 and 2 5-5%  wt loss  Education: heart healthy eating  low sodium diet  diet and lipid management  wt  loss  healthy choices while dining out  portion control  Progressing: Yes - eating less meat, and choosing more fish     Plan: Education class: Reading Food Labels, Education Class: Heart Healthy Eating, Increase PUFA and MUFA, Decrease SFA, Increase whole grains, increase fruits/vegs, eliminate processed meats, reduce red meat 1x/wk, swtich to low fat dairy and Reduce added sugars <25g/day  Readiness to change: Preparation:  (Getting ready to change)       PSYCHOSOCIAL ASSESSMENT AND PLAN    Emotional:  Depression assessment:  PHQ-9 = 0 =No Depression            Anxiety measure:  PIERO-7 = 0  = Not anxious  Self-reported stress level: 5   Social support: Excellent  Goals:  Reduce perceived stress to 1-3/10, Physical Fitness in [facility] Score < 3, Pain in [facility] Score < 3, Overall Health in [facility] Score < 3, Quality of Life in [facility] Score < 3  and Change in Health in [facility] Score < 3   Education: benefits of positive support system and coping mechanisms  Progressing: Yes - denies stress, depression, or anxiety  Plan: Class: Stress and Your Health, Class: Relaxation and Practice relaxation techniques  Readiness to change: Action:  (Changing behavior)      OTHER CORE COMPONENTS     Tobacco:   Social History     Tobacco Use   Smoking Status Former Smoker    Packs/day:     Years: 15 00    Pack years: 15 00    Types: Cigarettes    Last attempt to quit:     Years since quitting: 10 2   Smokeless Tobacco Former User       Tobacco Use Intervention: Referral to tobacco expert:   N/A  Quit 10 years ago    Blood pressure:    Restin/76 - 126/90   Exercise: 134/70 - 172/90    Goals: consistent BP < 130/80, reduced dietary sodium <2300mg, consistent exercise >150 mins/wk and medication compliance  Education:  understanding HTN and CAD and low sodium diet and HTN  Progressing: Yes - normal resting BP with normal BP response to exercise  Plan: Class: Understanding Heart Disease and Class: Common Heart Medications  Readiness to change: Action:  (Changing behavior)

## 2020-03-27 ENCOUNTER — APPOINTMENT (OUTPATIENT)
Dept: CARDIAC REHAB | Age: 50
End: 2020-03-27
Payer: COMMERCIAL

## 2020-03-27 NOTE — PROGRESS NOTES
Cardiac Rehabilitation Plan of Care   COVID hold        Today's date: 3/25/2020   Visits: 10  Patient name: Lorena Herbert                                       : 1970  Age: 52 y o  MRN: [id number]  Referring Physician: Edward Millard PA-C  Cardiologist: Marissa Morataya DO  Provider: Aliyah Shields  Clinician: Darleen Whitlock, MS, CEP, CCRP     Dx:        Encounter Diagnosis   Name Primary?  Acute ST elevation myocardial infarction (STEMI) of inferior wall [facility])        Date of onset: 20          SUMMARY OF PROGRESS: *PATIENT ON HOLD*    Haile Warren has completed 10 sessions of cardiac rehab  Last exercise session was on 3/25/20  Due to the COVID-19 pandemic, all cardiopulmonary rehab sessions will be put on hold until the situation improves  Patient was informed to follow shelter-in-place order  Pt  will be informed when it is appropriate to return to rehab   At that time goals and progress will be reassessed and plan of care updated              Medication compliance: Yes              Comments:   Fall Risk: Low              Comments:      EKG changes: NSR, rare PVCs        EXERCISE ASSESSMENT and PLAN     Current Exercise Program in Rehab:                                                           Frequency: 3 days/week                                                                       Minutes: 40                                                                   METS: 1 8-4 7                                                                                                               HR: 100-130              RPE: 3-5                                                                                   Modalities: Treadmill, Airdyne bike, UBE and Lifecycle                          Exercise Progression 30 Day Goals :               Frequency: 3 days/week              Minutes: 40              METS: 2 5-4 5              HR: 100-125                  RPE: 4-6              Modalities: Treadmill, Airdyne bike, UBE and Lifecycle     Strength trainin-3 days / week  12-15 repetitions  1-2 sets per modality   Will be added following at least 8 weeks post surgery and 8-10 monitored sessions              Modalities: Leg Press, Chest Press, Pull Downs, Lateral Raise, Arm Extension and Arm Curl     Progressing:  Yes - increased intensity and duration of exercise     Home Exercise: Type: walking (slow), Frequency:  5-7days/week, Duration: 15-30 mins - (primary mode of transportation)    Goals: 10% improvement in functional capacity, improved DASI score by 10%, Increase in peak CR METs by 40% and Exercise 5 days/wk, >150mins/wk  Education: Benefit of exercise for CAD risk factors, signs and sxs and RPE scale   Plan:education on home exercise guidelines, home exercise 30+ mins 2 days opposite CR and Education class: Risk Factors for Heart Disease  Readiness to change: Action:  (Changing behavior)        NUTRITION ASSESSMENT AND PLAN     Weight control:               Starting weight: 204 6              Current weight:   207  Waist circumference:               Startin 5              Current:    Diabetes: N/A  Lipid management: Discussed diet and lipid management and Last lipid profile 20  Chol 171    HDL 32    Goals:reduced BMI to < 25, HDL >40, TRG <150, decreased body fat% <25%   , reduced waist circumference <40 inches, Improved Rate Your Plate score  >62 and 2 5-5%  wt loss  Education: heart healthy eating  low sodium diet  diet and lipid management  wt  loss  healthy choices while dining out  portion control  Progressing: Yes - eating less meat, and choosing more fish     Plan: Education class: Reading Food Labels, Education Class: Heart Healthy Eating, Increase PUFA and MUFA, Decrease SFA, Increase whole grains, increase fruits/vegs, eliminate processed meats, reduce red meat 1x/wk, swtich to low fat dairy and Reduce added sugars <25g/day  Readiness to change: Preparation:  (Getting ready to change)         PSYCHOSOCIAL ASSESSMENT AND PLAN     Emotional:  Depression assessment:  PHQ-9 = 0 =No Depression                       Anxiety measure:  PIERO-7 = 0  = Not anxious  Self-reported stress level: 5   Social support: Excellent  Goals:  Reduce perceived stress to 1-3/10, Physical Fitness in [facility] Score < 3, Pain in [facility] Score < 3, Overall Health in [facility] Score < 3, Quality of Life in [facility] Score < 3  and Change in Health in [facility] Score < 3   Education: benefits of positive support system and coping mechanisms  Progressing: Yes - denies stress, depression, or anxiety  Plan: Class: Stress and Your Health, Class: Relaxation and Practice relaxation techniques  Readiness to change: Action:  (Changing behavior)        OTHER CORE COMPONENTS      Tobacco:   Social History           Tobacco Use   Smoking Status Former Smoker    Packs/day:  00    Years: 15 00    Pack years: 15 00    Types: Cigarettes    Last attempt to quit:     Years since quitting: 10 2   Smokeless Tobacco Former User         Tobacco Use Intervention: Referral to tobacco expert:   N/A  Quit 10 years ago     Blood pressure:               Restin/76 - 126/90              Exercise: 134/70 - 172/90     Goals: consistent BP < 130/80, reduced dietary sodium <2300mg, consistent exercise >150 mins/wk and medication compliance  Education:  understanding HTN and CAD and low sodium diet and HTN  Progressing: Yes - normal resting BP with normal BP response to exercise  Plan: Class: Understanding Heart Disease and Class: Common Heart Medications  Readiness to change: Action:  (Changing behavior)

## 2020-03-30 ENCOUNTER — APPOINTMENT (OUTPATIENT)
Dept: CARDIAC REHAB | Age: 50
End: 2020-03-30
Payer: COMMERCIAL

## 2020-04-03 ENCOUNTER — TELEPHONE (OUTPATIENT)
Dept: CARDIAC REHAB | Age: 50
End: 2020-04-03

## 2020-04-17 ENCOUNTER — TELEPHONE (OUTPATIENT)
Dept: CARDIAC REHAB | Age: 50
End: 2020-04-17

## 2020-04-24 ENCOUNTER — TELEPHONE (OUTPATIENT)
Dept: CARDIAC REHAB | Age: 50
End: 2020-04-24

## 2020-05-06 ENCOUNTER — TELEPHONE (OUTPATIENT)
Dept: CARDIOLOGY CLINIC | Facility: CLINIC | Age: 50
End: 2020-05-06

## 2020-06-08 ENCOUNTER — OFFICE VISIT (OUTPATIENT)
Dept: CARDIOLOGY CLINIC | Facility: CLINIC | Age: 50
End: 2020-06-08
Payer: COMMERCIAL

## 2020-06-08 VITALS
WEIGHT: 204 LBS | DIASTOLIC BLOOD PRESSURE: 86 MMHG | RESPIRATION RATE: 16 BRPM | TEMPERATURE: 99.8 F | SYSTOLIC BLOOD PRESSURE: 128 MMHG | HEART RATE: 72 BPM | HEIGHT: 66 IN | BODY MASS INDEX: 32.78 KG/M2

## 2020-06-08 DIAGNOSIS — I12.9 BENIGN HYPERTENSION WITH CKD (CHRONIC KIDNEY DISEASE), STAGE II: ICD-10-CM

## 2020-06-08 DIAGNOSIS — I25.83 CORONARY ARTERY DISEASE DUE TO LIPID RICH PLAQUE: Primary | ICD-10-CM

## 2020-06-08 DIAGNOSIS — I10 ESSENTIAL HYPERTENSION: ICD-10-CM

## 2020-06-08 DIAGNOSIS — E78.5 HYPERLIPIDEMIA, UNSPECIFIED HYPERLIPIDEMIA TYPE: ICD-10-CM

## 2020-06-08 DIAGNOSIS — I25.10 CORONARY ARTERY DISEASE DUE TO LIPID RICH PLAQUE: Primary | ICD-10-CM

## 2020-06-08 DIAGNOSIS — I21.19 ACUTE ST ELEVATION MYOCARDIAL INFARCTION (STEMI) OF INFERIOR WALL (HCC): ICD-10-CM

## 2020-06-08 DIAGNOSIS — G47.30 SLEEP APNEA, UNSPECIFIED TYPE: ICD-10-CM

## 2020-06-08 DIAGNOSIS — N18.2 BENIGN HYPERTENSION WITH CKD (CHRONIC KIDNEY DISEASE), STAGE II: ICD-10-CM

## 2020-06-08 DIAGNOSIS — I21.11 ST ELEVATION MYOCARDIAL INFARCTION INVOLVING RIGHT CORONARY ARTERY (HCC): ICD-10-CM

## 2020-06-08 PROCEDURE — 3008F BODY MASS INDEX DOCD: CPT

## 2020-06-08 PROCEDURE — 3074F SYST BP LT 130 MM HG: CPT

## 2020-06-08 PROCEDURE — 3079F DIAST BP 80-89 MM HG: CPT

## 2020-06-08 PROCEDURE — 1036F TOBACCO NON-USER: CPT

## 2020-06-08 PROCEDURE — 99214 OFFICE O/P EST MOD 30 MIN: CPT

## 2020-06-23 ENCOUNTER — OFFICE VISIT (OUTPATIENT)
Dept: SLEEP CENTER | Facility: CLINIC | Age: 50
End: 2020-06-23
Payer: COMMERCIAL

## 2020-06-23 VITALS
DIASTOLIC BLOOD PRESSURE: 82 MMHG | WEIGHT: 206.8 LBS | HEIGHT: 66 IN | BODY MASS INDEX: 33.23 KG/M2 | SYSTOLIC BLOOD PRESSURE: 136 MMHG | HEART RATE: 77 BPM

## 2020-06-23 DIAGNOSIS — G47.30 SLEEP APNEA, UNSPECIFIED TYPE: ICD-10-CM

## 2020-06-23 DIAGNOSIS — G47.30 INSOMNIA WITH SLEEP APNEA: ICD-10-CM

## 2020-06-23 DIAGNOSIS — G47.00 INSOMNIA WITH SLEEP APNEA: ICD-10-CM

## 2020-06-23 PROCEDURE — 99243 OFF/OP CNSLTJ NEW/EST LOW 30: CPT | Performed by: NURSE PRACTITIONER

## 2020-06-24 ENCOUNTER — HOSPITAL ENCOUNTER (EMERGENCY)
Facility: HOSPITAL | Age: 50
Discharge: HOME | End: 2020-06-24
Attending: EMERGENCY MEDICINE
Payer: COMMERCIAL

## 2020-06-24 ENCOUNTER — APPOINTMENT (EMERGENCY)
Dept: RADIOLOGY | Facility: HOSPITAL | Age: 50
End: 2020-06-24
Payer: COMMERCIAL

## 2020-06-24 ENCOUNTER — TELEPHONE (OUTPATIENT)
Dept: SLEEP CENTER | Facility: CLINIC | Age: 50
End: 2020-06-24

## 2020-06-24 VITALS
WEIGHT: 204.59 LBS | HEART RATE: 83 BPM | RESPIRATION RATE: 18 BRPM | BODY MASS INDEX: 33.02 KG/M2 | OXYGEN SATURATION: 96 % | TEMPERATURE: 96.7 F | SYSTOLIC BLOOD PRESSURE: 141 MMHG | DIASTOLIC BLOOD PRESSURE: 86 MMHG

## 2020-06-24 DIAGNOSIS — Z20.822 ENCOUNTER FOR LABORATORY TESTING FOR COVID-19 VIRUS: Primary | ICD-10-CM

## 2020-06-24 DIAGNOSIS — M25.522 LEFT ELBOW PAIN: Primary | ICD-10-CM

## 2020-06-24 PROCEDURE — 99284 EMERGENCY DEPT VISIT MOD MDM: CPT | Performed by: PHYSICIAN ASSISTANT

## 2020-06-24 PROCEDURE — 99283 EMERGENCY DEPT VISIT LOW MDM: CPT

## 2020-06-24 PROCEDURE — 73080 X-RAY EXAM OF ELBOW: CPT

## 2020-07-02 ENCOUNTER — OFFICE VISIT (OUTPATIENT)
Dept: OBGYN CLINIC | Facility: MEDICAL CENTER | Age: 50
End: 2020-07-02
Payer: COMMERCIAL

## 2020-07-02 VITALS
DIASTOLIC BLOOD PRESSURE: 97 MMHG | SYSTOLIC BLOOD PRESSURE: 148 MMHG | HEIGHT: 66 IN | HEART RATE: 83 BPM | WEIGHT: 205 LBS | BODY MASS INDEX: 32.95 KG/M2

## 2020-07-02 DIAGNOSIS — M77.12 LATERAL EPICONDYLITIS OF LEFT ELBOW: Primary | ICD-10-CM

## 2020-07-02 PROCEDURE — 1036F TOBACCO NON-USER: CPT | Performed by: EMERGENCY MEDICINE

## 2020-07-02 PROCEDURE — 3080F DIAST BP >= 90 MM HG: CPT | Performed by: EMERGENCY MEDICINE

## 2020-07-02 PROCEDURE — 99203 OFFICE O/P NEW LOW 30 MIN: CPT | Performed by: EMERGENCY MEDICINE

## 2020-07-02 PROCEDURE — 3077F SYST BP >= 140 MM HG: CPT | Performed by: EMERGENCY MEDICINE

## 2020-07-02 PROCEDURE — 3008F BODY MASS INDEX DOCD: CPT | Performed by: EMERGENCY MEDICINE

## 2020-07-02 RX ORDER — METHYLPREDNISOLONE 4 MG/1
TABLET ORAL
Qty: 1 EACH | Refills: 0 | Status: SHIPPED | OUTPATIENT
Start: 2020-07-02 | End: 2021-03-31

## 2020-07-02 NOTE — PATIENT INSTRUCTIONS
While taking oral steroids (Prednisone, Medrol dose pack) do not take any NSAIDs such as Advil, ibuprofen, Motrin, Aleve or naproxen  You can restart the NSAIDs after you finish the steroids  However you may take Tylenol with these medications    While taking oral steroids, you may experience mild side effects such as feeling jittery or flushing  Please call if your side effects are significant or you have any questions  Tennis Elbow   WHAT YOU NEED TO KNOW:   Tennis elbow is inflammation of the tendons in your elbow  Tendons are strong tissues that connect muscle to bone  DISCHARGE INSTRUCTIONS:   Return to the emergency department if:   · You suddenly have no feeling in your arm, hand, or fingers  · You suddenly cannot move your arm, wrist, hand, or fingers  Contact your healthcare provider if:   · You have a fever  · You have more pain or weakness in your arm, wrist, hand, or fingers  · You have new numbness or tingling in your arm, hand, or fingers  · You have questions or concerns about your condition or care  Medicines:   · Acetaminophen  decreases pain and fever  It is available without a doctor's order  Ask how much to take and how often to take it  Follow directions  Read the labels of all other medicines you are using to see if they also contain acetaminophen, or ask your doctor or pharmacist  Acetaminophen can cause liver damage if not taken correctly  Do not use more than 4 grams (4,000 milligrams) total of acetaminophen in one day  · NSAIDs , such as ibuprofen, help decrease swelling, pain, and fever  This medicine is available with or without a doctor's order  NSAIDs can cause stomach bleeding or kidney problems in certain people  If you take blood thinner medicine, always ask your healthcare provider if NSAIDs are safe for you  Always read the medicine label and follow directions  · Take your medicine as directed    Contact your healthcare provider if you think your medicine is not helping or if you have side effects  Tell him or her if you are allergic to any medicine  Keep a list of the medicines, vitamins, and herbs you take  Include the amounts, and when and why you take them  Bring the list or the pill bottles to follow-up visits  Carry your medicine list with you in case of an emergency  Physical therapy:  A physical therapist teaches you exercises to help improve movement and strength, and to decrease pain  Self-care:   · Wear your support device as directed  Devices, such as an arm strap, brace, or splint, help limit your arm movement  They also decrease pain and help prevent more damage to your tendon  Ask your healthcare provider how to care for your arm while you wear a brace or splint  · Rest your injured arm  and avoid activities that cause pain  This will help your tendons heal     · Apply ice on your elbow  for 15 to 20 minutes every hour or as directed  Use an ice pack, or put crushed ice in a plastic bag  Cover it with a towel before you apply it to your skin  Ice helps prevent tissue damage and decreases swelling and pain  · Elevate your elbow  above the level of your heart as often as you can  This will help decrease swelling and pain  Prop your elbow on pillows or blankets to keep it elevated comfortably  Follow up with your healthcare provider as directed:  Write down your questions so you remember to ask them during your visits  © 2017 [employer] Information is for End User's use only and may not be sold, redistributed or otherwise used for commercial purposes  All illustrations and images included in CareNotes® are the copyrighted property of [employer]  or [employer]  The above information is an  only  It is not intended as medical advice for individual conditions or treatments   Talk to your doctor, nurse or pharmacist before following any medical regimen to see if it is safe and effective for you

## 2020-07-02 NOTE — PROGRESS NOTES
Assessment/Plan:    Diagnoses and all orders for this visit:    Lateral epicondylitis of left elbow  -     methylPREDNISolone 4 MG tablet therapy pack; Use as directed on package  -     Ambulatory referral to PT/OT hand therapy; Future    We have provided an elbow counter force brace during dayand wrist brace at night  Patient unable to take NSAIDs hx CVA, CAD and MI on antiplatelet therapy  We have provided information for tennis elbow  Return for 4-6 weeks  Chief Complaint:     Chief Complaint   Patient presents with    Left Elbow - Pain       Subjective:   Patient ID: Carter Gaona is a 48 y o  male  NP presents for several weeks of left lateral elbow pain worse with flexion/extension, denies injury, was evaluate in ER, Xrays wnl, he has been using diclofenac cream and this causes a burning pain when applying  Denies n/t  Review of Systems   Constitutional: Negative for chills and fever  HENT: Negative for sore throat and trouble swallowing  Eyes: Negative for pain and discharge  Respiratory: Negative for choking and shortness of breath  Cardiovascular: Negative for chest pain and palpitations  Gastrointestinal: Negative for abdominal pain and vomiting  Endocrine: Negative for cold intolerance and heat intolerance  Musculoskeletal: Positive for arthralgias  Negative for joint swelling  Neurological: Negative for dizziness and weakness  Psychiatric/Behavioral: Negative for self-injury and suicidal ideas  The following portions of the patient's chart were reviewed and updated as appropriate:    Allergy:  No Known Allergies      Past Medical History:   Diagnosis Date    Acute ST elevation myocardial infarction (STEMI) of inferior wall ([facility])     cath with PCI/JUAN - RCA 2/7/2020    Back pain     CAD (coronary artery disease)     COPD (chronic obstructive pulmonary disease) ([facility])     Hyperlipidemia     Hypertension     Inguinal hernia, left     Mild heartburn  Obesity     Stroke [facility]) approx 2015    Denies residual problems  Initially he only had trouble with two fingers on left side      Wears glasses        Past Surgical History:   Procedure Laterality Date    APPENDECTOMY      HERNIA REPAIR      NO PAST SURGERIES      MI LAP,APPENDECTOMY N/A 11/28/2018    Procedure: APPENDECTOMY LAPAROSCOPIC;  Surgeon: Adelina Garcia MD;  Location: [facility] MAIN OR;  Service: General    MI [place] [address] Left 4/2/2018    Procedure: LAPAROSCOPIC INGUINAL HERNIA REPAIR WITH MESH;  Surgeon: Adelina Garcia MD;  Location: [facility] OR;  Service: General       Social History     Socioeconomic History    Marital status: Single     Spouse name: Not on file    Number of children: Not on file    Years of education: Not on file    Highest education level: Not on file   Occupational History    Not on file   Social Needs    Financial resource strain: Not on file    Food insecurity:     Worry: Not on file     Inability: Not on file    Transportation needs:     Medical: Not on file     Non-medical: Not on file   Tobacco Use    Smoking status: Former Smoker     Packs/day: 1 00     Years: 15 00     Pack years: 15 00     Types: Cigarettes     Last attempt to quit: 2010     Years since quitting: 10 5    Smokeless tobacco: Never Used   Substance and Sexual Activity    Alcohol use: Not Currently    Drug use: No    Sexual activity: Not on file   Lifestyle    Physical activity:     Days per week: Not on file     Minutes per session: Not on file    Stress: Not on file   Relationships    Social connections:     Talks on phone: Not on file     Gets together: Not on file     Attends Orthodoxy service: Not on file     Active member of club or organization: Not on file     Attends meetings of clubs or organizations: Not on file     Relationship status: Not on file    Intimate partner violence:     Fear of current or ex partner: Not on file     Emotionally abused: Not on file     Physically abused: Not on file     Forced sexual activity: Not on file   Other Topics Concern    Not on file   Social History Narrative    Not on file       History reviewed  No pertinent family history      Medications:    Current Outpatient Medications:     aspirin 81 mg chewable tablet, Chew 1 tablet (81 mg total) daily, Disp: , Rfl: 0    atorvastatin (LIPITOR) 80 mg tablet, Take 1 tablet (80 mg total) by mouth every evening, Disp: 30 tablet, Rfl: 5    diclofenac sodium (VOLTAREN) 1 %, Apply 2 g topically 4 (four) times a day as needed (Elbow pain), Disp: 100 g, Rfl: 0    hydrochlorothiazide (HYDRODIURIL) 25 mg tablet, Take 1 tablet (25 mg total) by mouth daily, Disp: 90 tablet, Rfl: 3    metoprolol tartrate (LOPRESSOR) 50 mg tablet, Take 1 tablet (50 mg total) by mouth every 12 (twelve) hours, Disp: 60 tablet, Rfl: 5    nitroglycerin (NITROSTAT) 0 4 mg SL tablet, Place 1 tablet (0 4 mg total) under the tongue every 5 (five) minutes as needed for chest pain If no relief after 3 tablets, come immediately to ER, Disp: 100 tablet, Rfl: 1    ticagrelor (BRILINTA) 90 MG, Take 1 tablet (90 mg total) by mouth every 12 (twelve) hours, Disp: 60 tablet, Rfl: 11    methylPREDNISolone 4 MG tablet therapy pack, Use as directed on package, Disp: 1 each, Rfl: 0    Patient Active Problem List   Diagnosis    Indirect inguinal hernia    Dizziness    Benign hypertension with CKD (chronic kidney disease), stage II    Hyperlipidemia    CKD (chronic kidney disease), stage II    Hypokalemia    Left arm pain    Bilateral low back pain without sciatica    ST elevation myocardial infarction involving right coronary artery (HCC)    Hypertension    CAD (coronary artery disease)    Acute ST elevation myocardial infarction (STEMI) of inferior wall (HCC)    Sleep apnea    Insomnia with sleep apnea       Objective:  /97   Pulse 83   Ht 5' 6" (1 676 m)   Wt 93 kg (205 lb)   BMI 33 09 kg/m²     Left Elbow Exam     Tenderness   The patient is experiencing tenderness in the lateral epicondyle  Range of Motion   The patient has normal left elbow ROM  Other   Erythema: absent  Sensation: normal  Pulse: present    Comments:  Pain reproduced with resisted 3rd digit extension, handshake            Physical Exam   Constitutional: He is oriented to person, place, and time  He appears well-developed and well-nourished  HENT:   Head: Normocephalic and atraumatic  Eyes: Conjunctivae are normal    Neck: Neck supple  Pulmonary/Chest: Effort normal    Neurological: He is alert and oriented to person, place, and time  Skin: Skin is warm and dry  Psychiatric: He has a normal mood and affect  His behavior is normal    Vitals reviewed  Neurologic Exam     Mental Status   Oriented to person, place, and time  Procedures    I have personally reviewed the written report of the pertinent studies       Xray Elbow WNL

## 2020-07-15 DIAGNOSIS — I25.10 CAD (CORONARY ARTERY DISEASE): ICD-10-CM

## 2020-07-15 DIAGNOSIS — I10 ESSENTIAL HYPERTENSION: ICD-10-CM

## 2020-07-15 DIAGNOSIS — I21.19 ACUTE ST ELEVATION MYOCARDIAL INFARCTION (STEMI) OF INFERIOR WALL (HCC): ICD-10-CM

## 2020-07-16 RX ORDER — METOPROLOL TARTRATE 50 MG/1
50 TABLET, FILM COATED ORAL EVERY 12 HOURS SCHEDULED
Qty: 60 TABLET | Refills: 6 | Status: SHIPPED | OUTPATIENT
Start: 2020-07-16 | End: 2020-09-07 | Stop reason: SDUPTHER

## 2020-08-04 NOTE — PROGRESS NOTES
Visits: 10  Patient name: Silvina Wooten                                       : 1970  Age: 52 y o  MRN: [id number]  Referring Physician: Jovi Minor PA-C  Cardiologist: Ben Fischer DO  Provider: Chao Long  Clinician: Lele Munoz MS, CEP, CCRP    Pt has not returned to cardiac rehab since services resumed on May 11 following COVID closure  Attempt was made to reschedule  Pt is not comfortable returning at this time  At this time the Pt is being discharged from cardiac rehab  A new referral will be obtained if Pt would like to resume in the future

## 2020-08-14 ENCOUNTER — OFFICE VISIT (OUTPATIENT)
Dept: OBGYN CLINIC | Facility: MEDICAL CENTER | Age: 50
End: 2020-08-14
Payer: COMMERCIAL

## 2020-08-14 VITALS
BODY MASS INDEX: 32.95 KG/M2 | HEART RATE: 74 BPM | WEIGHT: 205 LBS | TEMPERATURE: 98.5 F | SYSTOLIC BLOOD PRESSURE: 138 MMHG | HEIGHT: 66 IN | RESPIRATION RATE: 18 BRPM | DIASTOLIC BLOOD PRESSURE: 70 MMHG

## 2020-08-14 DIAGNOSIS — M77.12 LATERAL EPICONDYLITIS OF LEFT ELBOW: Primary | ICD-10-CM

## 2020-08-14 PROCEDURE — 3078F DIAST BP <80 MM HG: CPT | Performed by: EMERGENCY MEDICINE

## 2020-08-14 PROCEDURE — 99213 OFFICE O/P EST LOW 20 MIN: CPT | Performed by: EMERGENCY MEDICINE

## 2020-08-14 PROCEDURE — 1036F TOBACCO NON-USER: CPT | Performed by: EMERGENCY MEDICINE

## 2020-08-14 PROCEDURE — 3075F SYST BP GE 130 - 139MM HG: CPT | Performed by: EMERGENCY MEDICINE

## 2020-08-14 PROCEDURE — 3008F BODY MASS INDEX DOCD: CPT | Performed by: EMERGENCY MEDICINE

## 2020-08-14 NOTE — PROGRESS NOTES
Assessment/Plan:    Diagnoses and all orders for this visit:    Lateral epicondylitis of left elbow        Return if symptoms worsen or fail to improve  Chief Complaint:     Chief Complaint   Patient presents with    Left Elbow - Follow-up       Subjective:   Patient ID: Leticia Zamarripa is a 48 y o  male  Patient returns with improved symptoms, did not take Medrol dose pack nor start PT  He stopped using braces  He is happy with improvement, he still gets some discomfort with random movements  Mild intermittent n/t left forearm  Initial note:  NP presents for several weeks of left lateral elbow pain worse with flexion/extension, denies injury, was evaluate in ER, Xrays wnl, he has been using diclofenac cream and this causes a burning pain when applying  Denies n/t  Review of Systems    The following portions of the patient's chart were reviewed and updated as appropriate: Allergy:  No Known Allergies      Past Medical History:   Diagnosis Date    Acute ST elevation myocardial infarction (STEMI) of inferior wall ([facility])     cath with PCI/JUAN - RCA 2/7/2020    Back pain     CAD (coronary artery disease)     COPD (chronic obstructive pulmonary disease) ([facility])     Hyperlipidemia     Hypertension     Inguinal hernia, left     Mild heartburn     Obesity     Stroke ([address] ) approx 2015    Denies residual problems  Initially he only had trouble with two fingers on left side      Wears glasses        Past Surgical History:   Procedure Laterality Date    APPENDECTOMY      HERNIA REPAIR      NO PAST SURGERIES      [place] LAP,APPENDECTOMY N/A 11/28/2018    Procedure: APPENDECTOMY LAPAROSCOPIC;  Surgeon: Ary Najjar, MD;  Location: [address] [place] [address] OR;  Service: General    [place] [address] Left 4/2/2018    Procedure: LAPAROSCOPIC INGUINAL HERNIA REPAIR WITH MESH;  Surgeon: Ary Najjar, MD;  Location: [facility] OR;  Service: General       Social History     Socioeconomic History  Marital status: Single     Spouse name: Not on file    Number of children: Not on file    Years of education: Not on file    Highest education level: Not on file   Occupational History    Not on file   Social Needs    Financial resource strain: Not on file    Food insecurity     Worry: Not on file     Inability: Not on file    Transportation needs     Medical: Not on file     Non-medical: Not on file   Tobacco Use    Smoking status: Former Smoker     Packs/day: 1 00     Years: 15 00     Pack years: 15 00     Types: Cigarettes     Last attempt to quit: 2010     Years since quitting: 10 6    Smokeless tobacco: Never Used   Substance and Sexual Activity    Alcohol use: Not Currently    Drug use: No    Sexual activity: Not on file   Lifestyle    Physical activity     Days per week: Not on file     Minutes per session: Not on file    Stress: Not on file   Relationships    Social connections     Talks on phone: Not on file     Gets together: Not on file     Attends Confucianism service: Not on file     Active member of club or organization: Not on file     Attends meetings of clubs or organizations: Not on file     Relationship status: Not on file    Intimate partner violence     Fear of current or ex partner: Not on file     Emotionally abused: Not on file     Physically abused: Not on file     Forced sexual activity: Not on file   Other Topics Concern    Not on file   Social History Narrative    Not on file       History reviewed  No pertinent family history      Medications:    Current Outpatient Medications:     aspirin 81 mg chewable tablet, Chew 1 tablet (81 mg total) daily, Disp: , Rfl: 0    atorvastatin (LIPITOR) 80 mg tablet, Take 1 tablet (80 mg total) by mouth every evening, Disp: 30 tablet, Rfl: 5    diclofenac sodium (VOLTAREN) 1 %, Apply 2 g topically 4 (four) times a day as needed (Elbow pain), Disp: 100 g, Rfl: 0    hydrochlorothiazide (HYDRODIURIL) 25 mg tablet, Take 1 tablet (25 mg total) by mouth daily, Disp: 90 tablet, Rfl: 3    methylPREDNISolone 4 MG tablet therapy pack, Use as directed on package, Disp: 1 each, Rfl: 0    metoprolol tartrate (LOPRESSOR) 50 mg tablet, Take 1 tablet (50 mg total) by mouth every 12 (twelve) hours, Disp: 60 tablet, Rfl: 6    nitroglycerin (NITROSTAT) 0 4 mg SL tablet, Place 1 tablet (0 4 mg total) under the tongue every 5 (five) minutes as needed for chest pain If no relief after 3 tablets, come immediately to ER, Disp: 100 tablet, Rfl: 1    ticagrelor (BRILINTA) 90 MG, Take 1 tablet (90 mg total) by mouth every 12 (twelve) hours, Disp: 60 tablet, Rfl: 11    Patient Active Problem List   Diagnosis    Indirect inguinal hernia    Dizziness    Benign hypertension with CKD (chronic kidney disease), stage II    Hyperlipidemia    CKD (chronic kidney disease), stage II    Hypokalemia    Left arm pain    Bilateral low back pain without sciatica    ST elevation myocardial infarction involving right coronary artery (HCC)    Hypertension    CAD (coronary artery disease)    Acute ST elevation myocardial infarction (STEMI) of inferior wall (HCC)    Sleep apnea    Insomnia with sleep apnea       Objective:  /70   Pulse 74   Temp 98 5 °F (36 9 °C)   Resp 18   Ht 5' 6" (1 676 m)   Wt 93 kg (205 lb)   BMI 33 09 kg/m²     Left Elbow Exam     Range of Motion   The patient has normal left elbow ROM  Other   Erythema: absent  Sensation: normal    Comments:  Normal inspection  Pain reproduced with resisted 3rd digit extension and wrist extension            Physical Exam      Neurologic Exam    Procedures    I have personally reviewed the written report of the pertinent studies       LEFT ELBOW     INDICATION:   Elbow pain, no trauma      COMPARISON:  None     VIEWS:  XR ELBOW 3+ VW LEFT         FINDINGS:     There is no acute fracture or dislocation      There is no joint effusion      No significant degenerative changes      No lytic or blastic osseous lesion      Soft tissues are unremarkable      IMPRESSION:     No acute osseous abnormality

## 2020-08-26 DIAGNOSIS — E78.5 HYPERLIPIDEMIA, UNSPECIFIED HYPERLIPIDEMIA TYPE: ICD-10-CM

## 2020-08-26 DIAGNOSIS — I25.10 CAD (CORONARY ARTERY DISEASE): ICD-10-CM

## 2020-08-26 DIAGNOSIS — I21.19 ACUTE ST ELEVATION MYOCARDIAL INFARCTION (STEMI) OF INFERIOR WALL (HCC): ICD-10-CM

## 2020-08-26 RX ORDER — ATORVASTATIN CALCIUM 80 MG/1
80 TABLET, FILM COATED ORAL EVERY EVENING
Qty: 90 TABLET | Refills: 3 | Status: SHIPPED | OUTPATIENT
Start: 2020-08-26 | End: 2021-02-23 | Stop reason: SDUPTHER

## 2020-09-05 ENCOUNTER — HOSPITAL ENCOUNTER (EMERGENCY)
Facility: HOSPITAL | Age: 50
Discharge: HOME | End: 2020-09-06
Attending: EMERGENCY MEDICINE
Payer: COMMERCIAL

## 2020-09-05 DIAGNOSIS — E87.6 HYPOKALEMIA: ICD-10-CM

## 2020-09-05 DIAGNOSIS — R53.1 GENERALIZED WEAKNESS: Primary | ICD-10-CM

## 2020-09-05 LAB
ANION GAP SERPL CALCULATED.3IONS-SCNC: 9 MMOL/L (ref 4–13)
BASOPHILS # BLD AUTO: 0.07 THOUSANDS/ΜL (ref 0–0.1)
BASOPHILS NFR BLD AUTO: 1 % (ref 0–1)
BUN SERPL-MCNC: 13 MG/DL (ref 5–25)
CALCIUM SERPL-MCNC: 9 MG/DL (ref 8.3–10.1)
CHLORIDE SERPL-SCNC: 97 MMOL/L (ref 100–108)
CO2 SERPL-SCNC: 28 MMOL/L (ref 21–32)
CREAT SERPL-MCNC: 1.27 MG/DL (ref 0.6–1.3)
EOSINOPHIL # BLD AUTO: 0.22 THOUSAND/ΜL (ref 0–0.61)
EOSINOPHIL NFR BLD AUTO: 3 % (ref 0–6)
ERYTHROCYTE [DISTWIDTH] IN BLOOD BY AUTOMATED COUNT: 12.2 % (ref 11.6–15.1)
GFR SERPL CREATININE-BSD FRML MDRD: 65 ML/MIN/1.73SQ M
GLUCOSE SERPL-MCNC: 114 MG/DL (ref 65–140)
HCT VFR BLD AUTO: 47.2 % (ref 36.5–49.3)
HGB BLD-MCNC: 16.7 G/DL (ref 12–17)
IMM GRANULOCYTES # BLD AUTO: 0.04 THOUSAND/UL (ref 0–0.2)
IMM GRANULOCYTES NFR BLD AUTO: 0 % (ref 0–2)
LYMPHOCYTES # BLD AUTO: 1.5 THOUSANDS/ΜL (ref 0.6–4.47)
LYMPHOCYTES NFR BLD AUTO: 17 % (ref 14–44)
MAGNESIUM SERPL-MCNC: 1.6 MG/DL (ref 1.6–2.6)
MCH RBC QN AUTO: 31.3 PG (ref 26.8–34.3)
MCHC RBC AUTO-ENTMCNC: 35.4 G/DL (ref 31.4–37.4)
MCV RBC AUTO: 89 FL (ref 82–98)
MONOCYTES # BLD AUTO: 0.61 THOUSAND/ΜL (ref 0.17–1.22)
MONOCYTES NFR BLD AUTO: 7 % (ref 4–12)
NEUTROPHILS # BLD AUTO: 6.5 THOUSANDS/ΜL (ref 1.85–7.62)
NEUTS SEG NFR BLD AUTO: 72 % (ref 43–75)
NRBC BLD AUTO-RTO: 0 /100 WBCS
PLATELET # BLD AUTO: 256 THOUSANDS/UL (ref 149–390)
PMV BLD AUTO: 10 FL (ref 8.9–12.7)
POTASSIUM SERPL-SCNC: 2.8 MMOL/L (ref 3.5–5.3)
RBC # BLD AUTO: 5.33 MILLION/UL (ref 3.88–5.62)
SODIUM SERPL-SCNC: 134 MMOL/L (ref 136–145)
TROPONIN I SERPL-MCNC: <0.02 NG/ML
TSH SERPL DL<=0.05 MIU/L-ACNC: 2.87 UIU/ML (ref 0.36–3.74)
WBC # BLD AUTO: 8.94 THOUSAND/UL (ref 4.31–10.16)

## 2020-09-05 PROCEDURE — 93005 ELECTROCARDIOGRAM TRACING: CPT

## 2020-09-05 PROCEDURE — 83735 ASSAY OF MAGNESIUM: CPT | Performed by: PHYSICIAN ASSISTANT

## 2020-09-05 PROCEDURE — 85025 COMPLETE CBC W/AUTO DIFF WBC: CPT | Performed by: PHYSICIAN ASSISTANT

## 2020-09-05 PROCEDURE — 80048 BASIC METABOLIC PNL TOTAL CA: CPT | Performed by: PHYSICIAN ASSISTANT

## 2020-09-05 PROCEDURE — 96365 THER/PROPH/DIAG IV INF INIT: CPT

## 2020-09-05 PROCEDURE — 84484 ASSAY OF TROPONIN QUANT: CPT | Performed by: PHYSICIAN ASSISTANT

## 2020-09-05 PROCEDURE — 99285 EMERGENCY DEPT VISIT HI MDM: CPT | Performed by: EMERGENCY MEDICINE

## 2020-09-05 PROCEDURE — 84443 ASSAY THYROID STIM HORMONE: CPT | Performed by: PHYSICIAN ASSISTANT

## 2020-09-05 PROCEDURE — 36415 COLL VENOUS BLD VENIPUNCTURE: CPT | Performed by: PHYSICIAN ASSISTANT

## 2020-09-05 PROCEDURE — 99285 EMERGENCY DEPT VISIT HI MDM: CPT

## 2020-09-05 PROCEDURE — 96361 HYDRATE IV INFUSION ADD-ON: CPT

## 2020-09-05 RX ORDER — POTASSIUM CHLORIDE 20 MEQ/1
20 TABLET, EXTENDED RELEASE ORAL ONCE
Status: COMPLETED | OUTPATIENT
Start: 2020-09-05 | End: 2020-09-05

## 2020-09-05 RX ORDER — POTASSIUM CHLORIDE 14.9 MG/ML
20 INJECTION INTRAVENOUS ONCE
Status: COMPLETED | OUTPATIENT
Start: 2020-09-05 | End: 2020-09-06

## 2020-09-05 RX ADMIN — POTASSIUM CHLORIDE 20 MEQ: 14.9 INJECTION, SOLUTION INTRAVENOUS at 23:53

## 2020-09-05 RX ADMIN — SODIUM CHLORIDE 1000 ML: 0.9 INJECTION, SOLUTION INTRAVENOUS at 22:55

## 2020-09-05 RX ADMIN — POTASSIUM CHLORIDE 20 MEQ: 1500 TABLET, EXTENDED RELEASE ORAL at 23:52

## 2020-09-06 VITALS
DIASTOLIC BLOOD PRESSURE: 59 MMHG | HEART RATE: 63 BPM | SYSTOLIC BLOOD PRESSURE: 127 MMHG | WEIGHT: 209.44 LBS | OXYGEN SATURATION: 95 % | TEMPERATURE: 97.2 F | RESPIRATION RATE: 18 BRPM | BODY MASS INDEX: 33.8 KG/M2

## 2020-09-06 LAB
ATRIAL RATE: 77 BPM
ATRIAL RATE: 78 BPM
P AXIS: 46 DEGREES
P AXIS: 51 DEGREES
PR INTERVAL: 146 MS
PR INTERVAL: 150 MS
QRS AXIS: 44 DEGREES
QRS AXIS: 48 DEGREES
QRSD INTERVAL: 102 MS
QRSD INTERVAL: 98 MS
QT INTERVAL: 376 MS
QT INTERVAL: 378 MS
QTC INTERVAL: 425 MS
QTC INTERVAL: 430 MS
T WAVE AXIS: 35 DEGREES
T WAVE AXIS: 36 DEGREES
VENTRICULAR RATE: 77 BPM
VENTRICULAR RATE: 78 BPM

## 2020-09-06 PROCEDURE — 96366 THER/PROPH/DIAG IV INF ADDON: CPT

## 2020-09-06 PROCEDURE — 93010 ELECTROCARDIOGRAM REPORT: CPT | Performed by: INTERNAL MEDICINE

## 2020-09-06 RX ORDER — POTASSIUM CHLORIDE 750 MG/1
10 TABLET, EXTENDED RELEASE ORAL 2 TIMES DAILY
Qty: 14 TABLET | Refills: 0 | Status: SHIPPED | OUTPATIENT
Start: 2020-09-06 | End: 2021-03-31

## 2020-09-06 NOTE — ED PROVIDER NOTES
History  Chief Complaint   Patient presents with    Weakness - Generalized     reports generalized weakness x1hr  "i feel better now, i just Maury Swann make sure everythings ok"     Ivone Cantor is a 27-year-old male, with past medical history of hypertension, hyperlipidemia, COPD, presenting with generalized weakness and fatigue, as well as lightheadedness which she reports began approximately 1 hour prior to arrival   Patient states he was sitting down relaxing at home when the symptoms began  Patient denies associated chest pain or shortness of breath  Denies palpitations or perceived ectopy  Denies headache, visual changes, neck pain or stiffness  Denies focal tingling or numbness in face or extremities  Denies confusion or slurred speech  Pt describes lightheadedness as feeling  "like I could pass out if I stood up"  Denies vertigo or sensation of spinning  Denies illicit substance use or recent alcohol consumption  History provided by:  Patient   used: No    Fatigue   Duration:  1 hour  Chronicity:  New  Context: not alcohol use, not change in medication, not drug use, not recent infection and not urinary tract infection    Relieved by:  None tried  Worsened by:  Nothing  Ineffective treatments:  None tried  Associated symptoms: no abdominal pain, no aphasia, no chest pain, no cough, no diarrhea, no difficulty walking, no dizziness, no dysphagia, no dysuria, no numbness in extremities, no fever, no foul-smelling urine, no frequency, no headaches, no loss of consciousness, no melena, no myalgias, no nausea, no shortness of breath, no urgency, no vision change and no vomiting    Risk factors: coronary artery disease and heart disease        Prior to Admission Medications   Prescriptions Last Dose Informant Patient Reported?  Taking?   aspirin 81 mg chewable tablet  Self No Yes   Sig: Chew 1 tablet (81 mg total) daily   atorvastatin (LIPITOR) 80 mg tablet   No Yes   Sig: Take 1 tablet (80 mg total) by mouth every evening   diclofenac sodium (VOLTAREN) 1 % Not Taking at Unknown time  No No   Sig: Apply 2 g topically 4 (four) times a day as needed (Elbow pain)   Patient not taking: Reported on 9/5/2020   hydrochlorothiazide (HYDRODIURIL) 25 mg tablet  Self No Yes   Sig: Take 1 tablet (25 mg total) by mouth daily   methylPREDNISolone 4 MG tablet therapy pack   No No   Sig: Use as directed on package   metoprolol tartrate (LOPRESSOR) 50 mg tablet   No Yes   Sig: Take 1 tablet (50 mg total) by mouth every 12 (twelve) hours   nitroglycerin (NITROSTAT) 0 4 mg SL tablet  Self No Yes   Sig: Place 1 tablet (0 4 mg total) under the tongue every 5 (five) minutes as needed for chest pain If no relief after 3 tablets, come immediately to ER   ticagrelor (BRILINTA) 90 MG  Self No Yes   Sig: Take 1 tablet (90 mg total) by mouth every 12 (twelve) hours      Facility-Administered Medications: None       Past Medical History:   Diagnosis Date    Acute ST elevation myocardial infarction (STEMI) of inferior wall ([facility])     cath with PCI/JUAN - RCA 2/7/2020    Back pain     CAD (coronary artery disease)     COPD (chronic obstructive pulmonary disease) ([facility])     Hyperlipidemia     Hypertension     Inguinal hernia, left     Mild heartburn     Obesity     Stroke ([address] ) approx 2015    Denies residual problems  Initially he only had trouble with two fingers on left side   Wears glasses        Past Surgical History:   Procedure Laterality Date    APPENDECTOMY      HERNIA REPAIR      NO PAST SURGERIES      PA LAP,APPENDECTOMY N/A 11/28/2018    Procedure: APPENDECTOMY LAPAROSCOPIC;  Surgeon: Nasir Avilez MD;  Location: [facility] MAIN OR;  Service: General    PA [address] Left 4/2/2018    Procedure: LAPAROSCOPIC INGUINAL HERNIA REPAIR WITH MESH;  Surgeon: Nasir Avilez MD;  Location: [facility] Main OR;  Service: General       History reviewed  No pertinent family history    I have reviewed and agree with the history as documented  E-Cigarette/Vaping    E-Cigarette Use Never User      E-Cigarette/Vaping Substances    Nicotine No     THC No     CBD No     Flavoring No     Other No     Unknown No      Social History     Tobacco Use    Smoking status: Former Smoker     Packs/day: 1 00     Years: 15 00     Pack years: 15 00     Types: Cigarettes     Last attempt to quit: 2010     Years since quitting: 10 6    Smokeless tobacco: Never Used   Substance Use Topics    Alcohol use: Not Currently    Drug use: No       Review of Systems   Constitutional: Positive for fatigue  Negative for chills and fever  HENT: Negative for congestion, ear discharge, ear pain, mouth sores, rhinorrhea, sinus pressure, sinus pain, sore throat and voice change  Eyes: Negative for pain, redness and visual disturbance  Respiratory: Negative for cough, chest tightness, shortness of breath and wheezing  Cardiovascular: Negative for chest pain and palpitations  Gastrointestinal: Negative for abdominal pain, constipation, diarrhea, dysphagia, melena, nausea and vomiting  Genitourinary: Negative for dysuria, frequency and urgency  Musculoskeletal: Negative for myalgias, neck pain and neck stiffness  Skin: Negative for pallor, rash and wound  Neurological: Positive for weakness and light-headedness  Negative for dizziness, loss of consciousness, syncope, speech difficulty, numbness and headaches  Physical Exam  Physical Exam  Vitals signs and nursing note reviewed  Constitutional:       General: He is not in acute distress  Appearance: He is well-developed  He is not diaphoretic  HENT:      Head: Normocephalic and atraumatic  Right Ear: Tympanic membrane, ear canal and external ear normal       Left Ear: Tympanic membrane, ear canal and external ear normal       Nose: Nose normal       Mouth/Throat:      Pharynx: No oropharyngeal exudate  Eyes:      General:         Right eye: No discharge  Left eye: No discharge  Conjunctiva/sclera: Conjunctivae normal       Pupils: Pupils are equal, round, and reactive to light  Neck:      Musculoskeletal: Normal range of motion and neck supple  Cardiovascular:      Rate and Rhythm: Normal rate and regular rhythm  Heart sounds: Normal heart sounds  No murmur  No friction rub  No gallop  Comments: No LE edema or calf TTP  Negative Elizabeth's b/l  Pulmonary:      Effort: Pulmonary effort is normal  No respiratory distress  Breath sounds: Normal breath sounds  No stridor  No wheezing or rales  Abdominal:      General: Bowel sounds are normal  There is no distension  Palpations: Abdomen is soft  Tenderness: There is no abdominal tenderness  There is no guarding  Lymphadenopathy:      Cervical: No cervical adenopathy  Skin:     General: Skin is warm and dry  Capillary Refill: Capillary refill takes less than 2 seconds  Coloration: Skin is not pale  Findings: No erythema or rash  Neurological:      Mental Status: He is alert and oriented to person, place, and time  Motor: No abnormal muscle tone  Coordination: Coordination normal       Comments: Cranial nerves 2-12 grossly intact  EOMs preserved  No nystagmus or strabismus  5/5 strength to bilateral upper and lower extremities  Intact, symmetric sensation to sharp and dull touch to bilateral face/neck, torso, and upper lower extremities  Intact coordination in finger-nose and heel-shin testing  Normal gait  Psychiatric:         Behavior: Behavior normal          Thought Content:  Thought content normal          Judgment: Judgment normal          Vital Signs  ED Triage Vitals   Temperature Pulse Respirations Blood Pressure SpO2   09/05/20 2223 09/05/20 2223 09/05/20 2223 09/05/20 2223 09/05/20 2223   (!) 97 2 °F (36 2 °C) 101 16 149/96 97 %      Temp Source Heart Rate Source Patient Position - Orthostatic VS BP Location FiO2 (%)   09/05/20 2223 09/06/20 0043 09/05/20 2223 09/05/20 2223 --   Temporal Monitor Sitting Right arm       Pain Score       09/05/20 2223       No Pain           Vitals:    09/05/20 2223 09/06/20 0043 09/06/20 0207   BP: 149/96 127/65 127/59   Pulse: 101 77 63   Patient Position - Orthostatic VS: Sitting Lying Lying         Visual Acuity      ED Medications  Medications   sodium chloride 0 9 % bolus 1,000 mL (0 mL Intravenous Stopped 9/5/20 2356)   potassium chloride (K-DUR,KLOR-CON) CR tablet 20 mEq (20 mEq Oral Given 9/5/20 2352)   potassium chloride 20 mEq IVPB (premix) (0 mEq Intravenous Stopped 9/6/20 0204)       Diagnostic Studies  Results Reviewed     Procedure Component Value Units Date/Time    Basic metabolic panel [[id number]]  (Abnormal) Collected:  09/05/20 2249    Lab Status:  Final result Specimen:  Blood from Arm, Left Updated:  09/05/20 2320     Sodium 134 mmol/L      Potassium 2 8 mmol/L      Chloride 97 mmol/L      CO2 28 mmol/L      ANION GAP 9 mmol/L      BUN 13 mg/dL      Creatinine 1 27 mg/dL      Glucose 114 mg/dL      Calcium 9 0 mg/dL      eGFR 65 ml/min/1 73sq m     Narrative:       [place] guidelines for Chronic Kidney Disease (CKD):     Stage 1 with normal or high GFR (GFR > 90 mL/min/1 73 square meters)    Stage 2 Mild CKD (GFR = 60-89 mL/min/1 73 square meters)    Stage 3A Moderate CKD (GFR = 45-59 mL/min/1 73 square meters)    Stage 3B Moderate CKD (GFR = 30-44 mL/min/1 73 square meters)    Stage 4 Severe CKD (GFR = 15-29 mL/min/1 73 square meters)    Stage 5 End Stage CKD (GFR <15 mL/min/1 73 square meters)  Note: GFR calculation is accurate only with a steady state creatinine    Magnesium [[id number]]  (Normal) Collected:  09/05/20 2249    Lab Status:  Final result Specimen:  Blood from Arm, Left Updated:  09/05/20 2320     Magnesium 1 6 mg/dL     TSH, 3rd generation with Free T4 reflex [[id number]]  (Normal) Collected:  09/05/20 2249    Lab Status:  Final result Specimen: Blood from Arm, Left Updated:  09/05/20 2320     TSH 3RD GENERATON 2 872 uIU/mL     Narrative:       Patients undergoing fluorescein dye angiography may retain small amounts of fluorescein in the body for 48-72 hours post procedure  Samples containing fluorescein can produce falsely depressed TSH values  If the patient had this procedure,a specimen should be resubmitted post fluorescein clearance        Troponin I [[id number]]  (Normal) Collected:  09/05/20 2249    Lab Status:  Final result Specimen:  Blood from Arm, Left Updated:  09/05/20 2313     Troponin I <0 02 ng/mL     CBC and differential [[id number]] Collected:  09/05/20 2249    Lab Status:  Final result Specimen:  Blood from Arm, Left Updated:  09/05/20 2300     WBC 8 94 Thousand/uL      RBC 5 33 Million/uL      Hemoglobin 16 7 g/dL      Hematocrit 47 2 %      MCV 89 fL      MCH 31 3 pg      MCHC 35 4 g/dL      RDW 12 2 %      MPV 10 0 fL      Platelets 951 Thousands/uL      nRBC 0 /100 WBCs      Neutrophils Relative 72 %      Immat GRANS % 0 %      Lymphocytes Relative 17 %      Monocytes Relative 7 %      Eosinophils Relative 3 %      Basophils Relative 1 %      Neutrophils Absolute 6 50 Thousands/µL      Immature Grans Absolute 0 04 Thousand/uL      Lymphocytes Absolute 1 50 Thousands/µL      Monocytes Absolute 0 61 Thousand/µL      Eosinophils Absolute 0 22 Thousand/µL      Basophils Absolute 0 07 Thousands/µL                  No orders to display              Procedures  ECG 12 Lead Documentation Only    Date/Time: 9/5/2020 10:55 PM  Performed by: Lesvia Andrea PA-C  Authorized by: Lesvia Andrea PA-C     Indications / Diagnosis:  Lightheadedness/weakness  ECG reviewed by me, the ED Provider: yes    Patient location:  ED  Previous ECG:     Previous ECG:  Compared to current    Similarity:  No change    Comparison to cardiac monitor: Yes    Interpretation:     Interpretation: normal    Rate:     ECG rate:  78    ECG rate assessment: normal Rhythm:     Rhythm: sinus rhythm    Ectopy:     Ectopy: none    QRS:     QRS axis:  Normal    QRS intervals:  Normal  Conduction:     Conduction: normal    ST segments:     ST segments:  Normal  T waves:     T waves: normal               ED Course  ED Course as of Sep 06 1612   Sun Sep 06, 2020   0042 Pt signed out to Chely Valencia for disposition  MDM  Number of Diagnoses or Management Options  Generalized weakness:   Hypokalemia:   Diagnosis management comments: Generalized fatigue, weakness which began approximately 1 hour ago while patient was relaxing at home  Associated lightheadedness  No sensation of vertigo/spinning  No focal neurologic symptoms or deficits by exam   No headache or neck pain  No associated symptoms to suggest cardiac ischemia, however given hx of CAD will check cardiac labs/EKG, will check basic labs, TSH, mag, provide fluids, reassess  ----------------------------  Pt moderately hypokalemic on exam, will replete orally and by IV  Remainder of basic labs grossly WNL  EKG shows NSR, trop WNL  Pt reports moderate improvement in symptoms during ED course  Pt signed out to Chely Valencia PA-C for disposition following administration of IV potassium/fluids  Amount and/or Complexity of Data Reviewed  Clinical lab tests: ordered    Patient Progress  Patient progress: stable        Disposition  Final diagnoses:   Generalized weakness   Hypokalemia     Time reflects when diagnosis was documented in both MDM as applicable and the Disposition within this note     Time User Action Codes Description Comment    9/6/2020  1:55 AM Graeme Meals Add [R53 1] Generalized weakness     9/6/2020  1:55 AM Graeme Meals Add [E87 6] Hypokalemia       ED Disposition     ED Disposition Condition Date/Time Comment    Discharge Good Sun Sep 6, 2020  1:55 AM Leanora Locket discharge to home/self care              Follow-up Information     Follow up With Specialties Details Why Contact Info    Prince Aruaz MD Family Medicine Schedule an appointment as soon as possible for a visit today  [address] [id number] [address]  [phone]            Discharge Medication List as of 9/6/2020  1:56 AM      START taking these medications    Details   potassium chloride (K-DUR,KLOR-CON) 10 mEq tablet Take 1 tablet (10 mEq total) by mouth 2 (two) times a day for 7 days, Starting Sun 9/6/2020, Until Sun 9/13/2020, Print         CONTINUE these medications which have NOT CHANGED    Details   aspirin 81 mg chewable tablet Chew 1 tablet (81 mg total) daily, Starting Sun 2/9/2020, No Print      atorvastatin (LIPITOR) 80 mg tablet Take 1 tablet (80 mg total) by mouth every evening, Starting Wed 8/26/2020, Normal      hydrochlorothiazide (HYDRODIURIL) 25 mg tablet Take 1 tablet (25 mg total) by mouth daily, Starting Mon 12/16/2019, Normal      metoprolol tartrate (LOPRESSOR) 50 mg tablet Take 1 tablet (50 mg total) by mouth every 12 (twelve) hours, Starting Thu 7/16/2020, Normal      nitroglycerin (NITROSTAT) 0 4 mg SL tablet Place 1 tablet (0 4 mg total) under the tongue every 5 (five) minutes as needed for chest pain If no relief after 3 tablets, come immediately to ER, Starting Wed 2/12/2020, Normal      ticagrelor (BRILINTA) 90 MG Take 1 tablet (90 mg total) by mouth every 12 (twelve) hours, Starting Sun 2/9/2020, Normal      diclofenac sodium (VOLTAREN) 1 % Apply 2 g topically 4 (four) times a day as needed (Elbow pain), Starting Wed 6/24/2020, Normal      methylPREDNISolone 4 MG tablet therapy pack Use as directed on package, Normal           No discharge procedures on file      PDMP Review     None          ED Provider  Electronically Signed by           Marisa Narayan PA-C  09/06/20 0250

## 2020-09-07 ENCOUNTER — HOSPITAL ENCOUNTER (EMERGENCY)
Facility: HOSPITAL | Age: 50
Discharge: HOME | End: 2020-09-07
Attending: EMERGENCY MEDICINE | Admitting: EMERGENCY MEDICINE
Payer: COMMERCIAL

## 2020-09-07 ENCOUNTER — HOSPITAL ENCOUNTER (EMERGENCY)
Facility: HOSPITAL | Age: 50
Discharge: HOME | End: 2020-09-07
Attending: EMERGENCY MEDICINE
Payer: COMMERCIAL

## 2020-09-07 VITALS
SYSTOLIC BLOOD PRESSURE: 157 MMHG | TEMPERATURE: 96 F | WEIGHT: 207.23 LBS | HEART RATE: 69 BPM | DIASTOLIC BLOOD PRESSURE: 82 MMHG | OXYGEN SATURATION: 95 % | BODY MASS INDEX: 33.45 KG/M2 | RESPIRATION RATE: 18 BRPM

## 2020-09-07 VITALS
BODY MASS INDEX: 34.41 KG/M2 | OXYGEN SATURATION: 93 % | TEMPERATURE: 96.9 F | DIASTOLIC BLOOD PRESSURE: 86 MMHG | SYSTOLIC BLOOD PRESSURE: 139 MMHG | HEART RATE: 89 BPM | WEIGHT: 213.19 LBS | RESPIRATION RATE: 16 BRPM

## 2020-09-07 DIAGNOSIS — Z76.0 MEDICATION REFILL: Primary | ICD-10-CM

## 2020-09-07 DIAGNOSIS — I10 HYPERTENSION, UNSPECIFIED TYPE: ICD-10-CM

## 2020-09-07 DIAGNOSIS — Z76.0 ENCOUNTER FOR MEDICATION REFILL: Primary | ICD-10-CM

## 2020-09-07 DIAGNOSIS — I10 ESSENTIAL HYPERTENSION: ICD-10-CM

## 2020-09-07 DIAGNOSIS — I21.19 ACUTE ST ELEVATION MYOCARDIAL INFARCTION (STEMI) OF INFERIOR WALL (HCC): ICD-10-CM

## 2020-09-07 DIAGNOSIS — I25.10 CAD (CORONARY ARTERY DISEASE): ICD-10-CM

## 2020-09-07 PROCEDURE — 99284 EMERGENCY DEPT VISIT MOD MDM: CPT | Performed by: PHYSICIAN ASSISTANT

## 2020-09-07 PROCEDURE — 99283 EMERGENCY DEPT VISIT LOW MDM: CPT | Performed by: PHYSICIAN ASSISTANT

## 2020-09-07 PROCEDURE — 99281 EMR DPT VST MAYX REQ PHY/QHP: CPT

## 2020-09-07 RX ORDER — HYDROCHLOROTHIAZIDE 12.5 MG/1
25 TABLET ORAL DAILY
Status: DISCONTINUED | OUTPATIENT
Start: 2020-09-08 | End: 2020-09-07

## 2020-09-07 RX ORDER — HYDROCHLOROTHIAZIDE 25 MG/1
25 TABLET ORAL DAILY
Qty: 90 TABLET | Refills: 0 | Status: SHIPPED | OUTPATIENT
Start: 2020-09-07 | End: 2020-12-02

## 2020-09-07 RX ORDER — HYDROCHLOROTHIAZIDE 12.5 MG/1
25 TABLET ORAL DAILY
Status: DISCONTINUED | OUTPATIENT
Start: 2020-09-07 | End: 2020-09-07 | Stop reason: HOSPADM

## 2020-09-07 RX ORDER — METOPROLOL TARTRATE 50 MG/1
50 TABLET, FILM COATED ORAL EVERY 12 HOURS SCHEDULED
Qty: 60 TABLET | Refills: 0 | Status: SHIPPED | OUTPATIENT
Start: 2020-09-07 | End: 2020-09-08 | Stop reason: SDUPTHER

## 2020-09-07 RX ADMIN — METOPROLOL TARTRATE 50 MG: 25 TABLET, FILM COATED ORAL at 18:12

## 2020-09-07 RX ADMIN — TICAGRELOR 90 MG: 90 TABLET ORAL at 18:12

## 2020-09-07 NOTE — ED PROVIDER NOTES
History  Chief Complaint   Patient presents with    Medication Refill     needs metoprolol tartate 50mg, hydrochlorothiazide 25mg refill, and Brilinta 90mg refill  Patient is a 51-year-old with history of CAD, hypertension, hyperlipidemia, COPD, CVA, presents emergency department for medication refill  Patient states he ran out of his medication, metoprolol tartrate 50 mg, hydrochlorothiazide 25 mg and Brilinta 90 mg b i d  Patient states he took his last dose of medication this morning, [facility], unable to obtain prescriptions  Patient denies fever, chest pain, shortness of breath, numbness/tingling, headache, focal weakness  Prior to Admission Medications   Prescriptions Last Dose Informant Patient Reported?  Taking?   aspirin 81 mg chewable tablet  Self No Yes   Sig: Chew 1 tablet (81 mg total) daily   atorvastatin (LIPITOR) 80 mg tablet   No Yes   Sig: Take 1 tablet (80 mg total) by mouth every evening   diclofenac sodium (VOLTAREN) 1 %   No No   Sig: Apply 2 g topically 4 (four) times a day as needed (Elbow pain)   Patient not taking: Reported on 9/5/2020   hydrochlorothiazide (HYDRODIURIL) 25 mg tablet  Self No Yes   Sig: Take 1 tablet (25 mg total) by mouth daily   hydrochlorothiazide (HYDRODIURIL) 25 mg tablet   No No   Sig: Take 1 tablet (25 mg total) by mouth daily   methylPREDNISolone 4 MG tablet therapy pack   No No   Sig: Use as directed on package   metoprolol tartrate (LOPRESSOR) 50 mg tablet   No Yes   Sig: Take 1 tablet (50 mg total) by mouth every 12 (twelve) hours   metoprolol tartrate (LOPRESSOR) 50 mg tablet   No No   Sig: Take 1 tablet (50 mg total) by mouth every 12 (twelve) hours   nitroglycerin (NITROSTAT) 0 4 mg SL tablet  Self No Yes   Sig: Place 1 tablet (0 4 mg total) under the tongue every 5 (five) minutes as needed for chest pain If no relief after 3 tablets, come immediately to ER   potassium chloride (K-DUR,KLOR-CON) 10 mEq tablet   No Yes   Sig: Take 1 tablet (10 mEq total) by mouth 2 (two) times a day for 7 days   ticagrelor (BRILINTA) 90 MG  Self No Yes   Sig: Take 1 tablet (90 mg total) by mouth every 12 (twelve) hours   ticagrelor (BRILINTA) 90 MG   No No   Sig: Take 1 tablet (90 mg total) by mouth every 12 (twelve) hours      Facility-Administered Medications: None       Past Medical History:   Diagnosis Date    Acute ST elevation myocardial infarction (STEMI) of inferior wall ([facility])     cath with PCI/JUAN - RCA 2/7/2020    Back pain     CAD (coronary artery disease)     COPD (chronic obstructive pulmonary disease) ([facility])     Hyperlipidemia     Hypertension     Inguinal hernia, left     Mild heartburn     Obesity     Stroke ([address] ) approx 2015    Denies residual problems  Initially he only had trouble with two fingers on left side   Wears glasses        Past Surgical History:   Procedure Laterality Date    APPENDECTOMY      HERNIA REPAIR      NO PAST SURGERIES      DE LAP,APPENDECTOMY N/A 11/28/2018    Procedure: APPENDECTOMY LAPAROSCOPIC;  Surgeon: Berta Lainez MD;  Location: [address] [place] [address] MAIN OR;  Service: General    DE [address] Left 4/2/2018    Procedure: LAPAROSCOPIC INGUINAL HERNIA REPAIR WITH MESH;  Surgeon: Berta Lainez MD;  Location[facility];  Service: General       History reviewed  No pertinent family history  I have reviewed and agree with the history as documented  E-Cigarette/Vaping    E-Cigarette Use Never User      E-Cigarette/Vaping Substances    Nicotine No     THC No     CBD No     Flavoring No     Other No     Unknown No      Social History     Tobacco Use    Smoking status: Former Smoker     Packs/day: 1 00     Years: 15 00     Pack years: 15 00     Types: Cigarettes     Last attempt to quit: 2010     Years since quitting: 10 6    Smokeless tobacco: Never Used   Substance Use Topics    Alcohol use: Not Currently    Drug use:  No Review of Systems   All other systems reviewed and are negative  Physical Exam  Physical Exam  Constitutional:       Appearance: He is well-developed  HENT:      Head: Normocephalic and atraumatic  Nose: Nose normal    Neck:      Musculoskeletal: Normal range of motion  Cardiovascular:      Rate and Rhythm: Normal rate and regular rhythm  Pulmonary:      Effort: Pulmonary effort is normal       Breath sounds: Normal breath sounds  Musculoskeletal: Normal range of motion  Skin:     General: Skin is warm and dry  Neurological:      Mental Status: He is alert and oriented to person, place, and time  Psychiatric:         Behavior: Behavior normal          Vital Signs  ED Triage Vitals [09/07/20 1322]   Temperature Pulse Respirations Blood Pressure SpO2   (!) 96 °F (35 6 °C) 69 18 157/82 95 %      Temp Source Heart Rate Source Patient Position - Orthostatic VS BP Location FiO2 (%)   Temporal Monitor Sitting Right arm --      Pain Score       --           Vitals:    09/07/20 1322   BP: 157/82   Pulse: 69   Patient Position - Orthostatic VS: Sitting         Visual Acuity      ED Medications  Medications - No data to display    Diagnostic Studies  Results Reviewed     None                 No orders to display              Procedures  Procedures         ED Course       US AUDIT      Most Recent Value   Initial Alcohol Screen: US AUDIT-C    1  How often do you have a drink containing alcohol?  0 Filed at: 09/07/2020 1325   2  How many drinks containing alcohol do you have on a typical day you are drinking? 0 Filed at: 09/07/2020 1325   3a  Male UNDER 65: How often do you have five or more drinks on one occasion? 0 Filed at: 09/07/2020 1325   3b  FEMALE Any Age, or MALE 65+: How often do you have 4 or more drinks on one occassion?   0 Filed at: 09/07/2020 1325   Audit-C Score  0 Filed at: 09/07/2020 1325                  AMBROSIO/DAST-10      Most Recent Value   How many times in the past year have you  Used an illegal drug or used a prescription medication for non-medical reasons? Never Filed at: 09/07/2020 1325                                [facility]  Number of Diagnoses or Management Options  Encounter for medication refill: minor  Diagnosis management comments: Patient is a 27-year-old with history of CAD, hypertension, hyperlipidemia, COPD, CVA, presents emergency department for medication refill  Patient states he ran out of his medication, metoprolol tartrate 50 mg, hydrochlorothiazide 25 mg and Brilinta 90 mg b i d  Patient states he took his last dose of medication this morning, [facility], unable to obtain prescriptions  Patient asymptomatic at this time  Vital signs within normal limits  Pt states "I am very frustrated with my insurance"  Rx for refills of metoprolol tartrate 50 mg, hydrochlorothiazide 25 mg and Brilinta 90 mg provided to patient to take to another pharmacy to have filled  Instructed patient if he develops any symptoms or is unable to fill his prescriptions to return emergency department  Patient verbalizes understanding and agrees with plan  The management plan was discussed in detail with the patient at bedside and all questions were answered  Prior to discharge, I provided both verbal and written instructions  I discussed with the patient the signs and symptoms for which to return to the emergency department  All questions were answered and patient was comfortable with the plan of care and discharged to home  The patient agrees to return to the Emergency Department for concerns and/or progression of illness          Disposition  Final diagnoses:   Encounter for medication refill     Time reflects when diagnosis was documented in both MDM as applicable and the Disposition within this note     Time User Action Codes Description Comment    9/7/2020  2:02 PM Ajay Castillo Add [Z76 0] Encounter for medication refill 9/7/2020  2:02 PM Debbe Byes Add [I21 19] Acute ST elevation myocardial infarction (STEMI) of inferior wall ([facility] )     9/7/2020  2:03 PM Debbe Byes Modify [I21 19] Acute ST elevation myocardial infarction (STEMI) of inferior wall ([facility] )     9/7/2020  2:03 PM Debbe Byes Modify [I21 19] Acute ST elevation myocardial infarction (STEMI) of inferior wall ([facility] )     9/7/2020  2:03 PM Debbe Byes Add [I10] Essential hypertension     9/7/2020  2:03 PM Debbe Byes Add [I25 10] CAD (coronary artery disease)     9/7/2020  2:03 PM Debbe Byes Modify [I21 19] Acute ST elevation myocardial infarction (STEMI) of inferior wall ([facility] )     9/7/2020  2:03 PM Debbe Byes Modify [I10] Essential hypertension     9/7/2020  2:03 PM Debbe Byes Modify [I25 10] CAD (coronary artery disease)     9/7/2020  2:03 PM Debbe Byes Modify [I21 19] Acute ST elevation myocardial infarction (STEMI) of inferior wall ([facility] )     9/7/2020  2:03 PM Debbe Byes Modify [I10] Essential hypertension     9/7/2020  2:03 PM Debbe Byes Modify [I25 10] CAD (coronary artery disease)     9/7/2020  2:03 PM [place], [address] Hypertension, unspecified type     9/7/2020  2:04 PM Debbe Byes Modify [I21 19] Acute ST elevation myocardial infarction (STEMI) of inferior wall ([facility] )     9/7/2020  2:04 PM Debbe Byes Modify [I10] Essential hypertension     9/7/2020  2:04 PM Debbe Byes Modify [I25 10] CAD (coronary artery disease)     9/7/2020  2:04 PM [place], [address] Hypertension, unspecified type       ED Disposition     ED Disposition Condition Date/Time Comment    Discharge Stable Mon Sep 7, 2020  2:04 PM Karen Jemima discharge to home/self care              Follow-up Information     Follow up With Specialties Details Why Contact Info    Nicolas Kemp [address]   [phone]            Discharge Medication List as of 9/7/2020  2:04 PM      CONTINUE these medications which have CHANGED    Details   hydrochlorothiazide (HYDRODIURIL) 25 mg tablet Take 1 tablet (25 mg total) by mouth daily, Starting Mon 9/7/2020, Print      metoprolol tartrate (LOPRESSOR) 50 mg tablet Take 1 tablet (50 mg total) by mouth every 12 (twelve) hours, Starting Mon 9/7/2020, Print      ticagrelor (BRILINTA) 90 MG Take 1 tablet (90 mg total) by mouth every 12 (twelve) hours, Starting Mon 9/7/2020, Print         CONTINUE these medications which have NOT CHANGED    Details   aspirin 81 mg chewable tablet Chew 1 tablet (81 mg total) daily, Starting Sun 2/9/2020, No Print      atorvastatin (LIPITOR) 80 mg tablet Take 1 tablet (80 mg total) by mouth every evening, Starting Wed 8/26/2020, Normal      diclofenac sodium (VOLTAREN) 1 % Apply 2 g topically 4 (four) times a day as needed (Elbow pain), Starting Wed 6/24/2020, Normal      methylPREDNISolone 4 MG tablet therapy pack Use as directed on package, Normal      nitroglycerin (NITROSTAT) 0 4 mg SL tablet Place 1 tablet (0 4 mg total) under the tongue every 5 (five) minutes as needed for chest pain If no relief after 3 tablets, come immediately to ER, Starting Wed 2/12/2020, Normal      potassium chloride (K-DUR,KLOR-CON) 10 mEq tablet Take 1 tablet (10 mEq total) by mouth 2 (two) times a day for 7 days, Starting Sun 9/6/2020, Until Sun 9/13/2020, Print           No discharge procedures on file      PDMP Review     None          ED Provider  Electronically Signed by           Marley Carmona PA-C  09/07/20 5163

## 2020-09-07 NOTE — ED NOTES
Patient states he took his hydrochlorothiazide today this morning        Babs Gonzalez RN  09/07/20 5841

## 2020-09-07 NOTE — ED PROVIDER NOTES
History  Chief Complaint   Patient presents with    Medication Refill     Pt reports "I ran out of heart pills, if i run out I will have a massive heart attack'     Patient is a 60-year-old with history of CAD, hypertension, hyperlipidemia, COPD, CVA, presents emergency department for medication refill  Patient states he ran out of his medication, metoprolol tartrate 50 mg, hydrochlorothiazide 25 mg and Brilinta 90 mg b i d  Patient states he took his last dose of medication this morning, went to [facility] [place] pharmacy today about out pharmacy was closed, unable to obtain prescriptions  Patient seen at [address] [place] [address] Emergency Department earlier today, given Rx for refills of medication, patient states he went to [facility] and was told that another pharmacy had already filled his prescriptions and day unable to fill his prescriptions as his insurance will not cover and he will need to pay out-of-pocket, patient refusing to pay out-of-pocket for medications  Patient returns back to the emergency department for his nighttime dose of his medications; metoprolol tartrate, hydrochlorothiazide and Brilinta  Patient denies fever, chest pain, shortness of breath, numbness/tingling, headache, focal weakness  Prior to Admission Medications   Prescriptions Last Dose Informant Patient Reported?  Taking?   aspirin 81 mg chewable tablet  Self No No   Sig: Chew 1 tablet (81 mg total) daily   atorvastatin (LIPITOR) 80 mg tablet   No No   Sig: Take 1 tablet (80 mg total) by mouth every evening   diclofenac sodium (VOLTAREN) 1 %   No No   Sig: Apply 2 g topically 4 (four) times a day as needed (Elbow pain)   Patient not taking: Reported on 9/5/2020   hydrochlorothiazide (HYDRODIURIL) 25 mg tablet   No No   Sig: Take 1 tablet (25 mg total) by mouth daily   methylPREDNISolone 4 MG tablet therapy pack   No No   Sig: Use as directed on package   metoprolol tartrate (LOPRESSOR) 50 mg tablet   No No   Sig: Take 1 tablet (50 mg total) by mouth every 12 (twelve) hours   nitroglycerin (NITROSTAT) 0 4 mg SL tablet  Self No No   Sig: Place 1 tablet (0 4 mg total) under the tongue every 5 (five) minutes as needed for chest pain If no relief after 3 tablets, come immediately to ER   potassium chloride (K-DUR,KLOR-CON) 10 mEq tablet   No No   Sig: Take 1 tablet (10 mEq total) by mouth 2 (two) times a day for 7 days   ticagrelor (BRILINTA) 90 MG   No No   Sig: Take 1 tablet (90 mg total) by mouth every 12 (twelve) hours      Facility-Administered Medications: None       Past Medical History:   Diagnosis Date    Acute ST elevation myocardial infarction (STEMI) of inferior wall ([facility])     cath with PCI/JUAN - RCA 2/7/2020    Back pain     CAD (coronary artery disease)     COPD [facility]chronic obstructive pulmonary disease) ([facility]     Hyperlipidemia     Hypertension     Inguinal hernia, left     Mild heartburn     Obesity     Stroke ([address] ) approx 2015    Denies residual problems  Initially he only had trouble with two fingers on left side   Wears glasses        Past Surgical History:   Procedure Laterality Date    APPENDECTOMY      HERNIA REPAIR      NO PAST SURGERIES      [place] LAP,APPENDECTOMY N/A 11/28/2018    Procedure: APPENDECTOMY LAPAROSCOPIC;  Surgeon: Wilda Calloway MD;  Location: [facility] MAIN OR;  Service: General    SC [place] Julien [address] Left 4/2/2018    Procedure: LAPAROSCOPIC INGUINAL HERNIA REPAIR WITH MESH;  Surgeon: Wilda Calloway MD;  Location: [facility] Main OR;  Service: General       History reviewed  No pertinent family history  I have reviewed and agree with the history as documented      E-Cigarette/Vaping    E-Cigarette Use Never User      E-Cigarette/Vaping Substances    Nicotine No     THC No     CBD No     Flavoring No     Other No     Unknown No      Social History     Tobacco Use    Smoking status: Former Smoker     Packs/day: 1 00     Years: 15 00     Pack years: 15 00     Types: Cigarettes     Last attempt to quit: 2010     Years since quitting: 10 6    Smokeless tobacco: Never Used   Substance Use Topics    Alcohol use: Not Currently    Drug use: No       Review of Systems   All other systems reviewed and are negative  Physical Exam  Physical Exam  Constitutional:       General: He is not in acute distress  Appearance: He is well-developed  He is not ill-appearing, toxic-appearing or diaphoretic  HENT:      Head: Normocephalic and atraumatic  Nose: Nose normal       Mouth/Throat:      Mouth: Mucous membranes are moist    Eyes:      Extraocular Movements: Extraocular movements intact  Conjunctiva/sclera: Conjunctivae normal    Neck:      Musculoskeletal: Normal range of motion and neck supple  Vascular: No JVD  Cardiovascular:      Rate and Rhythm: Normal rate and regular rhythm  Heart sounds: No murmur  No friction rub  No gallop  Pulmonary:      Effort: Pulmonary effort is normal  No tachypnea or respiratory distress  Breath sounds: Normal breath sounds  No decreased breath sounds, wheezing, rhonchi or rales  Musculoskeletal:      Right lower leg: Normal  He exhibits no tenderness, no bony tenderness and no swelling  No edema  Left lower leg: Normal  He exhibits no tenderness, no bony tenderness and no swelling  No edema  Skin:     General: Skin is warm and dry  Capillary Refill: Capillary refill takes less than 2 seconds  Findings: No rash  Neurological:      Mental Status: He is alert and oriented to person, place, and time  GCS: GCS eye subscore is 4  GCS verbal subscore is 5  GCS motor subscore is 6     Psychiatric:         Mood and Affect: Mood and affect normal          Speech: Speech normal          Behavior: Behavior normal          Vital Signs  ED Triage Vitals [09/07/20 1752]   Temperature Pulse Respirations Blood Pressure SpO2   (!) 96 9 °F (36 1 °C) 89 16 139/86 93 %      Temp Source Heart Rate Source Patient Position - Orthostatic VS BP Location FiO2 (%)   Temporal Monitor Sitting Right arm --      Pain Score       --           Vitals:    09/07/20 1752   BP: 139/86   Pulse: 89   Patient Position - Orthostatic VS: Sitting         Visual Acuity      ED Medications  Medications   ticagrelor (BRILINTA) tablet 90 mg (90 mg Oral Given 9/7/20 1812)   metoprolol tartrate (LOPRESSOR) tablet 50 mg (50 mg Oral Given 9/7/20 1812)       Diagnostic Studies  Results Reviewed     None                 No orders to display              Procedures  Procedures         ED Course       US AUDIT      Most Recent Value   Initial Alcohol Screen: US AUDIT-C    1  How often do you have a drink containing alcohol?  0 Filed at: 09/07/2020 1815   2  How many drinks containing alcohol do you have on a typical day you are drinking? 0 Filed at: 09/07/2020 1815   3a  Male UNDER 65: How often do you have five or more drinks on one occasion? 0 Filed at: 09/07/2020 1815   3b  FEMALE Any Age, or MALE 65+: How often do you have 4 or more drinks on one occassion? 0 Filed at: 09/07/2020 1815   Audit-C Score  0 Filed at: 09/07/2020 1815                  AMBROSIO/DAST-10      Most Recent Value   How many times in the past year have you    Used an illegal drug or used a prescription medication for non-medical reasons? Never Filed at: 09/07/2020 1815                                MDM  Number of Diagnoses or Management Options  Medication refill: minor  Diagnosis management comments: Patient is a 68-year-old with history of CAD, hypertension, hyperlipidemia, COPD, CVA, presents emergency department for medication refill  Patient states he ran out of his medication, metoprolol tartrate 50 mg, hydrochlorothiazide 25 mg and Brilinta 90 mg b i d  Patient states he took his last dose of medication this morning, [facility], unable to obtain prescriptions    Patient seen at [address] [place] [address] Emergency Department earlier today, given Rx for refills of medication, patient states he went to [place] and was told that another pharmacy had already filled his prescriptions and day unable to fill his prescriptions as his insurance will not cover and he will need to pay out-of-pocket, patient refusing to pay out-of-pocket for medications  Patient returns back to the emergency department for his nighttime dose of his medications; metoprolol tartrate, hydrochlorothiazide and Brilinta  Patient denies fever, chest pain, shortness of breath, numbness/tingling, headache, focal weakness  Patient asymptomatic  Vital signs within normal limits  Patient again stating use very frustrated with insurance  Nighttime dose of metoprolol tartrate, hydrochlorothiazide and Brilinta given in emergency department  [facility]our[facility] in the morning  Advised patient if he develops any symptoms to return emergency department  Patient verbalizes understanding and agrees with plan  The management plan was discussed in detail with the patient at bedside and all questions were answered  Prior to discharge, I provided both verbal and written instructions  I discussed with the patient the signs and symptoms for which to return to the emergency department  All questions were answered and patient was comfortable with the plan of care and discharged to home  The patient agrees to return to the Emergency Department for concerns and/or progression of illness  Disposition  Final diagnoses:   Medication refill     Time reflects when diagnosis was documented in both MDM as applicable and the Disposition within this note     Time User Action Codes Description Comment    9/7/2020  6:17 PM [place] Cinnamon Add [Z76 0] Medication refill       ED Disposition     ED Disposition Condition Date/Time Comment    Discharge Stable Mon Sep 7, 2020  6:17 PM Iglesia Antonio discharge to home/self care  Follow-up Information     Follow up With Specialties Details Why Contact Domingo Lowe, [address] [place] [address]  [phone]            Discharge Medication List as of 9/7/2020  6:17 PM      CONTINUE these medications which have NOT CHANGED    Details   aspirin 81 mg chewable tablet Chew 1 tablet (81 mg total) daily, Starting Sun 2/9/2020, No Print      atorvastatin (LIPITOR) 80 mg tablet Take 1 tablet (80 mg total) by mouth every evening, Starting Wed 8/26/2020, Normal      nitroglycerin (NITROSTAT) 0 4 mg SL tablet Place 1 tablet (0 4 mg total) under the tongue every 5 (five) minutes as needed for chest pain If no relief after 3 tablets, come immediately to ER, Starting Wed 2/12/2020, Normal      potassium chloride (K-DUR,KLOR-CON) 10 mEq tablet Take 1 tablet (10 mEq total) by mouth 2 (two) times a day for 7 days, Starting Sun 9/6/2020, Until Sun 9/13/2020, Print      diclofenac sodium (VOLTAREN) 1 % Apply 2 g topically 4 (four) times a day as needed (Elbow pain), Starting Wed 6/24/2020, Normal      hydrochlorothiazide (HYDRODIURIL) 25 mg tablet Take 1 tablet (25 mg total) by mouth daily, Starting Mon 9/7/2020, Print      methylPREDNISolone 4 MG tablet therapy pack Use as directed on package, Normal      metoprolol tartrate (LOPRESSOR) 50 mg tablet Take 1 tablet (50 mg total) by mouth every 12 (twelve) hours, Starting Mon 9/7/2020, Print      ticagrelor (BRILINTA) 90 MG Take 1 tablet (90 mg total) by mouth every 12 (twelve) hours, Starting Mon 9/7/2020, Print           No discharge procedures on file      PDMP Review     None          ED Provider  Electronically Signed by           Bogdan Garrett PA-C  09/07/20 0512

## 2020-09-08 DIAGNOSIS — I10 ESSENTIAL HYPERTENSION: ICD-10-CM

## 2020-09-08 DIAGNOSIS — I25.10 CAD (CORONARY ARTERY DISEASE): ICD-10-CM

## 2020-09-08 DIAGNOSIS — I21.19 ACUTE ST ELEVATION MYOCARDIAL INFARCTION (STEMI) OF INFERIOR WALL (HCC): ICD-10-CM

## 2020-09-08 RX ORDER — METOPROLOL TARTRATE 50 MG/1
50 TABLET, FILM COATED ORAL EVERY 12 HOURS SCHEDULED
Qty: 180 TABLET | Refills: 2 | Status: SHIPPED | OUTPATIENT
Start: 2020-09-08 | End: 2021-02-23 | Stop reason: SDUPTHER

## 2020-10-12 ENCOUNTER — HOSPITAL ENCOUNTER (OUTPATIENT)
Dept: NON INVASIVE DIAGNOSTICS | Facility: HOSPITAL | Age: 50
Discharge: HOME | End: 2020-10-12
Payer: COMMERCIAL

## 2020-10-12 DIAGNOSIS — I25.83 CORONARY ARTERY DISEASE DUE TO LIPID RICH PLAQUE: ICD-10-CM

## 2020-10-12 DIAGNOSIS — I21.19 ACUTE ST ELEVATION MYOCARDIAL INFARCTION (STEMI) OF INFERIOR WALL (HCC): ICD-10-CM

## 2020-10-12 DIAGNOSIS — I25.10 CORONARY ARTERY DISEASE DUE TO LIPID RICH PLAQUE: ICD-10-CM

## 2020-10-12 PROCEDURE — 93308 TTE F-UP OR LMTD: CPT

## 2020-10-12 PROCEDURE — 93321 DOPPLER ECHO F-UP/LMTD STD: CPT

## 2020-10-12 PROCEDURE — 93325 DOPPLER ECHO COLOR FLOW MAPG: CPT

## 2020-10-12 RX ADMIN — PERFLUTREN 1 ML/MIN: 6.52 INJECTION, SUSPENSION INTRAVENOUS at 08:30

## 2020-10-26 ENCOUNTER — OFFICE VISIT (OUTPATIENT)
Dept: CARDIOLOGY CLINIC | Facility: CLINIC | Age: 50
End: 2020-10-26
Payer: COMMERCIAL

## 2020-10-26 VITALS
WEIGHT: 211 LBS | DIASTOLIC BLOOD PRESSURE: 88 MMHG | TEMPERATURE: 97.9 F | BODY MASS INDEX: 34.06 KG/M2 | SYSTOLIC BLOOD PRESSURE: 122 MMHG | HEART RATE: 63 BPM

## 2020-10-26 DIAGNOSIS — I21.11 ST ELEVATION MYOCARDIAL INFARCTION INVOLVING RIGHT CORONARY ARTERY (HCC): ICD-10-CM

## 2020-10-26 DIAGNOSIS — E87.6 HYPOKALEMIA: ICD-10-CM

## 2020-10-26 DIAGNOSIS — I25.83 CORONARY ARTERY DISEASE DUE TO LIPID RICH PLAQUE: Primary | ICD-10-CM

## 2020-10-26 DIAGNOSIS — I25.10 CORONARY ARTERY DISEASE DUE TO LIPID RICH PLAQUE: Primary | ICD-10-CM

## 2020-10-26 DIAGNOSIS — N18.2 CKD (CHRONIC KIDNEY DISEASE), STAGE II: ICD-10-CM

## 2020-10-26 DIAGNOSIS — I21.19 ACUTE ST ELEVATION MYOCARDIAL INFARCTION (STEMI) OF INFERIOR WALL (HCC): ICD-10-CM

## 2020-10-26 DIAGNOSIS — N18.2 BENIGN HYPERTENSION WITH CKD (CHRONIC KIDNEY DISEASE), STAGE II: ICD-10-CM

## 2020-10-26 DIAGNOSIS — E78.5 HYPERLIPIDEMIA, UNSPECIFIED HYPERLIPIDEMIA TYPE: ICD-10-CM

## 2020-10-26 DIAGNOSIS — I10 ESSENTIAL HYPERTENSION: ICD-10-CM

## 2020-10-26 DIAGNOSIS — I25.10 CAD (CORONARY ARTERY DISEASE): ICD-10-CM

## 2020-10-26 DIAGNOSIS — I12.9 BENIGN HYPERTENSION WITH CKD (CHRONIC KIDNEY DISEASE), STAGE II: ICD-10-CM

## 2020-10-26 PROCEDURE — 99214 OFFICE O/P EST MOD 30 MIN: CPT

## 2020-12-02 DIAGNOSIS — I10 HYPERTENSION, UNSPECIFIED TYPE: ICD-10-CM

## 2020-12-02 RX ORDER — HYDROCHLOROTHIAZIDE 25 MG/1
25 TABLET ORAL DAILY
Qty: 90 TABLET | Refills: 2 | Status: SHIPPED | OUTPATIENT
Start: 2020-12-02 | End: 2021-02-23 | Stop reason: SDUPTHER

## 2021-01-11 DIAGNOSIS — Z20.822 EXPOSURE TO COVID-19 VIRUS: Primary | ICD-10-CM

## 2021-01-11 DIAGNOSIS — Z20.822 EXPOSURE TO COVID-19 VIRUS: ICD-10-CM

## 2021-01-11 PROCEDURE — U0003 INFECTIOUS AGENT DETECTION BY NUCLEIC ACID (DNA OR RNA); SEVERE ACUTE RESPIRATORY SYNDROME CORONAVIRUS 2 (SARS-COV-2) (CORONAVIRUS DISEASE [COVID-19]), AMPLIFIED PROBE TECHNIQUE, MAKING USE OF HIGH THROUGHPUT TECHNOLOGIES AS DESCRIBED BY CMS-2020-01-R: HCPCS | Performed by: FAMILY MEDICINE

## 2021-01-11 PROCEDURE — U0005 INFEC AGEN DETEC AMPLI PROBE: HCPCS | Performed by: FAMILY MEDICINE

## 2021-01-12 LAB — SARS-COV-2 RNA SPEC QL NAA+PROBE: NOT DETECTED

## 2021-02-23 ENCOUNTER — OFFICE VISIT (OUTPATIENT)
Dept: CARDIOLOGY CLINIC | Facility: CLINIC | Age: 51
End: 2021-02-23
Payer: COMMERCIAL

## 2021-02-23 VITALS
BODY MASS INDEX: 35.28 KG/M2 | TEMPERATURE: 97.2 F | DIASTOLIC BLOOD PRESSURE: 80 MMHG | HEART RATE: 68 BPM | SYSTOLIC BLOOD PRESSURE: 130 MMHG | WEIGHT: 218.6 LBS

## 2021-02-23 DIAGNOSIS — I21.19 ACUTE ST ELEVATION MYOCARDIAL INFARCTION (STEMI) OF INFERIOR WALL (HCC): ICD-10-CM

## 2021-02-23 DIAGNOSIS — I25.83 CORONARY ARTERY DISEASE DUE TO LIPID RICH PLAQUE: ICD-10-CM

## 2021-02-23 DIAGNOSIS — I25.10 CAD (CORONARY ARTERY DISEASE): ICD-10-CM

## 2021-02-23 DIAGNOSIS — E78.5 HYPERLIPIDEMIA, UNSPECIFIED HYPERLIPIDEMIA TYPE: ICD-10-CM

## 2021-02-23 DIAGNOSIS — I10 ESSENTIAL HYPERTENSION: ICD-10-CM

## 2021-02-23 DIAGNOSIS — I10 HYPERTENSION, UNSPECIFIED TYPE: ICD-10-CM

## 2021-02-23 DIAGNOSIS — I12.9 BENIGN HYPERTENSION WITH CKD (CHRONIC KIDNEY DISEASE), STAGE II: ICD-10-CM

## 2021-02-23 DIAGNOSIS — I25.10 CORONARY ARTERY DISEASE DUE TO LIPID RICH PLAQUE: ICD-10-CM

## 2021-02-23 DIAGNOSIS — R06.00 PAROXYSMAL NOCTURNAL DYSPNEA: ICD-10-CM

## 2021-02-23 DIAGNOSIS — N18.2 CKD (CHRONIC KIDNEY DISEASE), STAGE II: ICD-10-CM

## 2021-02-23 DIAGNOSIS — N18.2 BENIGN HYPERTENSION WITH CKD (CHRONIC KIDNEY DISEASE), STAGE II: ICD-10-CM

## 2021-02-23 DIAGNOSIS — I21.11 ST ELEVATION MYOCARDIAL INFARCTION INVOLVING RIGHT CORONARY ARTERY (HCC): Primary | ICD-10-CM

## 2021-02-23 PROCEDURE — 99214 OFFICE O/P EST MOD 30 MIN: CPT

## 2021-02-23 RX ORDER — ATORVASTATIN CALCIUM 80 MG/1
80 TABLET, FILM COATED ORAL EVERY EVENING
Qty: 90 TABLET | Refills: 3 | Status: SHIPPED | OUTPATIENT
Start: 2021-02-23 | End: 2022-02-28 | Stop reason: SDUPTHER

## 2021-02-23 RX ORDER — HYDROCHLOROTHIAZIDE 25 MG/1
25 TABLET ORAL DAILY
Qty: 90 TABLET | Refills: 3 | Status: SHIPPED | OUTPATIENT
Start: 2021-02-23 | End: 2022-02-28 | Stop reason: SDUPTHER

## 2021-02-23 RX ORDER — METOPROLOL TARTRATE 50 MG/1
50 TABLET, FILM COATED ORAL EVERY 12 HOURS SCHEDULED
Qty: 180 TABLET | Refills: 3 | Status: SHIPPED | OUTPATIENT
Start: 2021-02-23 | End: 2021-04-02 | Stop reason: HOSPADM

## 2021-02-23 RX ORDER — ASPIRIN 81 MG/1
81 TABLET, CHEWABLE ORAL DAILY
Qty: 90 TABLET | Refills: 3 | Status: SHIPPED | OUTPATIENT
Start: 2021-02-23

## 2021-02-23 NOTE — PROGRESS NOTES
Cardiology   Karmen Bumpers, MD Lawton Cirri, MD Althia Guarneri, DO, MD Lior Boyer DO, Pierce Esquivelster, , [facility] [place]  -------------------------------------------------------------------  [facility]  [address] [place] [id number]  [phone]  [phone]  02/23/21  Ariel Zuluaga  YOB: 1970   MRN: [id number]      Referring Physician: Yolette Richards MD  [address] [facility],  [address] [place] [address] Dr RIVERA: Ariel Zuluaga is a 48 y o  male with coronary artery disease status post ST-elevation MI in February 2020 with subsequent PCI to the distal RCA with a 3 0 x 23 mm drug-eluting stent, hypertension, dyslipidemia who presents today for office follow-up  He states he is doing well  Denies any chest pain or significant dyspnea with exertion  He does note some episodes paroxysmal nocturnal dyspnea states when he is sleeping he is suddenly awakened feeling like he is very short of breath and like he just stopped breathing  Denies any lower extremity edema or heart failure type symptoms  Was recommended for evaluation for sleep apnea in the past but this was never performed  He was seen in the ER in October and had very low potassium at that time  I asked him to have this rechecked after he was given potassium supplementation however this was never done  Review of Systems   Constitutional: Negative for chills and fever  HENT: Negative for facial swelling and sore throat  Eyes: Negative for visual disturbance  Respiratory: Positive for apnea  Negative for cough, chest tightness, shortness of breath and wheezing  Cardiovascular: Negative for chest pain, palpitations and leg swelling  Gastrointestinal: Negative for abdominal pain, blood in stool, constipation, diarrhea, nausea and vomiting  Endocrine: Negative for cold intolerance and heat intolerance     Genitourinary: Negative for decreased urine volume, difficulty urinating, dysuria and hematuria  Musculoskeletal: Negative for arthralgias, back pain and myalgias  Skin: Negative for rash  Neurological: Negative for dizziness, syncope, weakness and numbness  Psychiatric/Behavioral: Negative for agitation, behavioral problems and confusion  The patient is not nervous/anxious  OBJECTIVE  Vitals:    02/23/21 1359   BP: 130/80   Pulse: 68   Temp: (!) 97 2 °F (36 2 °C)       Physical Exam    General appearance: alert and oriented, in no acute distress, obese male  Head: Normocephalic, without obvious abnormality, atraumatic  Eyes: conjunctivae/corneas clear  Anicteric  Neck: no adenopathy, no carotid bruit, no JVD  Lungs: clear to auscultation bilaterally  Heart: regular rate and rhythm, S1, S2 normal, no murmur, no click, rub or gallop  Abdomen:  Obese, soft, non-tender; bowel sounds normal; no masses,  no organomegaly  Extremities: extremities normal, warm and well-perfused; no cyanosis, clubbing, or edema  Skin: Skin color, texture, turgor normal  No rashes or lesions   EKG:  No results found for this visit on 02/23/21  IMPRESSION:  1  Coronary artery disease status post STEMI with PCI to RCA February 2020 on aspirin and Brilinta  2  Hypertension  3  Dyslipidemia    DISCUSSION/RECOMMENDATIONS:  Kvng Smith He is doing well from a cardiovascular disease standpoint   Asymptomatic with regards to angina  Has severe RCA disease but minimal disease elsewhere in his other coronary arteries    Okay to stop Brilinta at this time    Continue aspirin, Lipitor   Continue HCTZ, metoprolol  · Would check BMP to re-evaluate potassium  · Placed another referral for evaluation for sleep apnea today  · Plan for follow-up with Cardiology 6 months     Gaby Pulliam DO, [facility] [place]  --------------------------------------------------------------------------------  TREADMILL STRESS  No results found for this or any previous visit  ----------------------------------------------------------------------------------------------  NUCLEAR STRESS TEST: No results found for this or any previous visit  No results found for this or any previous visit     --------------------------------------------------------------------------------  CATH:    Results for orders placed during the hospital encounter of 20   Cardiac catheterization    Narrative Carmela 48  Josh Ruiz [address]  [facility], [address] [facility]  [phone]    [facility]    Invasive Cardiovascular Lab Complete Report    Patient: Catherine Hdez  MR number: [id number]  Account number: [de-identified]  Study date: 2020  Gender: Male  : 1970  Height: 65 in  Weight: 209 9 lb  BSA: 2 02 mï¾²    Allergies: NO KNOWN ALLERGIES    Diagnostic Cardiologist:  Galina Garcia MD  Interventional Cardiologist:  Galina Garcia MD  Primary Physician:  Juancarlos Bhatti MD    SUMMARY    CORONARY CIRCULATION:  Left main: Normal   LAD: The vessel was large sized  Angiography showed mild atherosclerosis  Circumflex: The vessel was medium sized  Angiography showed minor luminal irregularities  Ramus intermedius: The vessel was large sized  Angiography showed mild atherosclerosis  RCA: The vessel was large sized (dominant)  Angiography showed moderate atherosclerosis  Distal RCA: There was a tubular 85 % stenosis  There was KATHLEEN grade 2 flow through the vessel (partial perfusion)  This is a likely culprit for the patient's clinical presentation  An intervention was performed  1ST LESION INTERVENTIONS:  A successful drug-eluting stent procedure was performed on the 85 % lesion in the distal RCA  Following intervention there was a 0 % residual stenosis  This was not a bifurcation lesion  This was an ACC/AHA type B "moderate risk" lesion for intervention  There was no evidence of the transient no-reflow phenomenon  The residual lesion demonstrated no evidence of thrombus  There was KATHLEEN 2  flow before the procedure and KATHLEEN 3 flow after the procedure  A Xience Jerica Rx 3 0 x 23mm drug-eluting stent was placed across the lesion and deployed at a maximum inflation pressure of 12 bayron  ADVERSE OUTCOMES:  There were no complications  INDICATIONS:  Myocardial infarction with ST elevation (STEMI)  Acute inferior wall MI     INDICATIONS:  --  Myocardial infarction with ST elevation (STEMI)  Acute inferior wall MI     PROCEDURES PERFORMED    --  Left coronary angiography  --  Right coronary angiography  --  Acute Myocardial Infarct  --  Mod Sedation Same Physician Initial 15min  --  Coronary Catheterization (w/o LHC)  --  Mod Sedation Same Physician Add 15min  --  Mod Sedation Same Physician Add 15min  --  AMI PCI (JUAN, PTCRA, PTCA) Single  --  Intervention on distal RCA: drug-eluting stent  PROCEDURE: The risks and alternatives of the procedures and conscious sedation were explained to the patient and informed consent was obtained  The patient was brought to the cath lab and placed on the table  The planned puncture sites  were prepped and draped in the usual sterile fashion  --  Right radial artery access  After performing an Nomi's test to verify adequate ulnar artery supply to the hand, the radial site was prepped  The puncture site was infiltrated with local anesthetic  The vessel was accessed using the  modified Seldinger technique, a wire was advanced into the vessel, and a sheath was advanced over the wire into the vessel  --  Left coronary artery angiography  A catheter was advanced over a guidewire into the aorta and positioned in the left coronary artery ostium under fluoroscopic guidance  Angiography was performed  --  Right coronary artery angiography  A catheter was advanced over a guidewire into the aorta and positioned in the right coronary artery ostium under fluoroscopic guidance  Angiography was performed  --  Acute Myocardial Infarct      -- Mod Sedation Same Physician Initial 15min  --  Coronary Catheterization (w/o [facility])  --  Mod Sedation Same Physician Add 15min  --  Mod Sedation Same Physician Add 15min  LESION INTERVENTION: A successful drug-eluting stent procedure was performed on the 85 % lesion in the distal RCA  Following intervention there was a 0 % residual stenosis  This was not a bifurcation lesion  This was an ACC/AHA type B  "moderate risk" lesion for intervention  There was no evidence of the transient no-reflow phenomenon  The residual lesion demonstrated no evidence of thrombus  There was KATHLEEN 2 flow before the procedure and KATHLEEN 3 flow after the procedure  --  Vessel setup was performed  A Launcher 6Fr Jr 4 0 guiding catheter was used to cannulate the vessel  --  Vessel setup was performed  A Runthrough NS 180cm wire was used to cross the lesion  --  Balloon angioplasty was performed, using a Trek Rx 2 5 x 15mm balloon, with 2 inflations and a maximum inflation pressure of 14 bayron  --  A Xience Jerica Rx 3 0 x 23mm drug-eluting stent was placed across the lesion and deployed at a maximum inflation pressure of 12 bayron  --  Balloon angioplasty was performed, with 3 inflations and a maximum inflation pressure of 19 bayron  INTERVENTIONS:  --  AMI PCI (JUAN, PTCRA, PTCA) Single  PROCEDURE COMPLETION: The patient tolerated the procedure well and was discharged from the cath lab  TIMING: Test started at 07:52  Test concluded at 08:31  HEMOSTASIS: The sheath was removed  The site was compressed with a Hemoband  device  Hemostasis was obtained  MEDICATIONS GIVEN: 0 9 NSS, infusion rate of 100 ml/hr, IV, at 07:52  Fentanyl (1OOmcg/2 ml), 50 mcg, IV, at 07:57  Versed (2mg/2ml), 2 mg, IV, at 07:57  1% Lidocaine, 1 ml, subcutaneously, at 07:57  Nitroglycerin (200mcg/ml), 200 mcg, at 07:59  Verapamil (5mg/2ml), 2 5 mg, IV, at 07:59  Heparin 1000 units/ml, 4,000 units, IV, at 07:59   Heparin 1000 units/ml, 8,000 units, IV, at 08:07  CONTRAST GIVEN: 100 ml Omnipaque (350mg I /ml)  30  ml Omnipaque (350mg I /ml)  RADIATION EXPOSURE: Fluoroscopy time: 9 38 min  CORONARY VESSELS:   --  Left main: Normal   --  LAD: The vessel was large sized  Angiography showed mild atherosclerosis  --  1st diagonal: The vessel was medium sized  --  Circumflex: The vessel was medium sized  Angiography showed minor luminal irregularities  --  Ramus intermedius: The vessel was large sized  Angiography showed mild atherosclerosis  --  RCA: The vessel was large sized (dominant)  Angiography showed moderate atherosclerosis  --  Proximal RCA: There was a discrete 40 % stenosis  --  Distal RCA: There was a tubular 85 % stenosis  There was KATHLEEN grade 2 flow through the vessel (partial perfusion)  This is a likely culprit for the patient's clinical presentation  An intervention was performed  IMPRESSIONS:  Acute inferior ST elevation MI  S/P JUAN placement to the distal RCA  1  ASA 81 mg daily  2  Brilinta 90 mg BID  3  Lipitor 80 mg daily  4  Lopressor 25 mg every 6 hours  5  Check echo    Prepared and signed by    Krista Pena MD  Signed 2020 12:54:37    Study diagram    Angiographic findings  Native coronary lesions:  ï¾·Proximal RCA: Lesion 1: discrete, 40 % stenosis  ï¾·Distal RCA: Lesion 1: tubular, 85 % stenosis  Intervention results  Native coronary lesions:  ï¾·Successful drug-eluting stent of the 85 % stenosis in distal RCA  0 % residual stenosis  Stent: Ariel Coon Rx 3 0 x 23mm drug-eluting      Hemodynamic tables    Pressures:  Baseline  Pressures:  - HR: 93  Pressures:  - Rhythm:  Pressures:  -- Aortic Pressure (S/D/M): 139/98/118    Outputs:  Baseline  Outputs:  -- CALCULATIONS: [age]  Outputs:  -- CALCULATIONS: Body Surface Area: 2 02  Outputs:  -- CALCULATIONS: Height in cm: 165 00  Outputs:  -- CALCULATIONS: Sex: Male  Outputs:  -- CALCULATIONS: Weight in k 40 --------------------------------------------------------------------------------  ECHO:   Results for orders placed during the hospital encounter of 20   Echo Complete with Contrast if Indicated    Elena Carmela [address]  [address] Sara [address]  [place], [address]  [phone]    Transthoracic Echocardiogram  2D, M-mode, Doppler, and Color Doppler    Study date:  2020    Patient: Nadeem Michelle  MR number: [id number]  Account number: [de-identified]  : 1970  Age: 52 years  Gender: Male  Status: Inpatient  Location: Intensive care unit  Height: 65 in  Weight: 220 4 lb  BP: 174/ 107 mmHg    Indications: Acute myocardial infarction  Diagnoses: I25 119 - Atherosclerotic heart disease of native coronary artery with unspecified angina pectoris    Sonographer:  Maranda Mercado RDCS  Primary Physician:  Mehnaz Gar MD  Referring Physician:  Germán De MD  Group:  [id number]  Interpreting Physician:  SUDHA Kennedy     SUMMARY    LEFT VENTRICLE:  Systolic function was mildly reduced by visual assessment  Ejection fraction was estimated to be 50 %  There was moderate hypokinesis of the basal inferoseptal and entire inferior wall(s)  Doppler parameters were consistent with abnormal left ventricular relaxation (grade 1 diastolic dysfunction)  SUMMARY MEASUREMENTS  2D measurements:  Unspecified Anatomy:   %FS was 31 7 %  Ao Diam was 3 3 cm   EDV(Teich) was 75 6 ml   EF(Teich) was 60 1 %  ESV(Teich) was 30 2 ml   HR_4Ch_Q was 76 8 BPM   IVSd was 1 2 cm  LA Diam was 3 9 cm  LAAs A2C was 14 8 cm2  LAAs A4C was 10 3  cm2  LAESV A-L A2C was 41 9 ml  LAESV A-L A4C was 27 6 ml  LAESV Index (A-L) was 19 3 ml/m2  LAESV MOD A2C was 41 ml  LAESV MOD A4C was 26 4 ml  LAESV(A-L) was 39 7 ml  LAESV(MOD BP) was 38 4 ml  LAESVInd MOD BP was 18 6 ml/m2  LALs A2C was 4 4 cm  LALs A4C was 3 3 cm  LVCO_4Ch_Q was 3 L/min    LVEF_4Ch_Q was 60 5 %   LVIDd was 4 1 cm  LVIDs was 2 8 cm  LVLd_4Ch_Q was 7 2 cm  LVLs_4Ch_Q was 5 9 cm  LVPWd was 1 1 cm  LVSV_4Ch_Q was 38 7 ml  LVVED_4Ch_Q was  64 ml  LVVES_4Ch_Q was 25 3 ml  RAAs A4C was 9 1 cm2  RAESV A-L was 21 6 ml   RAESV MOD was 21 1 ml  RALs was 3 3 cm  RVIDd was 2 2 cm  RWT was 0 5    SV(Teich) was 45 4 ml   CW measurements:  Unspecified Anatomy:   TR Vmax was 2 2 m/s   TR maxPG was 18 8 mmHg  PW measurements:  Unspecified Anatomy:   E' Avg was 0 1 m/s  E' Lat was 0 1 m/s  E' Sept was 0 1 m/s  E/E' Avg was 6 9   E/E' Lat was 5 3   E/E' Sept was 10 1   MV A Maury was 0 8 m/s  MV Dec [place] was 3 3 m/s2  MV DecT was 210 3 ms   MV E Maury was 0 7  m/s  MV E/A Ratio was 0 9   HISTORY: PRIOR HISTORY: Hyperlipidemia, hypertension, chronic kidney disease stage 2, hypokalemia  PROCEDURE: The study was performed in the Intensive care unit  This was a routine study  The transthoracic approach was used  The study included complete 2D imaging, M-mode, complete spectral Doppler, and color Doppler  The heart rate was  81 bpm, at the start of the study  Intravenous contrast ( 1 0ml Definity in nss) was administered  Echocardiographic views were limited due to poor acoustic window availability and decreased penetration  This was a technically difficult  study  LEFT VENTRICLE: Size was normal  Systolic function was mildly reduced by visual assessment  Ejection fraction was estimated to be 50 %  There was moderate hypokinesis of the basal inferoseptal and entire inferior wall(s)  Cardiac wall  motion was otherwise normal  Wall thickness was normal  DOPPLER: Doppler parameters were consistent with abnormal left ventricular relaxation (grade 1 diastolic dysfunction)      RIGHT VENTRICLE: The size was normal  Systolic function was normal  Wall thickness was normal     LEFT ATRIUM: Size was normal     RIGHT ATRIUM: Size was normal     MITRAL VALVE: Valve structure was normal  There was normal leaflet separation  DOPPLER: The transmitral velocity was within the normal range  There was no evidence for stenosis  There was no regurgitation  AORTIC VALVE: The valve was trileaflet  Leaflets exhibited normal thickness and normal cuspal separation  DOPPLER: Transaortic velocity was within the normal range  There was no evidence for stenosis  There was no regurgitation  TRICUSPID VALVE: The valve structure was normal  There was normal leaflet separation  DOPPLER: The transtricuspid velocity was within the normal range  There was no evidence for stenosis  There was no regurgitation  PULMONIC VALVE: Leaflets exhibited normal thickness, no calcification, and normal cuspal separation  DOPPLER: The transpulmonic velocity was within the normal range  There was no regurgitation  PERICARDIUM: There was no pericardial effusion  The pericardium was normal in appearance  AORTA: The root exhibited normal size  SYSTEMIC VEINS: IVC: The inferior vena cava was normal in size and course   Respirophasic changes were normal     SYSTEM MEASUREMENT TABLES    2D  %FS: 31 7 %  Ao Diam: 3 3 cm  EDV(Teich): 75 6 ml  EF(Teich): 60 1 %  ESV(Teich): 30 2 ml  HR_4Ch_Q: 76 8 BPM  IVSd: 1 2 cm  LA Diam: 3 9 cm  LAAs A2C: 14 8 cm2  LAAs A4C: 10 3 cm2  LAESV A-L A2C: 41 9 ml  LAESV A-L A4C: 27 6 ml  LAESV Index (A-L): 19 3 ml/m2  LAESV MOD A2C: 41 ml  LAESV MOD A4C: 26 4 ml  LAESV(A-L): 39 7 ml  LAESV(MOD BP): 38 4 ml  LAESVInd MOD BP: 18 6 ml/m2  LALs A2C: 4 4 cm  LALs A4C: 3 3 cm  LVCO_4Ch_Q: 3 L/min  LVEF_4Ch_Q: 60 5 %  LVIDd: 4 1 cm  LVIDs: 2 8 cm  LVLd_4Ch_Q: 7 2 cm  LVLs_4Ch_Q: 5 9 cm  LVPWd: 1 1 cm  LVSV_4Ch_Q: 38 7 ml  LVVED_4Ch_Q: 64 ml  LVVES_4Ch_Q: 25 3 ml  RAAs A4C: 9 1 cm2  RAESV A-L: 21 6 ml  RAESV MOD: 21 1 ml  RALs: 3 3 cm  RVIDd: 2 2 cm  RWT: 0 5  SV(Teich): 45 4 ml    CW  TR Vmax: 2 2 m/s  TR maxP 8 mmHg    PW  E' Av 1 m/s  E' Lat: 0 1 m/s  E' Sept: 0 1 m/s  E/E' Av 9  E/E' Lat: 5 3  E/E' Sept: 10 1  MV A Maury: 0 8 m/s  MV Dec Rains: 3 3 m/s2  MV DecT: 210 3 ms  MV E Maury: 0 7 m/s  MV E/A Ratio: 0 9    [facility] Accredited Echocardiography Laboratory    Prepared and electronically signed by    SUDHA Child  Signed 07-Feb-2020 15:06:29       No results found for this or any previous visit   --------------------------------------------------------------------------------  HOLTER  No results found for this or any previous visit  No results found for this or any previous visit   --------------------------------------------------------------------------------  CAROTIDS  No results found for this or any previous visit    --------------------------------------------------------------------------------  Diagnoses and all orders for this visit:    ST elevation myocardial infarction involving right coronary artery ([address] )    Benign hypertension with CKD (chronic kidney disease), stage II    Essential hypertension  -     Basic metabolic panel    Coronary artery disease due to lipid rich plaque    CKD (chronic kidney disease), stage II  -     Basic metabolic panel    Hyperlipidemia, unspecified hyperlipidemia type    Paroxysmal nocturnal dyspnea  -     Ambulatory referral to Sleep Medicine; Future       ======================================================    Past Medical History:   Diagnosis Date    Acute ST elevation myocardial infarction (STEMI) of inferior wall (HCC)     cath with PCI/JUAN - RCA 2/7/2020    Back pain     CAD (coronary artery disease)     [facility]     Hyperlipidemia     Hypertension     Inguinal hernia, left     Mild heartburn     Obesity     Stroke ([facility] [address] ) approx 2015    Denies residual problems  Initially he only had trouble with two fingers on left side      Wears glasses      Past Surgical History:   Procedure Laterality Date    APPENDECTOMY      HERNIA REPAIR      NO PAST SURGERIES      ID LAP,APPENDECTOMY N/A 11/28/2018 Procedure: APPENDECTOMY LAPAROSCOPIC;  Surgeon: Ingrid Langley MD;  [facility] MA[facility] OR;  Service: General    RI [address] Left 4/2/2018    Procedure: LAPAROSCOPIC INGUINAL HERNIA REPAIR WITH MESH;  Surgeon: Ingrid Langley MD;  Location: [facility] Main OR;  Service: General         Medications  Current Outpatient Medications   Medication Sig Dispense Refill    aspirin 81 mg chewable tablet Chew 1 tablet (81 mg total) daily  0    atorvastatin (LIPITOR) 80 mg tablet Take 1 tablet (80 mg total) by mouth every evening 90 tablet 3    hydrochlorothiazide (HYDRODIURIL) 25 mg tablet Take 1 tablet (25 mg total) by mouth daily 90 tablet 2    metoprolol tartrate (LOPRESSOR) 50 mg tablet Take 1 tablet (50 mg total) by mouth every 12 (twelve) hours 180 tablet 2    nitroglycerin (NITROSTAT) 0 4 mg SL tablet Place 1 tablet (0 4 mg total) under the tongue every 5 (five) minutes as needed for chest pain If no relief after 3 tablets, come immediately to  tablet 1    diclofenac sodium (VOLTAREN) 1 % Apply 2 g topically 4 (four) times a day as needed (Elbow pain) (Patient not taking: Reported on 9/5/2020) 100 g 0    methylPREDNISolone 4 MG tablet therapy pack Use as directed on package (Patient not taking: Reported on 10/26/2020) 1 each 0    potassium chloride (K-DUR,KLOR-CON) 10 mEq tablet Take 1 tablet (10 mEq total) by mouth 2 (two) times a day for 7 days (Patient not taking: Reported on 2/23/2021) 14 tablet 0     No current facility-administered medications for this visit           No Known Allergies    Social History     Socioeconomic History    Marital status: Single     Spouse name: Not on file    Number of children: Not on file    Years of education: Not on file    Highest education level: Not on file   Occupational History    Not on file   Social Needs    Financial resource strain: Not on file    Food insecurity     Worry: Not on file     Inability: Not on file   [employer] needs     Medical: Not on file     Non-medical: Not on file   Tobacco Use    Smoking status: Former Smoker     Packs/day: 1 00     Years: 15 00     Pack years: 15 00     Types: Cigarettes     Quit date:      Years since quittin 1    Smokeless tobacco: Never Used   Substance and Sexual Activity    Alcohol use: Not Currently    Drug use: No    Sexual activity: Not on file   Lifestyle    Physical activity     Days per week: Not on file     Minutes per session: Not on file    Stress: Not on file   Relationships    Social connections     Talks on phone: Not on file     Gets together: Not on file     Attends Restoration service: Not on file     Active member of club or organization: Not on file     Attends meetings of clubs or organizations: Not on file     Relationship status: Not on file    Intimate partner violence     Fear of current or ex partner: Not on file     Emotionally abused: Not on file     Physically abused: Not on file     Forced sexual activity: Not on file   Other Topics Concern    Not on file   Social History Narrative    Not on file        History reviewed  No pertinent family history      Lab Results   Component Value Date    WBC 8 94 2020    HGB 16 7 2020    HCT 47 2 2020    MCV 89 2020     2020      Lab Results   Component Value Date    SODIUM 134 (L) 2020    K 2 8 (L) 2020    CL 97 (L) 2020    CO2 28 2020    BUN 13 2020    CREATININE 1 27 2020    GLUC 114 2020    CALCIUM 9 0 2020      No results found for: HGBA1C   No results found for: CHOL  Lab Results   Component Value Date    HDL 32 (L) 2020     Lab Results   Component Value Date    LDLCALC 100 2020     Lab Results   Component Value Date    TRIG 195 (H) 2020     No results found for: [place]   Lab Results   Component Value Date    INR 1 02 2020    INR 0 97 2020    INR 1 09 2018    PROTIME 13 5 2020 PROTIME 13 0 02/07/2020    PROTIME 12 6 11/03/2018          Patient Active Problem List    Diagnosis Date Noted    Sleep apnea 06/23/2020    Insomnia with sleep apnea 06/23/2020    Hypertension     CAD (coronary artery disease)     Acute ST elevation myocardial infarction (STEMI) of inferior wall (HCC)     ST elevation myocardial infarction involving right coronary artery ([facility] [address] ) 02/08/2020    Left arm pain 08/20/2019    Bilateral low back pain without sciatica 08/20/2019    CKD (chronic kidney disease), stage II 04/09/2019    Hypokalemia 04/09/2019    Dizziness 03/30/2019    Benign hypertension with CKD (chronic kidney disease), stage II 03/30/2019    Hyperlipidemia 03/30/2019    Indirect inguinal hernia 04/02/2018       Portions of the record may have been created with voice recognition software  Occasional wrong word or "sound a like" substitutions may have occurred due to the inherent limitations of voice recognition software  Read the chart carefully and recognize, using context, where substitutions have occurred          Worthy DO Rosal[place], [facility]  2/23/2021 2:27 PM

## 2021-03-31 ENCOUNTER — HOSPITAL ENCOUNTER (INPATIENT)
Dept: NON INVASIVE DIAGNOSTICS | Facility: HOSPITAL | Age: 51
LOS: 2 days | Discharge: HOME | DRG: 174 | End: 2021-04-02
Attending: INTERNAL MEDICINE | Admitting: INTERNAL MEDICINE
Payer: COMMERCIAL

## 2021-03-31 ENCOUNTER — HOSPITAL ENCOUNTER (EMERGENCY)
Facility: HOSPITAL | Age: 51
DRG: 174 | End: 2021-03-31
Attending: EMERGENCY MEDICINE
Payer: COMMERCIAL

## 2021-03-31 ENCOUNTER — APPOINTMENT (INPATIENT)
Dept: NEUROLOGY | Facility: CLINIC | Age: 51
DRG: 174 | End: 2021-03-31
Payer: COMMERCIAL

## 2021-03-31 ENCOUNTER — APPOINTMENT (INPATIENT)
Dept: NON INVASIVE DIAGNOSTICS | Facility: HOSPITAL | Age: 51
DRG: 174 | End: 2021-03-31
Payer: COMMERCIAL

## 2021-03-31 ENCOUNTER — APPOINTMENT (INPATIENT)
Dept: RADIOLOGY | Facility: HOSPITAL | Age: 51
DRG: 174 | End: 2021-03-31
Payer: COMMERCIAL

## 2021-03-31 ENCOUNTER — TELEPHONE (OUTPATIENT)
Dept: RADIOLOGY | Facility: HOSPITAL | Age: 51
End: 2021-03-31

## 2021-03-31 ENCOUNTER — APPOINTMENT (EMERGENCY)
Dept: RADIOLOGY | Facility: HOSPITAL | Age: 51
DRG: 174 | End: 2021-03-31
Payer: COMMERCIAL

## 2021-03-31 VITALS
DIASTOLIC BLOOD PRESSURE: 65 MMHG | HEART RATE: 89 BPM | OXYGEN SATURATION: 98 % | BODY MASS INDEX: 36.44 KG/M2 | WEIGHT: 225.75 LBS | RESPIRATION RATE: 20 BRPM | SYSTOLIC BLOOD PRESSURE: 119 MMHG | TEMPERATURE: 98 F

## 2021-03-31 DIAGNOSIS — I63.9 STROKE (HCC): ICD-10-CM

## 2021-03-31 DIAGNOSIS — I21.19 ACUTE MI, INFERIOR WALL (HCC): Primary | ICD-10-CM

## 2021-03-31 DIAGNOSIS — I21.9 AMI (ACUTE MYOCARDIAL INFARCTION) (HCC): ICD-10-CM

## 2021-03-31 DIAGNOSIS — I21.3 ACUTE ST ELEVATION MYOCARDIAL INFARCTION (STEMI) DUE TO OCCLUSION OF RIGHT CORONARY ARTERY (HCC): Primary | ICD-10-CM

## 2021-03-31 DIAGNOSIS — I21.19 ST ELEVATION MYOCARDIAL INFARCTION (STEMI) OF INFERIOR WALL (HCC): ICD-10-CM

## 2021-03-31 PROBLEM — R41.3 AMNESIA: Status: ACTIVE | Noted: 2021-03-31

## 2021-03-31 LAB
ALBUMIN SERPL BCP-MCNC: 3.4 G/DL (ref 3.5–5)
ALP SERPL-CCNC: 108 U/L (ref 46–116)
ALT SERPL W P-5'-P-CCNC: 92 U/L (ref 12–78)
ANION GAP SERPL CALCULATED.3IONS-SCNC: 8 MMOL/L (ref 4–13)
AST SERPL W P-5'-P-CCNC: 32 U/L (ref 5–45)
ATRIAL RATE: 111 BPM
ATRIAL RATE: 62 BPM
ATRIAL RATE: 71 BPM
ATRIAL RATE: 77 BPM
BASOPHILS # BLD AUTO: 0.05 THOUSANDS/ΜL (ref 0–0.1)
BASOPHILS NFR BLD AUTO: 1 % (ref 0–1)
BILIRUB SERPL-MCNC: 0.76 MG/DL (ref 0.2–1)
BUN SERPL-MCNC: 17 MG/DL (ref 5–25)
CALCIUM ALBUM COR SERPL-MCNC: 9.5 MG/DL (ref 8.3–10.1)
CALCIUM SERPL-MCNC: 9 MG/DL (ref 8.3–10.1)
CHLORIDE SERPL-SCNC: 101 MMOL/L (ref 100–108)
CO2 SERPL-SCNC: 28 MMOL/L (ref 21–32)
CREAT SERPL-MCNC: 1.14 MG/DL (ref 0.6–1.3)
EOSINOPHIL # BLD AUTO: 0.17 THOUSAND/ΜL (ref 0–0.61)
EOSINOPHIL NFR BLD AUTO: 3 % (ref 0–6)
ERYTHROCYTE [DISTWIDTH] IN BLOOD BY AUTOMATED COUNT: 11.9 % (ref 11.6–15.1)
GFR SERPL CREATININE-BSD FRML MDRD: 75 ML/MIN/1.73SQ M
GLUCOSE SERPL-MCNC: 173 MG/DL (ref 65–140)
HCT VFR BLD AUTO: 46 % (ref 36.5–49.3)
HGB BLD-MCNC: 16.3 G/DL (ref 12–17)
IMM GRANULOCYTES # BLD AUTO: 0.03 THOUSAND/UL (ref 0–0.2)
IMM GRANULOCYTES NFR BLD AUTO: 1 % (ref 0–2)
KCT BLD-ACNC: 189 SEC (ref 89–137)
LIPASE SERPL-CCNC: 220 U/L (ref 73–393)
LYMPHOCYTES # BLD AUTO: 1.33 THOUSANDS/ΜL (ref 0.6–4.47)
LYMPHOCYTES NFR BLD AUTO: 24 % (ref 14–44)
MCH RBC QN AUTO: 32.2 PG (ref 26.8–34.3)
MCHC RBC AUTO-ENTMCNC: 35.4 G/DL (ref 31.4–37.4)
MCV RBC AUTO: 91 FL (ref 82–98)
MONOCYTES # BLD AUTO: 0.54 THOUSAND/ΜL (ref 0.17–1.22)
MONOCYTES NFR BLD AUTO: 10 % (ref 4–12)
NEUTROPHILS # BLD AUTO: 3.37 THOUSANDS/ΜL (ref 1.85–7.62)
NEUTS SEG NFR BLD AUTO: 61 % (ref 43–75)
NRBC BLD AUTO-RTO: 0 /100 WBCS
P AXIS: 43 DEGREES
P AXIS: 54 DEGREES
P AXIS: 64 DEGREES
PLATELET # BLD AUTO: 207 THOUSANDS/UL (ref 149–390)
PLATELET # BLD AUTO: 225 THOUSANDS/UL (ref 149–390)
PMV BLD AUTO: 10 FL (ref 8.9–12.7)
PMV BLD AUTO: 9.9 FL (ref 8.9–12.7)
POTASSIUM SERPL-SCNC: 3.4 MMOL/L (ref 3.5–5.3)
PR INTERVAL: 142 MS
PR INTERVAL: 156 MS
PROT SERPL-MCNC: 6.9 G/DL (ref 6.4–8.2)
QRS AXIS: 16 DEGREES
QRS AXIS: 67 DEGREES
QRS AXIS: 67 DEGREES
QRS AXIS: 74 DEGREES
QRSD INTERVAL: 100 MS
QRSD INTERVAL: 102 MS
QRSD INTERVAL: 90 MS
QRSD INTERVAL: 98 MS
QT INTERVAL: 368 MS
QT INTERVAL: 390 MS
QT INTERVAL: 392 MS
QT INTERVAL: 398 MS
QTC INTERVAL: 395 MS
QTC INTERVAL: 400 MS
QTC INTERVAL: 416 MS
QTC INTERVAL: 425 MS
RBC # BLD AUTO: 5.06 MILLION/UL (ref 3.88–5.62)
SODIUM SERPL-SCNC: 137 MMOL/L (ref 136–145)
SPECIMEN SOURCE: ABNORMAL
T WAVE AXIS: 103 DEGREES
T WAVE AXIS: 107 DEGREES
T WAVE AXIS: 20 DEGREES
T WAVE AXIS: 54 DEGREES
TROPONIN I SERPL-MCNC: 0.04 NG/ML
TROPONIN I SERPL-MCNC: 0.38 NG/ML
TROPONIN I SERPL-MCNC: 1.55 NG/ML
TROPONIN I SERPL-MCNC: 2.53 NG/ML
VENTRICULAR RATE: 61 BPM
VENTRICULAR RATE: 62 BPM
VENTRICULAR RATE: 71 BPM
VENTRICULAR RATE: 77 BPM
WBC # BLD AUTO: 5.49 THOUSAND/UL (ref 4.31–10.16)

## 2021-03-31 PROCEDURE — 70498 CT ANGIOGRAPHY NECK: CPT

## 2021-03-31 PROCEDURE — 93454 CORONARY ARTERY ANGIO S&I: CPT | Performed by: INTERNAL MEDICINE

## 2021-03-31 PROCEDURE — C1725 CATH, TRANSLUMIN NON-LASER: HCPCS | Performed by: INTERNAL MEDICINE

## 2021-03-31 PROCEDURE — 93306 TTE W/DOPPLER COMPLETE: CPT | Performed by: INTERNAL MEDICINE

## 2021-03-31 PROCEDURE — 92941 PRQ TRLML REVSC TOT OCCL AMI: CPT | Performed by: INTERNAL MEDICINE

## 2021-03-31 PROCEDURE — 95816 EEG AWAKE AND DROWSY: CPT | Performed by: STUDENT IN AN ORGANIZED HEALTH CARE EDUCATION/TRAINING PROGRAM

## 2021-03-31 PROCEDURE — NC001 PR NO CHARGE: Performed by: INTERNAL MEDICINE

## 2021-03-31 PROCEDURE — 80053 COMPREHEN METABOLIC PANEL: CPT | Performed by: EMERGENCY MEDICINE

## 2021-03-31 PROCEDURE — 93005 ELECTROCARDIOGRAM TRACING: CPT

## 2021-03-31 PROCEDURE — C9606 PERC D-E COR REVASC W AMI S: HCPCS | Performed by: INTERNAL MEDICINE

## 2021-03-31 PROCEDURE — 85025 COMPLETE CBC W/AUTO DIFF WBC: CPT | Performed by: EMERGENCY MEDICINE

## 2021-03-31 PROCEDURE — 99153 MOD SED SAME PHYS/QHP EA: CPT | Performed by: INTERNAL MEDICINE

## 2021-03-31 PROCEDURE — 85049 AUTOMATED PLATELET COUNT: CPT | Performed by: INTERNAL MEDICINE

## 2021-03-31 PROCEDURE — C1874 STENT, COATED/COV W/DEL SYS: HCPCS

## 2021-03-31 PROCEDURE — 93010 ELECTROCARDIOGRAM REPORT: CPT | Performed by: INTERNAL MEDICINE

## 2021-03-31 PROCEDURE — G1004 CDSM NDSC: HCPCS

## 2021-03-31 PROCEDURE — 84484 ASSAY OF TROPONIN QUANT: CPT | Performed by: EMERGENCY MEDICINE

## 2021-03-31 PROCEDURE — 70496 CT ANGIOGRAPHY HEAD: CPT

## 2021-03-31 PROCEDURE — C1769 GUIDE WIRE: HCPCS | Performed by: INTERNAL MEDICINE

## 2021-03-31 PROCEDURE — 36415 COLL VENOUS BLD VENIPUNCTURE: CPT | Performed by: EMERGENCY MEDICINE

## 2021-03-31 PROCEDURE — 99291 CRITICAL CARE FIRST HOUR: CPT

## 2021-03-31 PROCEDURE — 84484 ASSAY OF TROPONIN QUANT: CPT | Performed by: INTERNAL MEDICINE

## 2021-03-31 PROCEDURE — 93306 TTE W/DOPPLER COMPLETE: CPT

## 2021-03-31 PROCEDURE — 99291 CRITICAL CARE FIRST HOUR: CPT | Performed by: EMERGENCY MEDICINE

## 2021-03-31 PROCEDURE — 95816 EEG AWAKE AND DROWSY: CPT

## 2021-03-31 PROCEDURE — 83690 ASSAY OF LIPASE: CPT | Performed by: EMERGENCY MEDICINE

## 2021-03-31 PROCEDURE — C1887 CATHETER, GUIDING: HCPCS | Performed by: INTERNAL MEDICINE

## 2021-03-31 PROCEDURE — 99152 MOD SED SAME PHYS/QHP 5/>YRS: CPT | Performed by: INTERNAL MEDICINE

## 2021-03-31 PROCEDURE — 027034Z DILATION OF CORONARY ARTERY, ONE ARTERY WITH DRUG-ELUTING INTRALUMINAL DEVICE, PERCUTANEOUS APPROACH: ICD-10-PCS | Performed by: INTERNAL MEDICINE

## 2021-03-31 PROCEDURE — 96375 TX/PRO/DX INJ NEW DRUG ADDON: CPT

## 2021-03-31 PROCEDURE — 85347 COAGULATION TIME ACTIVATED: CPT

## 2021-03-31 PROCEDURE — 99255 IP/OBS CONSLTJ NEW/EST HI 80: CPT | Performed by: PSYCHIATRY & NEUROLOGY

## 2021-03-31 PROCEDURE — 96374 THER/PROPH/DIAG INJ IV PUSH: CPT

## 2021-03-31 PROCEDURE — NC001 PR NO CHARGE: Performed by: PHYSICIAN ASSISTANT

## 2021-03-31 PROCEDURE — 99232 SBSQ HOSP IP/OBS MODERATE 35: CPT | Performed by: EMERGENCY MEDICINE

## 2021-03-31 PROCEDURE — B2111ZZ FLUOROSCOPY OF MULTIPLE CORONARY ARTERIES USING LOW OSMOLAR CONTRAST: ICD-10-PCS | Performed by: INTERNAL MEDICINE

## 2021-03-31 PROCEDURE — C1894 INTRO/SHEATH, NON-LASER: HCPCS | Performed by: INTERNAL MEDICINE

## 2021-03-31 RX ORDER — ATORVASTATIN CALCIUM 80 MG/1
80 TABLET, FILM COATED ORAL
Status: DISCONTINUED | OUTPATIENT
Start: 2021-03-31 | End: 2021-04-02 | Stop reason: HOSPADM

## 2021-03-31 RX ORDER — HEPARIN SODIUM 10000 [USP'U]/100ML
3-20 INJECTION, SOLUTION INTRAVENOUS
Status: DISCONTINUED | OUTPATIENT
Start: 2021-03-31 | End: 2021-03-31 | Stop reason: HOSPADM

## 2021-03-31 RX ORDER — METOPROLOL TARTRATE 50 MG/1
25 TABLET, FILM COATED ORAL EVERY 12 HOURS SCHEDULED
Status: DISCONTINUED | OUTPATIENT
Start: 2021-03-31 | End: 2021-03-31

## 2021-03-31 RX ORDER — FENTANYL CITRATE 50 UG/ML
INJECTION, SOLUTION INTRAMUSCULAR; INTRAVENOUS CODE/TRAUMA/SEDATION MEDICATION
Status: COMPLETED | OUTPATIENT
Start: 2021-03-31 | End: 2021-03-31

## 2021-03-31 RX ORDER — NITROGLYCERIN 20 MG/100ML
INJECTION INTRAVENOUS CODE/TRAUMA/SEDATION MEDICATION
Status: COMPLETED | OUTPATIENT
Start: 2021-03-31 | End: 2021-03-31

## 2021-03-31 RX ORDER — ATORVASTATIN CALCIUM 40 MG/1
40 TABLET, FILM COATED ORAL EVERY EVENING
Status: DISCONTINUED | OUTPATIENT
Start: 2021-03-31 | End: 2021-03-31

## 2021-03-31 RX ORDER — HEPARIN SODIUM 1000 [USP'U]/ML
INJECTION, SOLUTION INTRAVENOUS; SUBCUTANEOUS CODE/TRAUMA/SEDATION MEDICATION
Status: COMPLETED | OUTPATIENT
Start: 2021-03-31 | End: 2021-03-31

## 2021-03-31 RX ORDER — LIDOCAINE HYDROCHLORIDE 10 MG/ML
INJECTION, SOLUTION EPIDURAL; INFILTRATION; INTRACAUDAL; PERINEURAL CODE/TRAUMA/SEDATION MEDICATION
Status: COMPLETED | OUTPATIENT
Start: 2021-03-31 | End: 2021-03-31

## 2021-03-31 RX ORDER — MORPHINE SULFATE 4 MG/ML
4 INJECTION, SOLUTION INTRAMUSCULAR; INTRAVENOUS ONCE
Status: COMPLETED | OUTPATIENT
Start: 2021-03-31 | End: 2021-03-31

## 2021-03-31 RX ORDER — NITROGLYCERIN 0.4 MG/1
0.4 TABLET SUBLINGUAL
Status: DISCONTINUED | OUTPATIENT
Start: 2021-03-31 | End: 2021-04-02 | Stop reason: HOSPADM

## 2021-03-31 RX ORDER — NITROGLYCERIN 0.4 MG/1
0.4 TABLET SUBLINGUAL ONCE
Status: COMPLETED | OUTPATIENT
Start: 2021-03-31 | End: 2021-03-31

## 2021-03-31 RX ORDER — ASPIRIN 81 MG/1
81 TABLET, CHEWABLE ORAL DAILY
Status: DISCONTINUED | OUTPATIENT
Start: 2021-04-01 | End: 2021-04-02 | Stop reason: HOSPADM

## 2021-03-31 RX ORDER — SODIUM CHLORIDE 9 MG/ML
INJECTION, SOLUTION INTRAVENOUS
Status: COMPLETED | OUTPATIENT
Start: 2021-03-31 | End: 2021-03-31

## 2021-03-31 RX ORDER — METOPROLOL TARTRATE 50 MG/1
50 TABLET, FILM COATED ORAL EVERY 12 HOURS SCHEDULED
Status: DISCONTINUED | OUTPATIENT
Start: 2021-03-31 | End: 2021-03-31

## 2021-03-31 RX ORDER — ONDANSETRON 2 MG/ML
4 INJECTION INTRAMUSCULAR; INTRAVENOUS EVERY 6 HOURS PRN
Status: DISCONTINUED | OUTPATIENT
Start: 2021-03-31 | End: 2021-04-02 | Stop reason: HOSPADM

## 2021-03-31 RX ORDER — HEPARIN SODIUM 5000 [USP'U]/ML
5000 INJECTION, SOLUTION INTRAVENOUS; SUBCUTANEOUS EVERY 8 HOURS SCHEDULED
Status: DISCONTINUED | OUTPATIENT
Start: 2021-03-31 | End: 2021-04-02 | Stop reason: HOSPADM

## 2021-03-31 RX ORDER — ACETAMINOPHEN 325 MG/1
650 TABLET ORAL EVERY 4 HOURS PRN
Status: DISCONTINUED | OUTPATIENT
Start: 2021-03-31 | End: 2021-04-02 | Stop reason: HOSPADM

## 2021-03-31 RX ORDER — SODIUM CHLORIDE 9 MG/ML
125 INJECTION, SOLUTION INTRAVENOUS CONTINUOUS
Status: DISCONTINUED | OUTPATIENT
Start: 2021-03-31 | End: 2021-04-01

## 2021-03-31 RX ORDER — MIDAZOLAM HYDROCHLORIDE 2 MG/2ML
INJECTION, SOLUTION INTRAMUSCULAR; INTRAVENOUS CODE/TRAUMA/SEDATION MEDICATION
Status: COMPLETED | OUTPATIENT
Start: 2021-03-31 | End: 2021-03-31

## 2021-03-31 RX ORDER — CLOPIDOGREL BISULFATE 75 MG/1
75 TABLET ORAL DAILY
Status: DISCONTINUED | OUTPATIENT
Start: 2021-04-01 | End: 2021-03-31

## 2021-03-31 RX ORDER — CLOPIDOGREL BISULFATE 75 MG/1
600 TABLET ORAL ONCE
Status: DISCONTINUED | OUTPATIENT
Start: 2021-03-31 | End: 2021-03-31

## 2021-03-31 RX ORDER — NITROGLYCERIN 0.4 MG/1
0.4 TABLET SUBLINGUAL
Status: DISCONTINUED | OUTPATIENT
Start: 2021-03-31 | End: 2021-03-31

## 2021-03-31 RX ORDER — NITROGLYCERIN 20 MG/100ML
5 INJECTION INTRAVENOUS
Status: DISCONTINUED | OUTPATIENT
Start: 2021-03-31 | End: 2021-03-31

## 2021-03-31 RX ORDER — HEPARIN SODIUM 1000 [USP'U]/ML
4000 INJECTION, SOLUTION INTRAVENOUS; SUBCUTANEOUS ONCE
Status: COMPLETED | OUTPATIENT
Start: 2021-03-31 | End: 2021-03-31

## 2021-03-31 RX ORDER — VERAPAMIL HYDROCHLORIDE 2.5 MG/ML
INJECTION, SOLUTION INTRAVENOUS CODE/TRAUMA/SEDATION MEDICATION
Status: COMPLETED | OUTPATIENT
Start: 2021-03-31 | End: 2021-03-31

## 2021-03-31 RX ADMIN — TICAGRELOR 90 MG: 90 TABLET ORAL at 21:26

## 2021-03-31 RX ADMIN — PERFLUTREN 0.6 ML/MIN: 6.52 INJECTION, SUSPENSION INTRAVENOUS at 15:00

## 2021-03-31 RX ADMIN — HEPARIN SODIUM 4000 UNITS: 1000 INJECTION INTRAVENOUS; SUBCUTANEOUS at 11:26

## 2021-03-31 RX ADMIN — MORPHINE SULFATE 4 MG: 4 INJECTION INTRAVENOUS at 11:26

## 2021-03-31 RX ADMIN — TICAGRELOR 180 MG: 90 TABLET ORAL at 11:26

## 2021-03-31 RX ADMIN — NITROGLYCERIN 0.4 MG: 0.4 TABLET SUBLINGUAL at 11:00

## 2021-03-31 RX ADMIN — SODIUM CHLORIDE 125 ML/HR: 0.9 INJECTION, SOLUTION INTRAVENOUS at 23:22

## 2021-03-31 RX ADMIN — HEPARIN SODIUM 5000 UNITS: 1000 INJECTION INTRAVENOUS; SUBCUTANEOUS at 12:42

## 2021-03-31 RX ADMIN — MIDAZOLAM 2 MG: 1 INJECTION INTRAMUSCULAR; INTRAVENOUS at 12:13

## 2021-03-31 RX ADMIN — SODIUM CHLORIDE 125 ML/HR: 0.9 INJECTION, SOLUTION INTRAVENOUS at 13:30

## 2021-03-31 RX ADMIN — Medication 200 MCG: at 12:18

## 2021-03-31 RX ADMIN — IOHEXOL 85 ML: 350 INJECTION, SOLUTION INTRAVENOUS at 14:28

## 2021-03-31 RX ADMIN — IOHEXOL 105 ML: 350 INJECTION, SOLUTION INTRAVENOUS at 12:41

## 2021-03-31 RX ADMIN — HEPARIN SODIUM 5000 UNITS: 5000 INJECTION INTRAVENOUS; SUBCUTANEOUS at 21:21

## 2021-03-31 RX ADMIN — VERAPAMIL HYDROCHLORIDE 2.5 MG: 2.5 INJECTION, SOLUTION INTRAVENOUS at 12:18

## 2021-03-31 RX ADMIN — ATORVASTATIN CALCIUM 80 MG: 80 TABLET, FILM COATED ORAL at 17:43

## 2021-03-31 RX ADMIN — HEPARIN SODIUM 6000 UNITS: 1000 INJECTION INTRAVENOUS; SUBCUTANEOUS at 12:18

## 2021-03-31 RX ADMIN — FENTANYL CITRATE 50 MCG: 50 INJECTION INTRAMUSCULAR; INTRAVENOUS at 12:13

## 2021-03-31 RX ADMIN — LIDOCAINE HYDROCHLORIDE 1 ML: 10 INJECTION, SOLUTION EPIDURAL; INFILTRATION; INTRACAUDAL; PERINEURAL at 12:17

## 2021-03-31 RX ADMIN — METOPROLOL TARTRATE 25 MG: 25 TABLET, FILM COATED ORAL at 21:22

## 2021-03-31 RX ADMIN — SODIUM CHLORIDE 100 ML/HR: 9 INJECTION, SOLUTION INTRAVENOUS at 12:17

## 2021-03-31 RX ADMIN — SODIUM CHLORIDE 500 ML: 0.9 INJECTION, SOLUTION INTRAVENOUS at 11:29

## 2021-03-31 NOTE — ASSESSMENT & PLAN NOTE
- Presented to [facility] with chest pain, found to have ST elevation on repeat ECG  - Transferred to [facility] for cardiac catheterization, s/p JUAN to proximal RCA  - Currently on ASA 81 mg daily, Brillinta 90 mg BID, and atorvastatin 80 mg daily   - Echo:  · Systolic function normal, no regional wall motion abnormalities  · Right ventricle mildly to moderately dilated, right atrium mildly dilated  · Left atrium size at the upper limits of normal  · Mild annular calcification and trace regurgitation of mitral valve  - Cardiology following

## 2021-03-31 NOTE — CONSULTS
Consultation - Stroke   Juan Carlos Murphy 48 y o  male MRN: [id number]  Unit/Bed#: [facility] 429-01 Encounter: [id number]      Assessment/Plan   Amnesia  Assessment & Plan  Juan Carlos Murphy is a 48 y o  male with prior Right MCA stroke (March 2018), CAD s/p stent (02/07/2020), HTN, dyslipidemia, possible SHIRA (requiring workup) who initially presented to [facility] ED on 03/31/2021 with chest pain, found to have a STEMI, and [facility]  NIHSS of 1 for confusion  CT head unremarkable for acute intracranial abnormalities, however patient was found to have a possible chronic right ICA dissection vs web per review with attending neurologist  CT imaging official reads pending  TPA Decision: Patient not a TPA candidate  Symptoms resolved/clearly non disabling  Amnesia following cardiac catheterization  Etiology unclear at this time  Differentials include TGA vs TIA/CVA vs seizure  Will obtain MRI brain to rule out stroke  Plan:  - CT head and CTA head and neck official reads pending  - MRI brain pending  - Routine EEG pending   - Echo pending   - Continue AP/AC therapy per Cardiology  - Continue high-dose statin   - SBP goal 120-160   - Euglycemia, normothermia  - Telemetry   - Frequent neuro checks   - PT/OT / speech   - Medical management and supportive care per primary team, notify with changes    * ST elevation myocardial infarction (STEMI) of inferior wall ([address] ), proximal RCA JUAN placement  Assessment & Plan  - Presented to [facility] with chest pain, found to have ST elevation on repeat ECG  - [facility], s/p JUAN to proximal RCA  - Currently on ASA 81 mg daily, Brillinta 90 mg BID, and atorvastatin 80 mg daily   - Echo pending   - Cardiology following     Recommendations for outpatient neurological follow up have yet to be determined      History of Present Illness     Reason for Consult / Principal Problem: Stroke alert, amnesia   Hx and PE limited by: Patient is confused/amnestic   Patient last known well: Prior to procedure   Stroke alert called: 1400 on 03/31/2021  Neurology time of arrival: 1400  HPI: Memo Jay is a 48 y o   male with prior Right MCA stroke (March 2018), CAD s/p stent (02/07/2020), HTN, dyslipidemia, possible SHIRA (requiring workup) who initially presented to [facility] ED on 03/31/2021 with chest pain, found to have a STEMI, and transferred to [facility] for cardiac catheterization  Per chart review, patient presented to [facility] on 03/31/2021 with chest pain  Initial ECG without ST segment elevation, however repeat revealed ST segment elevation in inferior leads  MI alert was called  Patient was loaded with ASA and Brilinta, started on heparin drip, given Lopressor, high-intensity statin, and transferred to [facility] ED for cardiac catheterization  Per discussion with staff, patient was his normal self prior to the procedure  After waking from the cardiac catheterization patient was noted to be confused  Stroke alert called at 1400  NIHSS of 1 for confusion  No focal neurological deficits on exam   CT head unremarkable for acute intracranial abnormalities  CTA head and neck completed and revealed a possible chronic right ICA dissection vs web per attending neurologist   After CT imaging, patient began to regain some of his memory  Patient was able to recall that he was experiencing some chest pain and that he was transferred to [place] from [facility]      Consults    Review of Systems  12 point ROS limited to confusion and acuity of condition  Historical Information   Past Medical History:   Diagnosis Date    Acute ST elevation myocardial infarction (STEMI) of inferior wall ([facility])     cath with PCI/JUAN - RCA 2/7/2020    Back pain     CAD (coronary artery disease)     COPD (chronic obstructive pulmonary disease) ([facility])     Hyperlipidemia     Hypertension     Inguinal hernia, left     Mild heartburn  Obesity     Stroke [facility]) approx 2015    Denies residual problems  Initially he only had trouble with two fingers on left side   Wears glasses      Past Surgical History:   Procedure Laterality Date    APPENDECTOMY      HERNIA REPAIR      NO PAST SURGERIES      SC LAP,APPENDECTOMY N/A 2018    Procedure: APPENDECTOMY LAPAROSCOPIC;  Surgeon: Nicolle Salter MD;  Location: [address] [place] [address] MAIN OR;  Service: General    SC [place] Left 2018    Procedure: LAPAROSCOPIC INGUINAL HERNIA REPAIR WITH MESH;  Surgeon: Nicolle Salter MD;  Location: [facility] OR;  Service: General     Social History   Social History     Substance and Sexual Activity   Alcohol Use Not Currently     Social History     Substance and Sexual Activity   Drug Use No     E-Cigarette/Vaping    E-Cigarette Use Never User      E-Cigarette/Vaping Substances    Nicotine No     THC No     CBD No     Flavoring No     Other No     Unknown No      Social History     Tobacco Use   Smoking Status Former Smoker    Packs/day: 1 00    Years: 15 00    Pack years: 15 00    Types: Cigarettes    Quit date:     Years since quittin 2   Smokeless Tobacco Never Used     Family History: No family history on file  Review of previous medical records was completed      Meds/Allergies   all current active meds have been reviewed, current meds:   Current Facility-Administered Medications   Medication Dose Route Frequency    acetaminophen (TYLENOL) tablet 650 mg  650 mg Oral Q4H PRN    [START ON 2021] aspirin chewable tablet 81 mg  81 mg Oral Daily    atorvastatin (LIPITOR) tablet 80 mg  80 mg Oral Daily With Dinner    heparin (porcine) subcutaneous injection 5,000 Units  5,000 Units Subcutaneous Q8H [facility]    metoprolol tartrate (LOPRESSOR) tablet 25 mg  25 mg Oral Q12H [facility]    nitroglycerin (NITROSTAT) SL tablet 0 4 mg  0 4 mg Sublingual Q5 Min PRN    ondansetron (ZOFRAN) injection 4 mg  4 mg Intravenous Q6H PRN  sodium chloride 0 9 % infusion  125 mL/hr Intravenous Continuous    ticagrelor (BRILINTA) tablet 90 mg  90 mg Oral Q12H [address]   , PTA meds:   Prior to Admission Medications   Prescriptions Last Dose Informant Patient Reported? Taking?   aspirin 81 mg chewable tablet   No No   Sig: Chew 1 tablet (81 mg total) daily   atorvastatin (LIPITOR) 80 mg tablet   No No   Sig: Take 1 tablet (80 mg total) by mouth every evening   hydrochlorothiazide (HYDRODIURIL) 25 mg tablet   No No   Sig: Take 1 tablet (25 mg total) by mouth daily   metoprolol tartrate (LOPRESSOR) 50 mg tablet   No No   Sig: Take 1 tablet (50 mg total) by mouth every 12 (twelve) hours   nitroglycerin (NITROSTAT) 0 4 mg SL tablet  Self No No   Sig: Place 1 tablet (0 4 mg total) under the tongue every 5 (five) minutes as needed for chest pain If no relief after 3 tablets, come immediately to ER   Patient not taking: Reported on 3/31/2021      Facility-Administered Medications: None    and     No Known Allergies    Objective   Vitals:Blood pressure 124/72, pulse 76, resp  rate 16, SpO2 99 %  ,There is no height or weight on file to calculate BMI  No intake or output data in the 24 hours ending 03/31/21 1536    Invasive Devices: Invasive Devices     Peripheral Intravenous Line            Peripheral IV 03/31/21 Left Antecubital less than 1 day    Peripheral IV 03/31/21 Right Antecubital less than 1 day    Peripheral IV 03/31/21 Right;Ventral (anterior) Hand less than 1 day              Physical Exam  Vitals signs and nursing note reviewed  Constitutional:       General: He is not in acute distress  Appearance: Normal appearance  He is obese  He is not ill-appearing, toxic-appearing or diaphoretic  HENT:      Head: Normocephalic and atraumatic  Eyes:      General: No scleral icterus  Right eye: No discharge  Left eye: No discharge        Extraocular Movements: Extraocular movements intact and EOM normal       Conjunctiva/sclera: Conjunctivae normal    Neck:      Musculoskeletal: Normal range of motion and neck supple  Cardiovascular:      Rate and Rhythm: Normal rate and regular rhythm  Pulmonary:      Effort: Pulmonary effort is normal  No respiratory distress  Skin:     General: Skin is warm and dry  Coloration: Skin is not jaundiced or pale  Findings: No bruising, erythema, lesion or rash  Neurological:      Mental Status: He is alert  Coordination: Finger-Nose-Finger Test normal    Psychiatric:         Mood and Affect: Mood normal          Behavior: Behavior normal          Thought Content: Thought content normal          Judgment: Judgment normal        Neurologic Exam     Mental Status   Patient is alert, sitting up in bed  Oriented to person  On initial exam, patient is unable to state the month  States he is in the hospital  Able to name objects provided, follows multistep commands, answers all questions appropriately  No evidence of dysarthria or aphasia on exam  [place] 3/3, recall after a few minutes 0/30    On reassessment patient was eventually able to state the month  Able to state the year  Cranial Nerves     CN II   Visual fields full to confrontation  CN III, IV, VI   Extraocular motions are normal    Nystagmus: none   Upgaze: normal  Downgaze: normal  Conjugate gaze: present    CN V   Facial sensation intact  CN VII   Facial expression full, symmetric       CN VIII   Hearing: intact    CN XI   CN XI normal      CN XII   Tongue deviation: none    Motor Exam   Muscle bulk: normal  Overall muscle tone: normal  Right arm pronator drift: absent  Left arm pronator drift: absent   strength symmetric, 5/5 bilaterally  Bilateral upper lower extremity strength testing 5/5 throughout     Sensory Exam   Light touch normal      Temperature sensation intact throughout   No evidence of extinction or neglect on exam     Gait, Coordination, and Reflexes     Coordination   Finger to nose coordination: normal    Tremor   Resting tremor: absent    Reflexes   Right plantar: upgoing  Left plantar: normal  No ataxia or dysmetria on bilateral finger-to-nose testing  Bilateral upper extremity reflexes trace throughout   Bilateral patellar reflexes brisk 3+, no cross adductor reflex present   Bilateral Achilles reflexes 2+     NIHSS:  1a Level of Consciousness: 0 = Alert   1b  LOC Questions: 1 = Answers one correctly   1c  LOC Commands: 0 = Obeys both correctly   2  Best Gaze: 0 = Normal   3  Visual: 0 = No visual field loss   4  Facial Palsy: 0=Normal symmetric movement   5a  Motor Right Arm: 0=No drift, limb holds 90 (or 45) degrees for full 10 seconds   5b  Motor Left Arm: 0=No drift, limb holds 90 (or 45) degrees for full 10 seconds   6a  Motor Right Le=No drift, limb holds 90 (or 45) degrees for full 10 seconds   6b  Motor Left Le=No drift, limb holds 90 (or 45) degrees for full 10 seconds   7  Limb Ataxia:  0=Absent   8  Sensory: 0=Normal; no sensory loss   9  Best Language:  0=No aphasia, normal   10  Dysarthria: 0=Normal articulation   11  Extinction and Inattention (formerly Neglect): 0=No abnormality   Total Score: 1     Time NIHSS was completed: 1410    Modified [place] Score:  Unable to determine currently, will gather additional data    Lab Results: I have personally reviewed pertinent reports    Recent Results (from the past 24 hour(s))   ECG 12 lead    Collection Time: 21 10:32 AM   Result Value Ref Range    Ventricular Rate 77 BPM    Atrial Rate 77 BPM    DC Interval 142 ms    QRSD Interval 102 ms    QT Interval 368 ms    QTC Interval 416 ms    P [place] 64 degrees    QRS Axis 67 degrees    T Wave Axis 20 degrees   CBC and differential    Collection Time: 21 11:00 AM   Result Value Ref Range    WBC 5 49 4 31 - 10 16 Thousand/uL    RBC 5 06 3 88 - 5 62 Million/uL    Hemoglobin 16 3 12 0 - 17 0 g/dL    Hematocrit 46 0 36 5 - 49 3 %    MCV 91 82 - 98 fL    MCH 32 2 26 8 - 34 3 pg    MCHC 35 4 31 4 - 37 4 g/dL    RDW 11 9 11 6 - 15 1 %    MPV 10 0 8 9 - 12 7 fL    Platelets 143 666 - 749 Thousands/uL    nRBC 0 /100 WBCs    Neutrophils Relative 61 43 - 75 %    Immat GRANS % 1 0 - 2 %    Lymphocytes Relative 24 14 - 44 %    Monocytes Relative 10 4 - 12 %    Eosinophils Relative 3 0 - 6 %    Basophils Relative 1 0 - 1 %    Neutrophils Absolute 3 37 1 85 - 7 62 Thousands/µL    Immature Grans Absolute 0 03 0 00 - 0 20 Thousand/uL    Lymphocytes Absolute 1 33 0 60 - 4 47 Thousands/µL    Monocytes Absolute 0 54 0 17 - 1 22 Thousand/µL    Eosinophils Absolute 0 17 0 00 - 0 61 Thousand/µL    Basophils Absolute 0 05 0 00 - 0 10 Thousands/µL   Comprehensive metabolic panel    Collection Time: 03/31/21 11:00 AM   Result Value Ref Range    Sodium 137 136 - 145 mmol/L    Potassium 3 4 (L) 3 5 - 5 3 mmol/L    Chloride 101 100 - 108 mmol/L    CO2 28 21 - 32 mmol/L    ANION GAP 8 4 - 13 mmol/L    BUN 17 5 - 25 mg/dL    Creatinine 1 14 0 60 - 1 30 mg/dL    Glucose 173 (H) 65 - 140 mg/dL    Calcium 9 0 8 3 - 10 1 mg/dL    Corrected Calcium 9 5 8 3 - 10 1 mg/dL    AST 32 5 - 45 U/L    ALT 92 (H) 12 - 78 U/L    Alkaline Phosphatase 108 46 - 116 U/L    Total Protein 6 9 6 4 - 8 2 g/dL    Albumin 3 4 (L) 3 5 - 5 0 g/dL    Total Bilirubin 0 76 0 20 - 1 00 mg/dL    eGFR 75 ml/min/1 73sq m   Lipase    Collection Time: 03/31/21 11:00 AM   Result Value Ref Range    Lipase 220 73 - 393 u/L   Troponin I    Collection Time: 03/31/21 11:00 AM   Result Value Ref Range    Troponin I 0 04 <=0 04 ng/mL   ECG 12 lead    Collection Time: 03/31/21 11:07 AM   Result Value Ref Range    Ventricular Rate 61 BPM    Atrial Rate 62 BPM    IA Interval  ms    QRSD Interval 90 ms    QT Interval 398 ms    QTC Interval 400 ms    P Axis  degrees    QRS Axis 74 degrees    T Wave Axis 107 degrees   ECG 12 lead    Collection Time: 03/31/21 11:10 AM   Result Value Ref Range    Ventricular Rate 62 BPM    Atrial Rate 111 BPM    IA Interval  ms    QRSD Interval 98 ms    QT Interval 390 ms    QTC Interval 395 ms    P Axis 43 degrees    QRS Axis 67 degrees    T Wave Axis 103 degrees   POCT activated clotting time    Collection Time: 03/31/21 12:38 PM   Result Value Ref Range    Activated Clotting Time, i-STAT 189 (H) 89 - 137 sec    Specimen Type ARTERIAL    Troponin I    Collection Time: 03/31/21 12:57 PM   Result Value Ref Range    Troponin I 0 38 (H) <=0 04 ng/mL   Platelet count    Collection Time: 03/31/21 12:57 PM   Result Value Ref Range    Platelets 497 877 - 833 Thousands/uL    MPV 9 9 8 9 - 12 7 fL   ]  Imaging Studies: I have personally reviewed pertinent reports and I have personally reviewed pertinent films in PACS  EKG, Pathology, and Other Studies: I have personally reviewed pertinent reports  VTE Prophylaxis: Heparin    Counseling / Coordination of Care  Total time spent today 50 minutes critical care time  Greater than 50% of total time was spent with the patient and/or family counseling and/or coordination of care  A description of the counseling/coordination of care:  Patient was seen and evaluated  Discussed with attending  Chart reviewed thoroughly including laboratory and imaging studies    Plan of care discussed with patient and primary team

## 2021-03-31 NOTE — H&P
H&P- Cardiology   Jakob Jack 48 y o  male MRN: [id number]  Unit/Bed#: [facility] Encounter: [id number]      ASSESSMENT:  1  Acute inferior wall MI  2  History of CAD with prior STEMI 02/2020, s/p PCI X 1 to [place]  3  Preserved EF of 55% on echo in October 2020 (most recent)  4  History of hypertension  5  Dyslipidemia     PLAN/ DISCUSSION:     · Aspirin 324 (given pre-hospital), load Brilinta 180, start heparin infusion    · Update echocardiogram    · Urgent cardiac catheterization, unfortunately cath lab in [facility] is down right now so patient is being transferred to [facility]    · NPO, he tells me the last time he ate was yesterday evening    · Supplemental oxygen    · Pain control, received both nitroglycerin and morphine in the ER    History Of Present Illness: This is a pleasant 59-year-old gentleman who is cared for by Dr Abdirizak Gallagher of our group  Cardiac history is outlined above and most notable for prior inferior wall STEMI in February 2020 for which he underwent cardiac catheterization and subsequent PCI of his distal RCA which had an 85% blockage  He had been doing well since that time and had actually seen Dr Abdirizak Gallagher about 1 month ago at which time he was reportedly asymptomatic from a cardiovascular standpoint  On the morning of 03/31/2021 this gentleman got up to go get some coffee in his home  He had sudden onset substernal chest pain which radiated to the left side of his neck and back  This was similar to his MI about 1 year ago but was much more severe  Called EMS almost right away  He had some associated shortness of breath  He received an aspirin while in the ambulance  Initial EKG showed normal sinus rhythm with nonspecific changes in the ED however subsequent EKG showed ST elevation in inferior leads with reciprocal ST depression  He was given nitroglycerin and morphine and pain went down to a 1   Given his abnormal EKG and concerning symptoms he was advised for urgent cardiac catheterization for which he is agreeable  Patient will be transferred to [facility] for the same  Past Medical History:        Past Medical History:   Diagnosis Date    Acute ST elevation myocardial infarction (STEM[facility]) [facility] wall ([facility]     cath with PCI/JUAN - RCA 2/7/2020    Back pain     CAD (coronary artery disease)     COPD (chronic obstructive pulmonary disease) (HCC)     Hyperlipidemia     Hypertension     Inguinal hernia, left     Mild heartburn     Obesity     Stroke ([address] ) approx 2015    Denies residual problems  Initially he only had trouble with two fingers on left side   Wears glasses       Past Surgical History:   Procedure Laterality Date    APPENDECTOMY      HERNIA REPAIR      NO PAST SURGERIES      AR LAP,APPENDECTOMY N/A 11/28/2018    Procedure: APPENDECTOMY LAPAROSCOPIC;  Surgeon: Ana Red MD;  Location: [address] [place] [address] MAIN OR;  Service: General    AR [place] [address]ien 19 Left 4/2/2018    Procedure: LAPAROSCOPIC INGUINAL HERNIA REPAIR WITH MESH;  Surgeon: Ana Red MD;  Location: [facility] Main OR;  Service: General        Allergy:        No Known Allergies    Medications:       Prior to Admission medications    Medication Sig Start Date End Date Taking?  Authorizing Provider   aspirin 81 mg chewable tablet Chew 1 tablet (81 mg total) daily 2/23/21   Antonio Spittle, DO   atorvastatin (LIPITOR) 80 mg tablet Take 1 tablet (80 mg total) by mouth every evening 2/23/21   Antonio Bennettle, DO   diclofenac sodium (VOLTAREN) 1 % Apply 2 g topically 4 (four) times a day as needed (Elbow pain)  Patient not taking: Reported on 9/5/2020 6/24/20   Payton Taylor PA-C   hydrochlorothiazide (HYDRODIURIL) 25 mg tablet Take 1 tablet (25 mg total) by mouth daily 2/23/21   Antonio Bennettle, DO   methylPREDNISolone 4 MG tablet therapy pack Use as directed on package  Patient not taking: Reported on 10/26/2020 7/2/20   Jazzy Craig MD   metoprolol tartrate (LOPRESSOR) 50 mg tablet Take 1 tablet (50 mg total) by mouth every 12 (twelve) hours 21   Brenna Devi, DO   nitroglycerin (NITROSTAT) 0 4 mg SL tablet Place 1 tablet (0 4 mg total) under the tongue every 5 (five) minutes as needed for chest pain If no relief after 3 tablets, come immediately to ER 20   Jodie Osorio,    potassium chloride (K-DUR,KLOR-CON) 10 mEq tablet Take 1 tablet (10 mEq total) by mouth 2 (two) times a day for 7 days  Patient not taking: Reported on 2021  Maureen Calvillo PA-C       Family History:     History reviewed  No pertinent family history       Social History:       Social History     Socioeconomic History    Marital status: Single     Spouse name: None    Number of children: None    Years of education: None    Highest education level: None   Occupational History    None   Social Needs    Financial resource strain: None    Food insecurity     Worry: None     Inability: None    Transportation needs     Medical: None     Non-medical: None   Tobacco Use    Smoking status: Former Smoker     Packs/day: 1 00     Years: 15 00     Pack years: 15 00     Types: Cigarettes     Quit date:      Years since quittin 2    Smokeless tobacco: Never Used   Substance and Sexual Activity    Alcohol use: Not Currently    Drug use: No    Sexual activity: None   Lifestyle    Physical activity     Days per week: None     Minutes per session: None    Stress: None   Relationships    Social connections     Talks on phone: None     Gets together: None     Attends [employer] service: None     Active member of club or organization: None     Attends meetings of clubs or organizations: None     Relationship status: None    Intimate partner violence     Fear of current or ex partner: None     Emotionally abused: None     Physically abused: None     Forced sexual activity: None   Other Topics Concern    None   Social History Narrative    None ROS:  Symptoms per HPI  Remainder review of systems is negative    Exam:  General:  alert, oriented and in no distress, cooperative   Somewhat uncomfortable appearing but overall nontoxic  Head: Normocephalic, atraumatic  Eyes:  EOMI  Pupils - equal, round, reactive to accomodation  No icterus  Normal Conjunctiva  Oropharynx: moist and normal-appearing mucosa  Neck: supple, symmetrical, trachea midline and no JVD  Heart:  RRR, No: murmer, rub or gallop, S1 & S2 normal   Respiratory effort / Chest Inspection: unlabored  Lungs:  normal air entry, lungs clear to auscultation and no rales, rhonchi or wheezing   Abdomen: flat, normal findings: bowel sounds normal and soft, non-tender  Lower Limbs:  no pitting edema  Pulses[de-identified]  2+ radial pulses bilaterally  Musculoskeletal: ROM grossly normal        DATA:      ECG:                 Normal sinus rhythm  Inferior ST-elevation          Weights: Wt Readings from Last 3 Encounters:   03/31/21 102 kg (225 lb 12 oz)   02/23/21 99 2 kg (218 lb 9 6 oz)   10/26/20 95 7 kg (211 lb)   , Body mass index is 36 44 kg/m²           Lab Studies:    Results from last 7 days   Lab Units 03/31/21  1100   TROPONIN I ng/mL 0 04          Results from last 7 days   Lab Units 03/31/21  1100   WBC Thousand/uL 5 49   HEMOGLOBIN g/dL 16 3   HEMATOCRIT % 46 0   PLATELETS Thousands/uL 225   ,       Invalid input(s): LABALBU

## 2021-03-31 NOTE — PLAN OF CARE
Problem: Prexisting or High Potential for Compromised Skin Integrity  Goal: Skin integrity is maintained or improved  Description: INTERVENTIONS:  - Identify patients at risk for skin breakdown  - Assess and monitor skin integrity  - Assess and monitor nutrition and hydration status  - Monitor labs   - Assess for incontinence   - Turn and reposition patient  - Assist with mobility/ambulation  - Relieve pressure over bony prominences  - Avoid friction and shearing  - Provide appropriate hygiene as needed including keeping skin clean and dry  - Evaluate need for skin moisturizer/barrier cream  - Collaborate with interdisciplinary team   - Patient/family teaching  - Consider wound care consult   Outcome: Progressing     Problem: PAIN - ADULT  Goal: Verbalizes/displays adequate comfort level or baseline comfort level  Description: Interventions:  - Encourage patient to monitor pain and request assistance  - Assess pain using appropriate pain scale  - Administer analgesics based on type and severity of pain and evaluate response  - Implement non-pharmacological measures as appropriate and evaluate response  - Consider cultural and social influences on pain and pain management  - Notify physician/advanced practitioner if interventions unsuccessful or patient reports new pain  Outcome: Progressing     Problem: INFECTION - ADULT  Goal: Absence or prevention of progression during hospitalization  Description: INTERVENTIONS:  - Assess and monitor for signs and symptoms of infection  - Monitor lab/diagnostic results  - Monitor all insertion sites, i e  indwelling lines, tubes, and drains  - Monitor endotracheal if appropriate and nasal secretions for changes in amount and color  - [place] appropriate cooling/warming therapies per order  - Administer medications as ordered  - Instruct and encourage patient and family to use good hand hygiene technique  - Identify and instruct in appropriate isolation precautions for identified infection/condition  Outcome: Progressing  Goal: Absence of fever/infection during neutropenic period  Description: INTERVENTIONS:  - Monitor WBC    Outcome: Progressing     Problem: SAFETY ADULT  Goal: Patient will remain free of falls  Description: INTERVENTIONS:  - Assess patient frequently for physical needs  -  Identify cognitive and physical deficits and behaviors that affect risk of falls    -  [place] fall precautions as indicated by assessment   - Educate patient/family on patient safety including physical limitations  - Instruct patient to call for assistance with activity based on assessment  - Modify environment to reduce risk of injury  - Consider OT/PT consult to assist with strengthening/mobility  Outcome: Progressing  Goal: Maintain or return to baseline ADL function  Description: INTERVENTIONS:  -  Assess patient's ability to carry out ADLs; assess patient's baseline for ADL function and identify physical deficits which impact ability to perform ADLs (bathing, care of mouth/teeth, toileting, grooming, dressing, etc )  - Assess/evaluate cause of self-care deficits   - Assess range of motion  - Assess patient's mobility; develop plan if impaired  - Assess patient's need for assistive devices and provide as appropriate  - Encourage maximum independence but intervene and supervise when necessary  - Involve family in performance of ADLs  - Assess for home care needs following discharge   - Consider OT consult to assist with ADL evaluation and planning for discharge  - Provide patient education as appropriate  Outcome: Progressing  Goal: Maintain or return mobility status to optimal level  Description: INTERVENTIONS:  - Assess patient's baseline mobility status (ambulation, transfers, stairs, etc )    - Identify cognitive and physical deficits and behaviors that affect mobility  - Identify mobility aids required to assist with transfers and/or ambulation (gait belt, sit-to-stand, lift, walker, cane, etc )  - [place] fall precautions as indicated by assessment  - Record patient progress and toleration of activity level on Mobility SBAR; progress patient to next Phase/Stage  - Instruct patient to call for assistance with activity based on assessment  - Consider rehabilitation consult to assist with strengthening/weightbearing, etc   Outcome: Progressing     Problem: DISCHARGE PLANNING  Goal: Discharge to home or other facility with appropriate resources  Description: INTERVENTIONS:  - Identify barriers to discharge w/patient and caregiver  - Arrange for needed discharge resources and transportation as appropriate  - Identify discharge learning needs (meds, wound care, etc )  - Arrange for interpretive services to assist at discharge as needed  - Refer to Case Management Department for coordinating discharge planning if the patient needs post-hospital services based on physician/advanced practitioner order or complex needs related to functional status, cognitive ability, or social support system  Outcome: Progressing     Problem: Knowledge Deficit  Goal: Patient/family/caregiver demonstrates understanding of disease process, treatment plan, medications, and discharge instructions  Description: Complete learning assessment and assess knowledge base    Interventions:  - Provide teaching at level of understanding  - Provide teaching via preferred learning methods  Outcome: Progressing

## 2021-03-31 NOTE — ASSESSMENT & PLAN NOTE
Elmo Perry is a 48 y o  male with prior Right MCA stroke (March 2018), CAD s/p stent (02/07/2020), HTN, dyslipidemia, possible SHIRA (requiring workup) who initially presented to [facility] ED on 03/31/2021 with chest pain, found to have a STEMI, and [facility]  NIHSS of 1 for confusion  CT head unremarkable for acute intracranial abnormalities, however patient was found to have a possible chronic right ICA dissection vs right cavernous ICA atherosclerosis per review with attending neurologist  TPA Decision: Patient not a TPA candidate  Symptoms resolved/clearly non disabling  Amnesia following cardiac catheterization  MRI brain unremarkable for acute infarct  Routine EEG unremarkable  Strong suspicion for transient global amnesia  Plan:  - Continue AP/AC therapy per Cardiology; appears to be on ASA 81 mg daily and Brillinta 90  Mg BID  - Continue high-dose statin   - Goal normotension, euglycemia, normothermia  - Telemetry   - Frequent neuro checks   - PT/OT / speech   - Medical management and supportive care per primary team, notify with changes    Imaging/Labs:  - CT head:  · No acute intracranial abnormalities  - CTA head and neck:  · No cervical or intracranial large vessel occlusion  · No flow limiting dissection or aneurysm  · Mild intracranial carotid atherosclerotic disease  · Mild stenosis due to atherosclerotic plaque at the right suly cavernous junction  Mild atherosclerotic disease in the left cavernous ICA  · Punctate calcification within a subcentimeter right thyroid nodule  - MRI brain:  · No acute infarct, hemorrhage, or midline shift  · Redemonstrated minimal nonspecific right frontoparietal white matter changes  Differential diagnosis includes chronic microangiopathic changes, sequela of remote infectious/inflammatory etiology, less likely demyelinating disease  - Routine EEG:  · Normal awake and drowsy routine EEG study    No electrographic seizures, interictal epileptiform discharges (seizure tendency) or focal slowing were seen

## 2021-03-31 NOTE — EMTALA/ACUTE CARE TRANSFER
[facility]  [address] [facility] [address]  Dept: [phone]      EMTALA TRANSFER CONSENT    NAME Memo Jay                                         1970                              MRN [id number]    I have been informed of my rights regarding examination, treatment, and transfer   by Dr Piero Hollis: Specialized equipment and/or services available at the receiving facility (Include comment)________________________    Risks: Potential for delay in receiving treatment      Consent for Transfer:  I acknowledge that my medical condition has been evaluated and explained to me by the emergency department physician or other qualified medical person and/or my attending physician, who has recommended that I be transferred to the service of  Accepting Physician: (Dr Mark Rogers) at [address], [address] : ([facility])  The above potential benefits of such transfer, the potential risks associated with such transfer, and the probable risks of not being transferred have been explained to me, and I fully understand them  The doctor has explained that, in my case, the benefits of transfer outweigh the risks  I agree to be transferred  I authorize the performance of emergency medical procedures and treatments upon me in both transit and upon arrival at the receiving facility  Additionally, I authorize the release of any and all medical records to the receiving facility and request they be transported with me, if possible  I understand that the safest mode of transportation during a medical emergency is an ambulance and that the Hospital advocates the use of this mode of transport  Risks of traveling to the receiving facility by car, including absence of medical control, life sustaining equipment, such as oxygen, and medical personnel has been explained to me and I fully understand them      ([address] [place])  [ ]  I consent to the stated transfer and to be transported by ambulance/helicopter  [  ]  I consent to the stated transfer, but refuse transportation by ambulance and accept full responsibility for my transportation by car  I understand the risks of non-ambulance transfers and I exonerate the Hospital and its staff from any deterioration in my condition that results from this refusal     X___________________________________________    DATE  21  TIME________  Signature of patient or legally responsible individual signing on patient behalf           RELATIONSHIP TO PATIENT_________________________          Provider Certification    NAME Joy Saleem                                         1970                              MRN [id number]    A medical screening exam was performed on the above named patient  Based on the examination:    Condition Necessitating Transfer The encounter diagnosis was Acute MI, inferior wall ([address] )  Patient Condition: The patient has been stabilized such that within reasonable medical probability, no material deterioration of the patient condition or the condition of the unborn child(raffi) is likely to result from the transfer    Reason for Transfer: Level of Care needed not available at this facility    Transfer Requirements: Facility ([address] [place] [address])   · Space available and qualified personnel available for treatment as acknowledged by    · Agreed to accept transfer and to provide appropriate medical treatment as acknowledged by       (Dr Tank Magaña)  · Appropriate medical records of the examination and treatment of the patient are provided at the time of transfer   [address] _______  · Transfer will be performed by qualified personnel from    and appropriate transfer equipment as required, including the use of necessary and appropriate life support measures      Provider Certification: I have examined the patient and explained the following risks and benefits of being transferred/refusing transfer to the patient/family:  General risk, such as traffic hazards, adverse weather conditions, rough terrain or turbulence, possible failure of equipment (including vehicle or aircraft), or consequences of actions of persons outside the control of the transport personnel      Based on these reasonable risks and benefits to the patient and/or the unborn child(raffi), and based upon the information available at the time of the patients examination, I certify that the medical benefits reasonably to be expected from the provision of appropriate medical treatments at another medical facility outweigh the increasing risks, if any, to the individuals medical condition, and in the case of labor to the unborn child, from effecting the transfer      X____________________________________________ DATE 03/31/21        TIME_______      ORIGINAL - SEND TO MEDICAL RECORDS   COPY - SEND WITH PATIENT DURING TRANSFER

## 2021-03-31 NOTE — ED NOTES
Lipid panel and Magnesium blood work not collected due to MI alert and patient transfer prior to being able to collect blood work       Ty Billings RN  03/31/21 2592

## 2021-03-31 NOTE — ED NOTES
X-ray at bedside, however patient on [facility] stretcher, [facility] would like to transfer patient STAT  X-ray not done, provider notified       Rossy Fallon RN  03/31/21 2143

## 2021-03-31 NOTE — CODE DOCUMENTATION
Per cath lab team episode of altered mental status , rapid response called   resolved upon arrival of RR team

## 2021-03-31 NOTE — PROGRESS NOTES
Patient post cardiac catheterization, inferior MI with right coronary artery stent  Patient with transient amnesia  Talked to his daughter, was unaware that he had done that  He is moving all extremities an appropriate with commands  Unclear if this is transient global amnesia from contrast which is rare but can occur  Will send for stat head CT/CTA    Neurology consultation    Daughter James Hannah, updated via telephone

## 2021-03-31 NOTE — ED NOTES
Patient reports increase in CP, appears diaphoretic  EKG repeated, new ST elevations noted on second EKG, ED provider notified immediately         Lydia Hood RN  03/31/21 2491

## 2021-03-31 NOTE — ED PROVIDER NOTES
History  Chief Complaint   Patient presents with    Chest Pain     Patient reports sudden cp, sob w/ exertion  Reports he called ems, once they arrived his symptoms were mostly resolved  Received 324 mg Aspirin pta  Pt  With a hx of CAD (s/p stent placed last year in 2/7/20) presents with substernal chest pain radiating to the L side and his back since about 1& 1/2 hour ago after he got up to walk to get coffee  No fevers, no cp, no sob  He had a full aspirin pta  Cp has been intermittent in intensity since then  Prior to Admission Medications   Prescriptions Last Dose Informant Patient Reported?  Taking?   aspirin 81 mg chewable tablet   No No   Sig: Chew 1 tablet (81 mg total) daily   atorvastatin (LIPITOR) 80 mg tablet   No No   Sig: Take 1 tablet (80 mg total) by mouth every evening   diclofenac sodium (VOLTAREN) 1 %   No No   Sig: Apply 2 g topically 4 (four) times a day as needed (Elbow pain)   Patient not taking: Reported on 9/5/2020   hydrochlorothiazide (HYDRODIURIL) 25 mg tablet   No No   Sig: Take 1 tablet (25 mg total) by mouth daily   methylPREDNISolone 4 MG tablet therapy pack   No No   Sig: Use as directed on package   Patient not taking: Reported on 10/26/2020   metoprolol tartrate (LOPRESSOR) 50 mg tablet   No No   Sig: Take 1 tablet (50 mg total) by mouth every 12 (twelve) hours   nitroglycerin (NITROSTAT) 0 4 mg SL tablet  Self No No   Sig: Place 1 tablet (0 4 mg total) under the tongue every 5 (five) minutes as needed for chest pain If no relief after 3 tablets, come immediately to ER   potassium chloride (K-DUR,KLOR-CON) 10 mEq tablet   No No   Sig: Take 1 tablet (10 mEq total) by mouth 2 (two) times a day for 7 days   Patient not taking: Reported on 2/23/2021      Facility-Administered Medications: None       Past Medical History:   Diagnosis Date    Acute ST elevation myocardial infarction (STEMI) of inferior wall (HCC)     cath with PCI/JUAN - RCA 2/7/2020    Back pain     CAD (coronary artery disease)     COPD (chronic obstructive pulmonary disease) (HCC)     Hyperlipidemia     Hypertension     Inguinal hernia, left     Mild heartburn     Obesity     Stroke ([address] ) approx 2015    Denies residual problems  Initially he only had trouble with two fingers on left side   Wears glasses        Past Surgical History:   Procedure Laterality Date    APPENDECTOMY      HERNIA REPAIR      NO PAST SURGERIES      [place] LAP,APPENDECTOMY N/A 2018    Procedure: APPENDECTOMY LAPAROSCOPIC;  Surgeon: Nando Todd MD;  Location: [facility] MAIN OR;  Service: General    [place] [address] Left 2018    Procedure: LAPAROSCOPIC INGUINAL HERNIA REPAIR WITH MESH;  Surgeon: Nando Todd MD;  Location: [facility] Main [facility];  Service: General       History reviewed  No pertinent family history  I have reviewed and agree with the history as documented  E-Cigarette/Vaping    E-Cigarette Use Never User      E-Cigarette/Vaping Substances    Nicotine No     THC No     CBD No     Flavoring No     Other No     Unknown No      Social History     Tobacco Use    Smoking status: Former Smoker     Packs/day: 1 00     Years: 15 00     Pack years: 15 00     Types: Cigarettes     Quit date:      Years since quittin 2    Smokeless tobacco: Never Used   Substance Use Topics    Alcohol use: Not Currently    Drug use: No       Review of Systems   Constitutional: Negative for appetite change, fatigue and fever  HENT: Negative for rhinorrhea and sore throat  Respiratory: Negative for cough, shortness of breath and wheezing  Cardiovascular: Positive for chest pain  Negative for leg swelling  Gastrointestinal: Negative for abdominal pain, diarrhea and vomiting  Genitourinary: Negative for dysuria and flank pain  Musculoskeletal: Negative for back pain and neck pain  Skin: Negative for rash  Neurological: Negative for syncope and headaches     Psychiatric/Behavioral: Mood normal       Physical Exam  Physical Exam  Vitals signs and nursing note reviewed  Constitutional:       Appearance: He is well-developed  HENT:      Head: Normocephalic and atraumatic  Neck:      Musculoskeletal: Normal range of motion and neck supple  Cardiovascular:      Rate and Rhythm: Normal rate and regular rhythm  Pulmonary:      Effort: Pulmonary effort is normal  No respiratory distress  Breath sounds: Normal breath sounds  Abdominal:      Palpations: Abdomen is soft  Tenderness: There is no abdominal tenderness  Musculoskeletal: Normal range of motion  Skin:     General: Skin is warm and dry  Neurological:      Mental Status: He is alert and oriented to person, place, and time           Vital Signs  ED Triage Vitals [03/31/21 1036]   Temperature Pulse Respirations Blood Pressure SpO2   98 °F (36 7 °C) 84 18 (!) 181/115 96 %      Temp Source Heart Rate Source Patient Position - Orthostatic VS BP Location FiO2 (%)   Oral Monitor Lying Right arm --      Pain Score       2           Vitals:    03/31/21 1036 03/31/21 1128   BP: (!) 181/115 100/62   Pulse: 84 82   Patient Position - Orthostatic VS: Lying Lying         Visual Acuity      ED Medications  Medications   sodium chloride 0 9 % bolus 500 mL (500 mL Intravenous New Bag 3/31/21 1129)   heparin (porcine) 25,000 units in 0 45% NaCl 250 mL infusion (premix) (has no administration in time range)   nitroglycerin (NITROSTAT) SL tablet 0 4 mg (0 4 mg Sublingual Given 3/31/21 1100)   morphine (PF) 4 mg/mL injection 4 mg (4 mg Intravenous Given 3/31/21 1126)   heparin (porcine) injection 4,000 Units (4,000 Units Intravenous Given 3/31/21 1126)   ticagrelor (BRILINTA) tablet 180 mg (180 mg Oral Given 3/31/21 1126)       Diagnostic Studies  Results Reviewed     Procedure Component Value Units Date/Time    Comprehensive metabolic panel [[id number]]  (Abnormal) Collected: 03/31/21 1100    Lab Status: Final result Specimen: Blood from Arm, Right Updated: 03/31/21 1130     Sodium 137 mmol/L      Potassium 3 4 mmol/L      Chloride 101 mmol/L      CO2 28 mmol/L      ANION GAP 8 mmol/L      BUN 17 mg/dL      Creatinine 1 14 mg/dL      Glucose 173 mg/dL      Calcium 9 0 mg/dL      Corrected Calcium 9 5 mg/dL      AST 32 U/L      ALT 92 U/L      Alkaline Phosphatase 108 U/L      Total Protein 6 9 g/dL      Albumin 3 4 g/dL      Total Bilirubin 0 76 mg/dL      eGFR 75 ml/min/1 73sq m     Narrative:      National Kidney Disease Foundation guidelines for Chronic Kidney Disease (CKD):     Stage 1 with normal or high GFR (GFR > 90 mL/min/1 73 square meters)    Stage 2 Mild CKD (GFR = 60-89 mL/min/1 73 square meters)    Stage 3A Moderate CKD (GFR = 45-59 mL/min/1 73 square meters)    Stage 3B Moderate CKD (GFR = 30-44 mL/min/1 73 square meters)    Stage 4 Severe CKD (GFR = 15-29 mL/min/1 73 square meters)    Stage 5 End Stage CKD (GFR <15 mL/min/1 73 square meters)  Note: GFR calculation is accurate only with a steady state creatinine    Lipase [[id number]]  (Normal) Collected: 03/31/21 1100    Lab Status: Final result Specimen: Blood from Arm, Right Updated: 03/31/21 1130     Lipase 220 u/L     Troponin I [[id number]]  (Normal) Collected: 03/31/21 1100    Lab Status: Final result Specimen: Blood from Arm, Right Updated: 03/31/21 1127     Troponin I 0 04 ng/mL     Lipid panel [[id number]]     Lab Status: No result Specimen: Blood     Magnesium [[id number]]     Lab Status: No result Specimen: Blood     CBC and differential [[id number]] Collected: 03/31/21 1100    Lab Status: Final result Specimen: Blood from Arm, Right Updated: 03/31/21 1105     WBC 5 49 Thousand/uL      RBC 5 06 Million/uL      Hemoglobin 16 3 g/dL      Hematocrit 46 0 %      MCV 91 fL      MCH 32 2 pg      MCHC 35 4 g/dL      RDW 11 9 %      MPV 10 0 fL      Platelets 677 Thousands/uL      nRBC 0 /100 WBCs      Neutrophils Relative 61 %      Immat GRANS % 1 %      Lymphocytes Relative 24 %      Monocytes Relative 10 %      Eosinophils Relative 3 %      Basophils Relative 1 %      Neutrophils Absolute 3 37 Thousands/µL      Immature Grans Absolute 0 03 Thousand/uL      Lymphocytes Absolute 1 33 Thousands/µL      Monocytes Absolute 0 54 Thousand/µL      Eosinophils Absolute 0 17 Thousand/µL      Basophils Absolute 0 05 Thousands/µL                  No orders to display              Procedures  ECG 12 Lead Documentation Only    Date/Time: 3/31/2021 10:32 AM  Performed by: Miguelito Cherry MD  Authorized by: Miguelito Cherry MD     Rate:     ECG rate:  77    ECG rate assessment: normal    Rhythm:     Rhythm: sinus rhythm    Comments:      No  St elevation or depression  ECG 12 Lead Documentation Only    Date/Time: 3/31/2021 11:10 AM  Performed by: Miguelito Cherry MD  Authorized by: Miguelito Cherry MD     Rate:     ECG rate:  62    ECG rate assessment: normal    Rhythm:     Rhythm: sinus rhythm    Comments:      St elevation in II, III, and aVL and AVF (new)  CriticalCare Time  Performed by: Miguelito Cherry MD  Authorized by: Miguelito Cherry MD     Critical care provider statement:     Critical care time (minutes):  60    Critical care was necessary to treat or prevent imminent or life-threatening deterioration of the following conditions:  Cardiac failure and circulatory failure    Critical care was time spent personally by me on the following activities:  Development of treatment plan with patient or surrogate, discussions with consultants, evaluation of patient's response to treatment, examination of patient, interpretation of cardiac output measurements, ordering and performing treatments and interventions, ordering and review of laboratory studies, ordering and review of radiographic studies, re-evaluation of patient's condition and review of old charts             ED Course                             SBIRT 20yo+      Most Recent Value SBIRT (23 yo +)   In order to provide better care to our patients, we are screening all of our patients for alcohol and drug use  Would it be okay to ask you these screening questions? No Filed at: 03/31/2021 1100                    MDM  Number of Diagnoses or Management Options  Acute MI, inferior wall ([facility] ):      Amount and/or Complexity of Data Reviewed  Clinical lab tests: ordered and reviewed  Tests in the radiology section of CPT®: ordered and reviewed    Risk of Complications, Morbidity, and/or Mortality  Presenting problems: moderate  General comments: MI alert was called when 2nd ekg done  Dr Peter Irizarry is aware and accepting pt  To [facility] because our cath  Lab is down  Dr Kai Lopez, cardiology, is in the department and saw the pt  As well  He reviewed his last cath from 2/20 and recommended discontinuing the plavis and nitro drip and to give brilinta  Pt  Currently has 1/10 cp, improved with meds  Confirmed with Dr Kai Lopez that no TPA needs to be given now since door to balloon time can still be within 90 min          Disposition  Final diagnoses:   Acute MI, inferior wall ([facility] [address] )     Time reflects when diagnosis was documented in both MDM as applicable and the Disposition within this note     Time User Action Codes Description Comment    3/31/2021 11:27 AM Laila Alamo Add [I21 19] Acute MI, inferior wall [facility])       ED Disposition     ED Disposition Condition Date/Time Comment    Transfer to Another Facility-In Network  Wed Mar 31, 2021 11:26 AM Sylvia Stevenson should be transferred out to [facility]        MD Documentation      Most Recent Value   Patient Condition  The patient has been stabilized such that within reasonable medical probability, no material deterioration of the patient condition or the condition of the unborn child(raffi) is likely to result from the transfer   Reason for Transfer  Level of Care needed not available at this facility   Benefits of Transfer Specialized equipment and/or services available at the receiving facility (Include comment)________________________   Risks of Transfer  Potential for delay in receiving treatment   Accepting Physician  -- [Dr [address] Name, Kenneth King MD  -- [Dr Caitlyn Woody   Provider Certification  General risk, such as traffic hazards, adverse weather conditions, rough terrain or turbulence, possible failure of equipment (including vehicle or aircraft), or consequences of actions of persons outside the control of the transport personnel      RN Documentation      [address] Name, [address]   -- [[address]   Transport Mode  Ambulance   Level of Care  Advanced life support      Follow-up Information    None         Patient's Medications   Discharge Prescriptions    No medications on file     No discharge procedures on file      PDMP Review     None          ED Provider  Electronically Signed by           Ismael Gibbs MD  03/31/21 8483

## 2021-03-31 NOTE — PROGRESS NOTES
Pastoral Care Progress Note    3/31/2021  Patient: Robles Macias : 1970  Admission Date & Time: 3/31/2021 1238  MRN: [id number] CSN: [id number]     responded to rapid response/stroke alert  No family present at this time  Pastoral Care is following to offer support to Pt and family when available              21 1400   Clinical Encounter Type   Visited With Patient not available;Health care provider   Crisis Visit   (Rapid Response/Stroke Alert)

## 2021-03-31 NOTE — H&P
H&P Exam - Cardiology   Timothy Lester 48 y o  male MRN: [id number]  Unit/Bed#: PPHP 5 OVR Encounter: [id number]    Assessment/Plan     Assessment:  Acute inferior STEMI  CAD s/p PCI  Hyperlipidemia    Plan:  -patient is status post 4 0 X 26 mm JUAN to proximal RCA with excellent postprocedure angiographic results  Dual anti-platelet therapy for at least 1 year post procedure  Continue aspirin, Brilinta  High-intensity statin, continue outpatient beta-blockade   -transthoracic echo  -aggressive risk factor modification  -routine post PCI care    History of Present Illness   HPI:  Timothy Lester is a 48 y o  male with a history of CAD s/p PCI who initially presented to [facility] with complaints of chest pain for 1 hour  Initial EKG did not show ST elevation however subsequent EKG showed ST elevations in the inferior leads  MI alert was called and patient was transferred to [facility]) for emergent cardiac catheterization  Upon arrival to the cardiac catheterization lab, patient was chest pain free  He was loaded with Brilinta and given heparin IV bolus prior to arrival   Limited history is obtained due to acuity of the situation  Review of Systems   Unable to perform ROS: Acuity of condition       Historical Information   Past Medical History:   Diagnosis Date    Acute ST elevation myocardial infarction (STEMI) of inferior wall ([facility] [address] )     cath with PCI/JUAN - RCA 2/7/2020    Back pain     CAD (coronary artery disease)     COPD (chronic obstructive pulmonary disease) ([facility] [address] )     Hyperlipidemia     Hypertension     Inguinal hernia, left     Mild heartburn     Obesity     Stroke ([facility] [address] ) approx 2015    Denies residual problems  Initially he only had trouble with two fingers on left side      Wears glasses      Past Surgical History:   Procedure Laterality Date    APPENDECTOMY      HERNIA REPAIR      NO PAST SURGERIES      [place] LAP,APPENDECTOMY N/A 11/28/2018    Procedure: APPENDECTOMY LAPAROSCOPIC; Surgeon: Adelina Garcia MD;  Location: [address] [place] [address] MAIN OR;  Service: General    [place] [address] [address] Left 2018    Procedure: LAPAROSCOPIC INGUINAL HERNIA REPAIR WITH MESH;  Surgeon: Adelina Garcia MD;  Location: [facility];  Service: General     Social History   Social History     Substance and Sexual Activity   Alcohol Use Not Currently     Social History     Substance and Sexual Activity   Drug Use No     Social History     Tobacco Use   Smoking Status Former Smoker    Packs/day: 1 00    Years: 15 00    Pack years: 15 00    Types: Cigarettes    Quit date:     Years since quittin 2   Smokeless Tobacco Never Used     E-Cigarette/Vaping    E-Cigarette Use Never User       E-Cigarette/Vaping Substances    Nicotine No     THC No     CBD No     Flavoring No     Other No     Unknown No        Family History: No family history on file  Meds/Allergies   all medications and allergies reviewed  No Known Allergies    Objective   Vitals: There were no vitals taken for this visit  No intake or output data in the 24 hours ending 21 1246    Invasive Devices     Peripheral Intravenous Line            Peripheral IV 21 Left Antecubital less than 1 day    Peripheral IV 21 Right Antecubital less than 1 day    Peripheral IV 21 Right;Ventral (anterior) Hand less than 1 day                Physical Exam  Vitals signs and nursing note reviewed  Constitutional:       Appearance: Normal appearance  HENT:      Head: Normocephalic and atraumatic  Nose: Nose normal       Mouth/Throat:      Mouth: Mucous membranes are dry  Eyes:      Extraocular Movements: Extraocular movements intact  Neck:      Musculoskeletal: Neck supple  Cardiovascular:      Rate and Rhythm: Normal rate and regular rhythm  Pulmonary:      Effort: Pulmonary effort is normal    Abdominal:      Palpations: Abdomen is soft  Musculoskeletal: Normal range of motion     Skin:     General: Skin is warm and dry  Neurological:      General: No focal deficit present  Mental Status: He is alert and oriented to person, place, and time  Psychiatric:         Mood and Affect: Mood normal          Lab Results:   I have personally reviewed pertinent lab results  CBC with diff:   Results from last 7 days   Lab Units 03/31/21  1100   WBC Thousand/uL 5 49   RBC Million/uL 5 06   HEMOGLOBIN g/dL 16 3   HEMATOCRIT % 46 0   MCV fL 91   MCH pg 32 2   MCHC g/dL 35 4   RDW % 11 9   MPV fL 10 0   PLATELETS Thousands/uL 225     CMP:   Results from last 7 days   Lab Units 03/31/21  1100   SODIUM mmol/L 137   POTASSIUM mmol/L 3 4*   CHLORIDE mmol/L 101   CO2 mmol/L 28   BUN mg/dL 17   CREATININE mg/dL 1 14   CALCIUM mg/dL 9 0   AST U/L 32   ALT U/L 92*   ALK PHOS U/L 108   EGFR ml/min/1 73sq m 75     Troponin:   0   Lab Value Date/Time    TROPONINI 0 04 03/31/2021 1100    TROPONINI <0 02 09/05/2020 2249    TROPONINI 0 37 (H) 02/12/2020 0322    TROPONINI 0 59 (H) 02/11/2020 2357    TROPONINI 0 74 (H) 02/11/2020 2049    TROPONINI 6 27 (H) 02/08/2020 0540    TROPONINI 0 04 02/07/2020 0729    TROPONINI <0 02 03/30/2019 2026    TROPONINI <0 03 11/03/2018 1511     BNP:   Results from last 7 days   Lab Units 03/31/21  1100   POTASSIUM mmol/L 3 4*   CHLORIDE mmol/L 101   CO2 mmol/L 28   BUN mg/dL 17   CREATININE mg/dL 1 14   CALCIUM mg/dL 9 0   EGFR ml/min/1 73sq m 75     Coags:     TSH:     Magnesium:     Lipid Profile:     Imaging: I have personally reviewed pertinent reports      EKG: NSR, ST elevations in inferior leads    Code Status: Level 1 - Full Code

## 2021-03-31 NOTE — ED NOTES
Heparin premix bag not in MI box, not yet verified or sent from pharmacy  [facility] taking patient instead of waiting for medication as patient is a priority transfer  Can start heparin in truck  Provider notified          David Pineda RN  03/31/21 1155

## 2021-04-01 ENCOUNTER — APPOINTMENT (INPATIENT)
Dept: RADIOLOGY | Facility: HOSPITAL | Age: 51
DRG: 174 | End: 2021-04-01
Payer: COMMERCIAL

## 2021-04-01 LAB
ALBUMIN SERPL BCP-MCNC: 3.2 G/DL (ref 3.5–5)
ALP SERPL-CCNC: 89 U/L (ref 46–116)
ALT SERPL W P-5'-P-CCNC: 74 U/L (ref 12–78)
ANION GAP SERPL CALCULATED.3IONS-SCNC: 7 MMOL/L (ref 4–13)
AST SERPL W P-5'-P-CCNC: 37 U/L (ref 5–45)
ATRIAL RATE: 75 BPM
BILIRUB SERPL-MCNC: 1.88 MG/DL (ref 0.2–1)
BUN SERPL-MCNC: 14 MG/DL (ref 5–25)
CALCIUM ALBUM COR SERPL-MCNC: 9.4 MG/DL (ref 8.3–10.1)
CALCIUM SERPL-MCNC: 8.8 MG/DL (ref 8.3–10.1)
CHLORIDE SERPL-SCNC: 105 MMOL/L (ref 100–108)
CO2 SERPL-SCNC: 26 MMOL/L (ref 21–32)
CREAT SERPL-MCNC: 1.02 MG/DL (ref 0.6–1.3)
GFR SERPL CREATININE-BSD FRML MDRD: 85 ML/MIN/1.73SQ M
GLUCOSE SERPL-MCNC: 122 MG/DL (ref 65–140)
MAGNESIUM SERPL-MCNC: 1.9 MG/DL (ref 1.6–2.6)
P AXIS: 58 DEGREES
POTASSIUM SERPL-SCNC: 3.2 MMOL/L (ref 3.5–5.3)
PR INTERVAL: 148 MS
PROT SERPL-MCNC: 6.6 G/DL (ref 6.4–8.2)
QRS AXIS: 25 DEGREES
QRSD INTERVAL: 96 MS
QT INTERVAL: 388 MS
QTC INTERVAL: 433 MS
SODIUM SERPL-SCNC: 138 MMOL/L (ref 136–145)
T WAVE AXIS: 41 DEGREES
VENTRICULAR RATE: 75 BPM

## 2021-04-01 PROCEDURE — 93010 ELECTROCARDIOGRAM REPORT: CPT | Performed by: INTERNAL MEDICINE

## 2021-04-01 PROCEDURE — G1004 CDSM NDSC: HCPCS

## 2021-04-01 PROCEDURE — 99233 SBSQ HOSP IP/OBS HIGH 50: CPT | Performed by: PSYCHIATRY & NEUROLOGY

## 2021-04-01 PROCEDURE — 70551 MRI BRAIN STEM W/O DYE: CPT

## 2021-04-01 PROCEDURE — 83735 ASSAY OF MAGNESIUM: CPT | Performed by: INTERNAL MEDICINE

## 2021-04-01 PROCEDURE — 80053 COMPREHEN METABOLIC PANEL: CPT | Performed by: INTERNAL MEDICINE

## 2021-04-01 PROCEDURE — 99232 SBSQ HOSP IP/OBS MODERATE 35: CPT | Performed by: INTERNAL MEDICINE

## 2021-04-01 RX ORDER — LISINOPRIL 5 MG/1
5 TABLET ORAL DAILY
Status: DISCONTINUED | OUTPATIENT
Start: 2021-04-01 | End: 2021-04-02 | Stop reason: HOSPADM

## 2021-04-01 RX ORDER — POTASSIUM CHLORIDE 20 MEQ/1
40 TABLET, EXTENDED RELEASE ORAL ONCE
Status: COMPLETED | OUTPATIENT
Start: 2021-04-01 | End: 2021-04-01

## 2021-04-01 RX ADMIN — TICAGRELOR 90 MG: 90 TABLET ORAL at 21:22

## 2021-04-01 RX ADMIN — HEPARIN SODIUM 5000 UNITS: 5000 INJECTION INTRAVENOUS; SUBCUTANEOUS at 05:55

## 2021-04-01 RX ADMIN — HEPARIN SODIUM 5000 UNITS: 5000 INJECTION INTRAVENOUS; SUBCUTANEOUS at 21:23

## 2021-04-01 RX ADMIN — METOPROLOL TARTRATE 25 MG: 25 TABLET, FILM COATED ORAL at 09:12

## 2021-04-01 RX ADMIN — TICAGRELOR 90 MG: 90 TABLET ORAL at 09:12

## 2021-04-01 RX ADMIN — HEPARIN SODIUM 5000 UNITS: 5000 INJECTION INTRAVENOUS; SUBCUTANEOUS at 13:39

## 2021-04-01 RX ADMIN — POTASSIUM CHLORIDE 40 MEQ: 1500 TABLET, EXTENDED RELEASE ORAL at 09:12

## 2021-04-01 RX ADMIN — SODIUM CHLORIDE 125 ML/HR: 0.9 INJECTION, SOLUTION INTRAVENOUS at 07:42

## 2021-04-01 RX ADMIN — ASPIRIN 81 MG: 81 TABLET, CHEWABLE ORAL at 09:12

## 2021-04-01 RX ADMIN — LISINOPRIL 5 MG: 5 TABLET ORAL at 13:39

## 2021-04-01 RX ADMIN — ATORVASTATIN CALCIUM 80 MG: 80 TABLET, FILM COATED ORAL at 17:53

## 2021-04-01 RX ADMIN — METOPROLOL TARTRATE 25 MG: 25 TABLET, FILM COATED ORAL at 21:22

## 2021-04-01 NOTE — PROGRESS NOTES
Cardiology PROGRESS NOTE     Name: Karen Onofre   Age & Sex: 48 y o  male   MRN: [id number]  Unit/Bed#: [facility] 429-01   Encounter: [id number]    PATIENT INFORMATION     Name: Karen Onofre   Age & Sex: 48 y o  male   MRN: [id number]  Hospital Stay Days: 1    ASSESSMENT/PLAN     Principal Problem:    ST elevation myocardial infarction (STEMI) of inferior wall ([address] ), proximal RCA JUAN placement  Active Problems:    Amnesia    Problem list:  STEMI status post JUAN to RCA  Previous history of STEMI in 2/2021:  JUAN to distal RCA  AMS after cardiac catheterization  Hypertension  Hyperlipidemia  History of right MCA stroke in 2018  SHIRA      1  STEMI status post JUAN to right proximal RCA  Patient with previous history of STEMI with JUAN to distal RCA in February 2021 now presenting with similar chest pain and found to have STEMI status post JUAN to right proximal RCA on 03/31  Plan:  · Will continue aspirin 81 mg daily, Lipitor 80 mg daily, Brilinta 90 mg b i d   · On Lopressor 25 mg b i d  · Will likely add ACE-I/ARB   · Patient currently hemodynamically stable without any chest pain episodes  2  AMS  Patient with episode of transient amnesia after cardiac catheterization  Rapid response was called for AMS although workup has been negative including unremarkable MRI and EEG  Echo showing no signs of LV thrombus  Likely TGA in setting of contrast?    Plan:  · Neurology following  Will Follow up on recommendations  3  Hypertension:  Currently well controlled  4  Hyperlipidemia:  Continue Lipitor 80 mg daily  5  History of SHIRA    SUBJECTIVE     Patient seen and examined  No acute events overnight  Patient denies any chest pain or shortness of breath overnight      OBJECTIVE     Vitals:    04/01/21 0500 04/01/21 0600 04/01/21 0700 04/01/21 0755   BP: 104/66 149/79 131/76 135/84   BP Location: Right arm Right arm  Right arm   Pulse: 62 80  79   Resp:    18   Temp:    98 °F (36 7 °C)   TempSrc: Oral   SpO2: 96% 97%  96%   Weight:       Height:          Temperature:   Temp (24hrs), Av 2 °F (36 8 °C), Min:98 °F (36 7 °C), Max:98 7 °F (37 1 °C)    Temperature: 98 °F (36 7 °C)  Intake & Output:  I/O        0701 -  0700  0701 - / 0700  07 -  0700    I V  (mL/kg)  1875 (18 6) 210 4 (2 1)    Total Intake(mL/kg)  1875 (18 6) 210 4 (2 1)    Urine (mL/kg/hr)  2150 650 (2 2)    Total Output  2150 650    Net  -275 -909 6               Weights:   IBW: 63 8 kg    Body mass index is 36 05 kg/m²  Weight (last 2 days)     Date/Time   Weight    21 1808   101 (223 33)            Physical Exam  Constitutional:       General: He is not in acute distress  Appearance: He is well-developed  He is not diaphoretic  HENT:      Head: Normocephalic and atraumatic  Nose: Nose normal       Mouth/Throat:      Pharynx: No oropharyngeal exudate  Eyes:      General: No scleral icterus  Right eye: No discharge  Left eye: No discharge  Neck:      Musculoskeletal: Normal range of motion and neck supple  Cardiovascular:      Rate and Rhythm: Normal rate and regular rhythm  Heart sounds: Normal heart sounds  No murmur  No friction rub  No gallop  Pulmonary:      Effort: Pulmonary effort is normal  No respiratory distress  Breath sounds: No stridor  No wheezing or rales  Abdominal:      General: Bowel sounds are normal  There is no distension  Palpations: Abdomen is soft  Tenderness: There is no abdominal tenderness  There is no guarding  Musculoskeletal: Normal range of motion  Skin:     General: Skin is warm  Findings: No erythema  Neurological:      Mental Status: He is alert and oriented to person, place, and time  LABORATORY DATA     Labs: I have personally reviewed pertinent reports    Results from last 7 days   Lab Units 21  1257 21  1100   WBC Thousand/uL  --  5 49   HEMOGLOBIN g/dL  --  16 3   HEMATOCRIT %  -- 46 0   PLATELETS Thousands/uL 207 225   NEUTROS PCT %  --  61   MONOS PCT %  --  10      Results from last 7 days   Lab Units 04/01/21  0527 03/31/21  1100   POTASSIUM mmol/L 3 2* 3 4*   CHLORIDE mmol/L 105 101   CO2 mmol/L 26 28   BUN mg/dL 14 17   CREATININE mg/dL 1 02 1 14   CALCIUM mg/dL 8 8 9 0   ALK PHOS U/L 89 108   ALT U/L 74 92*   AST U/L 37 32     Results from last 7 days   Lab Units 04/01/21  0527   MAGNESIUM mg/dL 1 9                  Results from last 7 days   Lab Units 03/31/21  1831 03/31/21  1603 03/31/21  1257   TROPONIN I ng/mL 2 53* 1 55* 0 38*       IMAGING & DIAGNOSTIC TESTING     Radiology Results: I have personally reviewed pertinent reports  Mri Brain Wo Contrast    Result Date: 4/1/2021  Impression: No acute infarct, hemorrhage, or midline shift  Redemonstrated minimal nonspecific right frontoparietal white matter changes  Differential diagnosis includes chronic microangiopathic changes, sequela of remote infectious/inflammatory etiology, less likely demyelinating disease  Workstation performed: [id number]     Ct Stroke Alert Brain    Addendum Date: 3/31/2021    ADDENDUM: Correction: Findings were directly discussed with Dr Mirza Beebe on 3/31/2021 at 3:22 PM    Result Date: 3/31/2021  Impression: No acute intracranial abnormality  Findings were directly discussed with Fabienne Uriarte on 3/31/2021 2:52 PM  Workstation performed: [id number]     Cta Stroke Alert (head/neck)    Result Date: 3/31/2021  Impression: No cervical or intracranial large vessel occlusion  No flow limiting dissection or aneurysm  Mild intracranial carotid atherosclerotic disease  Punctate calcifications within a subcentimeter right thyroid nodule  Nonemergent ultrasound imaging is recommended for additional characterization  Findings were directly discussed with Dr Mirza Beebe on 3/31/2021 3:03 PM  Workstation performed: [id number]     Other Diagnostic Testing: I have personally reviewed pertinent reports      ACTIVE MEDICATIONS Current Facility-Administered Medications   Medication Dose Route Frequency    acetaminophen (TYLENOL) tablet 650 mg  650 mg Oral Q4H PRN    aspirin chewable tablet 81 mg  81 mg Oral Daily    atorvastatin (LIPITOR) tablet 80 mg  80 mg Oral Daily With Dinner    heparin (porcine) subcutaneous injection 5,000 Units  5,000 Units Subcutaneous Q8H [address]    metoprolol tartrate (LOPRESSOR) tablet 25 mg  25 mg Oral Q12H [address]    nitroglycerin (NITROSTAT) SL tablet 0 4 mg  0 4 mg Sublingual Q5 Min PRN    ondansetron (ZOFRAN) injection 4 mg  4 mg Intravenous Q6H PRN    sodium chloride 0 9 % infusion  125 mL/hr Intravenous Continuous    ticagrelor (BRILINTA) tablet 90 mg  90 mg Oral Q12H [address]       VTE Pharmacologic Prophylaxis: Heparin  VTE Mechanical Prophylaxis: sequential compression device    Portions of the record may have been created with voice recognition software  Occasional wrong word or "sound a like" substitutions may have occurred due to the inherent limitations of voice recognition software    Read the chart carefully and recognize, using context, where substitutions have occurred   ==  Nita Cancer, [facility]  Internal Medicine Residency PGY-2

## 2021-04-01 NOTE — UTILIZATION REVIEW
Initial Clinical Review    Admission: Date/Time/Statement:   Admission Orders (From admission, onward)     Ordered        03/31/21 1228  Inpatient Admission  Once                   Orders Placed This Encounter   Procedures    Inpatient Admission     Standing Status:   Standing     Number of Occurrences:   1     Order Specific Question:   Level of Care     Answer:   Level 1 Stepdown [13]     Order Specific Question:   Estimated length of stay     Answer:   More than 2 Midnights     Order Specific Question:   Certification     Answer:   I certify that inpatient services are medically necessary for this patient for a duration of greater than two midnights  See H&P and MD Progress Notes for additional information about the patient's course of treatment  Assessment/Plan: 47 yo m with apmh of prior R MCA stroke ( 3/18, CAD s/p stent  (2/20) , HTN, dyslipidemia, possible SHIRA( requires workup )  He  presents to the Ed at [facility] with chest discomfort, he reports being diaphoretic and feeling significantly ill  His chest discomfort is identical but worse than his prior MI pain  ECG shows ST segment elevation in the inferior leads  MI aler, started on ASA, Brilinta load , heparin gtt, Lopressor 25 mg x 1 and high intensity statin  He is transferred to [facility]  For an urgent cardiac catherization  He is admitted inpatient to [facility] for  an acute STEMI,     Cath report - 3/31 -CORONARY CIRCULATION:  Proximal RCA: There was a 100 % stenosis  Distal RCA: There was a 10 % stenosis at the site of a prior stent  Right PDA: There was a 60 % stenosis  1ST LESION INTERVENTIONS:  A balloon angioplasty with stent and balloon angioplasty procedure was performed on the 100 % lesion in the proximal RCA  Following intervention there was a 0 % residual stenosis  A Resolute Andre Rx 4 0 x 26mm drug-eluting stent was placed across the lesion and deployed at a maximum inflation pressure of 16 bayron    INDICATIONS:-- Possible CAD: myocardial infarction with ST elevation (STEMI)  Impression: nferior STEMI, culprit proximal RCA, successful revascularization with JUAN  Patent distal RCA stent  3/31 - Post catherization he developed altered mental status, transient amnesia, and confusion to situation  A rapid response was  called -  And a Stroke alert called  Imaging done  No acute, EEG no acute      Neurology consult - 3/31 -  Given recent cath suspect small hippocampal  TIA/stroke, however seizure and TGA in differential   No tPA due to low NIHSS, Has right cavernous ICA atherosclerosis vs chronic dissection, non actionable  Permissive HTN as long as his heart allows  Constinue post stent antiplatlet therapy and high intensity status,     4/1 -  Neurology -  Continue AP/AC therapy, high dose statin, Goal normotension, euglycemia, normothermia  Continue frequent neuro checks, PT/OT / Speech therapy  MRI brain unremarkable for acute infarction  Strong suspicion for transient global amnesia  4/1 Cardiology -  S/P STEMI s/p JUAN to R proximal RCA  Continue ASA 81 mg , Lipitorm, Brilinta, Lopressor, will likely add ACE/ARB, He is currently hemodynamically stable without chest pain episodes  No acute events overnight          Vitals   Temperature Pulse Respirations Blood Pressure SpO2   03/31/21 2334 03/31/21 1258 03/31/21 1258 03/31/21 1258 03/31/21 1258   98 7 °F (37 1 °C) 72 18 95/61 98 %      Temp Source Heart Rate Source Patient Position - Orthostatic VS BP Location FiO2 (%)   03/31/21 2334 03/31/21 1258 03/31/21 1258 03/31/21 1258 --   Oral Monitor Lying Left arm       Pain Score       03/31/21 1258       No Pain          Wt Readings from Last 1 Encounters:   03/31/21 101 kg (223 lb 5 2 oz)     Additional Vital Signs:   Date/Time  Temp  Pulse  Resp  BP  MAP (mmHg)  SpO2  Calculated FIO2 (%) - Nasal Cannula  Nasal Cannula O2 Flow Rate (L/min)  O2 Device  Patient Position - Orthostatic VS   04/01/21 0755  98 °F (36 7 °C)  79 18  135/84  105  96 %  --  --  None (Room air)  Lying   04/01/21 0700  --  --  --  131/76  --  --  --  --  --  --   04/01/21 0600  --  80  --  149/79  105  97 %  --  --  None (Room air)  --   04/01/21 0500  --  62  --  104/66  79  96 %  --  --  None (Room air)  --   04/01/21 0400  --  64  --  128/80  97  95 %  --  --  None (Room air)  --   04/01/21 0300  --  76  --  160/89  117  97 %  --  --  None (Room air)  --   04/01/21 0100  --  74  --  125/81  99  97 %  --  --  None (Room air)  --   03/31/21 2334  98 7 °F (37 1 °C)  68  18  122/73  92  96 %  --  --  None (Room air)  Sitting   03/31/21 2300  --  66  --  131/98  110  94 %  --  --  None (Room air)  --   03/31/21 2130  --  80  --  133/88  105  96 %  --  --  None (Room air)  --   03/31/21 2121  --  85  --  133/88  --  --  --  --  --  --   03/31/21 2100  --  68  --  105/77  87  94 %  --  --  None (Room air)  --   03/31/21 2000  --  68  --  108/72  85  95 %  --  --  None (Room air)  --   03/31/21 1800  --  72  16  105/63  80  97 %  --  --  --  --   03/31/21 1730  --  70  --  140/77  100  97 %  --  --  --  --   03/31/21 1700  --  60  --  105/69  84  95 %  --  --  --  --   03/31/21 1630  --  68  18  109/75  88  96 %  --  --  --  --   03/31/21 1600  --  76  19  121/85  99  97 %  --  --  None (Room air)  --   03/31/21 1457  --  76  16  124/72  --  99 %  --  --  --  Lying   03/31/21 1430  --  84  16  126/84  --  98 %  --  --  --  Lying           Pertinent Labs/Diagnostic Test Results:       Results from last 7 days   Lab Units 03/31/21  1257 03/31/21  1100   WBC Thousand/uL  --  5 49   HEMOGLOBIN g/dL  --  16 3   HEMATOCRIT %  --  46 0   PLATELETS Thousands/uL 207 225   NEUTROS ABS Thousands/µL  --  3 37     Results from last 7 days   Lab Units 04/01/21  0527 03/31/21  1100   SODIUM mmol/L 138 137   POTASSIUM mmol/L 3 2* 3 4*   CHLORIDE mmol/L 105 101   CO2 mmol/L 26 28   ANION GAP mmol/L 7 8   BUN mg/dL 14 17   CREATININE mg/dL 1 02 1 14   EGFR ml/min/1 73sq m 85 75 CALCIUM mg/dL 8 8 9 0   MAGNESIUM mg/dL 1 9  --      Results from last 7 days   Lab Units 04/01/21  0527 03/31/21  1100   AST U/L 37 32   ALT U/L 74 92*   ALK PHOS U/L 89 108   TOTAL PROTEIN g/dL 6 6 6 9   ALBUMIN g/dL 3 2* 3 4*   TOTAL BILIRUBIN mg/dL 1 88* 0 76     Results from last 7 days   Lab Units 04/01/21  0527 03/31/21  1100   GLUCOSE RANDOM mg/dL 122 173*       Results from last 7 days   Lab Units 03/31/21  1831 03/31/21  1603 03/31/21  1257 03/31/21  1100   TROPONIN I ng/mL 2 53* 1 55* 0 38* 0 04     Results from last 7 days   Lab Units 03/31/21  1100   LIPASE u/L 220       MRI Brain - 3/1 - No acute infarct, hemorrhage, or midline shift  Redemonstrated minimal nonspecific right frontoparietal white matter changes   Differential diagnosis includes chronic microangiopathic changes, sequela of remote infectious/inflammatory etiology, less likely demyelinating disease  CT Brain - 3/31 - No acute intracranial abnormality  CTA Head & Neck - 3/31 - No cervical or intracranial large vessel occlusion  No flow limiting dissection or aneurysm      Mild intracranial carotid atherosclerotic disease  Punctate calcifications within a subcentimeter right thyroid nodule   Nonemergent ultrasound imaging is recommended for additional characterization  EEG - 3/31 - This was a normal awake and drowsy routine EEG study   No electrographic seizures, interictal epileptiform discharges (seizure tendency) or focal slowing were seen    ECG 12 Lead Documentation Only   Date/Time: 3/31/2021 10:32 AM  Rate:     ECG rate:  77    ECG rate assessment: normal    Rhythm:     Rhythm: sinus rhythm    Comments:      No  St elevation or depression  ECG 12 Lead Documentation Only   Date/Time: 3/31/2021 11:10 AM  Rate:     ECG rate:  62    ECG rate assessment: normal    Rhythm:     Rhythm: sinus rhythm    Comments:      St elevation in II, III, and aVL and AVF (new)    Past Medical History:   Diagnosis Date    Acute ST elevation myocardial infarction (STEMI) of inferior wall ([facility])     cath with PCI/JUAN - RCA 2/7/2020    Back pain     CAD (coronary artery disease)     COPD (chronic obstructive pulmonary disease) ([facility])     Hyperlipidemia     Hypertension     Inguinal hernia, left     Mild heartburn     Obesity     Stroke ([facility] ) approx 2015    Denies residual problems  Initially he only had trouble with two fingers on left side   Wears glasses      Present on Admission:  **None**      Admitting Diagnosis: AMI (acute myocardial infarction) ([facility] [address] ) [I21 9]  Age/Sex: 48 y o  male       Admission Orders:  Scheduled Medications:  aspirin, 81 mg, Oral, Daily  atorvastatin, 80 mg, Oral, Daily With Dinner  heparin (porcine), 5,000 Units, Subcutaneous, Q8H SHALA  metoprolol tartrate, 25 mg, Oral, Q12H SHALA  ticagrelor, 90 mg, Oral, Q12H [address]  Potassium 40 meq po once - 4/1 x 1       Continuous IV Infusions:  sodium chloride, 125 mL/hr, Intravenous, Continuous      PRN Meds:  acetaminophen, 650 mg, Oral, Q4H PRN  nitroglycerin, 0 4 mg, Sublingual, Q5 Min PRN  ondansetron, 4 mg, Intravenous, Q6H PRN    Nursing Orders -  Neuro checks q 15 minutes  -  Then q1 x 4 q 2 x 8 then q 4 - Telem -  Pulse ox continuous - Puncture site care - SCD's to le's- I & O q shift  -  Diet cardiac         Network Utilization Review Department  ATTENTION: Please call with any questions or concerns to [phone] and carefully listen to the prompts so that you are directed to the right person  All voicemails are confidential   Niesha Engel all requests for admission clinical reviews, approved or denied determinations and any other requests to dedicated fax number below belonging to the campus where the patient is receiving treatment   List of dedicated fax numbers for the Facilities:  [address] DENIALS (Administrative/Medical Necessity) [phone]   [address] [place] [address] (Maternity/NICU/Pediatrics) [address] [facility] [address] [address] [facility] [address] [address] [place] [address] Bianca [address]an [address] [place] [address] [phone]

## 2021-04-01 NOTE — PROGRESS NOTES
Pastoral Care Progress Note    2021  Patient: Karen Onofre : 1970  Admission Date & Time: 3/31/2021 1238  MRN: [id number] CSN: [id number]     provided Pt a espinoza and activity books with ladan             21 1100   Clinical Encounter Type   Visited With Patient   Routine Visit Follow-up

## 2021-04-01 NOTE — PROGRESS NOTES
Progress Note - Neurology   Tello Wei 48 y o  male MRN: [id number]  Unit/Bed#: [facility] 429-01 Encounter: [id number]      Assessment/Plan   Amnesia  Assessment & Plan  Tello Wei is a 48 y o  male with prior Right MCA stroke (March 2018), CAD s/p stent (02/07/2020), HTN, dyslipidemia, possible SHIRA (requiring workup) who initially presented to [facility] ED on 03/31/2021 with chest pain, found to have a STEMI, and transferred to [facility] for cardiac catheterization  NIHSS of 1 for confusion  CT head unremarkable for acute intracranial abnormalities, however patient was found to have a possible chronic right ICA dissection vs right cavernous ICA atherosclerosis per review with attending neurologist  TPA Decision: Patient not a TPA candidate  Symptoms resolved/clearly non disabling  Amnesia following cardiac catheterization  MRI brain unremarkable for acute infarct  Routine EEG unremarkable  Strong suspicion for transient global amnesia  Plan:  - Continue AP/AC therapy per Cardiology; appears to be on ASA 81 mg daily and Brillinta 90  Mg BID  - Continue high-dose statin   - Goal normotension, euglycemia, normothermia  - Telemetry   - Frequent neuro checks   - PT/OT / speech   - Medical management and supportive care per primary team, notify with changes    Imaging/Labs:  - CT head:  · No acute intracranial abnormalities  - CTA head and neck:  · No cervical or intracranial large vessel occlusion  · No flow limiting dissection or aneurysm  · Mild intracranial carotid atherosclerotic disease  · Mild stenosis due to atherosclerotic plaque at the right suly cavernous junction  Mild atherosclerotic disease in the left cavernous ICA  · Punctate calcification within a subcentimeter right thyroid nodule  - MRI brain:  · No acute infarct, hemorrhage, or midline shift  · Redemonstrated minimal nonspecific right frontoparietal white matter changes    Differential diagnosis includes chronic microangiopathic changes, sequela of remote infectious/inflammatory etiology, less likely demyelinating disease  - Routine EEG:  · Normal awake and drowsy routine EEG study  No electrographic seizures, interictal epileptiform discharges (seizure tendency) or focal slowing were seen    * ST elevation myocardial infarction (STEMI) of inferior wall (HCC), proximal RCA JUAN placement  Assessment & Plan  - Presented to [facility], [place] with chest pain, found to have ST elevation on repeat ECG  - Transferred to [facility] for cardiac catheterization, s/p JUAN to proximal RCA  - Currently on ASA 81 mg daily, Brillinta 90 mg BID, and atorvastatin 80 mg daily   - Echo:  · Systolic function normal, no regional wall motion abnormalities  · Right ventricle mildly to moderately dilated, right atrium mildly dilated  · Left atrium size at the upper limits of normal  · Mild annular calcification and trace regurgitation of mitral valve  - Cardiology following     Yanelis Judd will not need outpatient follow up with Neurology  He will not require outpatient neurological testing  Subjective: Today patient states he feels good and admits to feeling as if he is at his baseline  Patient states he remembers coming into the 1 time hospital and then being transferred to [facility], however he does in a remember much of what happened after the procedure was completed  Patient was able to recognize examiner, however was unable to remember getting an echo done which was completed after neurology exam  Denies CP, SOB, headache, dizziness, vision changes, N/V, abdominal pain, weakness or numbness  ROS:  12 point ROS performed, as stated above, all others negative  Vitals: Blood pressure 146/83, pulse 76, temperature 98 5 °F (36 9 °C), temperature source Oral, resp  rate 18, height 5' 6" (1 676 m), weight 101 kg (223 lb 5 2 oz), SpO2 97 %  ,Body mass index is 36 05 kg/m²  Physical Exam  Vitals signs and nursing note reviewed     Constitutional: General: He is not in acute distress  Appearance: Normal appearance  He is obese  He is not ill-appearing, toxic-appearing or diaphoretic  HENT:      Head: Normocephalic and atraumatic  Eyes:      General: No scleral icterus  Right eye: No discharge  Left eye: No discharge  Extraocular Movements: Extraocular movements intact and EOM normal       Conjunctiva/sclera: Conjunctivae normal    Neck:      Musculoskeletal: Normal range of motion and neck supple  Pulmonary:      Comments: SOB on exertion with strength testing  Musculoskeletal: Normal range of motion  Skin:     General: Skin is warm and dry  Coloration: Skin is not jaundiced or pale  Findings: No bruising, erythema, lesion or rash  Neurological:      Mental Status: He is alert  Coordination: Finger-Nose-Finger Test normal    Psychiatric:         Mood and Affect: Mood normal          Behavior: Behavior normal          Thought Content: Thought content normal          Judgment: Judgment normal        Neurologic Exam     Mental Status   Patient is alert, sitting up in bed  Oriented x 3  No dysarthria or aphasia noted on exam  Able to name the president, names all objects provided, follows multistep commands, and answers all questions appropriately  Registers 3/3 and able to recall 3/3 after 5 minutes  Cranial Nerves     CN II   Visual fields full to confrontation  CN III, IV, VI   Extraocular motions are normal    Nystagmus: none   Upgaze: normal  Downgaze: normal  Conjugate gaze: present    CN V   Facial sensation intact  CN VII   Facial expression full, symmetric       CN XI   CN XI normal      CN XII   Tongue deviation: none    Motor Exam   Muscle bulk: normal  Overall muscle tone: normal  Right arm pronator drift: absent  Left arm pronator drift: absent  Strength symmetric, 5/5 bilaterally  Bilateral upper lower extremity strength testing 5/5 throughout     Sensory Exam   Light touch normal  Gait, Coordination, and Reflexes     Coordination   Finger to nose coordination: normal    Tremor   Resting tremor: absent  No ataxia or dysmetria with Finger to nose testing in BUE  No involuntary movements noted on exam       Lab Results: I have personally reviewed pertinent reports  Recent Results (from the past 24 hour(s))   ECG 12 lead    Collection Time: 03/31/21  3:04 PM   Result Value Ref Range    Ventricular Rate 71 BPM    Atrial Rate 71 BPM    MO Interval 156 ms    QRSD Interval 100 ms    QT Interval 392 ms    QTC Interval 425 ms    P [place] 54 degrees    QRS Axis 16 degrees    T Wave Axis 54 degrees   Troponin I    Collection Time: 03/31/21  4:03 PM   Result Value Ref Range    Troponin I 1 55 (H) <=0 04 ng/mL   Troponin I    Collection Time: 03/31/21  6:31 PM   Result Value Ref Range    Troponin I 2 53 (H) <=0 04 ng/mL   Comprehensive metabolic panel    Collection Time: 04/01/21  5:27 AM   Result Value Ref Range    Sodium 138 136 - 145 mmol/L    Potassium 3 2 (L) 3 5 - 5 3 mmol/L    Chloride 105 100 - 108 mmol/L    CO2 26 21 - 32 mmol/L    ANION GAP 7 4 - 13 mmol/L    BUN 14 5 - 25 mg/dL    Creatinine 1 02 0 60 - 1 30 mg/dL    Glucose 122 65 - 140 mg/dL    Calcium 8 8 8 3 - 10 1 mg/dL    Corrected Calcium 9 4 8 3 - 10 1 mg/dL    AST 37 5 - 45 U/L    ALT 74 12 - 78 U/L    Alkaline Phosphatase 89 46 - 116 U/L    Total Protein 6 6 6 4 - 8 2 g/dL    Albumin 3 2 (L) 3 5 - 5 0 g/dL    Total Bilirubin 1 88 (H) 0 20 - 1 00 mg/dL    eGFR 85 ml/min/1 73sq m   Magnesium    Collection Time: 04/01/21  5:27 AM   Result Value Ref Range    Magnesium 1 9 1 6 - 2 6 mg/dL   ]  Imaging Studies: I have personally reviewed pertinent reports and I have personally reviewed pertinent films in PACS  EKG, Pathology, and Other Studies: I have personally reviewed pertinent reports  VTE Prophylaxis: Heparin    Counseling / Coordination of Care  Total time spent today 35 minutes    Greater than 50% of total time was spent with the patient and/or family counseling and/or coordination of care  A description of the counseling/coordination of care:  Patient was seen and evaluated  Discussed with attending  Chart reviewed thoroughly including laboratory and imaging studies  Plan of care discussed with patient and primary team  Discussed MRI brain results with the patient

## 2021-04-01 NOTE — PROGRESS NOTES
Pastoral Care Progress Note    2021  Patient: Cheyanne Celeste : 1970  Admission Date & Time: 3/31/2021 1238  MRN: [id number] [facility]: [id number]     provided introduction to /Pastoral Care available to Pt  Pt is supported by his daughter, Weston Gil  She is coming to visit him today  Pt is trying to remember and process what happened yesterday but also not dwell on it  To help distract him, he said he'd like to read something or maybe watch TV   will print off some things for him to read  He appreciated having someone to talk to   and Pt prayed together                21 1000   Clinical Encounter Type   Visited With Patient   Routine Visit Introduction   [facility] Encounters   [facility] Needs Prayer

## 2021-04-02 VITALS
HEART RATE: 85 BPM | RESPIRATION RATE: 17 BRPM | HEIGHT: 66 IN | WEIGHT: 223.33 LBS | OXYGEN SATURATION: 97 % | SYSTOLIC BLOOD PRESSURE: 123 MMHG | BODY MASS INDEX: 35.89 KG/M2 | TEMPERATURE: 98.1 F | DIASTOLIC BLOOD PRESSURE: 63 MMHG

## 2021-04-02 PROBLEM — I21.19 ST ELEVATION MYOCARDIAL INFARCTION (STEMI) OF INFERIOR WALL (HCC): Status: RESOLVED | Noted: 2021-03-31 | Resolved: 2021-04-02

## 2021-04-02 PROBLEM — R41.3 AMNESIA: Status: RESOLVED | Noted: 2021-03-31 | Resolved: 2021-04-02

## 2021-04-02 PROCEDURE — 99232 SBSQ HOSP IP/OBS MODERATE 35: CPT | Performed by: INTERNAL MEDICINE

## 2021-04-02 RX ORDER — POTASSIUM CHLORIDE 20 MEQ/1
20 TABLET, EXTENDED RELEASE ORAL ONCE
Status: DISCONTINUED | OUTPATIENT
Start: 2021-04-02 | End: 2021-04-02

## 2021-04-02 RX ORDER — MAGNESIUM CHLORIDE 64 MG
128 TABLET, DELAYED RELEASE (ENTERIC COATED) ORAL ONCE
Status: DISCONTINUED | OUTPATIENT
Start: 2021-04-02 | End: 2021-04-02 | Stop reason: HOSPADM

## 2021-04-02 RX ORDER — LISINOPRIL 5 MG/1
5 TABLET ORAL DAILY
Qty: 30 TABLET | Refills: 0 | Status: SHIPPED | OUTPATIENT
Start: 2021-04-03 | End: 2021-04-26 | Stop reason: SDUPTHER

## 2021-04-02 RX ORDER — POTASSIUM CHLORIDE 20 MEQ/1
40 TABLET, EXTENDED RELEASE ORAL ONCE
Status: COMPLETED | OUTPATIENT
Start: 2021-04-02 | End: 2021-04-02

## 2021-04-02 RX ADMIN — POTASSIUM CHLORIDE 40 MEQ: 1500 TABLET, EXTENDED RELEASE ORAL at 09:45

## 2021-04-02 RX ADMIN — HEPARIN SODIUM 5000 UNITS: 5000 INJECTION INTRAVENOUS; SUBCUTANEOUS at 06:23

## 2021-04-02 RX ADMIN — LISINOPRIL 5 MG: 5 TABLET ORAL at 09:45

## 2021-04-02 RX ADMIN — TICAGRELOR 90 MG: 90 TABLET ORAL at 09:45

## 2021-04-02 RX ADMIN — METOPROLOL TARTRATE 25 MG: 25 TABLET, FILM COATED ORAL at 09:44

## 2021-04-02 RX ADMIN — ASPIRIN 81 MG: 81 TABLET, CHEWABLE ORAL at 09:44

## 2021-04-02 NOTE — PLAN OF CARE
Problem: Prexisting or High Potential for Compromised Skin Integrity  Goal: Skin integrity is maintained or improved  Description: INTERVENTIONS:  - Identify patients at risk for skin breakdown  - Assess and monitor skin integrity  - Assess and monitor nutrition and hydration status  - Monitor labs   - Assess for incontinence   - Turn and reposition patient  - Assist with mobility/ambulation  - Relieve pressure over bony prominences  - Avoid friction and shearing  - Provide appropriate hygiene as needed including keeping skin clean and dry  - Evaluate need for skin moisturizer/barrier cream  - Collaborate with interdisciplinary team   - Patient/family teaching  - Consider wound care consult   Outcome: Progressing     Problem: PAIN - ADULT  Goal: Verbalizes/displays adequate comfort level or baseline comfort level  Description: Interventions:  - Encourage patient to monitor pain and request assistance  - Assess pain using appropriate pain scale  - Administer analgesics based on type and severity of pain and evaluate response  - Implement non-pharmacological measures as appropriate and evaluate response  - Consider cultural and social influences on pain and pain management  - Notify physician/advanced practitioner if interventions unsuccessful or patient reports new pain  Outcome: Progressing     Problem: INFECTION - ADULT  Goal: Absence or prevention of progression during hospitalization  Description: INTERVENTIONS:  - Assess and monitor for signs and symptoms of infection  - Monitor lab/diagnostic results  - Monitor all insertion sites, i e  indwelling lines, tubes, and drains  - Monitor endotracheal if appropriate and nasal secretions for changes in amount and color  - [place] appropriate cooling/warming therapies per order  - Administer medications as ordered  - Instruct and encourage patient and family to use good hand hygiene technique  - Identify and instruct in appropriate isolation precautions for identified infection/condition  Outcome: Progressing  Goal: Absence of fever/infection during neutropenic period  Description: INTERVENTIONS:  - Monitor WBC    Outcome: Progressing     Problem: SAFETY ADULT  Goal: Patient will remain free of falls  Description: INTERVENTIONS:  - Assess patient frequently for physical needs  -  Identify cognitive and physical deficits and behaviors that affect risk of falls    -  [place] fall precautions as indicated by assessment   - Educate patient/family on patient safety including physical limitations  - Instruct patient to call for assistance with activity based on assessment  - Modify environment to reduce risk of injury  - Consider OT/PT consult to assist with strengthening/mobility  Outcome: Progressing  Goal: Maintain or return to baseline ADL function  Description: INTERVENTIONS:  -  Assess patient's ability to carry out ADLs; assess patient's baseline for ADL function and identify physical deficits which impact ability to perform ADLs (bathing, care of mouth/teeth, toileting, grooming, dressing, etc )  - Assess/evaluate cause of self-care deficits   - Assess range of motion  - Assess patient's mobility; develop plan if impaired  - Assess patient's need for assistive devices and provide as appropriate  - Encourage maximum independence but intervene and supervise when necessary  - Involve family in performance of ADLs  - Assess for home care needs following discharge   - Consider OT consult to assist with ADL evaluation and planning for discharge  - Provide patient education as appropriate  Outcome: Progressing  Goal: Maintain or return mobility status to optimal level  Description: INTERVENTIONS:  - Assess patient's baseline mobility status (ambulation, transfers, stairs, etc )    - Identify cognitive and physical deficits and behaviors that affect mobility  - Identify mobility aids required to assist with transfers and/or ambulation (gait belt, sit-to-stand, lift, walker, cane, etc )  - [place] fall precautions as indicated by assessment  - Record patient progress and toleration of activity level on Mobility SBAR; progress patient to next Phase/Stage  - Instruct patient to call for assistance with activity based on assessment  - Consider rehabilitation consult to assist with strengthening/weightbearing, etc   Outcome: Progressing     Problem: DISCHARGE PLANNING  Goal: Discharge to home or other facility with appropriate resources  Description: INTERVENTIONS:  - Identify barriers to discharge w/patient and caregiver  - Arrange for needed discharge resources and transportation as appropriate  - Identify discharge learning needs (meds, wound care, etc )  - Arrange for interpretive services to assist at discharge as needed  - Refer to Case Management Department for coordinating discharge planning if the patient needs post-hospital services based on physician/advanced practitioner order or complex needs related to functional status, cognitive ability, or social support system  Outcome: Progressing     Problem: Knowledge Deficit  Goal: Patient/family/caregiver demonstrates understanding of disease process, treatment plan, medications, and discharge instructions  Description: Complete learning assessment and assess knowledge base  Interventions:  - Provide teaching at level of understanding  - Provide teaching via preferred learning methods  Outcome: Progressing     Problem: Nutrition/Hydration-ADULT  Goal: Nutrient/Hydration intake appropriate for improving, restoring or maintaining nutritional needs  Description: Monitor and assess patient's nutrition/hydration status for malnutrition  Collaborate with interdisciplinary team and initiate plan and interventions as ordered  Monitor patient's weight and dietary intake as ordered or per policy  Utilize nutrition screening tool and intervene as necessary   Determine patient's food preferences and provide high-protein, high-caloric foods as appropriate       INTERVENTIONS:  - Monitor oral intake, urinary output, labs, and treatment plans  - Assess nutrition and hydration status and recommend course of action  - Evaluate amount of meals eaten  - Assist patient with eating if necessary   - Allow adequate time for meals  - Recommend/ encourage appropriate diets, oral nutritional supplements, and vitamin/mineral supplements  - Order, calculate, and assess calorie counts as needed  - Recommend, monitor, and adjust tube feedings and TPN/PPN based on assessed needs  - Assess need for intravenous fluids  - Provide specific nutrition/hydration education as appropriate  - Include patient/family/caregiver in decisions related to nutrition  Outcome: Progressing

## 2021-04-02 NOTE — PROGRESS NOTES
Cardiology PROGRESS NOTE     Name: Karen Onofre   Age & Sex: 48 y o  male   MRN: [id number]  Unit/Bed#: [facility] 429-01   Encounter: [id number]    PATIENT INFORMATION     Name: Karen Onofre   Age & Sex: 48 y o  male   MRN: [id number]  Hospital Stay Days: 2    ASSESSMENT/PLAN     Principal Problem:    ST elevation myocardial infarction (STEMI) of inferior wall ([address] ), proximal RCA JUAN placement  Active Problems:    Amnesia    Problem list:  STEMI status post JUAN to RCA  Previous history of STEMI in 2/2021:  JUAN to distal RCA  AMS after cardiac catheterization  Hypertension  Hyperlipidemia  History of right MCA stroke in 2018  SHIRA      1  STEMI status post JUAN to right proximal RCA  Patient with previous history of STEMI with JUAN to distal RCA in February 2021 now presenting with similar chest pain and found to have STEMI status post JUAN to right proximal RCA on 03/31  Plan:  · Will continue aspirin 81 mg daily, Lipitor 80 mg daily, Brilinta 90 mg b i d   · On Lopressor 25 mg b i d  · Lisinopril 5 mg daily added  · Patient currently hemodynamically stable without any chest pain episodes  · Plan to dc on 4/2/21  · Called outpatient cardiology office  They will call patient today to set an appointment within a week  2  AMS  Patient with episode of transient amnesia after cardiac catheterization  Rapid response was called for AMS although workup has been negative including unremarkable MRI and EEG  Echo showing no signs of LV thrombus  Likely TGA in setting of contrast?    Plan:  · Neurology following  Will Follow up on recommendations  No further inpatient workup needed  3  Hypertension:  Currently well controlled  Continue Lopressor 25 mg b i d  And lisinopril 5 mg daily  4  Hyperlipidemia:  Continue Lipitor 80 mg daily  5  History of SHIRA: pt was asked to do outpatient sleep study  SUBJECTIVE     Patient seen and examined  No acute events overnight    Patient denies any chest pain or shortness of breath overnight  OBJECTIVE     Vitals:    21 2300 21 0000 21 0300 21 0725   BP: 116/65  128/76 123/63   BP Location: Right arm  Right arm Right arm   Pulse: 67  82 85   Resp: 18  18 17   Temp: 98 °F (36 7 °C)  98 5 °F (36 9 °C) 98 1 °F (36 7 °C)   TempSrc: Oral  Oral Oral   SpO2: 97% 97% 98%    Weight:       Height:          Temperature:   Temp (24hrs), Av 3 °F (36 8 °C), Min:98 °F (36 7 °C), Max:98 5 °F (36 9 °C)    Temperature: 98 1 °F (36 7 °C)  Intake & Output:  I/O        07 -  0700  07 -  0700  07 -  0700    I V  (mL/kg)  1875 (18 6) 210 4 (2 1)    Total Intake(mL/kg)  1875 (18 6) 210 4 (2 1)    Urine (mL/kg/hr)  2150 650 (2 2)    Total Output  2150 650    Net  -275 -439 6               Weights:   IBW: 63 8 kg    Body mass index is 36 05 kg/m²  Weight (last 2 days)     Date/Time   Weight    21 1808   101 (223 33)            Physical Exam  Constitutional:       General: He is not in acute distress  Appearance: He is well-developed  He is not diaphoretic  HENT:      Head: Normocephalic and atraumatic  Nose: Nose normal       Mouth/Throat:      Pharynx: No oropharyngeal exudate  Eyes:      General: No scleral icterus  Right eye: No discharge  Left eye: No discharge  Neck:      Musculoskeletal: Normal range of motion and neck supple  Cardiovascular:      Rate and Rhythm: Normal rate and regular rhythm  Heart sounds: Normal heart sounds  No murmur  No friction rub  No gallop  Pulmonary:      Effort: Pulmonary effort is normal  No respiratory distress  Breath sounds: No stridor  No wheezing or rales  Abdominal:      General: Bowel sounds are normal  There is no distension  Palpations: Abdomen is soft  Tenderness: There is no abdominal tenderness  There is no guarding  Musculoskeletal: Normal range of motion  Skin:     General: Skin is warm        Findings: No erythema  Neurological:      Mental Status: He is alert and oriented to person, place, and time  LABORATORY DATA     Labs: I have personally reviewed pertinent reports  Results from last 7 days   Lab Units 03/31/21  1257 03/31/21  1100   WBC Thousand/uL  --  5 49   HEMOGLOBIN g/dL  --  16 3   HEMATOCRIT %  --  46 0   PLATELETS Thousands/uL 207 225   NEUTROS PCT %  --  61   MONOS PCT %  --  10      Results from last 7 days   Lab Units 04/01/21  0527 03/31/21  1100   POTASSIUM mmol/L 3 2* 3 4*   CHLORIDE mmol/L 105 101   CO2 mmol/L 26 28   BUN mg/dL 14 17   CREATININE mg/dL 1 02 1 14   CALCIUM mg/dL 8 8 9 0   ALK PHOS U/L 89 108   ALT U/L 74 92*   AST U/L 37 32     Results from last 7 days   Lab Units 04/01/21  0527   MAGNESIUM mg/dL 1 9                  Results from last 7 days   Lab Units 03/31/21  1831 03/31/21  1603 03/31/21  1257   TROPONIN I ng/mL 2 53* 1 55* 0 38*       IMAGING & DIAGNOSTIC TESTING     Radiology Results: I have personally reviewed pertinent reports  Mri Brain Wo Contrast    Result Date: 4/1/2021  Impression: No acute infarct, hemorrhage, or midline shift  Redemonstrated minimal nonspecific right frontoparietal white matter changes  Differential diagnosis includes chronic microangiopathic changes, sequela of remote infectious/inflammatory etiology, less likely demyelinating disease  Workstation performed: [id number]     Ct Stroke Alert Brain    Addendum Date: 3/31/2021    ADDENDUM: Correction: Findings were directly discussed with Dr Wil Allen on 3/31/2021 at 3:22 PM    Result Date: 3/31/2021  Impression: No acute intracranial abnormality  Findings were directly discussed with Zulema Marrufo on 3/31/2021 2:52 PM  Workstation performed: [id number]     Cta Stroke Alert (head/neck)    Result Date: 3/31/2021  Impression: No cervical or intracranial large vessel occlusion  No flow limiting dissection or aneurysm  Mild intracranial carotid atherosclerotic disease   Punctate calcifications within a subcentimeter right thyroid nodule  Nonemergent ultrasound imaging is recommended for additional characterization  Findings were directly discussed with Dr Mirza Beebe on 3/31/2021 3:03 PM  Workstation performed: [id number]     Other Diagnostic Testing: I have personally reviewed pertinent reports  ACTIVE MEDICATIONS     Current Facility-Administered Medications   Medication Dose Route Frequency    acetaminophen (TYLENOL) tablet 650 mg  650 mg Oral Q4H PRN    aspirin chewable tablet 81 mg  81 mg Oral Daily    atorvastatin (LIPITOR) tablet 80 mg  80 mg Oral Daily With Dinner    heparin (porcine) subcutaneous injection 5,000 Units  5,000 Units Subcutaneous Q8H [facility]    lisinopril (ZESTRIL) tablet 5 mg  5 mg Oral Daily    magnesium chloride (MAG64) EC tablet TBEC 128 mg  128 mg Oral Once    metoprolol tartrate (LOPRESSOR) tablet 25 mg  25 mg Oral Q12H [facility]    nitroglycerin (NITROSTAT) SL tablet 0 4 mg  0 4 mg Sublingual Q5 Min PRN    ondansetron (ZOFRAN) injection 4 mg  4 mg Intravenous Q6H PRN    potassium chloride (K-DUR,KLOR-CON) CR tablet 40 mEq  40 mEq Oral Once    ticagrelor (BRILINTA) tablet 90 mg  90 mg Oral Q12H [facility]       VTE Pharmacologic Prophylaxis: Heparin  VTE Mechanical Prophylaxis: sequential compression device    Portions of the record may have been created with voice recognition software  Occasional wrong word or "sound a like" substitutions may have occurred due to the inherent limitations of voice recognition software    Read the chart carefully and recognize, using context, where substitutions have occurred   ==  kM Grace, [facility]  Internal Medicine Residency PGY-2

## 2021-04-02 NOTE — PLAN OF CARE
Problem: Prexisting or High Potential for Compromised Skin Integrity  Goal: Skin integrity is maintained or improved  Description: INTERVENTIONS:  - Identify patients at risk for skin breakdown  - Assess and monitor skin integrity  - Assess and monitor nutrition and hydration status  - Monitor labs   - Assess for incontinence   - Turn and reposition patient  - Assist with mobility/ambulation  - Relieve pressure over bony prominences  - Avoid friction and shearing  - Provide appropriate hygiene as needed including keeping skin clean and dry  - Evaluate need for skin moisturizer/barrier cream  - Collaborate with interdisciplinary team   - Patient/family teaching  - Consider wound care consult   4/2/2021 0556 by Altagracia Fernandez RN  Outcome: Progressing  4/2/2021 0555 by Altagracia Fernandez, RN  Outcome: Progressing

## 2021-04-02 NOTE — PLAN OF CARE
Problem: Prexisting or High Potential for Compromised Skin Integrity  Goal: Skin integrity is maintained or improved  Description: INTERVENTIONS:  - Identify patients at risk for skin breakdown  - Assess and monitor skin integrity  - Assess and monitor nutrition and hydration status  - Monitor labs   - Assess for incontinence   - Turn and reposition patient  - Assist with mobility/ambulation  - Relieve pressure over bony prominences  - Avoid friction and shearing  - Provide appropriate hygiene as needed including keeping skin clean and dry  - Evaluate need for skin moisturizer/barrier cream  - Collaborate with interdisciplinary team   - Patient/family teaching  - Consider wound care consult   4/2/2021 1256 by Gina Brenner RN  Outcome: Adequate for Discharge  4/2/2021 0556 by Gina Brenner RN  Outcome: Progressing  4/2/2021 0555 by Gina Brenner RN  Outcome: Progressing

## 2021-04-05 NOTE — UTILIZATION REVIEW
Notification of Discharge  This is a Notification of Discharge from our facility [address]  Please be advised that this patient has been discharge from our facility  Below you will find the admission and discharge date and time including the patients disposition  PRESENTATION DATE: 3/31/2021 12:38 PM  OBS ADMISSION DATE:   IP ADMISSION DATE: 3/31/21 1238   DISCHARGE DATE: 4/2/2021  1:21 PM  DISPOSITION: Home/Self Care Home/Self Care   Admission Orders listed below:  Admission Orders (From admission, onward)     Ordered        03/31/21 1228  Inpatient Admission  Once                   Please contact the UR Department if additional information is required to close this patient's authorization/case  [employer] Utilization Review Department  Main: [phone] x carefully listen to the prompts  All voicemails are confidential   [email or web address]  Send all requests for admission clinical reviews, approved or denied determinations and any other requests to dedicated fax number below belonging to the campus where the patient is receiving treatment   List of dedicated fax numbers:  [address] DENIALS (Administrative/Medical Necessity) [phone]   [address] (Maternity/NICU/Pediatrics) [phone]   Conda Sas [phone]   Jamas Piggs [phone]   Kaylie Dress [phone]   [place] [address] [facility] [phone]   [facility]  [phone]   [address] [facility] [address]  [address] [address] [address] [facility] [phone]

## 2021-04-07 ENCOUNTER — OFFICE VISIT (OUTPATIENT)
Dept: CARDIOLOGY CLINIC | Facility: CLINIC | Age: 51
End: 2021-04-07
Payer: COMMERCIAL

## 2021-04-07 VITALS
DIASTOLIC BLOOD PRESSURE: 72 MMHG | SYSTOLIC BLOOD PRESSURE: 94 MMHG | BODY MASS INDEX: 35.12 KG/M2 | WEIGHT: 217.6 LBS | HEART RATE: 97 BPM

## 2021-04-07 DIAGNOSIS — I10 ESSENTIAL HYPERTENSION: ICD-10-CM

## 2021-04-07 DIAGNOSIS — I21.3 ST ELEVATION MYOCARDIAL INFARCTION (STEMI), UNSPECIFIED ARTERY (HCC): Primary | ICD-10-CM

## 2021-04-07 DIAGNOSIS — I21.19 ST ELEVATION MYOCARDIAL INFARCTION (STEMI) OF INFERIOR WALL (HCC): ICD-10-CM

## 2021-04-07 DIAGNOSIS — E78.5 HYPERLIPIDEMIA, UNSPECIFIED HYPERLIPIDEMIA TYPE: ICD-10-CM

## 2021-04-07 DIAGNOSIS — E87.6 HYPOKALEMIA: ICD-10-CM

## 2021-04-07 DIAGNOSIS — I25.10 CORONARY ARTERY DISEASE INVOLVING NATIVE HEART WITHOUT ANGINA PECTORIS, UNSPECIFIED VESSEL OR LESION TYPE: ICD-10-CM

## 2021-04-07 PROCEDURE — 93000 ELECTROCARDIOGRAM COMPLETE: CPT | Performed by: PHYSICIAN ASSISTANT

## 2021-04-07 PROCEDURE — 99214 OFFICE O/P EST MOD 30 MIN: CPT | Performed by: PHYSICIAN ASSISTANT

## 2021-04-07 NOTE — PROGRESS NOTES
Cardiology Office Follow Up  Ari Lopez  1970  [id number]      ASSESSMENT:  1  CAD, prior inferior wall STEMI February 2020 with PCI to dRCA  2  Recent inferior wall STEMI March 2021, s/p PCI to 100% pRCA  3  Hypertension  4  Dyslipidemia    PLAN:  Mr Yani Leonardo is doing well in the aftermath of his inferior wall STEMI last month  He is compliant with all his medications and is having no chest discomfort  He walked to the office today from several blocks away and did not notice any exertional chest pain or shortness of breath  · Continue dual anti-platelet therapy with aspirin and Brilinta, again stressed the importance of not skipping any doses  He tells me that he has a large supply and will call our office when he is getting low on his refills    · Blood pressure is slightly low today with systolic of 94 but he is asymptomatic  Will continue metoprolol, hydrochlorothiazide, and lisinopril for now but I did tell him that should he have any dizziness or presyncope we could discontinue the hydrochlorothiazide    · Potassium was 3 2 on his discharge lab work, provide a script for blood work tomorrow or Friday    · Recommended cardiac rehab but he kind refuses    · Follow-up with primary cardiologist in 2-3 months and call in the meantime if any issues    Interval History/ HPI:   Feeling well  No chest pain or shortness of breath  Has overall been doing well since his discharge  Not having any issues with the medications  Carrying nitroglycerin with him at all times but has not had to use it      Vitals:  Vitals:    04/07/21 1423   BP: 94/72   Pulse: 97         Past Medical History:   Diagnosis Date    Acute ST elevation myocardial infarction (STEMI) of inferior wall ([facility])     cath with PCI/JUAN - RCA 2/7/2020    Back pain     CAD (coronary artery disease)     COPD (chronic obstructive pulmonary disease) ([facility])     Hyperlipidemia     Hypertension     Inguinal hernia, left     Mild heartburn     Obesity     Stroke [facility]) approx 2015    Denies residual problems  Initially he only had trouble with two fingers on left side   Wears glasses      Social History     Socioeconomic History    Marital status: Single     Spouse name: Not on file    Number of children: Not on file    Years of education: Not on file    Highest education level: Not on file   Occupational History    Not on file   Social Needs    Financial resource strain: Not on file    Food insecurity     Worry: Not on file     Inability: Not on file    Transportation needs     Medical: Not on file     Non-medical: Not on file   Tobacco Use    Smoking status: Former Smoker     Packs/day: 1 00     Years: 15 00     Pack years: 15      Types: Cigarettes     Quit date:      Years since quittin 2    Smokeless tobacco: Never Used   Substance and Sexual Activity    Alcohol use: Not Currently    Drug use: No    Sexual activity: Not on file   Lifestyle    Physical activity     Days per week: Not on file     Minutes per session: Not on file    Stress: Not on file   Relationships    Social connections     Talks on phone: Not on file     Gets together: Not on file     Attends [employer] service: Not on file     Active member of club or organization: Not on file     Attends meetings of clubs or organizations: Not on file     Relationship status: Not on file    Intimate partner violence     Fear of current or ex partner: Not on file     Emotionally abused: Not on file     Physically abused: Not on file     Forced sexual activity: Not on file   Other Topics Concern    Not on file   Social History Narrative    Not on file      No family history on file    Past Surgical History:   Procedure Laterality Date    APPENDECTOMY      HERNIA REPAIR      NO PAST SURGERIES      [place] LAP,APPENDECTOMY N/A 2018    Procedure: APPENDECTOMY LAPAROSCOPIC;  Surgeon: Lee Cazares MD;  Location: [facility] MAIN OR;  Service: General    [place] Alysha Dhillon HERNIA REPR,INITIAL Left 4/2/2018    Procedure: LAPAROSCOPIC INGUINAL HERNIA REPAIR WITH MESH;  Surgeon: Broderick Zamorano MD;  Location: [facility] Main OR;  Service: General       Current Outpatient Medications:     aspirin 81 mg chewable tablet, Chew 1 tablet (81 mg total) daily, Disp: 90 tablet, Rfl: 3    atorvastatin (LIPITOR) 80 mg tablet, Take 1 tablet (80 mg total) by mouth every evening, Disp: 90 tablet, Rfl: 3    hydrochlorothiazide (HYDRODIURIL) 25 mg tablet, Take 1 tablet (25 mg total) by mouth daily, Disp: 90 tablet, Rfl: 3    lisinopril (ZESTRIL) 5 mg tablet, Take 1 tablet (5 mg total) by mouth daily, Disp: 30 tablet, Rfl: 0    metoprolol tartrate (LOPRESSOR) 25 mg tablet, Take 1 tablet (25 mg total) by mouth every 12 (twelve) hours, Disp: 60 tablet, Rfl: 0    nitroglycerin (NITROSTAT) 0 4 mg SL tablet, Place 1 tablet (0 4 mg total) under the tongue every 5 (five) minutes as needed for chest pain If no relief after 3 tablets, come immediately to ER, Disp: 100 tablet, Rfl: 1    ticagrelor (BRILINTA) 90 MG, Take 1 tablet (90 mg total) by mouth every 12 (twelve) hours, Disp: 180 tablet, Rfl: 0        Review of Systems:  Review of Systems   Constitutional: Negative for fatigue and unexpected weight change  HENT: Negative for congestion and nosebleeds  Respiratory: Negative for cough, chest tightness and shortness of breath  Cardiovascular: Negative for chest pain, palpitations and leg swelling  Gastrointestinal: Negative for abdominal pain, constipation, diarrhea, nausea and vomiting  Endocrine: Negative for cold intolerance and heat intolerance  Genitourinary: Negative for difficulty urinating and frequency  Musculoskeletal: Negative for back pain  Neurological: Negative for dizziness and syncope  Physical Exam:  Physical Exam  Constitutional:       Appearance: He is well-developed  HENT:      Head: Normocephalic and atraumatic     Eyes:      Pupils: Pupils are equal, round, and reactive to light  Neck:      Musculoskeletal: Normal range of motion and neck supple  Cardiovascular:      Rate and Rhythm: Normal rate and regular rhythm  Heart sounds: No murmur  No friction rub  No gallop  Pulmonary:      Effort: Pulmonary effort is normal  No respiratory distress  Breath sounds: Normal breath sounds  No wheezing or rales  Abdominal:      General: Bowel sounds are normal  There is no distension  Palpations: Abdomen is soft  Tenderness: There is no abdominal tenderness  Musculoskeletal: Normal range of motion  General: No deformity  Skin:     General: Skin is warm and dry  Neurological:      Mental Status: He is alert and oriented to person, place, and time  Psychiatric:         Behavior: Behavior normal          This note was completed in part utilizing [employer] Software  Grammatical errors, random word insertions, spelling mistakes, and incomplete sentences can be an occasional consequence of this system secondary to software limitations, ambient noise, and hardware issues  If you have any questions or concerns about the content, text, or information contained within the body of this dictation, please contact the provider for clarification

## 2021-04-07 NOTE — PATIENT INSTRUCTIONS
Please check blood work later this week  You do not need to fast for this  I will call you if the results are abnormal      No other changes to medications today    Please continue Aspirin and Brilinta, these medications are keeping your stent open    Call us when your prescriptions are getting low so we can refill them  Please do not discontinue any medications without calling us first      We will see you back in about 2 months

## 2021-04-26 DIAGNOSIS — I21.19 ST ELEVATION MYOCARDIAL INFARCTION (STEMI) OF INFERIOR WALL (HCC): ICD-10-CM

## 2021-04-26 RX ORDER — LISINOPRIL 5 MG/1
5 TABLET ORAL DAILY
Qty: 90 TABLET | Refills: 1 | Status: SHIPPED | OUTPATIENT
Start: 2021-04-26 | End: 2021-10-18 | Stop reason: SDUPTHER

## 2021-04-29 DIAGNOSIS — I21.19 ST ELEVATION MYOCARDIAL INFARCTION (STEMI) OF INFERIOR WALL (HCC): ICD-10-CM

## 2021-06-14 ENCOUNTER — OFFICE VISIT (OUTPATIENT)
Dept: CARDIOLOGY CLINIC | Facility: CLINIC | Age: 51
End: 2021-06-14
Payer: COMMERCIAL

## 2021-06-14 VITALS
HEIGHT: 66 IN | HEART RATE: 80 BPM | WEIGHT: 223.6 LBS | BODY MASS INDEX: 35.93 KG/M2 | SYSTOLIC BLOOD PRESSURE: 118 MMHG | RESPIRATION RATE: 16 BRPM | DIASTOLIC BLOOD PRESSURE: 84 MMHG

## 2021-06-14 DIAGNOSIS — I21.19 ACUTE ST ELEVATION MYOCARDIAL INFARCTION (STEMI) OF INFERIOR WALL (HCC): ICD-10-CM

## 2021-06-14 DIAGNOSIS — I10 ESSENTIAL HYPERTENSION: ICD-10-CM

## 2021-06-14 DIAGNOSIS — N18.2 BENIGN HYPERTENSION WITH CKD (CHRONIC KIDNEY DISEASE), STAGE II: ICD-10-CM

## 2021-06-14 DIAGNOSIS — I12.9 BENIGN HYPERTENSION WITH CKD (CHRONIC KIDNEY DISEASE), STAGE II: ICD-10-CM

## 2021-06-14 DIAGNOSIS — I25.10 CORONARY ARTERY DISEASE INVOLVING NATIVE HEART WITHOUT ANGINA PECTORIS, UNSPECIFIED VESSEL OR LESION TYPE: Primary | ICD-10-CM

## 2021-06-14 DIAGNOSIS — N18.2 CKD (CHRONIC KIDNEY DISEASE), STAGE II: ICD-10-CM

## 2021-06-14 PROCEDURE — 99214 OFFICE O/P EST MOD 30 MIN: CPT

## 2021-06-14 NOTE — PROGRESS NOTES
Cardiology   MD Haris Salamanca MD Debbra Glow, DO, [facility] MD Yassine Spian DO, Tracey Narvaez DO, [facility] [place]  -------------------------------------------------------------------  [facility]  [address] [place] [id number]  [phone]  [phone]  06/14/21  Sylvia Stevenson  YOB: 1970   MRN: [id number]      Referring Physician: Shira Michelle MD  [address] [facility],  [facility] [address]     HPI: Sylvia Stevenson is a 46 y o  male with   1  CAD, prior inferior wall STEMI February 2020 with PCI to dRCA  2  Recent inferior wall STEMI March 2021, s/p PCI to 100% pRCA  3  Hypertension  4  Dyslipidemia    Echo March 31, 0278  Systolic function was normal   There were no regional wall motion abnormalities  Wall thickness was mildly increased  There was mild concentric hypertrophy  Doppler parameters were consistent with abnormal left ventricular relaxation (grade 1 diastolic dysfunction)  Today he feels well  Denies any chest pain  Tolerating medications well, blood pressure is controlled  Review of Systems   Constitutional: Negative for chills and fever  HENT: Negative for facial swelling and sore throat  Eyes: Negative for visual disturbance  Respiratory: Negative for cough, chest tightness, shortness of breath and wheezing  Cardiovascular: Negative for chest pain, palpitations and leg swelling  Gastrointestinal: Negative for abdominal pain, blood in stool, constipation, diarrhea, nausea and vomiting  Endocrine: Negative for cold intolerance and heat intolerance  Genitourinary: Negative for decreased urine volume, difficulty urinating, dysuria and hematuria  Musculoskeletal: Negative for arthralgias, back pain and myalgias  Skin: Negative for rash  Neurological: Negative for dizziness, syncope, weakness and numbness  Psychiatric/Behavioral: Negative for agitation, behavioral problems and confusion  The patient is not nervous/anxious  OBJECTIVE  Vitals:    06/14/21 1336   BP: 118/84   Pulse: 80   Resp: 16       Physical Exam  Constitutional: awake, alert and oriented, in no acute distress, no obvious deformities, obese male  Head: Normocephalic, without obvious abnormality, atraumatic  Eyes: conjunctivae clear and moist  Sclera anicteric  No xanthelasmas  Pupils equal bilaterally  Extraocular motions are full  Ear nose mouth and throat: ears are symmetrical bilaterally, hearing appears to be equal bilaterally, no nasal discharge or epistaxis, oropharynx is clear with moist mucous membranes  Neck:  Trachea is midline, neck is supple, no thyromegaly or significant lymphadenopathy, there is full range of motion  Lungs: clear to auscultation bilaterally, no wheezes, no rales, no rhonchi, no accessory muscle use, breathing is nonlabored  Heart: regular rate and rhythm, S1, S2 normal, no murmur, click, rub or gallop, no lower extremity edema  Abdomen:  Obese, soft, non-tender; bowel sounds normal; no masses,  no organomegaly  Psychiatric:  Patient is oriented to time, place, person, mood/affect is negative for depression, anxiety, agitation, appears to have appropriate insight  Skin: Skin is warm, dry, intact  No obvious rashes or lesions on exposed extremities  Nail beds are pink with no cyanosis or clubbing  EKG:  No results found for this visit on 06/14/21  IMPRESSION:  1  CAD, prior inferior wall STEMI February 2020 with PCI to dRCA  2  Recent inferior wall STEMI March 2021, s/p PCI to 100% pRCA  3  Hypertension  4  Dyslipidemia    DISCUSSION/RECOMMENDATIONS:  Italo Palmer He had PCI to the RCA in February 2020 in the setting of a STEMI, follow-up 1 year later where we discontinued his Brilinta but then 1 month post discontinuation, had another STEMI in the same vessel  Fortunately LV function remains preserved  He is doing well now status post PCI to the RCA again     Would continue with aspirin Brilinta the next year, after 1 year may elect to switch to lower dose Brilinta at 60 mg twice a day for another year, with ultimate plan to switch to aspirin and Xarelto 2 5 mg thereafter likely   His blood pressure is controlled today  Would continue hydrochlorothiazide 25, lisinopril 5, metoprolol 25 b i d    Continue aspirin 81, Lipitor 80   Last cholesterol is LDL was 100 but that was 1 5 years ago  Needs repeat lipid panel  If LDL not to goal, would add Zetia, if still not to goal may need PCSK9 inhibitor    Martha Sanchez DO, [facility] [place]  --------------------------------------------------------------------------------  TREADMILL STRESS  No results found for this or any previous visit      ----------------------------------------------------------------------------------------------  NUCLEAR STRESS TEST: No results found for this or any previous visit  No results found for this or any previous visit       --------------------------------------------------------------------------------  CATH:  Results for orders placed during the hospital encounter of 21    Cardiac catheterization    Narrative  [address]  [address] [facility]  [place], [address] [facility]  [phone]    [facility]    Invasive Cardiovascular Lab Complete Report    Patient: Latisha Keenan  MR number: [id number]  Account number: [de-identified]  Study date: 2021  Gender: Male  : 1970  Height: 66 1 in  Weight: 224 4 lb  BSA: 2 11 mï¾²    Diagnostic Cardiologist:  Juliana pantoja DO  Interventional Cardiologist:  Juliana pantoja DO    SUMMARY    CORONARY CIRCULATION:  Proximal RCA: There was a 100 % stenosis  Distal RCA: There was a 10 % stenosis at the site of a prior stent  Right PDA: There was a 60 % stenosis  1ST LESION INTERVENTIONS:  A balloon angioplasty with stent and balloon angioplasty procedure was performed on the 100 % lesion in the proximal RCA   Following intervention there was a 0 % residual stenosis  A Resolute [place] Rx 4 0 x 26mm drug-eluting stent was placed across the lesion and deployed at a maximum inflation pressure of 16 bayron  INDICATIONS:  --  Possible CAD: myocardial infarction with ST elevation (STEMI)  PROCEDURES PERFORMED    --  Left coronary angiography  --  Right coronary angiography  --  Acute Myocardial Infarct  --  Mod Sedation Same Physician Initial 15min  --  Mod Sedation Same Physician Add 15min  --  Coronary Catheterization (w/o [facility])  --  AMI PCI (JUAN, PTCRA, PTCA) Single  --  Intervention on proximal RCA: balloon angioplasty, stent, balloon angioplasty  PROCEDURE: The risks and alternatives of the procedures and conscious sedation were explained to the patient and informed consent was obtained  The patient was brought to the cath lab and placed on the table  The planned puncture sites  were prepped and draped in the usual sterile fashion  --  Right radial artery access  After performing an Nomi's test to verify adequate ulnar artery supply to the hand, the radial site was prepped  The puncture site was infiltrated with local anesthetic  The vessel was accessed using the  modified Seldinger technique, a wire was advanced into the vessel, and a sheath was advanced over the wire into the vessel  --  Left coronary artery angiography  A catheter was advanced over a guidewire into the aorta and positioned in the left coronary artery ostium under fluoroscopic guidance  Angiography was performed  --  Right coronary artery angiography  A catheter was advanced over a guidewire into the aorta and positioned in the right coronary artery ostium under fluoroscopic guidance  Angiography was performed  --  Acute Myocardial Infarct  --  Mod Sedation Same Physician Initial 15min  --  Mod Sedation Same Physician Add 15min  --  Coronary Catheterization (w/o [facility])      LESION INTERVENTION: A balloon angioplasty with stent and balloon angioplasty procedure was performed on the 100 % lesion in the proximal RCA  Following intervention there was a 0 % residual stenosis  There was KATHLEEN 0 flow before the  procedure and KATHLEEN 3 flow after the procedure  There was no dissection  --  Vessel setup was performed  A Runthrough NS 180cm wire was used to cross the lesion  --  Vessel setup was performed  A 6Fr  Launcher JR 4 0 100cm guiding catheter was used to cannulate the vessel  --  Balloon angioplasty was performed, using a Trek Rx 2 5 x 15mm balloon, with 1 inflations and a maximum inflation pressure of 8 bayron  --  A Resolute [place] Rx 4 0 x 26mm drug-eluting stent was placed across the lesion and deployed at a maximum inflation pressure of 16 bayron  --  Balloon angioplasty was performed, using a [place] Trek Rx 4 5 x 20mm balloon, with 2 inflations and a maximum inflation pressure of 16 bayron  INTERVENTIONS:  --  AMI PCI (JUAN, PTCRA, PTCA) Single  PROCEDURE COMPLETION: The patient tolerated the procedure well and was discharged from the cath lab  TIMING: Test started at 12:16  Test concluded at 12:42  HEMOSTASIS: The sheath was removed  The site was compressed with a Hemoband  device  Hemostasis was obtained  MEDICATIONS GIVEN: Fentanyl (1OOmcg/2 ml), 50 mcg, IV, at 12:13  Versed (2mg/2ml), 2 mg, IV, at 12:13  1% Lidocaine, 1 ml, subcutaneously, at 12:16  Nitroglycerin (200mcg/ml), 200 mcg, at 12:16  Verapamil  (5mg/2ml), 2 5 mg, IV, at 12:16  Heparin 1000 units/ml, 6,000 units, at 12:16  Heparin 1000 units/ml, 5,000 units, IV, at 12:42  CONTRAST GIVEN: 105 ml Omnipaque (350 mg I /ml)  RADIATION EXPOSURE: Fluoroscopy time: 7 88 min  CORONARY VESSELS:   --  The coronary circulation is right dominant  --  Left main: Angiography showed minor luminal irregularities  --  Proximal LAD: Angiography showed minor luminal irregularities  --  Circumflex: Angiography showed minor luminal irregularities    --  Ramus intermedius: Angiography showed minor luminal irregularities  --  Proximal RCA: There was a 100 % stenosis  --  Distal RCA: There was a 10 % stenosis at the site of a prior stent  --  Right PDA: There was a 60 % stenosis  IMPRESSIONS:  Inferior STEMI, culprit proximal RCA, successful revascularization with JUAN  Patent distal RCA stent  RECOMMENDATIONS  GDMT CAD, dapt (consider 2 year dapt as ticagrelor stopped few weeks ago)  DISPOSITION:  The patient left the catheterization laboratory in stable condition  Prepared and signed by    Alessandro Perry DO  Signed 2021 12:52:39    Study diagram    Angiographic findings  Native coronary lesions:  ï¾·Proximal RCA: Lesion 1: 100 % stenosis  ï¾·Distal RCA: Lesion 1: 10 % stenosis, site of prior stent  ï¾·RPDA: Lesion 1: 60 % stenosis  Intervention results  Native coronary lesions:  ï¾·balloon angioplasty, stent, and balloon angioplasty of the 100 % stenosis in proximal RCA  0 % residual stenosis  Stent: Resolute [place] Rx 4 0 x 26mm drug-eluting      Hemodynamic tables    Pressures:  Baseline  Pressures:  - HR: 76  Pressures:  - Rhythm:  Pressures:  -- Aortic Pressure (S/D/M): 83/63/71    Outputs:  Baseline  Outputs:  -- CALCULATIONS: [age]  Outputs:  -- CALCULATIONS: Body Surface Area: 2 11  Outputs:  -- CALCULATIONS: Height in cm: 168 00  Outputs:  -- CALCULATIONS: Sex: Male  Outputs:  -- CALCULATIONS: Weight in k 00    --------------------------------------------------------------------------------  ECHO:   Results for orders placed during the hospital encounter of 21    Echo complete with contrast if indicated    Narrative  [facility]  [facility], [address] [facility]  [phone]    Transthoracic Echocardiogram  2D, M-mode, Doppler, and Color Doppler    Study date:  31-Mar-2021    Patient: Tiffany Serra  MR number: [id number]  Account number: [de-identified]  : 1970  Age: 48 years  Gender: Male  Status: Inpatient  Location: Cath lab  Height: 66 in  Weight: 225 lb  BP: 95/ 61 mmHg    Indications: MI     Diagnoses: I21 3 - ST elevation (STEMI) myocardial infarction of unspecified site    Sonographer:  Timothy Workman RDCS  Referring Physician:  JOSLYN Castro  Group:  [id number]  Cardiology Fellow:  Omero Mcnally MD  Interpreting Physician:  Amilcar Gómez DO    SUMMARY    PROCEDURE INFORMATION:  This was a technically difficult study  LEFT VENTRICLE:  Systolic function was normal   There were no regional wall motion abnormalities  Wall thickness was mildly increased  There was mild concentric hypertrophy  Doppler parameters were consistent with abnormal left ventricular relaxation (grade 1 diastolic dysfunction)  RIGHT VENTRICLE:  The ventricle was mildly to moderately dilated  RIGHT ATRIUM:  The atrium was mildly dilated  MITRAL VALVE:  There was mild annular calcification  There was trace regurgitation  HISTORY: PRIOR HISTORY: CKD-2  HLD  STEMI  Hypertension  CAD  Sleep apnea  COPD  Former smoker  PROCEDURE: The procedure was performed in the catheterization laboratory  This was a routine study  The transthoracic approach was used  The study included complete 2D imaging, M-mode, complete spectral Doppler, and color Doppler  The  heart rate was 72 bpm, at the start of the study  Images were obtained from the parasternal, apical, subcostal, and suprasternal notch acoustic windows  Intravenous contrast ( 0 6mL/min of Definity in NSS) was administered to opacify the  left ventricle  Echocardiographic views were limited due to decreased penetration and lung interference  This was a technically difficult study  LEFT VENTRICLE: Size was normal  Systolic function was normal  There were no regional wall motion abnormalities  Wall thickness was mildly increased  There was mild concentric hypertrophy   DOPPLER: Doppler parameters were consistent with  abnormal left ventricular relaxation (grade 1 diastolic dysfunction)  VENTRICULAR SEPTUM: Thickness was normal  There was normal motion  There was normal contour  The septum was intact  RIGHT VENTRICLE: The ventricle was mildly to moderately dilated  Systolic function was normal     LEFT ATRIUM: Size was at the upper limits of normal     RIGHT ATRIUM: The atrium was mildly dilated  MITRAL VALVE: There was mild annular calcification  There was normal leaflet separation  DOPPLER: The transmitral velocity was within the normal range  There was no evidence for stenosis  There was trace regurgitation  AORTIC VALVE: The valve was trileaflet  Leaflets exhibited sclerosis  DOPPLER: Transaortic velocity was within the normal range  There was no evidence for stenosis  There was no regurgitation  TRICUSPID VALVE: The valve structure was normal  There was normal leaflet separation  DOPPLER: The transtricuspid velocity was within the normal range  There was no evidence for stenosis  There was mild regurgitation  The tricuspid jet  envelope definition was inadequate for estimation of RV systolic pressure  PULMONIC VALVE: DOPPLER: The transpulmonic velocity was within the normal range  There was no regurgitation  PERICARDIUM: There was no pericardial effusion  AORTA: The root exhibited normal size      SYSTEM MEASUREMENT TABLES    2D  %FS: 28 78 %  Ao Diam: 3 02 cm  EDV(Teich): 96 65 ml  EF(Teich): 55 46 %  ESV(Teich): 43 04 ml  IVSd: 1 12 cm  LA Area: 12 98 cm2  LA Diam: 3 42 cm  LVEDV MOD A4C: 90 84 ml  LVEF MOD A4C: 54 09 %  LVESV MOD A4C: 41 71 ml  LVIDd: 4 59 cm  LVIDs: 3 27 cm  LVLd A4C: 6 75 cm  LVLs A4C: 6 11 cm  LVPWd: 0 86 cm  RA Area: 18 18 cm2  RVIDd: 3 96 cm  SV MOD A4C: 49 13 ml  SV(Teich): 53 61 ml    MM  TAPSE: 1 61 cm    PW  E' Sept: 0 07 m/s  E/E' Sept: 8 73  MV A Maury: 0 67 m/s  MV Dec Latimer: 4 38 m/s2  MV DecT: 144 84 ms  MV E Maury: 0 63 m/s  MV E/A Ratio: 0 95    [facility]    Prepared and electronically signed by    Kasey Liu DO  Signed 31-Mar-2021 16:26:48    No results found for this or any previous visit     --------------------------------------------------------------------------------  HOLTER  No results found for this or any previous visit  No results found for this or any previous visit     --------------------------------------------------------------------------------  CAROTIDS  No results found for this or any previous visit      --------------------------------------------------------------------------------  Diagnoses and all orders for this visit:    Coronary artery disease involving native heart without angina pectoris, unspecified vessel or lesion type    Essential hypertension    Acute ST elevation myocardial infarction (STEMI) of inferior wall (HCC)    Benign hypertension with CKD (chronic kidney disease), stage II    CKD (chronic kidney disease), stage II       ======================================================    Past Medical History:   Diagnosis Date    Acute ST elevation myocardial infarction (STEMI) of inferior wall (HCC)     cath with PCI/JUAN - RCA 2/7/2020    Back pain     CAD (coronary artery disease)     COPD [facility]chronic obstructive pulmonary disease) ([facility] [address] )     Hyperlipidemia     Hypertension     Inguinal hernia, left     Mild heartburn     Obesity     Stroke ([facility] ) approx 2015    Denies residual problems  Initially he only had trouble with two fingers on left side      Wears glasses      Past Surgical History:   Procedure Laterality Date    APPENDECTOMY      HERNIA REPAIR      NO PAST SURGERIES      CO LAP,APPENDECTOMY N/A 11/28/2018    Procedure: APPENDECTOMY LAPAROSCOPIC;  Surgeon: Lee Cazares MD;  Location: [address] [place] [address];  Service: General    CO [place] [address] Left 4/2/2018    Procedure: LAPAROSCOPIC INGUINAL HERNIA REPAIR WITH MESH;  Surgeon: Lee Cazares MD;  Location: [facility];  Service: General         Medications  Current Outpatient Medications   Medication Sig Dispense Refill    aspirin 81 mg chewable tablet Chew 1 tablet (81 mg total) daily 90 tablet 3    atorvastatin (LIPITOR) 80 mg tablet Take 1 tablet (80 mg total) by mouth every evening 90 tablet 3    hydrochlorothiazide (HYDRODIURIL) 25 mg tablet Take 1 tablet (25 mg total) by mouth daily 90 tablet 3    lisinopril (ZESTRIL) 5 mg tablet Take 1 tablet (5 mg total) by mouth daily 90 tablet 1    metoprolol tartrate (LOPRESSOR) 25 mg tablet Take 1 tablet (25 mg total) by mouth every 12 (twelve) hours 60 tablet 0    nitroglycerin (NITROSTAT) 0 4 mg SL tablet Place 1 tablet (0 4 mg total) under the tongue every 5 (five) minutes as needed for chest pain If no relief after 3 tablets, come immediately to  tablet 1    ticagrelor (BRILINTA) 90 MG Take 1 tablet (90 mg total) by mouth every 12 (twelve) hours 180 tablet 3     No current facility-administered medications for this visit          No Known Allergies    Social History     Socioeconomic History    Marital status: Single     Spouse name: Not on file    Number of children: Not on file    Years of education: Not on file    Highest education level: Not on file   Occupational History    Not on file   Tobacco Use    Smoking status: Former Smoker     Packs/day: 1 00     Years: 15 00     Pack years: 15 00     Types: Cigarettes     Quit date:      Years since quittin 4    Smokeless tobacco: Never Used   Vaping Use    Vaping Use: Never used   Substance and Sexual Activity    Alcohol use: Not Currently    Drug use: No    Sexual activity: Not on file   Other Topics Concern    Not on file   Social History Narrative    Not on file     Social Determinants of Health     Financial Resource Strain:     Difficulty of Paying Living Expenses:    Food Insecurity:     Worried About [address] in the Last Year:     [facility] [address] in the Last Year: Transportation Needs:     Lack of Transportation (Medical):  Lack of Transportation (Non-Medical):    Physical Activity:     Days of Exercise per Week:     Minutes of Exercise per Session:    Stress:     Feeling of Stress :    Social Connections:     Frequency of Communication with Friends and Family:     Frequency of Social Gatherings with Friends and Family:     Attends [employer] Services:     Active Member of Clubs or Organizations:     Attends Club or Organization Meetings:     Marital Status:    Intimate Partner Violence:     Fear of Current or Ex-Partner:     Emotionally Abused:     Physically Abused:     Sexually Abused:         No family history on file      Lab Results   Component Value Date    WBC 5 49 03/31/2021    HGB 16 3 03/31/2021    HCT 46 0 03/31/2021    MCV 91 03/31/2021     03/31/2021      Lab Results   Component Value Date    SODIUM 138 04/01/2021    K 3 2 (L) 04/01/2021     04/01/2021    CO2 26 04/01/2021    BUN 14 04/01/2021    CREATININE 1 02 04/01/2021    GLUC 122 04/01/2021    CALCIUM 8 8 04/01/2021      No results found for: HGBA1C   No results found for: CHOL  Lab Results   Component Value Date    HDL 32 (L) 02/08/2020     Lab Results   Component Value Date    LDLCALC 100 02/08/2020     Lab Results   Component Value Date    TRIG 195 (H) 02/08/2020     No results found for: [place]   Lab Results   Component Value Date    INR 1 02 02/11/2020    INR 0 97 02/07/2020    INR 1 09 11/03/2018    PROTIME 13 5 02/11/2020    PROTIME 13 0 02/07/2020    PROTIME 12 6 11/03/2018          Patient Active Problem List    Diagnosis Date Noted    Sleep apnea 06/23/2020    Insomnia with sleep apnea 06/23/2020    Hypertension     CAD (coronary artery disease)     Acute ST elevation myocardial infarction (STEMI) of inferior wall (HCC)     ST elevation myocardial infarction involving right coronary artery ([facility] [address] ) 02/08/2020    Left arm pain 08/20/2019    Bilateral low back pain without sciatica 08/20/2019    CKD (chronic kidney disease), stage II 04/09/2019    Hypokalemia 04/09/2019    Dizziness 03/30/2019    Benign hypertension with CKD (chronic kidney disease), stage II 03/30/2019    Hyperlipidemia 03/30/2019    Indirect inguinal hernia 04/02/2018       Portions of the record may have been created with voice recognition software  Occasional wrong word or "sound a like" substitutions may have occurred due to the inherent limitations of voice recognition software  Read the chart carefully and recognize, using context, where substitutions have occurred      Amy Ambrosio DO, [facility]  6/14/2021 2:02 PM

## 2021-06-24 LAB
CHOLEST SERPL-MCNC: 162 MG/DL
CHOLEST/HDLC SERPL: 4.5 (CALC)
HDLC SERPL-MCNC: 36 MG/DL
LDLC SERPL CALC-MCNC: 98 MG/DL (CALC)
NONHDLC SERPL-MCNC: 126 MG/DL (CALC)
TRIGL SERPL-MCNC: 185 MG/DL

## 2021-09-07 DIAGNOSIS — I21.19 ST ELEVATION MYOCARDIAL INFARCTION (STEMI) OF INFERIOR WALL (HCC): ICD-10-CM

## 2021-09-28 ENCOUNTER — OFFICE VISIT (OUTPATIENT)
Dept: CARDIOLOGY CLINIC | Facility: CLINIC | Age: 51
End: 2021-09-28
Payer: COMMERCIAL

## 2021-09-28 VITALS
DIASTOLIC BLOOD PRESSURE: 80 MMHG | OXYGEN SATURATION: 93 % | HEIGHT: 66 IN | BODY MASS INDEX: 36.03 KG/M2 | WEIGHT: 224.2 LBS | SYSTOLIC BLOOD PRESSURE: 122 MMHG | HEART RATE: 101 BPM

## 2021-09-28 DIAGNOSIS — I25.10 CORONARY ARTERY DISEASE INVOLVING NATIVE HEART WITHOUT ANGINA PECTORIS, UNSPECIFIED VESSEL OR LESION TYPE: ICD-10-CM

## 2021-09-28 DIAGNOSIS — I21.11 ST ELEVATION MYOCARDIAL INFARCTION INVOLVING RIGHT CORONARY ARTERY (HCC): Primary | ICD-10-CM

## 2021-09-28 DIAGNOSIS — N18.2 CKD (CHRONIC KIDNEY DISEASE), STAGE II: ICD-10-CM

## 2021-09-28 DIAGNOSIS — E78.5 HYPERLIPIDEMIA, UNSPECIFIED HYPERLIPIDEMIA TYPE: ICD-10-CM

## 2021-09-28 DIAGNOSIS — I10 ESSENTIAL HYPERTENSION: ICD-10-CM

## 2021-09-28 PROCEDURE — 99214 OFFICE O/P EST MOD 30 MIN: CPT

## 2021-09-28 RX ORDER — EZETIMIBE 10 MG/1
10 TABLET ORAL DAILY
Qty: 90 TABLET | Refills: 3 | Status: SHIPPED | OUTPATIENT
Start: 2021-09-28

## 2021-09-28 NOTE — PROGRESS NOTES
Cardiology   MD Janice Betts MD Cooper Dux, DO, Quenton Sham Mansoor, MD Rand Fujisawa, DO, Gino Dobbins DO, [facility] [place]  -------------------------------------------------------------------  [facility]  [address] [place] [id number]  [phone]  [phone]  09/28/21  Ambrosio Cordova  YOB: 1970   MRN: [id number]      Referring Physician: Carol Ann Christianson MD  [address]  [place],  [facility] [address]     HPI: Ambrosio Cordova is a 46 y o  male with   1  CAD, prior inferior wall STEMI February 2020 with PCI to dRCA  2  Recent inferior wall STEMI March 2021, s/p PCI to 100% pRCA  3  Hypertension  4  Dyslipidemia     Echo March 31, 9092  Systolic function was normal   There were no regional wall motion abnormalities  Wall thickness was mildly increased  There was mild concentric hypertrophy  Doppler parameters were consistent with abnormal left ventricular relaxation (grade 1 diastolic dysfunction)      Today he feels well  Denies any chest pain  Denies significant dyspnea with exertion  LDL is still not to goal however on recent blood work      Review of Systems   Constitutional: Negative for chills and fever  HENT: Negative for facial swelling and sore throat  Eyes: Negative for visual disturbance  Respiratory: Negative for cough, chest tightness, shortness of breath and wheezing  Cardiovascular: Negative for chest pain, palpitations and leg swelling  Gastrointestinal: Negative for abdominal pain, blood in stool, constipation, diarrhea, nausea and vomiting  Endocrine: Negative for cold intolerance and heat intolerance  Genitourinary: Negative for decreased urine volume, difficulty urinating, dysuria and hematuria  Musculoskeletal: Negative for arthralgias, back pain and myalgias  Skin: Negative for rash  Neurological: Negative for dizziness, syncope, weakness and numbness     Psychiatric/Behavioral: Negative for agitation, behavioral problems and confusion  The patient is not nervous/anxious  OBJECTIVE  Vitals:    09/28/21 1134   BP: 122/80   Pulse: 101   SpO2: 93%       Physical Exam  Constitutional: awake, alert and oriented, in no acute distress, no obvious deformities, obese male  Head: Normocephalic, without obvious abnormality, atraumatic  Eyes: conjunctivae clear and moist  Sclera anicteric  No xanthelasmas  Pupils equal bilaterally  Extraocular motions are full  Ear nose mouth and throat: ears are symmetrical bilaterally, hearing appears to be equal bilaterally, no nasal discharge or epistaxis, oropharynx is clear with moist mucous membranes  Neck:  Trachea is midline, neck is supple, no thyromegaly or significant lymphadenopathy, there is full range of motion  Lungs: clear to auscultation bilaterally, no wheezes, no rales, no rhonchi, no accessory muscle use, breathing is nonlabored  Heart: regular rate and rhythm, S1, S2 normal, no murmur, no click, no rub and no gallop, no lower extremity edema  Abdomen:  Obese, soft, non-tender; bowel sounds normal; no masses,  no organomegaly  Psychiatric:  Patient is oriented to time, place, person, mood/affect is negative for depression, anxiety, agitation, appears to have appropriate insight  Skin: Skin is warm, dry, intact  No obvious rashes or lesions on exposed extremities  Nail beds are pink with no cyanosis or clubbing  EKG:  No results found for this visit on 09/28/21  IMPRESSION:  1  CAD, prior inferior wall STEMI February 2020 with PCI to dRCA  2  Recent inferior wall STEMI March 2021, s/p PCI to 100% pRCA  3  Hypertension  4  Dyslipidemia    DISCUSSION/RECOMMENDATIONS:  Vivian Ring He is doing well at this time    He appears to be asymptomatic regarding his heart disease    LDL still not quite to goal   Will add Zetia 10 mg in addition to his high-dose Lipitor 80 mg today    Continue aspirin 81 mg, lisinopril, hydrochlorothiazide, metoprolol, Brilinta   Would continue with aspirin Brilinta for the next year status post PCI and may elect to switch to lower dose 60 mg twice a day for another year thereafter  [address] [place] [address] for follow-up around the time the 1 year point from most recent PCI, recheck lipids at that time and will make further recommendations regarding his dual antiplatelet therapy depending on his symptoms    Heydi Nelson DO, [facility] [place]  --------------------------------------------------------------------------------  TREADMILL STRESS  No results found for this or any previous visit      ----------------------------------------------------------------------------------------------  NUCLEAR STRESS TEST: No results found for this or any previous visit  No results found for this or any previous visit       --------------------------------------------------------------------------------  CATH:  Results for orders placed during the hospital encounter of 21    Cardiac catheterization    Narrative  [address]  [address]  [phone]    [facility]    Invasive Cardiovascular Lab Complete Report    Patient: Lazarus Taylor  MR number: [id number]  Account number: [de-identified]  Study date: 2021  Gender: Male  : 1970  Height: 66 1 in  Weight: 224 4 lb  BSA: 2 11 mï¾²    Diagnostic Cardiologist:  Linda Weir DO  Interventional Cardiologist:  Linda Weir DO    SUMMARY    CORONARY CIRCULATION:  Proximal RCA: There was a 100 % stenosis  Distal RCA: There was a 10 % stenosis at the site of a prior stent  Right PDA: There was a 60 % stenosis  1ST LESION INTERVENTIONS:  A balloon angioplasty with stent and balloon angioplasty procedure was performed on the 100 % lesion in the proximal RCA  Following intervention there was a 0 % residual stenosis    A Resolute [place] Rx 4 0 x 26mm drug-eluting stent was placed across the lesion and deployed at a maximum inflation pressure of 16 bayron  INDICATIONS:  --  Possible CAD: myocardial infarction with ST elevation (STEMI)  PROCEDURES PERFORMED    --  Left coronary angiography  --  Right coronary angiography  --  Acute Myocardial Infarct  --  Mod Sedation Same Physician Initial 15min  --  Mod Sedation Same Physician Add 15min  --  Coronary Catheterization (w/o [facility])  --  AMI PCI (JUAN, PTCRA, PTCA) Single  --  Intervention on proximal RCA: balloon angioplasty, stent, balloon angioplasty  PROCEDURE: The risks and alternatives of the procedures and conscious sedation were explained to the patient and informed consent was obtained  The patient was brought to the cath lab and placed on the table  The planned puncture sites  were prepped and draped in the usual sterile fashion  --  Right radial artery access  After performing an Nomi's test to verify adequate ulnar artery supply to the hand, the radial site was prepped  The puncture site was infiltrated with local anesthetic  The vessel was accessed using the  modified Seldinger technique, a wire was advanced into the vessel, and a sheath was advanced over the wire into the vessel  --  Left coronary artery angiography  A catheter was advanced over a guidewire into the aorta and positioned in the left coronary artery ostium under fluoroscopic guidance  Angiography was performed  --  Right coronary artery angiography  A catheter was advanced over a guidewire into the aorta and positioned in the right coronary artery ostium under fluoroscopic guidance  Angiography was performed  --  Acute Myocardial Infarct  --  Mod Sedation Same Physician Initial 15min  --  Mod Sedation Same Physician Add 15min  --  Coronary Catheterization (w/o [facility])  LESION INTERVENTION: A balloon angioplasty with stent and balloon angioplasty procedure was performed on the 100 % lesion in the proximal RCA  Following intervention there was a 0 % residual stenosis   There was KATHLEEN 0 flow before the  procedure and KATHLEEN 3 flow after the procedure  There was no dissection  --  Vessel setup was performed  A Runthrough NS 180cm wire was used to cross the lesion  --  Vessel setup was performed  A 6Fr  Launcher JR 4 0 100cm guiding catheter was used to cannulate the vessel  --  Balloon angioplasty was performed, using a Trek Rx 2 5 x 15mm balloon, with 1 inflations and a maximum inflation pressure of 8 bayron  --  A Resolute [place] Rx 4 0 x 26mm drug-eluting stent was placed across the lesion and deployed at a maximum inflation pressure of 16 bayron  --  Balloon angioplasty was performed, using a [place] Trek Rx 4 5 x 20mm balloon, with 2 inflations and a maximum inflation pressure of 16 bayron  INTERVENTIONS:  --  AMI PCI (JUAN, PTCRA, PTCA) Single  PROCEDURE COMPLETION: The patient tolerated the procedure well and was discharged from the cath lab  TIMING: Test started at 12:16  Test concluded at 12:42  HEMOSTASIS: The sheath was removed  The site was compressed with a Hemoband  device  Hemostasis was obtained  MEDICATIONS GIVEN: Fentanyl (1OOmcg/2 ml), 50 mcg, IV, at 12:13  Versed (2mg/2ml), 2 mg, IV, at 12:13  1% Lidocaine, 1 ml, subcutaneously, at 12:16  Nitroglycerin (200mcg/ml), 200 mcg, at 12:16  Verapamil  (5mg/2ml), 2 5 mg, IV, at 12:16  Heparin 1000 units/ml, 6,000 units, at 12:16  Heparin 1000 units/ml, 5,000 units, IV, at 12:42  CONTRAST GIVEN: 105 ml Omnipaque (350 mg I /ml)  RADIATION EXPOSURE: Fluoroscopy time: 7 88 min  CORONARY VESSELS:   --  The coronary circulation is right dominant  --  Left main: Angiography showed minor luminal irregularities  --  Proximal LAD: Angiography showed minor luminal irregularities  --  Circumflex: Angiography showed minor luminal irregularities  --  Ramus intermedius: Angiography showed minor luminal irregularities  --  Proximal RCA: There was a 100 % stenosis  --  Distal RCA: There was a 10 % stenosis at the site of a prior stent    --  Right PDA: There was a 60 % stenosis  IMPRESSIONS:  Inferior STEMI, culprit proximal RCA, successful revascularization with JUAN  Patent distal RCA stent  RECOMMENDATIONS  GDMT CAD, dapt (consider 2 year dapt as ticagrelor stopped few weeks ago)  DISPOSITION:  The patient left the catheterization laboratory in stable condition  Prepared and signed by    Ashwini Bowden DO  Signed 2021 12:52:39    Study diagram    Angiographic findings  Native coronary lesions:  ï¾·Proximal RCA: Lesion 1: 100 % stenosis  ï¾·Distal RCA: Lesion 1: 10 % stenosis, site of prior stent  ï¾·RPDA: Lesion 1: 60 % stenosis  Intervention results  Native coronary lesions:  ï¾·balloon angioplasty, stent, and balloon angioplasty of the 100 % stenosis in proximal RCA  0 % residual stenosis  Stent: Resolute Andre Rx 4 0 x 26mm drug-eluting      Hemodynamic tables    Pressures:  Baseline  Pressures:  - HR: 76  Pressures:  - Rhythm:  Pressures:  -- Aortic Pressure (S/D/M): 83/63/71    Outputs:  Baseline  Outputs:  -- CALCULATIONS: [age]  Outputs:  -- CALCULATIONS: Body Surface Area: 2 11  Outputs:  -- CALCULATIONS: Height in cm: 168 00  Outputs:  -- CALCULATIONS: Sex: Male  Outputs:  -- CALCULATIONS: Weight in k 00    --------------------------------------------------------------------------------  ECHO:   Results for orders placed during the hospital encounter of 21    Echo complete with contrast if indicated    Narrative  [facility]  [facility] [place] [address] [facility]  [phone]    Transthoracic Echocardiogram  2D, M-mode, Doppler, and Color Doppler    Study date:  31-Mar-2021    Patient: Edmund Franklin  MR number: [id number]  Account number: [de-identified]  : 1970  Age: 48 years  Gender: Male  Status: Inpatient  Location: Cath lab  Height: 66 in  Weight: 225 lb  BP: 95/ 61 mmHg    Indications: MI     Diagnoses: I21 3 - ST elevation (STEMI) myocardial infarction of unspecified site    Sonographer:  Sandy Davenport RDCS  Referring Physician:  Jerrald Bence, CRNP  Group:  [facility]  Cardiology Fellow:  Rolando Segundo MD  Interpreting Physician:  DO JOHN Bran    PROCEDURE INFORMATION:  This was a technically difficult study  LEFT VENTRICLE:  Systolic function was normal   There were no regional wall motion abnormalities  Wall thickness was mildly increased  There was mild concentric hypertrophy  Doppler parameters were consistent with abnormal left ventricular relaxation (grade 1 diastolic dysfunction)  RIGHT VENTRICLE:  The ventricle was mildly to moderately dilated  RIGHT ATRIUM:  The atrium was mildly dilated  MITRAL VALVE:  There was mild annular calcification  There was trace regurgitation  HISTORY: PRIOR HISTORY: CKD-2  HLD  STEMI  Hypertension  CAD  Sleep apnea  COPD  Former smoker  PROCEDURE: The procedure was performed in the catheterization laboratory  This was a routine study  The transthoracic approach was used  The study included complete 2D imaging, M-mode, complete spectral Doppler, and color Doppler  The  heart rate was 72 bpm, at the start of the study  Images were obtained from the parasternal, apical, subcostal, and suprasternal notch acoustic windows  Intravenous contrast ( 0 6mL/min of Definity in NSS) was administered to opacify the  left ventricle  Echocardiographic views were limited due to decreased penetration and lung interference  This was a technically difficult study  LEFT VENTRICLE: Size was normal  Systolic function was normal  There were no regional wall motion abnormalities  Wall thickness was mildly increased  There was mild concentric hypertrophy  DOPPLER: Doppler parameters were consistent with  abnormal left ventricular relaxation (grade 1 diastolic dysfunction)  VENTRICULAR SEPTUM: Thickness was normal  There was normal motion  There was normal contour   The septum was intact  RIGHT VENTRICLE: The ventricle was mildly to moderately dilated  Systolic function was normal     LEFT ATRIUM: Size was at the upper limits of normal     RIGHT ATRIUM: The atrium was mildly dilated  MITRAL VALVE: There was mild annular calcification  There was normal leaflet separation  DOPPLER: The transmitral velocity was within the normal range  There was no evidence for stenosis  There was trace regurgitation  AORTIC VALVE: The valve was trileaflet  Leaflets exhibited sclerosis  DOPPLER: Transaortic velocity was within the normal range  There was no evidence for stenosis  There was no regurgitation  TRICUSPID VALVE: The valve structure was normal  There was normal leaflet separation  DOPPLER: The transtricuspid velocity was within the normal range  There was no evidence for stenosis  There was mild regurgitation  The tricuspid jet  envelope definition was inadequate for estimation of RV systolic pressure  PULMONIC VALVE: DOPPLER: The transpulmonic velocity was within the normal range  There was no regurgitation  PERICARDIUM: There was no pericardial effusion  AORTA: The root exhibited normal size      SYSTEM MEASUREMENT TABLES    2D  %FS: 28 78 %  Ao Diam: 3 02 cm  EDV(Teich): 96 65 ml  EF(Teich): 55 46 %  ESV(Teich): 43 04 ml  IVSd: 1 12 cm  LA Area: 12 98 cm2  LA Diam: 3 42 cm  LVEDV MOD A4C: 90 84 ml  LVEF MOD A4C: 54 09 %  LVESV MOD A4C: 41 71 ml  LVIDd: 4 59 cm  LVIDs: 3 27 cm  LVLd A4C: 6 75 cm  LVLs A4C: 6 11 cm  LVPWd: 0 86 cm  RA Area: 18 18 cm2  RVIDd: 3 96 cm  SV MOD A4C: 49 13 ml  SV(Teich): 53 61 ml    MM  TAPSE: 1 61 cm    PW  E' Sept: 0 07 m/s  E/E' Sept: 8 73  MV A Maury: 0 67 m/s  MV Dec [place]: 4 38 m/s2  MV DecT: 144 84 ms  MV E Maury: 0 63 m/s  MV E/A Ratio: 0 95    [employer] Commission Accredited Echocardiography Laboratory    Prepared and electronically signed by    Wang French DO  Signed 31-Mar-2021 16:26:48    No results found for this or any previous visit     --------------------------------------------------------------------------------  HOLTER  No results found for this or any previous visit  No results found for this or any previous visit     --------------------------------------------------------------------------------  CAROTIDS  No results found for this or any previous visit      --------------------------------------------------------------------------------  Diagnoses and all orders for this visit:    ST elevation myocardial infarction involving right coronary artery (HCC)  -     ezetimibe (ZETIA) 10 mg tablet; Take 1 tablet (10 mg total) by mouth daily  -     Lipid Panel with Direct LDL reflex; Future    Essential hypertension    Coronary artery disease involving native heart without angina pectoris, unspecified vessel or lesion type    CKD (chronic kidney disease), stage II    Hyperlipidemia, unspecified hyperlipidemia type  -     ezetimibe (ZETIA) 10 mg tablet; Take 1 tablet (10 mg total) by mouth daily  -     Lipid Panel with Direct LDL reflex; Future       ======================================================    Past Medical History:   Diagnosis Date    Acute ST elevation myocardial infarction (STEMI) of inferior wall ([facility])     cath with PCI/JUAN - RCA 2/7/2020    Back pain     CAD (coronary artery disease)     COPD [facility]chronic obstructive pulmonary disease) ([facility]     Hyperlipidemia     Hypertension     Inguinal hernia, left     Mild heartburn     Obesity     Stroke ([facility] [address] ) approx 2015    Denies residual problems  Initially he only had trouble with two fingers on left side      Wears glasses      Past Surgical History:   Procedure Laterality Date    APPENDECTOMY      HERNIA REPAIR      NO PAST SURGERIES      [place] LAP,APPENDECTOMY N/A 11/28/2018    Procedure: APPENDECTOMY LAPAROSCOPIC;  Surgeon: Gideon Novoa MD;  Location: [address] [place] [address] OR;  Service: General    [place] [address] Left 4/2/2018    Procedure: Financial Resource Strain:     Difficulty of Paying Living Expenses:    Food Insecurity:     Worried About Running Out of Food in the Last Year:     [address] in the Last Year:    Transportation Needs:     Lack of Transportation (Medical):  Lack of Transportation (Non-Medical):    Physical Activity:     Days of Exercise per Week:     Minutes of Exercise per Session:    Stress:     Feeling of Stress :    Social Connections:     Frequency of Communication with Friends and Family:     Frequency of Social Gatherings with Friends and Family:     Attends Spiritism Services:     Active Member of Clubs or Organizations:     Attends Club or Organization Meetings:     Marital Status:    Intimate Partner Violence:     Fear of Current or Ex-Partner:     Emotionally Abused:     Physically Abused:     Sexually Abused:         History reviewed  No pertinent family history      Lab Results   Component Value Date    WBC 5 49 03/31/2021    HGB 16 3 03/31/2021    HCT 46 0 03/31/2021    MCV 91 03/31/2021     03/31/2021      Lab Results   Component Value Date    SODIUM 138 04/01/2021    K 3 2 (L) 04/01/2021     04/01/2021    CO2 26 04/01/2021    BUN 14 04/01/2021    CREATININE 1 02 04/01/2021    GLUC 122 04/01/2021    CALCIUM 8 8 04/01/2021      No results found for: HGBA1C   No results found for: CHOL  Lab Results   Component Value Date    HDL 36 (L) 06/23/2021    HDL 32 (L) 02/08/2020     Lab Results   Component Value Date    LDLCALC 98 06/23/2021    LDLCALC 100 02/08/2020     Lab Results   Component Value Date    TRIG 185 (H) 06/23/2021    TRIG 195 (H) 02/08/2020     No results found for: CHOLHDL   Lab Results   Component Value Date    INR 1 02 02/11/2020    INR 0 97 02/07/2020    INR 1 09 11/03/2018    PROTIME 13 5 02/11/2020    PROTIME 13 0 02/07/2020    PROTIME 12 6 11/03/2018          Patient Active Problem List    Diagnosis Date Noted    Sleep apnea 06/23/2020    Insomnia with sleep apnea 06/23/2020    Hypertension     CAD (coronary artery disease)     Acute ST elevation myocardial infarction (STEMI) of inferior wall (HCC)     ST elevation myocardial infarction involving right coronary artery ([facility] [address] ) 02/08/2020    Left arm pain 08/20/2019    Bilateral low back pain without sciatica 08/20/2019    CKD (chronic kidney disease), stage II 04/09/2019    Hypokalemia 04/09/2019    Dizziness 03/30/2019    Benign hypertension with CKD (chronic kidney disease), stage II 03/30/2019    Hyperlipidemia 03/30/2019    Indirect inguinal hernia 04/02/2018       Portions of the record may have been created with voice recognition software  Occasional wrong word or "sound a like" substitutions may have occurred due to the inherent limitations of voice recognition software  Read the chart carefully and recognize, using context, where substitutions have occurred      Diann Ayala DO, [facility] [place]  9/28/2021 12:00 PM

## 2021-10-18 DIAGNOSIS — I21.19 ST ELEVATION MYOCARDIAL INFARCTION (STEMI) OF INFERIOR WALL (HCC): ICD-10-CM

## 2021-10-19 ENCOUNTER — HOSPITAL ENCOUNTER (EMERGENCY)
Facility: HOSPITAL | Age: 51
Discharge: HOME | End: 2021-10-20
Attending: EMERGENCY MEDICINE
Payer: COMMERCIAL

## 2021-10-19 VITALS
HEART RATE: 98 BPM | RESPIRATION RATE: 18 BRPM | OXYGEN SATURATION: 95 % | WEIGHT: 223.99 LBS | BODY MASS INDEX: 36.15 KG/M2 | TEMPERATURE: 98.6 F | SYSTOLIC BLOOD PRESSURE: 135 MMHG | DIASTOLIC BLOOD PRESSURE: 75 MMHG

## 2021-10-19 DIAGNOSIS — B37.2 CANDIDOSIS OF SKIN: Primary | ICD-10-CM

## 2021-10-19 PROCEDURE — 99283 EMERGENCY DEPT VISIT LOW MDM: CPT | Performed by: PHYSICIAN ASSISTANT

## 2021-10-19 PROCEDURE — 99283 EMERGENCY DEPT VISIT LOW MDM: CPT

## 2021-10-19 RX ORDER — CLOTRIMAZOLE 1 %
CREAM (GRAM) TOPICAL
Qty: 15 G | Refills: 0 | Status: SHIPPED | OUTPATIENT
Start: 2021-10-19 | End: 2021-10-20 | Stop reason: SDUPTHER

## 2021-10-20 RX ORDER — CLOTRIMAZOLE 1 %
CREAM (GRAM) TOPICAL
Qty: 15 G | Refills: 0 | Status: SHIPPED | OUTPATIENT
Start: 2021-10-20

## 2021-10-21 RX ORDER — LISINOPRIL 5 MG/1
5 TABLET ORAL DAILY
Qty: 90 TABLET | Refills: 1 | Status: SHIPPED | OUTPATIENT
Start: 2021-10-21 | End: 2022-03-29 | Stop reason: SDUPTHER

## 2022-01-01 NOTE — CASE MANAGEMENT
CM obtained all the following info about the pt  HOME: Pt resides in a 2-story house w/ 13 steps between floors and 5 steps to enter at front door  LIVES W/: Brother  : Pt's adult daughter Donald Chao (c: [phone])  INDEPENDENCE: Pt is independent at baseline w/ ambulating and performing his ADLS  DME: None reported  HHC: No hx of services reported  I/P REHAB: No hx of placements reported  MENTAL HEALTH: No hx of issues reported  D&A: No hx of issues reported  PCP: Dr Radha Chaudhry through [facility]  PHARMACY: [address] [place] [address] located in [facility]  INCOME SOURCE: Unknown  INSURANCE: [employer]  MEDICAL POA: No one is officially pre-designated  TRANSPORTATION AT D/C: Pt's family will provide transport  CM reviewed d/c planning process including the following: identifying help at home, patient preference for d/c planning needs, Discharge Lounge, [facility] Meds to Bed program, availability of treatment team to discuss questions or concerns patient and/or family may have regarding understanding medications and recognizing signs and symptoms once discharged  CM also encouraged patient to follow up with all recommended appointments after discharge  Patient advised of importance for patient and family to participate in managing patients medical well being  Patient/caregiver received discharge checklist  Content reviewed  Patient/caregiver encouraged to participate in discharge plan of care prior to discharge home  Statement Selected

## 2022-01-10 ENCOUNTER — APPOINTMENT (EMERGENCY)
Dept: RADIOLOGY | Facility: HOSPITAL | Age: 52
End: 2022-01-10
Payer: COMMERCIAL

## 2022-01-10 ENCOUNTER — HOSPITAL ENCOUNTER (EMERGENCY)
Facility: HOSPITAL | Age: 52
Discharge: HOME | End: 2022-01-11
Attending: EMERGENCY MEDICINE | Admitting: EMERGENCY MEDICINE
Payer: COMMERCIAL

## 2022-01-10 DIAGNOSIS — R25.1 SHAKINESS: ICD-10-CM

## 2022-01-10 DIAGNOSIS — R53.1 GENERALIZED WEAKNESS: Primary | ICD-10-CM

## 2022-01-10 DIAGNOSIS — R06.00 DYSPNEA: ICD-10-CM

## 2022-01-10 LAB
ATRIAL RATE: 89 BPM
P AXIS: 67 DEGREES
PR INTERVAL: 142 MS
QRS AXIS: 64 DEGREES
QRSD INTERVAL: 94 MS
QT INTERVAL: 330 MS
QTC INTERVAL: 401 MS
T WAVE AXIS: 36 DEGREES
VENTRICULAR RATE: 89 BPM

## 2022-01-10 PROCEDURE — 93010 ELECTROCARDIOGRAM REPORT: CPT

## 2022-01-10 PROCEDURE — 36415 COLL VENOUS BLD VENIPUNCTURE: CPT | Performed by: EMERGENCY MEDICINE

## 2022-01-10 PROCEDURE — 99285 EMERGENCY DEPT VISIT HI MDM: CPT | Performed by: EMERGENCY MEDICINE

## 2022-01-10 PROCEDURE — 84484 ASSAY OF TROPONIN QUANT: CPT | Performed by: EMERGENCY MEDICINE

## 2022-01-10 PROCEDURE — 80053 COMPREHEN METABOLIC PANEL: CPT | Performed by: EMERGENCY MEDICINE

## 2022-01-10 PROCEDURE — 93005 ELECTROCARDIOGRAM TRACING: CPT

## 2022-01-10 PROCEDURE — 71046 X-RAY EXAM CHEST 2 VIEWS: CPT

## 2022-01-10 PROCEDURE — 85025 COMPLETE CBC W/AUTO DIFF WBC: CPT | Performed by: EMERGENCY MEDICINE

## 2022-01-10 PROCEDURE — 99285 EMERGENCY DEPT VISIT HI MDM: CPT

## 2022-01-11 VITALS
RESPIRATION RATE: 20 BRPM | DIASTOLIC BLOOD PRESSURE: 83 MMHG | BODY MASS INDEX: 35.3 KG/M2 | TEMPERATURE: 97.9 F | HEART RATE: 88 BPM | OXYGEN SATURATION: 95 % | SYSTOLIC BLOOD PRESSURE: 136 MMHG | WEIGHT: 218.7 LBS

## 2022-01-11 LAB
2HR DELTA HS TROPONIN: 1 NG/L
ALBUMIN SERPL BCP-MCNC: 3.7 G/DL (ref 3.5–5)
ALP SERPL-CCNC: 121 U/L (ref 46–116)
ALT SERPL W P-5'-P-CCNC: 89 U/L (ref 12–78)
ANION GAP SERPL CALCULATED.3IONS-SCNC: 10 MMOL/L (ref 4–13)
AST SERPL W P-5'-P-CCNC: 29 U/L (ref 5–45)
ATRIAL RATE: 99 BPM
BASOPHILS # BLD AUTO: 0.06 THOUSANDS/ΜL (ref 0–0.1)
BASOPHILS NFR BLD AUTO: 1 % (ref 0–1)
BILIRUB SERPL-MCNC: 1.44 MG/DL (ref 0.2–1)
BUN SERPL-MCNC: 19 MG/DL (ref 5–25)
CALCIUM SERPL-MCNC: 8.8 MG/DL (ref 8.3–10.1)
CARDIAC TROPONIN I PNL SERPL HS: 6 NG/L
CARDIAC TROPONIN I PNL SERPL HS: 7 NG/L
CHLORIDE SERPL-SCNC: 98 MMOL/L (ref 100–108)
CO2 SERPL-SCNC: 28 MMOL/L (ref 21–32)
CREAT SERPL-MCNC: 1.25 MG/DL (ref 0.6–1.3)
EOSINOPHIL # BLD AUTO: 0.16 THOUSAND/ΜL (ref 0–0.61)
EOSINOPHIL NFR BLD AUTO: 2 % (ref 0–6)
ERYTHROCYTE [DISTWIDTH] IN BLOOD BY AUTOMATED COUNT: 11.9 % (ref 11.6–15.1)
GFR SERPL CREATININE-BSD FRML MDRD: 66 ML/MIN/1.73SQ M
GLUCOSE SERPL-MCNC: 263 MG/DL (ref 65–140)
HCT VFR BLD AUTO: 49.4 % (ref 36.5–49.3)
HGB BLD-MCNC: 17.5 G/DL (ref 12–17)
IMM GRANULOCYTES # BLD AUTO: 0.04 THOUSAND/UL (ref 0–0.2)
IMM GRANULOCYTES NFR BLD AUTO: 1 % (ref 0–2)
LYMPHOCYTES # BLD AUTO: 1.65 THOUSANDS/ΜL (ref 0.6–4.47)
LYMPHOCYTES NFR BLD AUTO: 23 % (ref 14–44)
MCH RBC QN AUTO: 31.4 PG (ref 26.8–34.3)
MCHC RBC AUTO-ENTMCNC: 35.4 G/DL (ref 31.4–37.4)
MCV RBC AUTO: 89 FL (ref 82–98)
MONOCYTES # BLD AUTO: 0.58 THOUSAND/ΜL (ref 0.17–1.22)
MONOCYTES NFR BLD AUTO: 8 % (ref 4–12)
NEUTROPHILS # BLD AUTO: 4.65 THOUSANDS/ΜL (ref 1.85–7.62)
NEUTS SEG NFR BLD AUTO: 65 % (ref 43–75)
NRBC BLD AUTO-RTO: 0 /100 WBCS
P AXIS: 64 DEGREES
PLATELET # BLD AUTO: 247 THOUSANDS/UL (ref 149–390)
PMV BLD AUTO: 10.4 FL (ref 8.9–12.7)
POTASSIUM SERPL-SCNC: 3.4 MMOL/L (ref 3.5–5.3)
PR INTERVAL: 142 MS
PROT SERPL-MCNC: 7.5 G/DL (ref 6.4–8.2)
QRS AXIS: 54 DEGREES
QRSD INTERVAL: 90 MS
QT INTERVAL: 314 MS
QTC INTERVAL: 402 MS
RBC # BLD AUTO: 5.57 MILLION/UL (ref 3.88–5.62)
SODIUM SERPL-SCNC: 136 MMOL/L (ref 136–145)
T WAVE AXIS: 36 DEGREES
VENTRICULAR RATE: 99 BPM
WBC # BLD AUTO: 7.14 THOUSAND/UL (ref 4.31–10.16)

## 2022-01-11 PROCEDURE — 93005 ELECTROCARDIOGRAM TRACING: CPT

## 2022-01-11 PROCEDURE — 36415 COLL VENOUS BLD VENIPUNCTURE: CPT | Performed by: EMERGENCY MEDICINE

## 2022-01-11 PROCEDURE — 84484 ASSAY OF TROPONIN QUANT: CPT | Performed by: EMERGENCY MEDICINE

## 2022-01-11 PROCEDURE — 93010 ELECTROCARDIOGRAM REPORT: CPT | Performed by: INTERNAL MEDICINE

## 2022-01-11 NOTE — DISCHARGE INSTRUCTIONS
You have been seen for weakness  Please continue your current medication regimen  Return to the emergency department if you develop worsening weakness, trouble breathing or any other symptoms of concern   Please follow up with your PCP by calling the number provided

## 2022-01-11 NOTE — ED PROVIDER NOTES
History  Chief Complaint   Patient presents with    Weakness - Generalized     pt  reports shaking, weakness and sob that started today " I just dont feel well" no ches mary     Robles Macias is a 46y o  year old female with PMH of CAD, COPD, HTN, HLD presenting to the [facility] ED for generalized weakness and dyspnea  One hour prior to arrival patient was laying down when he noticed a shaking sensation in both his legs  He felt weak and short of breath during this  This lasted for about ten minutes and self-resolved  At the time of evaluation patient states "I feel fine"  No associated fevers, congestion, cough, chest pain, N/V/D or abdominal pain  Patient denies known sick contacts though is not vaccinated against [email or web address]  Patient has not taken/received any medications at home for relief of symptoms  History provided by:  Patient and medical records   used: No        Prior to Admission Medications   Prescriptions Last Dose Informant Patient Reported?  Taking?   aspirin 81 mg chewable tablet  Self No No   Sig: Chew 1 tablet (81 mg total) daily   atorvastatin (LIPITOR) 80 mg tablet  Self No No   Sig: Take 1 tablet (80 mg total) by mouth every evening   clotrimazole (LOTRIMIN) 1 % cream   No No   Sig: Apply to affected area 2 times daily   ezetimibe (ZETIA) 10 mg tablet   No No   Sig: Take 1 tablet (10 mg total) by mouth daily   hydrochlorothiazide (HYDRODIURIL) 25 mg tablet  Self No No   Sig: Take 1 tablet (25 mg total) by mouth daily   lisinopril (ZESTRIL) 5 mg tablet   No No   Sig: Take 1 tablet (5 mg total) by mouth daily   metoprolol tartrate (LOPRESSOR) 25 mg tablet  Self No No   Sig: Take 1 tablet (25 mg total) by mouth every 12 (twelve) hours   nitroglycerin (NITROSTAT) 0 4 mg SL tablet  Self No No   Sig: Place 1 tablet (0 4 mg total) under the tongue every 5 (five) minutes as needed for chest pain If no relief after 3 tablets, come immediately to ER   ticagrelor (BRILINTA) 90 MG  Self No No   Sig: Take 1 tablet (90 mg total) by mouth every 12 (twelve) hours      Facility-Administered Medications: None       Past Medical History:   Diagnosis Date    Acute ST elevation myocardial infarction (STEMI) of inferior wall ([facility])     cath with PCI/JUAN - RCA 2020    Back pain     CAD (coronary artery disease)     COPD (chronic obstructive pulmonary disease) ([facility])     Hyperlipidemia     Hypertension     Inguinal hernia, left     Mild heartburn     Obesity     Stroke ([address] ) approx 2015    Denies residual problems  Initially he only had trouble with two fingers on left side   Wears glasses        Past Surgical History:   Procedure Laterality Date    APPENDECTOMY      HERNIA REPAIR      NO PAST SURGERIES      MD LAP,APPENDECTOMY N/A 2018    Procedure: APPENDECTOMY LAPAROSCOPIC;  Surgeon: Booker Chao MD;  Location: [facility] MAIN OR;  Service: General    MD Chino Julien 19 Left 2018    Procedure: LAPAROSCOPIC INGUINAL HERNIA REPAIR WITH MESH;  Surgeon: Booker Chao MD;  Location: [facility] Main OR;  Service: General       History reviewed  No pertinent family history  I have reviewed and agree with the history as documented  E-Cigarette/Vaping    E-Cigarette Use Never User      E-Cigarette/Vaping Substances    Nicotine No     THC No     CBD No     Flavoring No     Other No     Unknown No      Social History     Tobacco Use    Smoking status: Former Smoker     Packs/day: 1 00     Years: 15 00     Pack years: 15 00     Types: Cigarettes     Quit date:      Years since quittin     Smokeless tobacco: Never Used   Vaping Use    Vaping Use: Never used   Substance Use Topics    Alcohol use: Not Currently    Drug use: No        Review of Systems   Constitutional: Positive for fatigue  Negative for chills and fever  HENT: Negative for congestion and rhinorrhea  Eyes: Negative for visual disturbance  Respiratory: Positive for shortness of breath  Negative for cough  Cardiovascular: Negative for chest pain and leg swelling  Gastrointestinal: Negative for abdominal distention, abdominal pain, diarrhea, nausea and vomiting  Endocrine: Negative for polyuria  Genitourinary: Negative for dysuria and hematuria  Musculoskeletal: Negative for arthralgias, gait problem and joint swelling  Skin: Negative for rash  Neurological: Negative for dizziness, seizures, syncope, speech difficulty, weakness, light-headedness and headaches  Hematological: Does not bruise/bleed easily  Psychiatric/Behavioral: Negative for behavioral problems and confusion  All other systems reviewed and are negative  Physical Exam  ED Triage Vitals   Temperature Pulse Respirations Blood Pressure SpO2   01/10/22 2251 01/10/22 2251 01/10/22 2251 01/10/22 2253 01/10/22 2253   97 9 °F (36 6 °C) 93 20 133/64 96 %      Temp Source Heart Rate Source Patient Position - Orthostatic VS BP Location FiO2 (%)   01/10/22 2251 01/10/22 2251 01/10/22 2251 01/10/22 2251 --   Oral Monitor Sitting Right arm       Pain Score       01/10/22 2251       No Pain             Orthostatic Vital Signs  Vitals:    01/10/22 2251 01/10/22 2253 01/11/22 0151   BP:  133/64 136/83   Pulse: 93  88   Patient Position - Orthostatic VS: Sitting  Lying       Physical Exam  Vitals and nursing note reviewed  Constitutional:       General: He is not in acute distress  Appearance: Normal appearance  He is well-developed  He is not ill-appearing, toxic-appearing or diaphoretic  HENT:      Head: Normocephalic and atraumatic  Nose: No congestion or rhinorrhea  Eyes:      General:         Right eye: No discharge  Left eye: No discharge  Cardiovascular:      Rate and Rhythm: Normal rate and regular rhythm  Pulmonary:      Effort: Pulmonary effort is normal  No respiratory distress  Breath sounds: Normal breath sounds  No wheezing or rales     Abdominal:      General: Bowel sounds are normal       Palpations: Abdomen is soft  Tenderness: There is no abdominal tenderness  There is no guarding or rebound  Musculoskeletal:      Cervical back: No rigidity  Right lower leg: No edema  Left lower leg: No edema  Skin:     General: Skin is warm  Capillary Refill: Capillary refill takes less than 2 seconds  Neurological:      Mental Status: He is alert and oriented to person, place, and time     Psychiatric:         Mood and Affect: Mood normal          Behavior: Behavior normal          ED Medications  Medications - No data to display    Diagnostic Studies  Results Reviewed     Procedure Component Value Units Date/Time    HS Troponin I 2hr [[id number]] Collected: 01/11/22 0148    Lab Status: Final result Specimen: Blood from Arm, Right Updated: 01/11/22 0225     hs TnI 2hr 7 ng/L      Delta 2hr hsTnI 1 ng/L     HS Troponin 0hr (reflex protocol) [[id number]]  (Normal) Collected: 01/10/22 2352    Lab Status: Final result Specimen: Blood from Arm, Right Updated: 01/11/22 0027     hs TnI 0hr 6 ng/L     HS Troponin I 4hr [[id number]]     Lab Status: No result Specimen: Blood     Comprehensive metabolic panel [[id number]]  (Abnormal) Collected: 01/10/22 2352    Lab Status: Final result Specimen: Blood from Arm, Right Updated: 01/11/22 0016     Sodium 136 mmol/L      Potassium 3 4 mmol/L      Chloride 98 mmol/L      CO2 28 mmol/L      ANION GAP 10 mmol/L      BUN 19 mg/dL      Creatinine 1 25 mg/dL      Glucose 263 mg/dL      Calcium 8 8 mg/dL      AST 29 U/L      ALT 89 U/L      Alkaline Phosphatase 121 U/L      Total Protein 7 5 g/dL      Albumin 3 7 g/dL      Total Bilirubin 1 44 mg/dL      eGFR 66 ml/min/1 73sq m     Narrative:      [place] guidelines for Chronic Kidney Disease (CKD):     Stage 1 with normal or high GFR (GFR > 90 mL/min/1 73 square meters)    Stage 2 Mild CKD (GFR = 60-89 mL/min/1 73 square meters)    Stage 3A Moderate CKD (GFR = 45-59 mL/min/1 73 square meters)    Stage 3B Moderate CKD (GFR = 30-44 mL/min/1 73 square meters)    Stage 4 Severe CKD (GFR = 15-29 mL/min/1 73 square meters)    Stage 5 End Stage CKD (GFR <15 mL/min/1 73 square meters)  Note: GFR calculation is accurate only with a steady state creatinine    CBC and differential [[id number]]  (Abnormal) Collected: 01/10/22 7831    Lab Status: Final result Specimen: Blood from Arm, Right Updated: 01/11/22 0015     WBC 7 14 Thousand/uL      RBC 5 57 Million/uL      Hemoglobin 17 5 g/dL      Hematocrit 49 4 %      MCV 89 fL      MCH 31 4 pg      MCHC 35 4 g/dL      RDW 11 9 %      MPV 10 4 fL      Platelets 696 Thousands/uL      nRBC 0 /100 WBCs      Neutrophils Relative 65 %      Immat GRANS % 1 %      Lymphocytes Relative 23 %      Monocytes Relative 8 %      Eosinophils Relative 2 %      Basophils Relative 1 %      Neutrophils Absolute 4 65 Thousands/µL      Immature Grans Absolute 0 04 Thousand/uL      Lymphocytes Absolute 1 65 Thousands/µL      Monocytes Absolute 0 58 Thousand/µL      Eosinophils Absolute 0 16 Thousand/µL      Basophils Absolute 0 06 Thousands/µL                  XR chest 2 views    (Results Pending)         Procedures  Procedures      ED Course  ED Course as of 01/11/22 0312   Mon Ej 10, 2022   2325 Procedure Note: EKG  Date/Time: 01/10/22 11:25 PM   Interpreted by: Agueda Mcdermott DO  Indications / Diagnosis: Dyspnea  ECG reviewed by me, the ED Provider: yes   The EKG demonstrates:  Rhythm: normal sinus rhythm 89 BPM  Intervals: Normal MT and QT intervals  Axis: Normal axis  QRS/Blocks: Normal QRS  ST Changes: No acute ST/T waves changes  No DI   No TWI  Comparison: Compared to prior EKG performed on 03/31/2021   Tue Jan 11, 2022   0041 hs TnI 0hr: 6   0150 Procedure Note: EKG  Date/Time: 01/11/22 1:50 AM   Interpreted by: Agueda Mcdermott DO  Indications / Diagnosis: Dyspnea  ECG reviewed by me, the ED Provider: yes   The EKG demonstrates:  Rhythm: normal sinus rhythm 99 BPM  Intervals: Normal WA and QT intervals  Axis: Normal axis  QRS/Blocks: Normal QRS  ST Changes: No acute ST/T waves changes  No DI  No TWI  Comparison: Compared to prior EKG performed on 1/10/2022             HEART Risk Score      Most Recent Value   Heart Score Risk Calculator    History 0 Filed at: 01/11/2022 0312   ECG 0 Filed at: 01/11/2022 5075   Age 1 Filed at: 01/11/2022 0270   Risk Factors 1 Filed at: 01/11/2022 6799   Troponin 0 Filed at: 01/11/2022 8425   HEART Score 2 Filed at: 01/11/2022 7205                                MDM  Number of Diagnoses or Management Options  Dyspnea  Generalized weakness  Shakiness  Diagnosis management comments:   46 y o  male presenting for shaking, weakness and dyspnea  VSS  Symptoms resolved on arrival   VSS  Asymptomatic in the ED  Will order CBC, CMP, troponin, EKG, CXR to evaluate for ACS, PTX, pulmonary disease, widened mediastinum  After diagnostic lab testing and imaging, no definitive abnormality was noted that would explain the patient's symptoms  I explained to the patient the relevant test results  I explained to the patient that although no definitive diagnosis could be made today, the possibility exists that it may be too early in the disease process to diagnose serious illness  I have discussed with the patient our plan to discharge them from the ED and the patient is in agreement with this plan  The patient was provided a written after visit summary with strict RTED precautions  Followup: I have discussed with the patient plan to follow up with their PCP   Contact information provided in AVS        Amount and/or Complexity of Data Reviewed  Clinical lab tests: ordered and reviewed  Tests in the radiology section of CPT®: reviewed and ordered  Review and summarize past medical records: yes  Independent visualization of images, tracings, or specimens: yes    Patient Progress  Patient progress: resolved      Disposition  Final diagnoses:   Generalized weakness   Shakiness   Dyspnea     Time reflects when diagnosis was documented in both MDM as applicable and the Disposition within this note     Time User Action Codes Description Comment    1/11/2022  2:04 AM Skippy Just Add [R53 1] Generalized weakness     1/11/2022  2:04 AM Skippy Just Add [R25 1] Shakiness     1/11/2022  2:33 AM Skippy Just Add [R06 00] Dyspnea       ED Disposition     ED Disposition Condition Date/Time Comment    Discharge Stable Tue Jan 11, 2022  2:33 AM Ari Lopez discharge to home/self care  Follow-up Information     Follow up With Specialties Details Why Karyn Denson MD Family Medicine Schedule an appointment as soon as possible for a visit  To make appointment for reevaluation in 3-5 days   [facility] [place]  [phone]            Discharge Medication List as of 1/11/2022  2:34 AM      CONTINUE these medications which have NOT CHANGED    Details   aspirin 81 mg chewable tablet Chew 1 tablet (81 mg total) daily, Starting Tue 2/23/2021, Normal      atorvastatin (LIPITOR) 80 mg tablet Take 1 tablet (80 mg total) by mouth every evening, Starting Tue 2/23/2021, Normal      clotrimazole (LOTRIMIN) 1 % cream Apply to affected area 2 times daily, Print      ezetimibe (ZETIA) 10 mg tablet Take 1 tablet (10 mg total) by mouth daily, Starting Tue 9/28/2021, Normal      hydrochlorothiazide (HYDRODIURIL) 25 mg tablet Take 1 tablet (25 mg total) by mouth daily, Starting Tue 2/23/2021, Normal      lisinopril (ZESTRIL) 5 mg tablet Take 1 tablet (5 mg total) by mouth daily, Starting Thu 10/21/2021, Normal      metoprolol tartrate (LOPRESSOR) 25 mg tablet Take 1 tablet (25 mg total) by mouth every 12 (twelve) hours, Starting Tue 9/7/2021, Normal      nitroglycerin (NITROSTAT) 0 4 mg SL tablet Place 1 tablet (0 4 mg total) under the tongue every 5 (five) minutes as needed for chest pain If no relief after 3 tablets, come immediately to ER, Starting Wed 2/12/2020, Normal      ticagrelor (BRILINTA) 90 MG Take 1 tablet (90 mg total) by mouth every 12 (twelve) hours, Starting Thu 4/29/2021, Normal           No discharge procedures on file  PDMP Review     None           ED Provider  Attending physically available and evaluated Tello Wei  CHERYL managed the patient along with the ED Attending      Electronically Signed by         Irma Farnsworth DO  01/11/22 5851

## 2022-01-11 NOTE — ED ATTENDING ATTESTATION
1/10/2022  Margarita LUNA DO, saw and evaluated the patient  I have discussed the patient with the resident/non-physician practitioner and agree with the resident's/non-physician practitioner's findings, Plan of Care, and MDM as documented in the resident's/non-physician practitioner's note, except where noted  All available labs and Radiology studies were reviewed  I was present for key portions of any procedure(s) performed by the resident/non-physician practitioner and I was immediately available to provide assistance  At this point I agree with the current assessment done in the Emergency Department  I have conducted an independent evaluation of this patient a history and physical is as follows:    Gary LUNA DO, saw and evaluated the patient  All available labs and X-rays were reviewed  I discussed the patient with the resident / non-physician and agree with the resident's / non-physician practitioner's findings and plan as documented in the resident's / non-physician practicitioner's note, except where noted  At this point, I agree with the current assessment done in the ED      NAME: Ari Lopez  AGE: 46 y o  SEX: male  : 1970   MRN: [id number]  ENCOUNTER: [id number]    DATE: 2022  TIME: 3:23 AM      History of Present Illness   Ari Lopez is a 46 y o  male who presents with Weakness - Generalized (pt  reports shaking, weakness and sob that started today " I just dont feel well" no ches tpain)    has a past medical history of Acute ST elevation myocardial infarction (STEMI) of inferior wall (HCC), Back pain, CAD (coronary artery disease), COPD (chronic obstructive pulmonary disease) ([address] ), Hyperlipidemia, Hypertension, Inguinal hernia, left, Mild heartburn, Obesity, Stroke ([address] ) (approx ), and Wears glasses        Patient is a 80-year-old male that presents tonight for lower extremity shaking and feelings of weakness with some associated shortness of breath but no chest pain  This started just shortly prior to arrival   Patient was sleeping when he woke up with feelings of shaking and tremors in his legs  Patient denies any recent fevers or URI symptoms  Patient does have a history of an inferior wall STEMI but states that this is different  He denies chest pain  Has been told that his potassium has been low in the past   Did not eat much throughout the day yesterday but otherwise has been in his normal state of health  Past Medical History     Past Medical History:   Diagnosis Date    Acute ST elevation myocardial infarction (STEMI) of inferior wall ([facility])     cath with PCI/JUAN - RCA 2020    Back pain     CAD (coronary artery disease)     COPD (chronic obstructive pulmonary disease) ([facility])     Hyperlipidemia     Hypertension     Inguinal hernia, left     Mild heartburn     Obesity     Stroke ([facility] ) approx 2015    Denies residual problems  Initially he only had trouble with two fingers on left side   Wears glasses        Past Surgical History     Past Surgical History:   Procedure Laterality Date    APPENDECTOMY      HERNIA REPAIR      NO PAST SURGERIES      IN LAP,APPENDECTOMY N/A 2018    Procedure: APPENDECTOMY LAPAROSCOPIC;  Surgeon: Juana Coyne MD;  Location: [address] [place] [address] MAIN OR;  Service: General    IN [address] Left 2018    Procedure: LAPAROSCOPIC INGUINAL HERNIA REPAIR WITH MESH;  Surgeon: Juana Coyne MD;  Location: [facility] OR;  Service: General       Social History     Social History     Substance and Sexual Activity   Alcohol Use Not Currently     Social History     Substance and Sexual Activity   Drug Use No     Social History     Tobacco Use   Smoking Status Former Smoker    Packs/day: 1 00    Years: 15 00    Pack years: 15 00    Types: Cigarettes    Quit date:     Years since quittin 0   Smokeless Tobacco Never Used       Family History   History reviewed   No pertinent family history  Medications Prior to Admission     Prior to Admission medications    Medication Sig Start Date End Date Taking? Authorizing Provider   aspirin 81 mg chewable tablet Chew 1 tablet (81 mg total) daily 2/23/21   [place] Clarity, DO   atorvastatin (LIPITOR) 80 mg tablet Take 1 tablet (80 mg total) by mouth every evening 2/23/21   [place] Clarity, DO   clotrimazole (LOTRIMIN) 1 % cream Apply to affected area 2 times daily 10/20/21   Sherrill Oneil PA-C   ezetimibe (ZETIA) 10 mg tablet Take 1 tablet (10 mg total) by mouth daily 9/28/21   [place] Clarity, DO   hydrochlorothiazide (HYDRODIURIL) 25 mg tablet Take 1 tablet (25 mg total) by mouth daily 2/23/21   [place] Clarity, DO   lisinopril (ZESTRIL) 5 mg tablet Take 1 tablet (5 mg total) by mouth daily 10/21/21   [place] Clarity, DO   metoprolol tartrate (LOPRESSOR) 25 mg tablet Take 1 tablet (25 mg total) by mouth every 12 (twelve) hours 9/7/21   [place] Clarity, DO   nitroglycerin (NITROSTAT) 0 4 mg SL tablet Place 1 tablet (0 4 mg total) under the tongue every 5 (five) minutes as needed for chest pain If no relief after 3 tablets, come immediately to ER 2/12/20   Wandy Osorio, DO   ticagrelor (BRILINTA) 90 MG Take 1 tablet (90 mg total) by mouth every 12 (twelve) hours 4/29/21   [place] Clarity, DO       Allergies   No Known Allergies    Objective     Vitals:    01/10/22 2249 01/10/22 2251 01/10/22 2253 01/11/22 0151   BP:   133/64 136/83   BP Location:    Left arm   Pulse:  93  88   Resp:  20     Temp:  97 9 °F (36 6 °C)     TempSrc:  Oral     SpO2:   96% 95%   Weight: 99 2 kg (218 lb 11 1 oz)        Body mass index is 35 3 kg/m²    No intake or output data in the 24 hours ending 01/11/22 0323  Invasive Devices  Report    None                 Physical Exam  General: awake, alert, no acute distress  Head: normocephalic, atraumatic  Eyes: no scleral icterus  Ears: external ears normal, hearing grossly intact  Nose: external exam grossly normal  Neck: symmetric, No JVD noted, trachea midline  Pulmonary: no respiratory distress, no tachypnea noted  Cardiovascular: appears well perfused  Abdomen: no distention noted  Musculoskeletal: no deformities noted, tone normal  Neuro: grossly non-focal  Psych: mood and affect appropriate    Lab Results:    Labs Reviewed   CBC AND DIFFERENTIAL - Abnormal       Result Value Ref Range Status    WBC 7 14  4 31 - 10 16 Thousand/uL Final    RBC 5 57  3 88 - 5 62 Million/uL Final    Hemoglobin 17 5 (*) 12 0 - 17 0 g/dL Final    Hematocrit 49 4 (*) 36 5 - 49 3 % Final    MCV 89  82 - 98 fL Final    MCH 31 4  26 8 - 34 3 pg Final    MCHC 35 4  31 4 - 37 4 g/dL Final    RDW 11 9  11 6 - 15 1 % Final    MPV 10 4  8 9 - 12 7 fL Final    Platelets 260  791 - 390 Thousands/uL Final    nRBC 0  /100 WBCs Final    Neutrophils Relative 65  43 - 75 % Final    Immat GRANS % 1  0 - 2 % Final    Lymphocytes Relative 23  14 - 44 % Final    Monocytes Relative 8  4 - 12 % Final    Eosinophils Relative 2  0 - 6 % Final    Basophils Relative 1  0 - 1 % Final    Neutrophils Absolute 4 65  1 85 - 7 62 Thousands/µL Final    Immature Grans Absolute 0 04  0 00 - 0 20 Thousand/uL Final    Lymphocytes Absolute 1 65  0 60 - 4 47 Thousands/µL Final    Monocytes Absolute 0 58  0 17 - 1 22 Thousand/µL Final    Eosinophils Absolute 0 16  0 00 - 0 61 Thousand/µL Final    Basophils Absolute 0 06  0 00 - 0 10 Thousands/µL Final   COMPREHENSIVE METABOLIC PANEL - Abnormal    Sodium 136  136 - 145 mmol/L Final    Potassium 3 4 (*) 3 5 - 5 3 mmol/L Final    Chloride 98 (*) 100 - 108 mmol/L Final    CO2 28  21 - 32 mmol/L Final    ANION GAP 10  4 - 13 mmol/L Final    BUN 19  5 - 25 mg/dL Final    Creatinine 1 25  0 60 - 1 30 mg/dL Final    Comment: Standardized to IDMS reference method    Glucose 263 (*) 65 - 140 mg/dL Final    Comment: If the patient is fasting, the ADA then defines impaired fasting glucose as > 100 mg/dL and diabetes as > or equal to 123 mg/dL  Specimen collection should occur prior to Sulfasalazine administration due to the potential for falsely depressed results  Specimen collection should occur prior to Sulfapyridine administration due to the potential for falsely elevated results  Calcium 8 8  8 3 - 10 1 mg/dL Final    AST 29  5 - 45 U/L Final    Comment: Specimen collection should occur prior to Sulfasalazine administration due to the potential for falsely depressed results  ALT 89 (*) 12 - 78 U/L Final    Comment: Specimen collection should occur prior to Sulfasalazine administration due to the potential for falsely depressed results  Alkaline Phosphatase 121 (*) 46 - 116 U/L Final    Total Protein 7 5  6 4 - 8 2 g/dL Final    Albumin 3 7  3 5 - 5 0 g/dL Final    Total Bilirubin 1 44 (*) 0 20 - 1 00 mg/dL Final    Comment: Use of this assay is not recommended for patients undergoing treatment with eltrombopag due to the potential for falsely elevated results  eGFR 66  ml/min/1 73sq m Final    Narrative:     [place] guidelines for Chronic Kidney Disease (CKD):     Stage 1 with normal or high GFR (GFR > 90 mL/min/1 73 square meters)    Stage 2 Mild CKD (GFR = 60-89 mL/min/1 73 square meters)    Stage 3A Moderate CKD (GFR = 45-59 mL/min/1 73 square meters)    Stage 3B Moderate CKD (GFR = 30-44 mL/min/1 73 square meters)    Stage 4 Severe CKD (GFR = 15-29 mL/min/1 73 square meters)    Stage 5 End Stage CKD (GFR <15 mL/min/1 73 square meters)  Note: GFR calculation is accurate only with a steady state creatinine   HS TROPONIN I 0HR - Normal    hs TnI 0hr 6  "Refer to ACS Flowchart"- see link ng/L Final    Comment:                                              Initial (time 0) result  If >=50 ng/L, Myocardial injury suggested ;  Type of myocardial injury and treatment strategy  to be determined    If 5-49 ng/L, a delta result at 2 hours and or 4 hours will be needed to further evaluate  If <4 ng/L, and chest pain has been >3 hours since onset, patient may qualify for discharge based on the HEART score in the ED  If <5 ng/L and <3hours since onset of chest pain, a delta result at 2 hours will be needed to further evaluate  Second Troponin (time 2 hours)  If calculated delta >= 20 ng/L,  Myocardial injury suggested ; Type of myocardial injury and treatment strategy to be determined  If 5-49 ng/L and the calculated delta is 5-19 ng/L, consult medical service for evaluation  Continue evaluation for ischemia on ecg and other possible etiology and repeat hs troponin at 4 hours  If delta is <5 ng/L at 2 hours, consider discharge based on risk stratification via the HEART score (if in ED), or KATHLEEN risk score in IP/Observation  HS TROPONIN I 2HR    hs TnI 2hr 7  "Refer to ACS Flowchart"- see link ng/L Final    Comment:                                              Initial (time 0) result  If >=50 ng/L, Myocardial injury suggested ;  Type of myocardial injury and treatment strategy  to be determined  If 5-49 ng/L, a delta result at 2 hours and or 4 hours will be needed to further evaluate  If <4 ng/L, and chest pain has been >3 hours since onset, patient may qualify for discharge based on the HEART score in the ED  If <5 ng/L and <3hours since onset of chest pain, a delta result at 2 hours will be needed to further evaluate  Second Troponin (time 2 hours)  If calculated delta >= 20 ng/L,  Myocardial injury suggested ; Type of myocardial injury and treatment strategy to be determined  If 5-49 ng/L and the calculated delta is 5-19 ng/L, consult medical service for evaluation  Continue evaluation for ischemia on ecg and other possible etiology and repeat hs troponin at 4 hours  If delta is <5 ng/L at 2 hours, consider discharge based on risk stratification via the HEART score (if in ED), or KATHLEEN risk score in IP/Observation      Delta 2hr hsTnI 1  ng/L Final   HS TROPONIN I 4HR         Imaging:   XR chest 2 views    (Results Pending)         Medications given in Emergency Department       Assessment and Plan  Lower extremity shaking that is not resolved  Feeling of bilateral leg weakness which is resolved  History of STEMI    Will obtain cardiac workup and look for possible electrolyte abnormalities causing this  Patient is symptom-free now so will hold off on treatment until labs return    Troponin is 6 and he does have evidence of a mild WILIAM  No major electrolyte dysfunction  Patient is able to orally hydrate which I feel would be sufficient  Will obtain a delta troponin for disposition    Delta troponin is 1  Patient still asymptomatic  Plan is to discharge home with follow-up by his PCP and return ER precautions  Active Problems:    * No active hospital problems  *      Final Diagnosis:  1  Generalized weakness    2  Shakiness    3   Dyspnea        ED Course         Critical Care Time  Procedures

## 2022-02-28 DIAGNOSIS — I21.19 ST ELEVATION MYOCARDIAL INFARCTION (STEMI) OF INFERIOR WALL (HCC): ICD-10-CM

## 2022-02-28 DIAGNOSIS — I21.19 ACUTE ST ELEVATION MYOCARDIAL INFARCTION (STEMI) OF INFERIOR WALL (HCC): ICD-10-CM

## 2022-02-28 DIAGNOSIS — I10 HYPERTENSION, UNSPECIFIED TYPE: ICD-10-CM

## 2022-02-28 DIAGNOSIS — I25.10 CAD (CORONARY ARTERY DISEASE): ICD-10-CM

## 2022-02-28 DIAGNOSIS — E78.5 HYPERLIPIDEMIA, UNSPECIFIED HYPERLIPIDEMIA TYPE: ICD-10-CM

## 2022-03-01 RX ORDER — HYDROCHLOROTHIAZIDE 25 MG/1
25 TABLET ORAL DAILY
Qty: 90 TABLET | Refills: 2 | Status: SHIPPED | OUTPATIENT
Start: 2022-03-01

## 2022-03-01 RX ORDER — ATORVASTATIN CALCIUM 80 MG/1
80 TABLET, FILM COATED ORAL EVERY EVENING
Qty: 90 TABLET | Refills: 2 | Status: SHIPPED | OUTPATIENT
Start: 2022-03-01

## 2022-03-29 ENCOUNTER — OFFICE VISIT (OUTPATIENT)
Dept: CARDIOLOGY CLINIC | Facility: CLINIC | Age: 52
End: 2022-03-29
Payer: COMMERCIAL

## 2022-03-29 VITALS
DIASTOLIC BLOOD PRESSURE: 94 MMHG | HEART RATE: 81 BPM | HEIGHT: 65 IN | WEIGHT: 214.8 LBS | BODY MASS INDEX: 35.79 KG/M2 | SYSTOLIC BLOOD PRESSURE: 130 MMHG

## 2022-03-29 DIAGNOSIS — M79.602 LEFT ARM PAIN: ICD-10-CM

## 2022-03-29 DIAGNOSIS — E78.2 MIXED HYPERLIPIDEMIA: ICD-10-CM

## 2022-03-29 DIAGNOSIS — I21.19 ST ELEVATION MYOCARDIAL INFARCTION (STEMI) OF INFERIOR WALL (HCC): ICD-10-CM

## 2022-03-29 DIAGNOSIS — I21.11 ST ELEVATION MYOCARDIAL INFARCTION INVOLVING RIGHT CORONARY ARTERY (HCC): ICD-10-CM

## 2022-03-29 DIAGNOSIS — I12.9 BENIGN HYPERTENSION WITH CKD (CHRONIC KIDNEY DISEASE), STAGE II: ICD-10-CM

## 2022-03-29 DIAGNOSIS — N18.2 BENIGN HYPERTENSION WITH CKD (CHRONIC KIDNEY DISEASE), STAGE II: ICD-10-CM

## 2022-03-29 DIAGNOSIS — I10 HYPERTENSION, UNSPECIFIED TYPE: ICD-10-CM

## 2022-03-29 DIAGNOSIS — N18.2 CKD (CHRONIC KIDNEY DISEASE), STAGE II: ICD-10-CM

## 2022-03-29 DIAGNOSIS — I25.10 CORONARY ARTERY DISEASE INVOLVING NATIVE CORONARY ARTERY OF NATIVE HEART WITHOUT ANGINA PECTORIS: Primary | ICD-10-CM

## 2022-03-29 PROCEDURE — 99214 OFFICE O/P EST MOD 30 MIN: CPT

## 2022-03-29 RX ORDER — LISINOPRIL 10 MG/1
10 TABLET ORAL DAILY
Qty: 90 TABLET | Refills: 3 | Status: SHIPPED | OUTPATIENT
Start: 2022-03-29

## 2022-03-29 RX ORDER — NITROGLYCERIN 0.4 MG/1
0.4 TABLET SUBLINGUAL
Qty: 100 TABLET | Refills: 1 | Status: SHIPPED | OUTPATIENT
Start: 2022-03-29

## 2022-03-29 NOTE — PROGRESS NOTES
Cardiology   MD Rupinder Euceda MD Susette Andes, DO, [facility] MD Ray Spain DO, Nano Gaona DO, [facility] [place]  -------------------------------------------------------------------  [facility]  [address], [place] [address]  [phone]  [phone]  [phone]  Joshua Salinas  YOB: 1970   MRN: [id number]      Referring Physician: Harsha Jarrett MD  [address] [facility],  [facility] [address]     HPI: Joshua Salinas is a 46 y o  male with:   1  CAD, prior inferior wall STEMI 2020 with PCI to dRCA  2  Recent inferior wall STEMI 2021, s/p PCI to 100% pRCA  3  Hypertension  4  Dyslipidemia     Echo 43  Systolic function was normal   There were no regional wall motion abnormalities  Wall thickness was mildly increased  There was mild concentric hypertrophy  Doppler parameters were consistent with abnormal left ventricular relaxation (grade 1 diastolic dysfunction)  He presents today for follow-up  He states he is feeling well, denies any angina sounding chest pain  Does note some short very sharp episodes of chest pain only lasts a few seconds that do not sound cardiac in nature  His nitroglycerin , but fortunately he has had no chest pain to the point where he had ever take the nitroglycerin  Review of Systems   Constitutional: Negative for chills and fever  HENT: Negative for facial swelling and sore throat  Eyes: Negative for visual disturbance  Respiratory: Negative for cough, chest tightness, shortness of breath and wheezing  Cardiovascular: Negative for chest pain, palpitations and leg swelling  Gastrointestinal: Negative for abdominal pain, blood in stool, constipation, diarrhea, nausea and vomiting  Endocrine: Negative for cold intolerance and heat intolerance  Genitourinary: Negative for decreased urine volume, difficulty urinating, dysuria and hematuria  Musculoskeletal: Negative for arthralgias, back pain and myalgias  Skin: Negative for rash  Neurological: Negative for dizziness, syncope, weakness and numbness  Psychiatric/Behavioral: Negative for agitation, behavioral problems and confusion  The patient is not nervous/anxious  OBJECTIVE  Vitals:    03/29/22 1331   BP: 130/94   Pulse: 81       Physical Exam  Constitutional: awake, alert and oriented, in no acute distress, no obvious deformities, obese male  Head: Normocephalic, without obvious abnormality, atraumatic  Eyes: conjunctivae clear and moist  Sclera anicteric  No xanthelasmas  Pupils equal bilaterally  Extraocular motions are full  Ear nose mouth and throat: ears are symmetrical bilaterally, hearing appears to be equal bilaterally, no nasal discharge or epistaxis, oropharynx is clear with moist mucous membranes  Neck:  Trachea is midline, neck is supple, no thyromegaly or significant lymphadenopathy, there is full range of motion  Lungs: clear to auscultation bilaterally, no wheezes, no rales, no rhonchi, no accessory muscle use, breathing is nonlabored  Heart: regular rate and rhythm, S1, S2 normal, no murmur, no click, no rub and no gallop, no lower extremity edema  Abdomen:  Obese, soft, non-tender; bowel sounds normal; no masses,  no organomegaly  Psychiatric:  Patient is oriented to time, place, person, mood/affect is negative for depression, anxiety, agitation, appears to have appropriate insight  Skin: Skin is warm, dry, intact  No obvious rashes or lesions on exposed extremities  Nail beds are pink with no cyanosis or clubbing  EKG:  No results found for this visit on 03/29/22  IMPRESSION:  1  CAD, prior inferior wall STEMI February 2020 with PCI to dRCA  2  Recent inferior wall STEMI March 2021, s/p PCI to 100% pRCA  3  Hypertension  4   Dyslipidemia  5  palpitations    DISCUSSION/RECOMMENDATIONS:   Overall he is doing well   Blood pressure is mildly elevated however, would increase lisinopril from 5 milligrams to 10 milligrams   It is now 1 year status post PCI the RCA, would switch Brilinta dose to 60 milligrams twice a day and continue this for the next year   Sent new Rx for sublingual nitroglycerin tablets to have on hand today   Needs a repeat lipid panel, continue aspirin statin, Yovanny   Will follow-up with him regarding the results of his lipid panel and they are available  Otherwise would plan for follow-up in the fall given these changes made to his medication regimen today   Will need routine follow-up with his primary care doctor as well   He also does complain of palpitations mostly occurring at night, wakes him up from sleep, he states that he is supposed to follow-up for sleep study but has not scheduled it yet, would follow-up with this, as I feel there is a reasonable likelihood of him having SHIRA  Edinson Vogel DO, [facility] [place]  --------------------------------------------------------------------------------  TREADMILL STRESS  No results found for this or any previous visit      ----------------------------------------------------------------------------------------------  NUCLEAR STRESS TEST: No results found for this or any previous visit      No results found for this or any previous visit       --------------------------------------------------------------------------------  CATH:  Results for orders placed during the hospital encounter of 21    Cardiac catheterization    Narrative  [address]  [address] [facility], [address] [place] [address]  [phone]    [facility]    Invasive Cardiovascular Lab Complete Report    Patient: Laurel Marquez  MR number: [id number]  Account number: [de-identified]  Study date: 2021  Gender: Male  : 1970  Height: 66 1 in  Weight: 224 4 lb  BSA: 2 11 mï¾²    Diagnostic Cardiologist:  Carroll Espinoza DO  Interventional Cardiologist:  Carroll Espinoza DO    SUMMARY    CORONARY CIRCULATION:  Proximal RCA: There was a 100 % stenosis  Distal RCA: There was a 10 % stenosis at the site of a prior stent  Right PDA: There was a 60 % stenosis  1ST LESION INTERVENTIONS:  A balloon angioplasty with stent and balloon angioplasty procedure was performed on the 100 % lesion in the proximal RCA  Following intervention there was a 0 % residual stenosis  A Resolute [place] Rx 4 0 x 26mm drug-eluting stent was placed across the lesion and deployed at a maximum inflation pressure of 16 bayron  INDICATIONS:  --  Possible CAD: myocardial infarction with ST elevation (STEMI)  PROCEDURES PERFORMED    --  Left coronary angiography  --  Right coronary angiography  --  Acute Myocardial Infarct  --  Mod Sedation Same Physician Initial 15min  --  Mod Sedation Same Physician Add 15min  --  Coronary Catheterization (w/o LHC)  --  AMI PCI (JUAN, PTCRA, PTCA) Single  --  Intervention on proximal RCA: balloon angioplasty, stent, balloon angioplasty  PROCEDURE: The risks and alternatives of the procedures and conscious sedation were explained to the patient and informed consent was obtained  The patient was brought to the cath lab and placed on the table  The planned puncture sites  were prepped and draped in the usual sterile fashion  --  Right radial artery access  After performing an Nomi's test to verify adequate ulnar artery supply to the hand, the radial site was prepped  The puncture site was infiltrated with local anesthetic  The vessel was accessed using the  modified Seldinger technique, a wire was advanced into the vessel, and a sheath was advanced over the wire into the vessel  --  Left coronary artery angiography  A catheter was advanced over a guidewire into the aorta and positioned in the left coronary artery ostium under fluoroscopic guidance  Angiography was performed  --  Right coronary artery angiography   A catheter was advanced over a guidewire into the aorta and positioned in the right coronary artery ostium under fluoroscopic guidance  Angiography was performed  --  Acute Myocardial Infarct  --  Mod Sedation Same Physician Initial 15min  --  Mod Sedation Same Physician Add 15min  --  Coronary Catheterization (w/o [facility])  LESION INTERVENTION: A balloon angioplasty with stent and balloon angioplasty procedure was performed on the 100 % lesion in the proximal RCA  Following intervention there was a 0 % residual stenosis  There was KATHLEEN 0 flow before the  procedure and KATHLEEN 3 flow after the procedure  There was no dissection  --  Vessel setup was performed  A Runthrough NS 180cm wire was used to cross the lesion  --  Vessel setup was performed  A 6Fr  Launcher JR 4 0 100cm guiding catheter was used to cannulate the vessel  --  Balloon angioplasty was performed, using a Trek Rx 2 5 x 15mm balloon, with 1 inflations and a maximum inflation pressure of 8 bayron  --  A Resolute [place] Rx 4 0 x 26mm drug-eluting stent was placed across the lesion and deployed at a maximum inflation pressure of 16 bayron  --  Balloon angioplasty was performed, using a [place] Trek Rx 4 5 x 20mm balloon, with 2 inflations and a maximum inflation pressure of 16 bayron  INTERVENTIONS:  --  AMI PCI (JUAN, PTCRA, PTCA) Single  PROCEDURE COMPLETION: The patient tolerated the procedure well and was discharged from the cath lab  TIMING: Test started at 12:16  Test concluded at 12:42  HEMOSTASIS: The sheath was removed  The site was compressed with a Hemoband  device  Hemostasis was obtained  MEDICATIONS GIVEN: Fentanyl (1OOmcg/2 ml), 50 mcg, IV, at 12:13  Versed (2mg/2ml), 2 mg, IV, at 12:13  1% Lidocaine, 1 ml, subcutaneously, at 12:16  Nitroglycerin (200mcg/ml), 200 mcg, at 12:16  Verapamil  (5mg/2ml), 2 5 mg, IV, at 12:16  Heparin 1000 units/ml, 6,000 units, at 12:16  Heparin 1000 units/ml, 5,000 units, IV, at 12:42  CONTRAST GIVEN: 105 ml Omnipaque (350 mg I /ml)  RADIATION EXPOSURE: Fluoroscopy time: 7 88 min  CORONARY VESSELS:   --  The coronary circulation is right dominant  --  Left main: Angiography showed minor luminal irregularities  --  Proximal LAD: Angiography showed minor luminal irregularities  --  Circumflex: Angiography showed minor luminal irregularities  --  Ramus intermedius: Angiography showed minor luminal irregularities  --  Proximal RCA: There was a 100 % stenosis  --  Distal RCA: There was a 10 % stenosis at the site of a prior stent  --  Right PDA: There was a 60 % stenosis  IMPRESSIONS:  Inferior STEMI, culprit proximal RCA, successful revascularization with JUAN  Patent distal RCA stent  RECOMMENDATIONS  GDMT CAD, dapt (consider 2 year dapt as ticagrelor stopped few weeks ago)  DISPOSITION:  The patient left the catheterization laboratory in stable condition  Prepared and signed by    Bipin Dewey DO  Signed 2021 12:52:39    Study diagram    Angiographic findings  Native coronary lesions:  ï¾·Proximal RCA: Lesion 1: 100 % stenosis  ï¾·Distal RCA: Lesion 1: 10 % stenosis, site of prior stent  ï¾·RPDA: Lesion 1: 60 % stenosis  Intervention results  Native coronary lesions:  ï¾·balloon angioplasty, stent, and balloon angioplasty of the 100 % stenosis in proximal RCA  0 % residual stenosis  Stent: Resolute [place] Rx 4 0 x 26mm drug-eluting      Hemodynamic tables    Pressures:  Baseline  Pressures:  - HR: 76  Pressures:  - Rhythm:  Pressures:  -- Aortic Pressure (S/D/M): 83/63/71    Outputs:  Baseline  Outputs:  -- CALCULATIONS: [age]  Outputs:  -- CALCULATIONS: Body Surface Area: 2 11  Outputs:  -- CALCULATIONS: Height in cm: 168 00  Outputs:  -- CALCULATIONS: Sex: Male  Outputs:  -- CALCULATIONS: Weight in k 00    --------------------------------------------------------------------------------  ECHO:   Results for orders placed during the hospital encounter of 21    Echo complete with contrast if indicated    Narrative  [facility]  [facility], [address] [facility]  [phone]    Transthoracic Echocardiogram  2D, M-mode, Doppler, and Color Doppler    Study date:  31-Mar-2021    Patient: Wendy Garrett  MR number: [id number]  Account number: [de-identified]  : 1970  Age: 48 years  Gender: Male  Status: Inpatient  Location: Cath lab  Height: 66 in  Weight: 225 lb  BP: 95/ 61 mmHg    Indications: MI     Diagnoses: I21 3 - ST elevation (STEMI) myocardial infarction of unspecified site    Sonographer:  Cristal Aase  Referring Physician:  Bunnie Paget, CRNP  Group:  [id number]  Cardiology Fellow:  Lyle Schwartz MD  Interpreting Physician:  Jessica Alarcon DO    SUMMARY    PROCEDURE INFORMATION:  This was a technically difficult study  LEFT VENTRICLE:  Systolic function was normal   There were no regional wall motion abnormalities  Wall thickness was mildly increased  There was mild concentric hypertrophy  Doppler parameters were consistent with abnormal left ventricular relaxation (grade 1 diastolic dysfunction)  RIGHT VENTRICLE:  The ventricle was mildly to moderately dilated  RIGHT ATRIUM:  The atrium was mildly dilated  MITRAL VALVE:  There was mild annular calcification  There was trace regurgitation  HISTORY: PRIOR HISTORY: CKD-2  HLD  STEMI  Hypertension  CAD  Sleep apnea  COPD  Former smoker  PROCEDURE: The procedure was performed in the catheterization laboratory  This was a routine study  The transthoracic approach was used  The study included complete 2D imaging, M-mode, complete spectral Doppler, and color Doppler  The  heart rate was 72 bpm, at the start of the study  Images were obtained from the parasternal, apical, subcostal, and suprasternal notch acoustic windows  Intravenous contrast ( 0 6mL/min of Definity in NSS) was administered to opacify the  left ventricle   Echocardiographic views were limited due to decreased penetration and lung interference  This was a technically difficult study  LEFT VENTRICLE: Size was normal  Systolic function was normal  There were no regional wall motion abnormalities  Wall thickness was mildly increased  There was mild concentric hypertrophy  DOPPLER: Doppler parameters were consistent with  abnormal left ventricular relaxation (grade 1 diastolic dysfunction)  VENTRICULAR SEPTUM: Thickness was normal  There was normal motion  There was normal contour  The septum was intact  RIGHT VENTRICLE: The ventricle was mildly to moderately dilated  Systolic function was normal     LEFT ATRIUM: Size was at the upper limits of normal     RIGHT ATRIUM: The atrium was mildly dilated  MITRAL VALVE: There was mild annular calcification  There was normal leaflet separation  DOPPLER: The transmitral velocity was within the normal range  There was no evidence for stenosis  There was trace regurgitation  AORTIC VALVE: The valve was trileaflet  Leaflets exhibited sclerosis  DOPPLER: Transaortic velocity was within the normal range  There was no evidence for stenosis  There was no regurgitation  TRICUSPID VALVE: The valve structure was normal  There was normal leaflet separation  DOPPLER: The transtricuspid velocity was within the normal range  There was no evidence for stenosis  There was mild regurgitation  The tricuspid jet  envelope definition was inadequate for estimation of RV systolic pressure  PULMONIC VALVE: DOPPLER: The transpulmonic velocity was within the normal range  There was no regurgitation  PERICARDIUM: There was no pericardial effusion  AORTA: The root exhibited normal size      SYSTEM MEASUREMENT TABLES    2D  %FS: 28 78 %  Ao Diam: 3 02 cm  EDV(Teich): 96 65 ml  EF(Teich): 55 46 %  ESV(Teich): 43 04 ml  IVSd: 1 12 cm  LA Area: 12 98 cm2  LA Diam: 3 42 cm  LVEDV MOD A4C: 90 84 ml  LVEF MOD A4C: 54 09 %  LVESV MOD A4C: 41 71 ml  LVIDd: 4 59 cm  LVIDs: 3 27 cm  LVLd A4C: 6 75 cm  LVLs A4C: 6 11 cm  LVPWd: 0 86 cm  RA Area: 18 18 cm2  RVIDd: 3 96 cm  SV MOD A4C: 49 13 ml  SV(Teich): 53 61 ml    MM  TAPSE: 1 61 cm    PW  E' Sept: 0 07 m/s  E/E' Sept: 8 73  MV A Maury: 0 67 m/s  MV Dec Dixie: 4 38 m/s2  MV DecT: 144 84 ms  MV E Maury: 0 63 m/s  MV E/A Ratio: 0 95    [facility]    Prepared and electronically signed by    Tamie Li DO  Signed 31-Mar-2021 16:26:48    No results found for this or any previous visit     --------------------------------------------------------------------------------  HOLTER  No results found for this or any previous visit  No results found for this or any previous visit     --------------------------------------------------------------------------------  CAROTIDS  No results found for this or any previous visit      --------------------------------------------------------------------------------  Diagnoses and all orders for this visit:    Coronary artery disease involving native coronary artery of native heart without angina pectoris  -     ticagrelor (Brilinta) 60 MG; Take 1 tablet (60 mg total) by mouth every 12 (twelve) hours    Hypertension, unspecified type    ST elevation myocardial infarction involving right coronary artery (HCC)    Benign hypertension with CKD (chronic kidney disease), stage II    CKD (chronic kidney disease), stage II    Mixed hyperlipidemia    Left arm pain  -     nitroglycerin (NITROSTAT) 0 4 mg SL tablet; Place 1 tablet (0 4 mg total) under the tongue every 5 (five) minutes as needed for chest pain If no relief after 3 tablets, come immediately to ER    ST elevation myocardial infarction (STEMI) of inferior wall (HCC), proximal RCA JUAN placement  -     lisinopril (ZESTRIL) 10 mg tablet;  Take 1 tablet (10 mg total) by mouth daily       ======================================================    Past Medical History:   Diagnosis Date  Acute ST elevation myocardial infarction (STEMI) of inferior wall [facility])     cath with PCI/JUAN - RCA 2/7/2020    Back pain     CAD (coronary artery disease)     COPD (chronic obstructive pulmonary disease) (HCC)     Hyperlipidemia     Hypertension     Inguinal hernia, left     Mild heartburn     Obesity     Stroke ([address] ) approx 2015    Denies residual problems  Initially he only had trouble with two fingers on left side      Wears glasses      Past Surgical History:   Procedure Laterality Date    APPENDECTOMY      HERNIA REPAIR      NO PAST SURGERIES      AL LAP,APPENDECTOMY N/A 11/28/2018    Procedure: APPENDECTOMY LAPAROSCOPIC;  Surgeon: Liane Aguilar MD;  Location: [address] [place] [address] MAIN OR;  Service: General    AL [address] Left 4/2/2018    Procedure: LAPAROSCOPIC INGUINAL HERNIA REPAIR WITH MESH;  Surgeon: Liane Aguilar MD;  Location: [facility] Main OR;  Service: General         Medications  Current Outpatient Medications   Medication Sig Dispense Refill    aspirin 81 mg chewable tablet Chew 1 tablet (81 mg total) daily 90 tablet 3    atorvastatin (LIPITOR) 80 mg tablet Take 1 tablet (80 mg total) by mouth every evening 90 tablet 2    ezetimibe (ZETIA) 10 mg tablet Take 1 tablet (10 mg total) by mouth daily 90 tablet 3    hydrochlorothiazide (HYDRODIURIL) 25 mg tablet Take 1 tablet (25 mg total) by mouth daily 90 tablet 2    lisinopril (ZESTRIL) 10 mg tablet Take 1 tablet (10 mg total) by mouth daily 90 tablet 3    metoprolol tartrate (LOPRESSOR) 25 mg tablet Take 1 tablet (25 mg total) by mouth every 12 (twelve) hours 180 tablet 2    clotrimazole (LOTRIMIN) 1 % cream Apply to affected area 2 times daily 15 g 0    nitroglycerin (NITROSTAT) 0 4 mg SL tablet Place 1 tablet (0 4 mg total) under the tongue every 5 (five) minutes as needed for chest pain If no relief after 3 tablets, come immediately to  tablet 1    ticagrelor (Brilinta) 60 MG Take 1 tablet (60 mg total) by mouth every 12 (twelve) hours 180 tablet 3     No current facility-administered medications for this visit  No Known Allergies    Social History     Socioeconomic History    Marital status: Single     Spouse name: Not on file    Number of children: Not on file    Years of education: Not on file    Highest education level: Not on file   Occupational History    Not on file   Tobacco Use    Smoking status: Former Smoker     Packs/day: 1 00     Years: 15 00     Pack years: 15 00     Types: Cigarettes     Quit date:      Years since quittin 2    Smokeless tobacco: Never Used   Vaping Use    Vaping Use: Never used   Substance and Sexual Activity    Alcohol use: Not Currently    Drug use: No    Sexual activity: Not on file   Other Topics Concern    Not on file   Social History Narrative    Not on file     Social Determinants of Health     Financial Resource Strain: Not on file   Food Insecurity: Not on file   Transportation Needs: Not on file   Physical Activity: Not on file   Stress: Not on file   Social Connections: Not on file   Intimate Partner Violence: Not on file   Housing Stability: Not on file        No family history on file      Lab Results   Component Value Date    WBC 7 14 01/10/2022    HGB 17 5 (H) 01/10/2022    HCT 49 4 (H) 01/10/2022    MCV 89 01/10/2022     01/10/2022      Lab Results   Component Value Date    SODIUM 136 01/10/2022    K 3 4 (L) 01/10/2022    CL 98 (L) 01/10/2022    CO2 28 01/10/2022    BUN 19 01/10/2022    CREATININE 1 25 01/10/2022    GLUC 263 (H) 01/10/2022    CALCIUM 8 8 01/10/2022      No results found for: HGBA1C   No results found for: CHOL  Lab Results   Component Value Date    HDL 36 (L) 2021    HDL 32 (L) 2020     Lab Results   Component Value Date    LDLCALC 98 2021    LDLCALC 100 2020     Lab Results   Component Value Date    TRIG 185 (H) 2021    TRIG 195 (H) 2020     No results found for: CHOLHDL   Lab Results   Component Value Date    INR 1 02 02/11/2020    INR 0 97 02/07/2020    INR 1 09 11/03/2018    PROTIME 13 5 02/11/2020    PROTIME 13 0 02/07/2020    PROTIME 12 6 11/03/2018          Patient Active Problem List    Diagnosis Date Noted    Sleep apnea 06/23/2020    Insomnia with sleep apnea 06/23/2020    Hypertension     CAD (coronary artery disease)     Acute ST elevation myocardial infarction (STEMI) of inferior wall (HCC)     ST elevation myocardial infarction involving right coronary artery ([facility] [address] ) 02/08/2020    Left arm pain 08/20/2019    Bilateral low back pain without sciatica 08/20/2019    CKD (chronic kidney disease), stage II 04/09/2019    Hypokalemia 04/09/2019    Dizziness 03/30/2019    Benign hypertension with CKD (chronic kidney disease), stage II 03/30/2019    Hyperlipidemia 03/30/2019    Indirect inguinal hernia 04/02/2018       Portions of the record may have been created with voice recognition software  Occasional wrong word or "sound a like" substitutions may have occurred due to the inherent limitations of voice recognition software  Read the chart carefully and recognize, using context, where substitutions have occurred      Elena Morris DO, [facility]  3/29/2022 1:50 PM

## 2022-07-14 ENCOUNTER — HOSPITAL ENCOUNTER (EMERGENCY)
Facility: HOSPITAL | Age: 52
Discharge: HOME | End: 2022-07-14
Attending: EMERGENCY MEDICINE
Payer: COMMERCIAL

## 2022-07-14 VITALS
WEIGHT: 212.96 LBS | DIASTOLIC BLOOD PRESSURE: 63 MMHG | TEMPERATURE: 98.7 F | BODY MASS INDEX: 35.44 KG/M2 | RESPIRATION RATE: 16 BRPM | SYSTOLIC BLOOD PRESSURE: 93 MMHG | OXYGEN SATURATION: 98 % | HEART RATE: 105 BPM

## 2022-07-14 DIAGNOSIS — Z20.822 COVID-19 VIRUS TEST RESULT UNKNOWN: ICD-10-CM

## 2022-07-14 DIAGNOSIS — J02.9 PHARYNGITIS: ICD-10-CM

## 2022-07-14 DIAGNOSIS — R68.89 FLU-LIKE SYMPTOMS: Primary | ICD-10-CM

## 2022-07-14 LAB
FLUAV RNA RESP QL NAA+PROBE: NEGATIVE
FLUBV RNA RESP QL NAA+PROBE: NEGATIVE
RSV RNA RESP QL NAA+PROBE: NEGATIVE
S PYO DNA THROAT QL NAA+PROBE: NOT DETECTED
SARS-COV-2 RNA RESP QL NAA+PROBE: POSITIVE

## 2022-07-14 PROCEDURE — 99283 EMERGENCY DEPT VISIT LOW MDM: CPT

## 2022-07-14 PROCEDURE — 87651 STREP A DNA AMP PROBE: CPT | Performed by: EMERGENCY MEDICINE

## 2022-07-14 PROCEDURE — 99284 EMERGENCY DEPT VISIT MOD MDM: CPT | Performed by: EMERGENCY MEDICINE

## 2022-07-14 PROCEDURE — 0241U HB NFCT DS VIR RESP RNA 4 TRGT: CPT | Performed by: EMERGENCY MEDICINE

## 2022-07-15 NOTE — ED PROVIDER NOTES
HPI: Patient is a 46 y o  male who presents with  days of fever and myalgias which the patient describes at mild The patient has had contact with people with similar symptoms  The patient has not taken any medication  Patient states that he had 1-2 days of subjective fevers and myalgias  No chest pain shortness of breath or palpitations  He also complained of possible hernia  He had hernia repair several years ago  No abdominal pain at this time    No Known Allergies    Past Medical History:   Diagnosis Date    Acute ST elevation myocardial infarction (STEMI) of inferior wall ([facility])     cath with PCI/JUAN - RCA 2020    Back pain     CAD (coronary artery disease)     COPD (chronic obstructive pulmonary disease) ([facility])     Hyperlipidemia     Hypertension     Inguinal hernia, left     Mild heartburn     Obesity     Stroke ([address] ) approx 2015    Denies residual problems  Initially he only had trouble with two fingers on left side      Wears glasses       Past Surgical History:   Procedure Laterality Date    APPENDECTOMY      HERNIA REPAIR      NO PAST SURGERIES      [place] LAP,APPENDECTOMY N/A 2018    Procedure: APPENDECTOMY LAPAROSCOPIC;  Surgeon: Valeria Martinez MD;  Location: [facility] MAIN OR;  Service: General    [place] Chinogisele Sandyyoshi 19 Left 2018    Procedure: LAPAROSCOPIC INGUINAL HERNIA REPAIR WITH MESH;  Surgeon: Valeria Martinez MD;  Location: [facility] Main OR;  Service: General     Social History     Tobacco Use    Smoking status: Former Smoker     Packs/day: 1 00     Years: 15 00     Pack years: 15 00     Types: Cigarettes     Quit date:      Years since quittin 5    Smokeless tobacco: Never Used   Vaping Use    Vaping Use: Never used   Substance Use Topics    Alcohol use: Not Currently    Drug use: No       Nursing notes reviewed  Physical Exam:  ED Triage Vitals [22 2201]   Temperature Pulse Respirations Blood Pressure SpO2   98 7 °F (37 1 °C) 105 16 93/63 98 %      Temp Source Heart Rate Source Patient Position - Orthostatic VS BP Location FiO2 (%)   Oral Monitor Sitting Right arm --      Pain Score       No Pain           ROS: Positive for subjective fevers, the remainder of a 10 organ system ROS was otherwise unremarkable  General: awake, alert, no acute distress    Head: normocephalic, atraumatic    Eyes: no scleral icterus  Ears: external ears normal, hearing grossly intact  Nose: external exam grossly normal, negative nasal discharge  Neck: symmetric, No JVD noted, trachea midline  Pulmonary: no respiratory distress, no tachypnea noted  Cardiovascular: appears well perfused  Abdomen: no distention noted  Soft nontender nondistended bowel sounds x4  No hernia palpable  Musculoskeletal: no deformities noted, tone normal  Neuro: grossly non-focal  Psych: mood and affect appropriate    The patient is stable and has a history and physical exam consistent with a viral illness  COVID19 testing has been performed  I considered the patient's other medical conditions as applicable/noted above in my medical decision making  The patient is stable upon discharge  The plan is for supportive care at home  The patient (and any family present) verbalized understanding of the discharge instructions and warnings that would necessitate return to the Emergency Department  All questions were answered prior to discharge      Medications - No data to display  Final diagnoses:   Flu-like symptoms   COVID-19 virus test result unknown   Pharyngitis     Time reflects when diagnosis was documented in both MDM as applicable and the Disposition within this note     Time User Action Codes Description Comment    7/14/2022 10:56 PM Brannon Wan Add [R68 89] Flu-like symptoms     7/14/2022 10:57 PM Amanda Brooks Add [Z20 822] COVID-19 virus test result unknown     7/14/2022 10:57 PM Amanda Brooks Add [J02 9] Pharyngitis       ED Disposition     ED Disposition   Discharge Condition   Stable    Date/Time   Thu Jul 14, 2022 10:56 PM    Comment   Tori Lipps discharge to home/self care                 Follow-up Information     Follow up With Specialties Details Why Contact Info Additional [address] [facility] Family Medicine Schedule an appointment as soon as possible for a visit in 1 week  [address], [address] [facility] [address]  [address], [address], [address] [place], [place], [address]    Tunde Cano MD Family Medicine In 1 week  [address] [phone]       [address] [facility] [address]  [place] [address] [facility] [address]  [address], [address], [address], [place], [place], [address]        Discharge Medication List as of 7/14/2022 10:59 PM      CONTINUE these medications which have NOT CHANGED    Details   aspirin 81 mg chewable tablet Chew 1 tablet (81 mg total) daily, Starting Tue 2/23/2021, Normal      atorvastatin (LIPITOR) 80 mg tablet Take 1 tablet (80 mg total) by mouth every evening, Starting Tue 3/1/2022, Normal      clotrimazole (LOTRIMIN) 1 % cream Apply to affected area 2 times daily, Print      ezetimibe (ZETIA) 10 mg tablet Take 1 tablet (10 mg total) by mouth daily, Starting Tue 9/28/2021, Normal      hydrochlorothiazide (HYDRODIURIL) 25 mg tablet Take 1 tablet (25 mg total) by mouth daily, Starting Tue 3/1/2022, Normal      lisinopril (ZESTRIL) 10 mg tablet Take 1 tablet (10 mg total) by mouth daily, Starting Tue 3/29/2022, Normal      metoprolol tartrate (LOPRESSOR) 25 mg tablet Take 1 tablet (25 mg total) by mouth every 12 (twelve) hours, Starting Tue 3/1/2022, Normal      nitroglycerin (NITROSTAT) 0 4 mg SL tablet Place 1 tablet (0 4 mg total) under the tongue every 5 (five) minutes as needed for chest pain If no relief after 3 tablets, come immediately to ER, Starting Tue 3/29/2022, Normal      ticagrelor (Brilinta) 60 MG Take 1 tablet (60 mg total) by mouth every 12 (twelve) hours, Starting Tue 3/29/2022, Normal           No discharge procedures on file      Electronically Signed by       Elmer Pagan DO  07/14/22 7900

## 2022-09-12 ENCOUNTER — HOSPITAL ENCOUNTER (EMERGENCY)
Facility: HOSPITAL | Age: 52
Discharge: HOME | End: 2022-09-12
Attending: EMERGENCY MEDICINE
Payer: COMMERCIAL

## 2022-09-12 ENCOUNTER — APPOINTMENT (OUTPATIENT)
Dept: RADIOLOGY | Facility: HOSPITAL | Age: 52
End: 2022-09-12
Payer: COMMERCIAL

## 2022-09-12 VITALS
HEART RATE: 90 BPM | DIASTOLIC BLOOD PRESSURE: 81 MMHG | SYSTOLIC BLOOD PRESSURE: 120 MMHG | OXYGEN SATURATION: 93 % | TEMPERATURE: 98.1 F | RESPIRATION RATE: 21 BRPM

## 2022-09-12 DIAGNOSIS — R07.9 CHEST PAIN: Primary | ICD-10-CM

## 2022-09-12 LAB
2HR DELTA HS TROPONIN: 0 NG/L
ALBUMIN SERPL BCP-MCNC: 3.5 G/DL (ref 3.5–5)
ALP SERPL-CCNC: 125 U/L (ref 46–116)
ALT SERPL W P-5'-P-CCNC: 67 U/L (ref 12–78)
ANION GAP SERPL CALCULATED.3IONS-SCNC: 11 MMOL/L (ref 4–13)
AST SERPL W P-5'-P-CCNC: 22 U/L (ref 5–45)
BASOPHILS # BLD AUTO: 0.04 THOUSANDS/ΜL (ref 0–0.1)
BASOPHILS NFR BLD AUTO: 1 % (ref 0–1)
BILIRUB DIRECT SERPL-MCNC: 0.19 MG/DL (ref 0–0.2)
BILIRUB SERPL-MCNC: 1.47 MG/DL (ref 0.2–1)
BUN SERPL-MCNC: 16 MG/DL (ref 5–25)
CALCIUM SERPL-MCNC: 8.9 MG/DL (ref 8.3–10.1)
CARDIAC TROPONIN I PNL SERPL HS: 4 NG/L
CARDIAC TROPONIN I PNL SERPL HS: 4 NG/L
CHLORIDE SERPL-SCNC: 98 MMOL/L (ref 96–108)
CO2 SERPL-SCNC: 26 MMOL/L (ref 21–32)
CREAT SERPL-MCNC: 1.14 MG/DL (ref 0.6–1.3)
EOSINOPHIL # BLD AUTO: 0.1 THOUSAND/ΜL (ref 0–0.61)
EOSINOPHIL NFR BLD AUTO: 2 % (ref 0–6)
ERYTHROCYTE [DISTWIDTH] IN BLOOD BY AUTOMATED COUNT: 11.8 % (ref 11.6–15.1)
GFR SERPL CREATININE-BSD FRML MDRD: 73 ML/MIN/1.73SQ M
GLUCOSE SERPL-MCNC: 341 MG/DL (ref 65–140)
HCT VFR BLD AUTO: 47.4 % (ref 36.5–49.3)
HGB BLD-MCNC: 16.6 G/DL (ref 12–17)
IMM GRANULOCYTES # BLD AUTO: 0.01 THOUSAND/UL (ref 0–0.2)
IMM GRANULOCYTES NFR BLD AUTO: 0 % (ref 0–2)
LYMPHOCYTES # BLD AUTO: 1.47 THOUSANDS/ΜL (ref 0.6–4.47)
LYMPHOCYTES NFR BLD AUTO: 28 % (ref 14–44)
MCH RBC QN AUTO: 30.1 PG (ref 26.8–34.3)
MCHC RBC AUTO-ENTMCNC: 35 G/DL (ref 31.4–37.4)
MCV RBC AUTO: 86 FL (ref 82–98)
MONOCYTES # BLD AUTO: 0.46 THOUSAND/ΜL (ref 0.17–1.22)
MONOCYTES NFR BLD AUTO: 9 % (ref 4–12)
NEUTROPHILS # BLD AUTO: 3.1 THOUSANDS/ΜL (ref 1.85–7.62)
NEUTS SEG NFR BLD AUTO: 60 % (ref 43–75)
NRBC BLD AUTO-RTO: 0 /100 WBCS
PLATELET # BLD AUTO: 224 THOUSANDS/UL (ref 149–390)
PMV BLD AUTO: 10.5 FL (ref 8.9–12.7)
POTASSIUM SERPL-SCNC: 3.8 MMOL/L (ref 3.5–5.3)
PROT SERPL-MCNC: 7.3 G/DL (ref 6.4–8.4)
RBC # BLD AUTO: 5.51 MILLION/UL (ref 3.88–5.62)
SODIUM SERPL-SCNC: 135 MMOL/L (ref 135–147)
WBC # BLD AUTO: 5.18 THOUSAND/UL (ref 4.31–10.16)

## 2022-09-12 PROCEDURE — 99285 EMERGENCY DEPT VISIT HI MDM: CPT | Performed by: EMERGENCY MEDICINE

## 2022-09-12 PROCEDURE — 71046 X-RAY EXAM CHEST 2 VIEWS: CPT

## 2022-09-12 PROCEDURE — 84484 ASSAY OF TROPONIN QUANT: CPT | Performed by: EMERGENCY MEDICINE

## 2022-09-12 PROCEDURE — 99285 EMERGENCY DEPT VISIT HI MDM: CPT

## 2022-09-12 PROCEDURE — 93005 ELECTROCARDIOGRAM TRACING: CPT

## 2022-09-12 PROCEDURE — 85025 COMPLETE CBC W/AUTO DIFF WBC: CPT | Performed by: EMERGENCY MEDICINE

## 2022-09-12 PROCEDURE — 80076 HEPATIC FUNCTION PANEL: CPT | Performed by: EMERGENCY MEDICINE

## 2022-09-12 PROCEDURE — 80048 BASIC METABOLIC PNL TOTAL CA: CPT | Performed by: EMERGENCY MEDICINE

## 2022-09-12 PROCEDURE — 36415 COLL VENOUS BLD VENIPUNCTURE: CPT | Performed by: EMERGENCY MEDICINE

## 2022-09-12 NOTE — ED PROVIDER NOTES
History  Chief Complaint   Patient presents with    Chest Pain     Pt reports stabbing cp that started 1 hr PTA  Hx STEMI in 2020 with stent placement  45 YO male presents with chest pain that began 1 hour prior to arrival  Pt states he has had a sharp, Left-sided chest pain that seems to be intermittent but very frequent, lasting usually seconds then resolving spontaneously  He states this has seemed more intense over the last hour but he does note he has had it in the past and has discussed this with a cardiologist  Pt denies nausea or vomiting, no radiation of the pain, no shortness of breath  He does have a Hx of MI in the past with stent placement, states he has been compliant with his home medications  Pt denies SOB/F/C/N/V/D/C, no dysuria, burning on urination or blood in urine  History provided by:  Patient and medical records   used: No    Chest Pain  Pain location:  L chest  Pain quality: sharp    Pain radiates to:  Does not radiate  Pain severity:  Moderate  Onset quality:  Sudden  Duration:  1 hour  Timing:  Intermittent  Progression:  Unchanged  Chronicity:  Recurrent  Relieved by:  Nothing  Worsened by:  Nothing tried  Ineffective treatments:  None tried  Associated symptoms: no abdominal pain, no dizziness, no fever, no nausea, no shortness of breath, not vomiting and no weakness        Prior to Admission Medications   Prescriptions Last Dose Informant Patient Reported?  Taking?   aspirin 81 mg chewable tablet  Self No No   Sig: Chew 1 tablet (81 mg total) daily   atorvastatin (LIPITOR) 80 mg tablet  Self No No   Sig: Take 1 tablet (80 mg total) by mouth every evening   clotrimazole (LOTRIMIN) 1 % cream  Self No No   Sig: Apply to affected area 2 times daily   ezetimibe (ZETIA) 10 mg tablet  Self No No   Sig: Take 1 tablet (10 mg total) by mouth daily   hydrochlorothiazide (HYDRODIURIL) 25 mg tablet  Self No No   Sig: Take 1 tablet (25 mg total) by mouth daily lisinopril (ZESTRIL) 10 mg tablet   No No   Sig: Take 1 tablet (10 mg total) by mouth daily   metoprolol tartrate (LOPRESSOR) 25 mg tablet  Self No No   Sig: Take 1 tablet (25 mg total) by mouth every 12 (twelve) hours   nitroglycerin (NITROSTAT) 0 4 mg SL tablet   No No   Sig: Place 1 tablet (0 4 mg total) under the tongue every 5 (five) minutes as needed for chest pain If no relief after 3 tablets, come immediately to ER   ticagrelor (Brilinta) 60 MG   No No   Sig: Take 1 tablet (60 mg total) by mouth every 12 (twelve) hours      Facility-Administered Medications: None       Past Medical History:   Diagnosis Date    Acute ST elevation myocardial infarction (STEMI) of inferior wall ([facility])     cath with PCI/JUAN - RCA 2/7/2020    Back pain     CAD (coronary artery disease)     COPD [facility]chronic obstructive pulmonary disease) ([facility])     Hyperlipidemia     Hypertension     Inguinal hernia, left     Mild heartburn     Obesity     Stroke ([address] ) approx 2015    Denies residual problems  Initially he only had trouble with two fingers on left side   Wears glasses        Past Surgical History:   Procedure Laterality Date    APPENDECTOMY      HERNIA REPAIR      NO PAST SURGERIES      [place] LAP,APPENDECTOMY N/A 11/28/2018    Procedure: APPENDECTOMY LAPAROSCOPIC;  Surgeon: Harris Nettles MD;  Location: [address] [place] [address] MAIN OR;  Service: General    [place] Chinogisele [address] 19 Left 4/2/2018    Procedure: LAPAROSCOPIC INGUINAL HERNIA REPAIR WITH MESH;  Surgeon: Harris Nettles MD;  Location: [facility] OR;  Service: General       History reviewed  No pertinent family history  I have reviewed and agree with the history as documented      E-Cigarette/Vaping    E-Cigarette Use Never User      E-Cigarette/Vaping Substances    Nicotine No     THC No     CBD No     Flavoring No     Other No     Unknown No      Social History     Tobacco Use    Smoking status: Former Smoker     Packs/day: 1 00     Years: 15 00     Pack years: 15 00     Types: Cigarettes     Quit date:      Years since quittin 7    Smokeless tobacco: Never Used   Vaping Use    Vaping Use: Never used   Substance Use Topics    Alcohol use: Not Currently    Drug use: No       Review of Systems   Constitutional: Negative for fever  HENT: Negative for dental problem  Eyes: Negative for visual disturbance  Respiratory: Negative for shortness of breath  Cardiovascular: Positive for chest pain  Gastrointestinal: Negative for abdominal pain, nausea and vomiting  Genitourinary: Negative for dysuria and frequency  Musculoskeletal: Negative for neck pain and neck stiffness  Skin: Negative for rash  Neurological: Negative for dizziness, weakness and light-headedness  Psychiatric/Behavioral: Negative for agitation, behavioral problems and confusion  All other systems reviewed and are negative  Physical Exam  Physical Exam  Vitals and nursing note reviewed  Constitutional:       Appearance: He is well-developed  HENT:      Head: Normocephalic and atraumatic  Eyes:      Extraocular Movements: Extraocular movements intact  Cardiovascular:      Rate and Rhythm: Normal rate and regular rhythm  Heart sounds: Normal heart sounds  Pulmonary:      Effort: Pulmonary effort is normal       Breath sounds: Normal breath sounds  Abdominal:      Palpations: Abdomen is soft  Musculoskeletal:         General: Normal range of motion  Cervical back: Normal range of motion  Skin:     General: Skin is warm and dry  Neurological:      Mental Status: He is alert and oriented to person, place, and time     Psychiatric:         Behavior: Behavior normal          Vital Signs  ED Triage Vitals   Temperature Pulse Respirations Blood Pressure SpO2   22   98 1 °F (36 7 °C) 92 20 142/85 96 %      Temp Source Heart Rate Source Patient Position - Orthostatic VS BP Location FiO2 (%)   09/12/22 2014 09/12/22 1826 09/12/22 1826 09/12/22 1826 --   Oral Monitor Lying Right arm       Pain Score       09/12/22 1826       5           Vitals:    09/12/22 1826 09/12/22 1927 09/12/22 2014 09/12/22 2127   BP: 142/85 127/90  120/81   Pulse: 92  84 90   Patient Position - Orthostatic VS: Lying Sitting  Sitting         Visual Acuity      ED Medications  Medications - No data to display    Diagnostic Studies  Results Reviewed     Procedure Component Value Units Date/Time    HS Troponin I 2hr [[id number]]  (Normal) Collected: 09/12/22 2056    Lab Status: Final result Specimen: Blood from Arm, Left Updated: 09/12/22 2122     hs TnI 2hr 4 ng/L      Delta 2hr hsTnI 0 ng/L     HS Troponin 0hr (reflex protocol) [[id number]]  (Normal) Collected: 09/12/22 1847    Lab Status: Final result Specimen: Blood from Arm, Left Updated: 09/12/22 1917     hs TnI 0hr 4 ng/L     Basic metabolic panel [[id number]]  (Abnormal) Collected: 09/12/22 1847    Lab Status: Final result Specimen: Blood from Arm, Left Updated: 09/12/22 1913     Sodium 135 mmol/L      Potassium 3 8 mmol/L      Chloride 98 mmol/L      CO2 26 mmol/L      ANION GAP 11 mmol/L      BUN 16 mg/dL      Creatinine 1 14 mg/dL      Glucose 341 mg/dL      Calcium 8 9 mg/dL      eGFR 73 ml/min/1 73sq m     Narrative:      [place] guidelines for Chronic Kidney Disease (CKD):     Stage 1 with normal or high GFR (GFR > 90 mL/min/1 73 square meters)    Stage 2 Mild CKD (GFR = 60-89 mL/min/1 73 square meters)    Stage 3A Moderate CKD (GFR = 45-59 mL/min/1 73 square meters)    Stage 3B Moderate CKD (GFR = 30-44 mL/min/1 73 square meters)    Stage 4 Severe CKD (GFR = 15-29 mL/min/1 73 square meters)    Stage 5 End Stage CKD (GFR <15 mL/min/1 73 square meters)  Note: GFR calculation is accurate only with a steady state creatinine    Hepatic function panel [[id number]]  (Abnormal) Collected: 09/12/22 1847    Lab Status: Final result Specimen: Blood from Arm, Left Updated: 09/12/22 1913     Total Bilirubin 1 47 mg/dL      Bilirubin, Direct 0 19 mg/dL      Alkaline Phosphatase 125 U/L      AST 22 U/L      ALT 67 U/L      Total Protein 7 3 g/dL      Albumin 3 5 g/dL     CBC and differential [[id number]] Collected: 09/12/22 1847    Lab Status: Final result Specimen: Blood from Arm, Left Updated: 09/12/22 1855     WBC 5 18 Thousand/uL      RBC 5 51 Million/uL      Hemoglobin 16 6 g/dL      Hematocrit 47 4 %      MCV 86 fL      MCH 30 1 pg      MCHC 35 0 g/dL      RDW 11 8 %      MPV 10 5 fL      Platelets 726 Thousands/uL      nRBC 0 /100 WBCs      Neutrophils Relative 60 %      Immat GRANS % 0 %      Lymphocytes Relative 28 %      Monocytes Relative 9 %      Eosinophils Relative 2 %      Basophils Relative 1 %      Neutrophils Absolute 3 10 Thousands/µL      Immature Grans Absolute 0 01 Thousand/uL      Lymphocytes Absolute 1 47 Thousands/µL      Monocytes Absolute 0 46 Thousand/µL      Eosinophils Absolute 0 10 Thousand/µL      Basophils Absolute 0 04 Thousands/µL                  XR chest 2 views    (Results Pending)              Procedures  ECG 12 Lead Documentation Only    Date/Time: 9/12/2022 6:32 PM  Performed by: Fritz Campoverde MD  Authorized by: Fritz Campoverde MD     ECG reviewed by me, the ED Provider: yes    Patient location:  ED  Previous ECG:     Previous ECG:  Compared to current    Comparison ECG info:  1/11/22    Similarity:  No change  Interpretation:     Interpretation: normal    Rate:     ECG rate:  87    ECG rate assessment: normal    Rhythm:     Rhythm: sinus rhythm    QRS:     QRS axis:  Normal    QRS intervals:  Normal  Conduction:     Conduction: normal    ST segments:     ST segments:  Normal  T waves:     T waves: normal               ED Course             HEART Risk Score    Flowsheet Row Most Recent Value   Heart Score Risk Calculator    History 0 Filed at: 09/12/2022 2123   ECG 0 Filed at: 09/12/2022 2123   Age 1 Filed at: 09/12/2022 2123   Risk Factors 2 Filed at: 09/12/2022 2123   Troponin 0 Filed at: 09/12/2022 2123   HEART Score 3 Filed at: 09/12/2022 2123                                      [facility]  Number of Diagnoses or Management Options  Chest pain: new and requires workup  Diagnosis management comments: 1  Chest pain - Pt states he has had this previously, denies associated symptoms  Pt does have a Hx of MI in the past  Will check ECG and troponin, Pt will require a delta of each  CBC for anemia, metabolic panel for electrolyte abnormalities and dehydration, CXR  Will give Toradol  Amount and/or Complexity of Data Reviewed  Clinical lab tests: ordered and reviewed  Tests in the radiology section of CPT®: ordered and reviewed  Review and summarize past medical records: yes  Independent visualization of images, tracings, or specimens: yes    Patient Progress  Patient progress: improved      Disposition  Final diagnoses:   Chest pain     Time reflects when diagnosis was documented in both MDM as applicable and the Disposition within this note     Time User Action Codes Description Comment    9/12/2022  9:24 PM Elisha Sotomayor [R07 9] Chest pain       ED Disposition     ED Disposition   Discharge    Condition   Stable    Date/Time   Mon Sep 12, 2022  9:24 PM    Comment   Kisha Willard discharge to home/self care                 Follow-up Information     Follow up With Specialties Details Why Contact Info    Wilfred Oglesby, [address] [place] [address]  [phone]            Discharge Medication List as of 9/12/2022  9:25 PM      CONTINUE these medications which have NOT CHANGED    Details   aspirin 81 mg chewable tablet Chew 1 tablet (81 mg total) daily, Starting Tue 2/23/2021, Normal      atorvastatin (LIPITOR) 80 mg tablet Take 1 tablet (80 mg total) by mouth every evening, Starting Tue 3/1/2022, Normal      clotrimazole (LOTRIMIN) 1 % cream Apply to affected area 2 times daily, Print ezetimibe (ZETIA) 10 mg tablet Take 1 tablet (10 mg total) by mouth daily, Starting Tue 9/28/2021, Normal      hydrochlorothiazide (HYDRODIURIL) 25 mg tablet Take 1 tablet (25 mg total) by mouth daily, Starting Tue 3/1/2022, Normal      lisinopril (ZESTRIL) 10 mg tablet Take 1 tablet (10 mg total) by mouth daily, Starting Tue 3/29/2022, Normal      metoprolol tartrate (LOPRESSOR) 25 mg tablet Take 1 tablet (25 mg total) by mouth every 12 (twelve) hours, Starting Tue 3/1/2022, Normal      nitroglycerin (NITROSTAT) 0 4 mg SL tablet Place 1 tablet (0 4 mg total) under the tongue every 5 (five) minutes as needed for chest pain If no relief after 3 tablets, come immediately to ER, Starting Tue 3/29/2022, Normal      ticagrelor (Brilinta) 60 MG Take 1 tablet (60 mg total) by mouth every 12 (twelve) hours, Starting Tue 3/29/2022, Normal             No discharge procedures on file      PDMP Review     None          ED Provider  Electronically Signed by           Kwame Rossi MD  09/13/22 2890

## 2022-09-13 LAB
ATRIAL RATE: 83 BPM
ATRIAL RATE: 87 BPM
P AXIS: 56 DEGREES
P AXIS: 60 DEGREES
PR INTERVAL: 136 MS
PR INTERVAL: 142 MS
QRS AXIS: 36 DEGREES
QRS AXIS: 69 DEGREES
QRSD INTERVAL: 88 MS
QRSD INTERVAL: 94 MS
QT INTERVAL: 344 MS
QT INTERVAL: 346 MS
QTC INTERVAL: 404 MS
QTC INTERVAL: 416 MS
T WAVE AXIS: 30 DEGREES
T WAVE AXIS: 36 DEGREES
VENTRICULAR RATE: 83 BPM
VENTRICULAR RATE: 87 BPM

## 2022-09-13 PROCEDURE — 93010 ELECTROCARDIOGRAM REPORT: CPT

## 2022-09-13 NOTE — ED NOTES
AVS reviewed with pt by provider, pt verbalized understanding of d/c instructions  No questions or concerns at this time  VSS   Pt left ambulatory with steady gait, alert and oriented     Wilfrid Liu RN  09/12/22 [id number]

## 2022-09-13 NOTE — DISCHARGE INSTRUCTIONS
Return to the ER if your chest pain changes in quality or location, or becomes associated with shortness of breath, lightheadedness, vomiting or sweating  Call your cardiologist in the morning, let them know you were in the ER, you should be seen in the office for further evaluation and management

## 2022-09-19 DIAGNOSIS — E78.5 HYPERLIPIDEMIA, UNSPECIFIED HYPERLIPIDEMIA TYPE: ICD-10-CM

## 2022-09-19 DIAGNOSIS — I21.11 ST ELEVATION MYOCARDIAL INFARCTION INVOLVING RIGHT CORONARY ARTERY (HCC): ICD-10-CM

## 2022-09-22 RX ORDER — EZETIMIBE 10 MG/1
10 TABLET ORAL DAILY
Qty: 30 TABLET | Refills: 1 | Status: SHIPPED | OUTPATIENT
Start: 2022-09-22

## 2022-10-03 ENCOUNTER — HOSPITAL ENCOUNTER (EMERGENCY)
Facility: HOSPITAL | Age: 52
Discharge: HOME | End: 2022-10-03
Attending: EMERGENCY MEDICINE
Payer: COMMERCIAL

## 2022-10-03 ENCOUNTER — APPOINTMENT (OUTPATIENT)
Dept: RADIOLOGY | Facility: HOSPITAL | Age: 52
End: 2022-10-03
Payer: COMMERCIAL

## 2022-10-03 VITALS
WEIGHT: 209.22 LBS | TEMPERATURE: 98.8 F | SYSTOLIC BLOOD PRESSURE: 118 MMHG | RESPIRATION RATE: 18 BRPM | OXYGEN SATURATION: 94 % | BODY MASS INDEX: 34.82 KG/M2 | HEART RATE: 95 BPM | DIASTOLIC BLOOD PRESSURE: 78 MMHG

## 2022-10-03 DIAGNOSIS — R53.1 GENERALIZED WEAKNESS: Primary | ICD-10-CM

## 2022-10-03 DIAGNOSIS — E87.6 HYPOKALEMIA: ICD-10-CM

## 2022-10-03 DIAGNOSIS — I25.10 CORONARY ARTERY DISEASE INVOLVING NATIVE CORONARY ARTERY OF NATIVE HEART WITHOUT ANGINA PECTORIS: ICD-10-CM

## 2022-10-03 DIAGNOSIS — I21.11 ST ELEVATION MYOCARDIAL INFARCTION INVOLVING RIGHT CORONARY ARTERY (HCC): ICD-10-CM

## 2022-10-03 LAB
ALBUMIN SERPL BCP-MCNC: 3.5 G/DL (ref 3.5–5)
ALP SERPL-CCNC: 113 U/L (ref 46–116)
ALT SERPL W P-5'-P-CCNC: 64 U/L (ref 12–78)
ANION GAP SERPL CALCULATED.3IONS-SCNC: 8 MMOL/L (ref 4–13)
AST SERPL W P-5'-P-CCNC: 19 U/L (ref 5–45)
ATRIAL RATE: 89 BPM
BASOPHILS # BLD AUTO: 0.05 THOUSANDS/ΜL (ref 0–0.1)
BASOPHILS NFR BLD AUTO: 1 % (ref 0–1)
BILIRUB SERPL-MCNC: 1.17 MG/DL (ref 0.2–1)
BUN SERPL-MCNC: 19 MG/DL (ref 5–25)
CALCIUM SERPL-MCNC: 9.4 MG/DL (ref 8.3–10.1)
CARDIAC TROPONIN I PNL SERPL HS: 4 NG/L
CHLORIDE SERPL-SCNC: 99 MMOL/L (ref 96–108)
CO2 SERPL-SCNC: 28 MMOL/L (ref 21–32)
CREAT SERPL-MCNC: 1.2 MG/DL (ref 0.6–1.3)
EOSINOPHIL # BLD AUTO: 0.11 THOUSAND/ΜL (ref 0–0.61)
EOSINOPHIL NFR BLD AUTO: 2 % (ref 0–6)
ERYTHROCYTE [DISTWIDTH] IN BLOOD BY AUTOMATED COUNT: 11.9 % (ref 11.6–15.1)
GFR SERPL CREATININE-BSD FRML MDRD: 69 ML/MIN/1.73SQ M
GLUCOSE SERPL-MCNC: 306 MG/DL (ref 65–140)
HCT VFR BLD AUTO: 47.4 % (ref 36.5–49.3)
HGB BLD-MCNC: 17.1 G/DL (ref 12–17)
IMM GRANULOCYTES # BLD AUTO: 0.02 THOUSAND/UL (ref 0–0.2)
IMM GRANULOCYTES NFR BLD AUTO: 0 % (ref 0–2)
LYMPHOCYTES # BLD AUTO: 1.56 THOUSANDS/ΜL (ref 0.6–4.47)
LYMPHOCYTES NFR BLD AUTO: 26 % (ref 14–44)
MCH RBC QN AUTO: 30.8 PG (ref 26.8–34.3)
MCHC RBC AUTO-ENTMCNC: 36.1 G/DL (ref 31.4–37.4)
MCV RBC AUTO: 85 FL (ref 82–98)
MONOCYTES # BLD AUTO: 0.4 THOUSAND/ΜL (ref 0.17–1.22)
MONOCYTES NFR BLD AUTO: 7 % (ref 4–12)
NEUTROPHILS # BLD AUTO: 3.97 THOUSANDS/ΜL (ref 1.85–7.62)
NEUTS SEG NFR BLD AUTO: 64 % (ref 43–75)
NRBC BLD AUTO-RTO: 0 /100 WBCS
NT-PROBNP SERPL-MCNC: 21 PG/ML
P AXIS: 53 DEGREES
PLATELET # BLD AUTO: 233 THOUSANDS/UL (ref 149–390)
PMV BLD AUTO: 9.9 FL (ref 8.9–12.7)
POTASSIUM SERPL-SCNC: 3.4 MMOL/L (ref 3.5–5.3)
PR INTERVAL: 136 MS
PROT SERPL-MCNC: 7.4 G/DL (ref 6.4–8.4)
QRS AXIS: 45 DEGREES
QRSD INTERVAL: 96 MS
QT INTERVAL: 338 MS
QTC INTERVAL: 411 MS
RBC # BLD AUTO: 5.55 MILLION/UL (ref 3.88–5.62)
SODIUM SERPL-SCNC: 135 MMOL/L (ref 135–147)
T WAVE AXIS: 35 DEGREES
TSH SERPL DL<=0.05 MIU/L-ACNC: 2.21 UIU/ML (ref 0.45–4.5)
VENTRICULAR RATE: 89 BPM
WBC # BLD AUTO: 6.11 THOUSAND/UL (ref 4.31–10.16)

## 2022-10-03 PROCEDURE — 93005 ELECTROCARDIOGRAM TRACING: CPT

## 2022-10-03 PROCEDURE — 93010 ELECTROCARDIOGRAM REPORT: CPT | Performed by: INTERNAL MEDICINE

## 2022-10-03 PROCEDURE — 85025 COMPLETE CBC W/AUTO DIFF WBC: CPT

## 2022-10-03 PROCEDURE — 80053 COMPREHEN METABOLIC PANEL: CPT

## 2022-10-03 PROCEDURE — 99285 EMERGENCY DEPT VISIT HI MDM: CPT

## 2022-10-03 PROCEDURE — 84484 ASSAY OF TROPONIN QUANT: CPT

## 2022-10-03 PROCEDURE — 71046 X-RAY EXAM CHEST 2 VIEWS: CPT

## 2022-10-03 PROCEDURE — 80061 LIPID PANEL: CPT

## 2022-10-03 PROCEDURE — 36415 COLL VENOUS BLD VENIPUNCTURE: CPT

## 2022-10-03 PROCEDURE — 84443 ASSAY THYROID STIM HORMONE: CPT

## 2022-10-03 PROCEDURE — 83880 ASSAY OF NATRIURETIC PEPTIDE: CPT

## 2022-10-03 RX ORDER — POTASSIUM CHLORIDE 20 MEQ/1
20 TABLET, EXTENDED RELEASE ORAL ONCE
Status: COMPLETED | OUTPATIENT
Start: 2022-10-03 | End: 2022-10-03

## 2022-10-03 RX ADMIN — POTASSIUM CHLORIDE 20 MEQ: 1500 TABLET, EXTENDED RELEASE ORAL at 21:05

## 2022-10-04 NOTE — DISCHARGE INSTRUCTIONS
Make sure ton get plenty of hydration    Follow up with your PCP in 1-2 days    Return to the ER for worsening weakness or shortness of breath, or for any chest pain, passing out, dizziness, weakness of one extremity, numbness/tingling, or any other symptoms

## 2022-10-05 ENCOUNTER — OFFICE VISIT (OUTPATIENT)
Dept: CARDIOLOGY CLINIC | Facility: CLINIC | Age: 52
End: 2022-10-05
Payer: COMMERCIAL

## 2022-10-05 ENCOUNTER — TELEPHONE (OUTPATIENT)
Dept: CARDIOLOGY CLINIC | Facility: CLINIC | Age: 52
End: 2022-10-05

## 2022-10-05 VITALS
DIASTOLIC BLOOD PRESSURE: 80 MMHG | OXYGEN SATURATION: 95 % | HEIGHT: 66 IN | BODY MASS INDEX: 33.37 KG/M2 | HEART RATE: 96 BPM | SYSTOLIC BLOOD PRESSURE: 114 MMHG | WEIGHT: 207.6 LBS

## 2022-10-05 DIAGNOSIS — I25.10 CORONARY ARTERY DISEASE INVOLVING NATIVE CORONARY ARTERY OF NATIVE HEART WITHOUT ANGINA PECTORIS: ICD-10-CM

## 2022-10-05 DIAGNOSIS — R06.02 SOB (SHORTNESS OF BREATH): ICD-10-CM

## 2022-10-05 DIAGNOSIS — I21.11 ST ELEVATION MYOCARDIAL INFARCTION INVOLVING RIGHT CORONARY ARTERY (HCC): Primary | ICD-10-CM

## 2022-10-05 DIAGNOSIS — I10 HYPERTENSION, UNSPECIFIED TYPE: ICD-10-CM

## 2022-10-05 DIAGNOSIS — I25.10 CORONARY ARTERY DISEASE INVOLVING NATIVE CORONARY ARTERY OF NATIVE HEART WITHOUT ANGINA PECTORIS: Primary | ICD-10-CM

## 2022-10-05 DIAGNOSIS — N18.2 CKD (CHRONIC KIDNEY DISEASE), STAGE II: ICD-10-CM

## 2022-10-05 DIAGNOSIS — G47.00 INSOMNIA, UNSPECIFIED TYPE: ICD-10-CM

## 2022-10-05 LAB
CHOLEST SERPL-MCNC: 135 MG/DL
HDLC SERPL-MCNC: 33 MG/DL
LDLC SERPL CALC-MCNC: 31 MG/DL (ref 0–100)
TRIGL SERPL-MCNC: 355 MG/DL

## 2022-10-05 PROCEDURE — 99214 OFFICE O/P EST MOD 30 MIN: CPT

## 2022-10-05 RX ORDER — ICOSAPENT ETHYL 1000 MG/1
2 CAPSULE ORAL 2 TIMES DAILY
Qty: 360 CAPSULE | Refills: 3 | Status: SHIPPED | OUTPATIENT
Start: 2022-10-05

## 2022-10-05 NOTE — TELEPHONE ENCOUNTER
----- Message from Perry Macedo DO sent at 10/5/2022  2:12 PM EDT -----  Please call the patient regarding his abnormal result  His LDL cholesterol is at goal however his triglycerides are significantly elevated  Given his coronary disease history, will start Vascepa 2 g p o  b i d  in addition to his current medications    He does not need the injectable type medications that we spoke about earlier currently however

## 2022-10-05 NOTE — ED PROVIDER NOTES
History  Chief Complaint   Patient presents with    Weakness - Generalized     Tired, weak for about 3 days, sob on exertion, denies sick contacts     The patient is a 22-year-old male with history of STEMI, hypertension, hyperlipidemia, COPD, CVA who presents to the ED for a 3 day history of generalized weakness  The patient states that he has been hypokalemic in the past, and he feels that this is similar  He required an IV infusion of potassium at that time  He does report dyspnea on exertion, however reports that he always has this, and reports that it is unchanged from his baseline  He denies alleviating or exacerbating factors  He feels as though he has been eating and drinking appropriately  He otherwise denies chest pain, leg swelling, calf pain, abdominal pain, nausea, vomiting, diarrhea, constipation, melena, hematochezia, dysuria, hematuria, numbness, paresthesia, focal weakness, headache, neck stiffness  Prior to Admission Medications   Prescriptions Last Dose Informant Patient Reported?  Taking?   aspirin 81 mg chewable tablet  Self No Yes   Sig: Chew 1 tablet (81 mg total) daily   atorvastatin (LIPITOR) 80 mg tablet  Self No Yes   Sig: Take 1 tablet (80 mg total) by mouth every evening   clotrimazole (LOTRIMIN) 1 % cream  Self No No   Sig: Apply to affected area 2 times daily   ezetimibe (ZETIA) 10 mg tablet   No Yes   Sig: Take 1 tablet (10 mg total) by mouth daily   hydrochlorothiazide (HYDRODIURIL) 25 mg tablet  Self No Yes   Sig: Take 1 tablet (25 mg total) by mouth daily   lisinopril (ZESTRIL) 10 mg tablet   No Yes   Sig: Take 1 tablet (10 mg total) by mouth daily   metoprolol tartrate (LOPRESSOR) 25 mg tablet  Self No Yes   Sig: Take 1 tablet (25 mg total) by mouth every 12 (twelve) hours   nitroglycerin (NITROSTAT) 0 4 mg SL tablet   No Yes   Sig: Place 1 tablet (0 4 mg total) under the tongue every 5 (five) minutes as needed for chest pain If no relief after 3 tablets, come immediately to ER   ticagrelor (Brilinta) 60 MG   No Yes   Sig: Take 1 tablet (60 mg total) by mouth every 12 (twelve) hours      Facility-Administered Medications: None       Past Medical History:   Diagnosis Date    Acute ST elevation myocardial infarction (STEMI) of inferior wall ([facility])     cath with PCI/JUAN - RCA 2020    Back pain     CAD (coronary artery disease)     COPD (chronic obstructive pulmonary disease) ([facility])     Hyperlipidemia     Hypertension     Inguinal hernia, left     Mild heartburn     Obesity     Stroke ([address] ) approx 2015    Denies residual problems  Initially he only had trouble with two fingers on left side   Wears glasses        Past Surgical History:   Procedure Laterality Date    APPENDECTOMY      HERNIA REPAIR      NO PAST SURGERIES      OH LAP,APPENDECTOMY N/A 2018    Procedure: APPENDECTOMY LAPAROSCOPIC;  Surgeon: Mickey Shah MD;  Location: [facility] MAIN OR;  Service: General    OH [place] [address] Left 2018    Procedure: LAPAROSCOPIC INGUINAL HERNIA REPAIR WITH MESH;  Surgeon: Mickey Shah MD;  Location: [facility] Main OR;  Service: General       No family history on file  I have reviewed and agree with the history as documented  E-Cigarette/Vaping    E-Cigarette Use Never User      E-Cigarette/Vaping Substances    Nicotine No     THC No     CBD No     Flavoring No     Other No     Unknown No      Social History     Tobacco Use    Smoking status: Former Smoker     Packs/day: 1 00     Years: 15 00     Pack years: 15 00     Types: Cigarettes     Quit date:      Years since quittin     Smokeless tobacco: Never Used   Vaping Use    Vaping Use: Never used   Substance Use Topics    Alcohol use: Not Currently    Drug use: No       Review of Systems   Constitutional: Negative for chills and fever  HENT: Negative for congestion and rhinorrhea  Eyes: Negative for photophobia and visual disturbance     Respiratory: Positive for shortness of breath (on exertion, baseline)  Negative for cough  Cardiovascular: Negative for chest pain and leg swelling  Gastrointestinal: Negative for abdominal pain, constipation, diarrhea, nausea and vomiting  Genitourinary: Negative for dysuria and flank pain  Musculoskeletal: Negative for arthralgias and myalgias  Skin: Negative for rash and wound  Neurological: Positive for weakness (generalized)  Negative for dizziness, numbness and headaches  Psychiatric/Behavioral: Negative for behavioral problems  Physical Exam  Physical Exam  Vitals and nursing note reviewed  Constitutional:       General: He is not in acute distress  Appearance: He is well-developed  He is not ill-appearing, toxic-appearing or diaphoretic  HENT:      Head: Normocephalic and atraumatic  Eyes:      Extraocular Movements: Extraocular movements intact  Conjunctiva/sclera: Conjunctivae normal       Pupils: Pupils are equal, round, and reactive to light  Cardiovascular:      Rate and Rhythm: Normal rate and regular rhythm  Pulses: Normal pulses  Heart sounds: Normal heart sounds  No murmur heard  Pulmonary:      Effort: Pulmonary effort is normal  No respiratory distress  Breath sounds: Normal breath sounds  Abdominal:      Palpations: Abdomen is soft  Tenderness: There is no abdominal tenderness  There is no right CVA tenderness, left CVA tenderness, guarding or rebound  Musculoskeletal:         General: No tenderness (no calf tenderness)  Normal range of motion  Cervical back: Neck supple  Right lower leg: No edema  Left lower leg: No edema  Skin:     General: Skin is warm and dry  Capillary Refill: Capillary refill takes less than 2 seconds  Neurological:      General: No focal deficit present  Mental Status: He is alert and oriented to person, place, and time  Cranial Nerves: No cranial nerve deficit  Sensory: No sensory deficit  Motor: No weakness  Coordination: Coordination normal          Vital Signs  ED Triage Vitals [10/03/22 1757]   Temperature Pulse Respirations Blood Pressure SpO2   98 8 °F (37 1 °C) 95 18 125/59 95 %      Temp Source Heart Rate Source Patient Position - Orthostatic VS BP Location FiO2 (%)   Oral Monitor Sitting Right arm --      Pain Score       --           Vitals:    10/03/22 1757 10/03/22 2023   BP: 125/59 118/78   Pulse: 95 95   Patient Position - Orthostatic VS: Sitting Sitting         Visual Acuity      ED Medications  Medications   potassium chloride (K-DUR,KLOR-CON) CR tablet 20 mEq (20 mEq Oral Given 10/3/22 2105)       Diagnostic Studies  Results Reviewed     Procedure Component Value Units Date/Time    HS Troponin 0hr (reflex protocol) [[id number]]  (Normal) Collected: 10/03/22 1945    Lab Status: Final result Specimen: Blood from Arm, Right Updated: 10/03/22 2029     hs TnI 0hr 4 ng/L     TSH [[id number]]  (Normal) Collected: 10/03/22 1945    Lab Status: Final result Specimen: Blood from Arm, Right Updated: 10/03/22 2018     TSH 3RD GENERATON 2 206 uIU/mL     Narrative:      Patients undergoing fluorescein dye angiography may retain small amounts of fluorescein in the body for 48-72 hours post procedure  Samples containing fluorescein can produce falsely depressed TSH values  If the patient had this procedure,a specimen should be resubmitted post fluorescein clearance        NT-BNP PRO [[id number]]  (Normal) Collected: 10/03/22 1945    Lab Status: Final result Specimen: Blood from Arm, Right Updated: 10/03/22 2018     NT-proBNP 21 pg/mL     CBC and differential [[id number]]  (Abnormal) Collected: 10/03/22 1945    Lab Status: Final result Specimen: Blood from Arm, Right Updated: 10/03/22 2011     WBC 6 11 Thousand/uL      RBC 5 55 Million/uL      Hemoglobin 17 1 g/dL      Hematocrit 47 4 %      MCV 85 fL      MCH 30 8 pg      MCHC 36 1 g/dL      RDW 11 9 %      MPV 9 9 fL      Platelets 590 Thousands/uL nRBC 0 /100 WBCs      Neutrophils Relative 64 %      Immat GRANS % 0 %      Lymphocytes Relative 26 %      Monocytes Relative 7 %      Eosinophils Relative 2 %      Basophils Relative 1 %      Neutrophils Absolute 3 97 Thousands/µL      Immature Grans Absolute 0 02 Thousand/uL      Lymphocytes Absolute 1 56 Thousands/µL      Monocytes Absolute 0 40 Thousand/µL      Eosinophils Absolute 0 11 Thousand/µL      Basophils Absolute 0 05 Thousands/µL     Comprehensive metabolic panel [[id number]]  (Abnormal) Collected: 10/03/22 1945    Lab Status: Final result Specimen: Blood from Arm, Right Updated: 10/03/22 2008     Sodium 135 mmol/L      Potassium 3 4 mmol/L      Chloride 99 mmol/L      CO2 28 mmol/L      ANION GAP 8 mmol/L      BUN 19 mg/dL      Creatinine 1 20 mg/dL      Glucose 306 mg/dL      Calcium 9 4 mg/dL      AST 19 U/L      ALT 64 U/L      Alkaline Phosphatase 113 U/L      Total Protein 7 4 g/dL      Albumin 3 5 g/dL      Total Bilirubin 1 17 mg/dL      eGFR 69 ml/min/1 73sq m     Narrative:      [place] guidelines for Chronic Kidney Disease (CKD):     Stage 1 with normal or high GFR (GFR > 90 mL/min/1 73 square meters)    Stage 2 Mild CKD (GFR = 60-89 mL/min/1 73 square meters)    Stage 3A Moderate CKD (GFR = 45-59 mL/min/1 73 square meters)    Stage 3B Moderate CKD (GFR = 30-44 mL/min/1 73 square meters)    Stage 4 Severe CKD (GFR = 15-29 mL/min/1 73 square meters)    Stage 5 End Stage CKD (GFR <15 mL/min/1 73 square meters)  Note: GFR calculation is accurate only with a steady state creatinine                 XR chest 2 views   ED Interpretation by Claudio Greenberg PA-C (10/03 2053)   No evidence of infiltrate, pneumothorax, or acute cardiopulmonary disease as interpreted by me      Final Result by Elizabeth Yeh MD (10/04 6925)      No acute cardiopulmonary disease                    Workstation performed: [id number]                    Procedures  Procedures ED Course  ED Course as of 10/05/22 0720   Mon Oct 03, 2022   1930 EKG: NSR at 89 BPM, normal axis, normal intervals, no ST elevation or depression, no significant change compared to EKG on 9/12/22 2039 Potassium(!): 3 4         SBIRT 20yo+    Flowsheet Row Most Recent Value   SBIRT (23 yo +)    In order to provide better care to our patients, we are screening all of our patients for alcohol and drug use  Would it be okay to ask you these screening questions? No Filed at: 10/03/2022 2026        MDM  Number of Diagnoses or Management Options  Generalized weakness: new and requires workup  Hypokalemia: new and requires workup  Diagnosis management comments: The patient is a 20-year-old male with history of STEMI, hypertension, hyperlipidemia, COPD, CVA who presents to the ED for a 3 day history of generalized weakness  does report dyspnea on exertion, however reports that he always has this, and reports that it is unchanged from his baseline  denies chest pain, leg swelling, calf pain, abdominal pain, nausea, vomiting, diarrhea, constipation, melena, hematochezia, dysuria, hematuria, subjective neurologic deficit  On exam, patient is in no acute distress  Vital signs stable  SpO2 95% on room air, patient 93% on room air during previous ED visit  Patient without neurologic deficit; CN II-XII grossly intact, EOMI, PERRLA, strength 5/5 in all extremities, sensation grossly intact  Patient a and o x 3  Coordination intact  Lungs clear to auscultation bilaterally  Heart rate and rhythm regular  Will obtain CBC, CMP, EKG, troponin, chest x-ray, TSH, proBNP  Patient with mild hypokalemia  Repleted in ED  On reexamination, patient remains in no acute distress  Workup otherwise unremarkable  At the time of discharge, the patient is in no acute distress   I discussed with the patient the diagnosis, treatment plan, follow-up, return precautions, and discharge instructions; they were given the opportunity to ask questions and verbalized understanding  Amount and/or Complexity of Data Reviewed  Clinical lab tests: ordered and reviewed  Tests in the radiology section of CPT®: ordered and reviewed  Tests in the medicine section of CPT®: ordered and reviewed  Decide to obtain previous medical records or to obtain history from someone other than the patient: yes  Review and summarize past medical records: yes  Independent visualization of images, tracings, or specimens: yes    Risk of Complications, Morbidity, and/or Mortality  Presenting problems: moderate  Management options: low    Patient Progress  Patient progress: improved      Disposition  Final diagnoses:   Generalized weakness   Hypokalemia     Time reflects when diagnosis was documented in both MDM as applicable and the Disposition within this note     Time User Action Codes Description Comment    10/3/2022  9:35 PM Cleven Canada Add [R53 1] Generalized weakness     10/3/2022  9:35 PM Michael Canada Add [E87 6] Hypokalemia       ED Disposition     ED Disposition   Discharge    Condition   Stable    Date/Time   Mon Oct 3, 2022 2135    Comment   Katheryn Canales discharge to home/self care                 Follow-up Information     Follow up With Specialties Details Why Contact Domingo Carrero, [address] [place] [address]  [phone]            Discharge Medication List as of 10/3/2022  9:36 PM      CONTINUE these medications which have NOT CHANGED    Details   aspirin 81 mg chewable tablet Chew 1 tablet (81 mg total) daily, Starting Tue 2/23/2021, Normal      atorvastatin (LIPITOR) 80 mg tablet Take 1 tablet (80 mg total) by mouth every evening, Starting Tue 3/1/2022, Normal      ezetimibe (ZETIA) 10 mg tablet Take 1 tablet (10 mg total) by mouth daily, Starting Thu 9/22/2022, Normal      hydrochlorothiazide (HYDRODIURIL) 25 mg tablet Take 1 tablet (25 mg total) by mouth daily, Starting Tue 3/1/2022, Normal      lisinopril (ZESTRIL) 10 mg tablet Take 1 tablet (10 mg total) by mouth daily, Starting Tue 3/29/2022, Normal      metoprolol tartrate (LOPRESSOR) 25 mg tablet Take 1 tablet (25 mg total) by mouth every 12 (twelve) hours, Starting Tue 3/1/2022, Normal      nitroglycerin (NITROSTAT) 0 4 mg SL tablet Place 1 tablet (0 4 mg total) under the tongue every 5 (five) minutes as needed for chest pain If no relief after 3 tablets, come immediately to ER, Starting Tue 3/29/2022, Normal      ticagrelor (Brilinta) 60 MG Take 1 tablet (60 mg total) by mouth every 12 (twelve) hours, Starting Tue 3/29/2022, Normal      clotrimazole (LOTRIMIN) 1 % cream Apply to affected area 2 times daily, Print             No discharge procedures on file      PDMP Review     None          ED Provider  Electronically Signed by           Jamal Hagan PA-C  10/05/22 9790

## 2022-10-05 NOTE — PROGRESS NOTES
Cardiology   MD Ray Faulkner MD Marvel Riis, DO, [facility]  MD Danielle Salcedo DO, Jahaira Somers DO, [facility] - [facility]  -------------------------------------------------------------------  [facility], [facility] [place] [id number] [phone]  10/05/22  Tello Wei  YOB: 1970   MRN: [id number]      Referring Physician: No referring provider defined for this encounter  HPI: Tello Wei is a 46 y o  male with:   CAD, prior inferior wall STEMI February 2020 with PCI to dRCA  2  Recent inferior wall STEMI March 2021, s/p PCI to 100% pRCA  3  Hypertension  4  Dyslipidemia     Echo March 31, 9245  Systolic function was normal   There were no regional wall motion abnormalities  Wall thickness was mildly increased  There was mild concentric hypertrophy  Doppler parameters were consistent with abnormal left ventricular relaxation (grade 1 diastolic dysfunction)       He presents today for follow-up  He does note some recent episodes of chest pain  Very atypical sounding, ER workup negative  Has not had repeat lipids    Does note some issues with sleeping and breathing  Review of Systems   Constitutional: Negative for chills and fever  HENT: Negative for facial swelling and sore throat  Eyes: Negative for visual disturbance  Respiratory: Negative for cough, chest tightness, shortness of breath and wheezing  Cardiovascular: Negative for chest pain, palpitations and leg swelling  Gastrointestinal: Negative for abdominal pain, blood in stool, constipation, diarrhea, nausea and vomiting  Endocrine: Negative for cold intolerance and heat intolerance  Genitourinary: Negative for decreased urine volume, difficulty urinating, dysuria and hematuria  Musculoskeletal: Negative for arthralgias, back pain and myalgias  Skin: Negative for rash     Neurological: Negative for dizziness, syncope, weakness and numbness  Psychiatric/Behavioral: Negative for agitation, behavioral problems and confusion  The patient is not nervous/anxious  OBJECTIVE  Vitals:    10/05/22 0952   BP: 114/80   Pulse: 96   SpO2: 95%       Physical Exam  Constitutional: awake, alert and oriented, in no acute distress, no obvious deformities, obese male  Head: Normocephalic, without obvious abnormality, atraumatic  Eyes: conjunctivae clear and moist  Sclera anicteric  No xanthelasmas  Pupils equal bilaterally  Extraocular motions are full  Ear nose mouth and throat: ears are symmetrical bilaterally, hearing appears to be equal bilaterally, no nasal discharge or epistaxis, oropharynx is clear with moist mucous membranes  Neck:  Trachea is midline, neck is supple, no thyromegaly or significant lymphadenopathy, there is full range of motion  Lungs: clear to auscultation bilaterally, no wheezes, no rales, no rhonchi, no accessory muscle use, breathing is nonlabored  Heart: regular rate and rhythm, S1, S2 normal, no murmur, no click, no rub and no gallop, no lower extremity edema  Abdomen:  Obese, soft, non-tender; bowel sounds normal; no masses,  no organomegaly  Psychiatric:  Patient is oriented to time, place, person, mood/affect is negative for depression, anxiety, agitation, appears to have appropriate insight  Skin: Skin is warm, dry, intact  No obvious rashes or lesions on exposed extremities  Nail beds are pink with no cyanosis or clubbing  EKG:  No results found for this visit on 10/05/22  IMPRESSION:  1  CAD, prior inferior wall STEMI February 2020 with PCI to dRCA  2  Recent inferior wall STEMI March 2021, s/p PCI to 100% pRCA  3  Hypertension  4   Dyslipidemia  5  palpitations    DISCUSSION/RECOMMENDATIONS:  He presents today for follow-up  He was in the emergency department twice with very atypical sounding chest pain symptoms, describes his pain as sharp pain in focal area in his chest  His workup in the ER was negative, cardiac enzymes were negative  He does note some issues with his sleep as well as well as waking up short of breath  Would plan for evaluation with a sleep study  His most recent LDL was 98, repeat lipid panel is pending, I will add this on to his ER labs from 2 days ago  If LDL is still greater than 70, given his prior history of 2 inferior wall transmural myocardial infarctions, on maximum dose Lipitor, Zetia, would plan likely at that situation to start a small interfering RNA inhibitor for further LDL reduction  Continue with Brilinta, will plan to continue this until 2023, continue with aspirin  I will follow-up with him regarding the results of his lipid panel  Otherwise would continue current management and plan for follow-up in the office in 6 months  Susanne Kearney DO, [facility], [address]  --------------------------------------------------------------------------------  TREADMILL STRESS  No results found for this or any previous visit      ----------------------------------------------------------------------------------------------  NUCLEAR STRESS TEST: No results found for this or any previous visit      No results found for this or any previous visit       --------------------------------------------------------------------------------  CATH:  Results for orders placed during the hospital encounter of 21    Cardiac catheterization    Elena Byrd [address]  [address] [facility], [address] [place] [address]  [phone]    [facility]    Invasive Cardiovascular Lab Complete Report    Patient: Tahir Xiong  MR number: [id number]  Account number: [de-identified]  Study date: 2021  Gender: Male  : 1970  Height: 66 1 in  Weight: 224 4 lb  BSA: 2 11 mï¾²    Diagnostic Cardiologist:  Eduardo Cyr DO  Interventional Cardiologist:  Eduardo Cyr DO    SUMMARY    CORONARY CIRCULATION:  Proximal RCA: There was a 100 % stenosis  Distal RCA: There was a 10 % stenosis at the site of a prior stent  Right PDA: There was a 60 % stenosis  1ST LESION INTERVENTIONS:  A balloon angioplasty with stent and balloon angioplasty procedure was performed on the 100 % lesion in the proximal RCA  Following intervention there was a 0 % residual stenosis  A Resolute Andre Rx 4 0 x 26mm drug-eluting stent was placed across the lesion and deployed at a maximum inflation pressure of 16 bayron  INDICATIONS:  --  Possible CAD: myocardial infarction with ST elevation (STEMI)  PROCEDURES PERFORMED    --  Left coronary angiography  --  Right coronary angiography  --  Acute Myocardial Infarct  --  Mod Sedation Same Physician Initial 15min  --  Mod Sedation Same Physician Add 15min  --  Coronary Catheterization (w/o [facility])  --  AMI PCI (JUAN, PTCRA, PTCA) Single  --  Intervention on proximal RCA: balloon angioplasty, stent, balloon angioplasty  PROCEDURE: The risks and alternatives of the procedures and conscious sedation were explained to the patient and informed consent was obtained  The patient was brought to the cath lab and placed on the table  The planned puncture sites  were prepped and draped in the usual sterile fashion  --  Right radial artery access  After performing an Nomi's test to verify adequate ulnar artery supply to the hand, the radial site was prepped  The puncture site was infiltrated with local anesthetic  The vessel was accessed using the  modified Seldinger technique, a wire was advanced into the vessel, and a sheath was advanced over the wire into the vessel  --  Left coronary artery angiography  A catheter was advanced over a guidewire into the aorta and positioned in the left coronary artery ostium under fluoroscopic guidance  Angiography was performed  --  Right coronary artery angiography   A catheter was advanced over a guidewire into the aorta and positioned in the right coronary artery ostium under fluoroscopic guidance  Angiography was performed  --  Acute Myocardial Infarct  --  Mod Sedation Same Physician Initial 15min  --  Mod Sedation Same Physician Add 15min  --  Coronary Catheterization (w/o [facility])  LESION INTERVENTION: A balloon angioplasty with stent and balloon angioplasty procedure was performed on the 100 % lesion in the proximal RCA  Following intervention there was a 0 % residual stenosis  There was KATHLEEN 0 flow before the  procedure and KATHLEEN 3 flow after the procedure  There was no dissection  --  Vessel setup was performed  A Runthrough NS 180cm wire was used to cross the lesion  --  Vessel setup was performed  A 6Fr  Launcher JR 4 0 100cm guiding catheter was used to cannulate the vessel  --  Balloon angioplasty was performed, using a Trek Rx 2 5 x 15mm balloon, with 1 inflations and a maximum inflation pressure of 8 bayron  --  A Resolute Andre Rx 4 0 x 26mm drug-eluting stent was placed across the lesion and deployed at a maximum inflation pressure of 16 bayron  --  Balloon angioplasty was performed, using a [place] Trek Rx 4 5 x 20mm balloon, with 2 inflations and a maximum inflation pressure of 16 bayron  INTERVENTIONS:  --  AMI PCI (JUAN, PTCRA, PTCA) Single  PROCEDURE COMPLETION: The patient tolerated the procedure well and was discharged from the cath lab  TIMING: Test started at 12:16  Test concluded at 12:42  HEMOSTASIS: The sheath was removed  The site was compressed with a Hemoband  device  Hemostasis was obtained  MEDICATIONS GIVEN: Fentanyl (1OOmcg/2 ml), 50 mcg, IV, at 12:13  Versed (2mg/2ml), 2 mg, IV, at 12:13  1% Lidocaine, 1 ml, subcutaneously, at 12:16  Nitroglycerin (200mcg/ml), 200 mcg, at 12:16  Verapamil  (5mg/2ml), 2 5 mg, IV, at 12:16  Heparin 1000 units/ml, 6,000 units, at 12:16  Heparin 1000 units/ml, 5,000 units, IV, at 12:42  CONTRAST GIVEN: 105 ml Omnipaque (350 mg I /ml)  RADIATION EXPOSURE: Fluoroscopy time: 7 88 min      CORONARY VESSELS:   -- The coronary circulation is right dominant  --  Left main: Angiography showed minor luminal irregularities  --  Proximal LAD: Angiography showed minor luminal irregularities  --  Circumflex: Angiography showed minor luminal irregularities  --  Ramus intermedius: Angiography showed minor luminal irregularities  --  Proximal RCA: There was a 100 % stenosis  --  Distal RCA: There was a 10 % stenosis at the site of a prior stent  --  Right PDA: There was a 60 % stenosis  IMPRESSIONS:  Inferior STEMI, culprit proximal RCA, successful revascularization with JUAN  Patent distal RCA stent  RECOMMENDATIONS  GDMT CAD, dapt (consider 2 year dapt as ticagrelor stopped few weeks ago)  DISPOSITION:  The patient left the catheterization laboratory in stable condition  Prepared and signed by    Chino Hernandes DO  Signed 2021 12:52:39    Study diagram    Angiographic findings  Native coronary lesions:  ï¾·Proximal RCA: Lesion 1: 100 % stenosis  ï¾·Distal RCA: Lesion 1: 10 % stenosis, site of prior stent  ï¾·RPDA: Lesion 1: 60 % stenosis  Intervention results  Native coronary lesions:  ï¾·balloon angioplasty, stent, and balloon angioplasty of the 100 % stenosis in proximal RCA  0 % residual stenosis  Stent: Resolute [place] Rx 4 0 x 26mm drug-eluting      Hemodynamic tables    Pressures:  Baseline  Pressures:  - HR: 76  Pressures:  - Rhythm:  Pressures:  -- Aortic Pressure (S/D/M): 83/63/71    Outputs:  Baseline  Outputs:  -- CALCULATIONS: [age]  Outputs:  -- CALCULATIONS: Body Surface Area: 2 11  Outputs:  -- CALCULATIONS: Height in cm: 168 00  Outputs:  -- CALCULATIONS: Sex: Male  Outputs:  -- CALCULATIONS: Weight in k 00    --------------------------------------------------------------------------------  ECHO:   Results for orders placed during the hospital encounter of 21    Echo complete with contrast if indicated    Elena Byrd [address] [place] [address]  [phone]    Transthoracic Echocardiogram  2D, M-mode, Doppler, and Color Doppler    Study date:  31-Mar-2021    Patient: Jalyn Sanchez  MR number: [id number]  Account number: [de-identified]  : 1970  Age: 48 years  Gender: Male  Status: Inpatient  Location: Cath lab  Height: 66 in  Weight: 225 lb  BP: 95/ 61 mmHg    Indications: MI     Diagnoses: I21 3 - ST elevation (STEMI) myocardial infarction of unspecified site    Sonographer:  Tello Ambrose  Referring Physician:  JOSLYN Valverde  Group:  [id number]  Cardiology Fellow:  Evelyn Fernandez MD  Interpreting Physician:  Donte Wills DO    SUMMARY    PROCEDURE INFORMATION:  This was a technically difficult study  LEFT VENTRICLE:  Systolic function was normal   There were no regional wall motion abnormalities  Wall thickness was mildly increased  There was mild concentric hypertrophy  Doppler parameters were consistent with abnormal left ventricular relaxation (grade 1 diastolic dysfunction)  RIGHT VENTRICLE:  The ventricle was mildly to moderately dilated  RIGHT ATRIUM:  The atrium was mildly dilated  MITRAL VALVE:  There was mild annular calcification  There was trace regurgitation  HISTORY: PRIOR HISTORY: CKD-2  HLD  STEMI  Hypertension  CAD  Sleep apnea  COPD  Former smoker  PROCEDURE: The procedure was performed in the catheterization laboratory  This was a routine study  The transthoracic approach was used  The study included complete 2D imaging, M-mode, complete spectral Doppler, and color Doppler  The  heart rate was 72 bpm, at the start of the study  Images were obtained from the parasternal, apical, subcostal, and suprasternal notch acoustic windows  Intravenous contrast ( 0 6mL/min of Definity in NSS) was administered to opacify the  left ventricle  Echocardiographic views were limited due to decreased penetration and lung interference   This was a technically difficult study  LEFT VENTRICLE: Size was normal  Systolic function was normal  There were no regional wall motion abnormalities  Wall thickness was mildly increased  There was mild concentric hypertrophy  DOPPLER: Doppler parameters were consistent with  abnormal left ventricular relaxation (grade 1 diastolic dysfunction)  VENTRICULAR SEPTUM: Thickness was normal  There was normal motion  There was normal contour  The septum was intact  RIGHT VENTRICLE: The ventricle was mildly to moderately dilated  Systolic function was normal     LEFT ATRIUM: Size was at the upper limits of normal     RIGHT ATRIUM: The atrium was mildly dilated  MITRAL VALVE: There was mild annular calcification  There was normal leaflet separation  DOPPLER: The transmitral velocity was within the normal range  There was no evidence for stenosis  There was trace regurgitation  AORTIC VALVE: The valve was trileaflet  Leaflets exhibited sclerosis  DOPPLER: Transaortic velocity was within the normal range  There was no evidence for stenosis  There was no regurgitation  TRICUSPID VALVE: The valve structure was normal  There was normal leaflet separation  DOPPLER: The transtricuspid velocity was within the normal range  There was no evidence for stenosis  There was mild regurgitation  The tricuspid jet  envelope definition was inadequate for estimation of RV systolic pressure  PULMONIC VALVE: DOPPLER: The transpulmonic velocity was within the normal range  There was no regurgitation  PERICARDIUM: There was no pericardial effusion  AORTA: The root exhibited normal size      SYSTEM MEASUREMENT TABLES    2D  %FS: 28 78 %  Ao Diam: 3 02 cm  EDV(Teich): 96 65 ml  EF(Teich): 55 46 %  ESV(Teich): 43 04 ml  IVSd: 1 12 cm  LA Area: 12 98 cm2  LA Diam: 3 42 cm  LVEDV MOD A4C: 90 84 ml  LVEF MOD A4C: 54 09 %  LVESV MOD A4C: 41 71 ml  LVIDd: 4 59 cm  LVIDs: 3 27 cm  LVLd A4C: 6 75 cm  LVLs A4C: 6 11 cm  LVPWd: 0 86 cm  RA Area: 18 18 cm2  RVIDd: 3 96 cm  SV MOD A4C: 49 13 ml  SV(Teich): 53 61 ml    MM  TAPSE: 1 61 cm    PW  E' Sept: 0 07 m/s  E/E' Sept: 8 73  MV A Maury: 0 67 m/s  MV Dec [place]: 4 38 m/s2  MV DecT: 144 84 ms  MV E Maury: 0 63 m/s  MV E/A Ratio: 0 95    [facility]    Prepared and electronically signed by    Yee Gomez DO  Signed 31-Mar-2021 16:26:48    No results found for this or any previous visit     --------------------------------------------------------------------------------  HOLTER  No results found for this or any previous visit  No results found for this or any previous visit     --------------------------------------------------------------------------------  CAROTIDS  No results found for this or any previous visit      --------------------------------------------------------------------------------  Diagnoses and all orders for this visit:    ST elevation myocardial infarction involving right coronary artery ([facility] [address] )  -     Lipid Panel with Direct LDL reflex; Future    Hypertension, unspecified type    Coronary artery disease involving native coronary artery of native heart without angina pectoris  -     Lipid Panel with Direct LDL reflex; Future    CKD (chronic kidney disease), stage II    Insomnia, unspecified type  -     Ambulatory Referral to Sleep Medicine; Future    SOB (shortness of breath)  -     Ambulatory Referral to Sleep Medicine; Future     ======================================================    Past Medical History:   Diagnosis Date    Acute ST elevation myocardial infarction (STEMI) of inferior wall ([facility])     cath with PCI/JUAN - RCA 2/7/2020    Back pain     CAD (coronary artery disease)     COPD (chronic obstructive pulmonary disease) ([facility])     Hyperlipidemia     Hypertension     Inguinal hernia, left     Mild heartburn     Obesity     Stroke ([facility] [address] ) approx 2015    Denies residual problems   Initially he only had trouble with two fingers on left side      Wears glasses      Past Surgical History:   Procedure Laterality Date    APPENDECTOMY      HERNIA REPAIR      NO PAST SURGERIES      SC LAP,APPENDECTOMY N/A 11/28/2018    Procedure: APPENDECTOMY LAPAROSCOPIC;  Surgeon: Brennan Pittman MD;  Location: [facility] MAIN OR;  Service: General    SC Chino [address]ien 19 Left 4/2/2018    Procedure: LAPAROSCOPIC INGUINAL HERNIA REPAIR WITH MESH;  Surgeon: Brennan Pittman MD;  Location: [facility] Main OR;  Service: General         Medications  Current Outpatient Medications   Medication Sig Dispense Refill    atorvastatin (LIPITOR) 80 mg tablet Take 1 tablet (80 mg total) by mouth every evening 90 tablet 2    ezetimibe (ZETIA) 10 mg tablet Take 1 tablet (10 mg total) by mouth daily 30 tablet 1    hydrochlorothiazide (HYDRODIURIL) 25 mg tablet Take 1 tablet (25 mg total) by mouth daily 90 tablet 2    lisinopril (ZESTRIL) 10 mg tablet Take 1 tablet (10 mg total) by mouth daily 90 tablet 3    metoprolol tartrate (LOPRESSOR) 25 mg tablet Take 1 tablet (25 mg total) by mouth every 12 (twelve) hours 180 tablet 2    nitroglycerin (NITROSTAT) 0 4 mg SL tablet Place 1 tablet (0 4 mg total) under the tongue every 5 (five) minutes as needed for chest pain If no relief after 3 tablets, come immediately to  tablet 1    ticagrelor (Brilinta) 60 MG Take 1 tablet (60 mg total) by mouth every 12 (twelve) hours 180 tablet 3    aspirin 81 mg chewable tablet Chew 1 tablet (81 mg total) daily 90 tablet 3    atorvastatin (LIPITOR) 80 mg tablet Take 1 tablet (80 mg total) by mouth every evening 90 tablet 2    ezetimibe (ZETIA) 10 mg tablet Take 1 tablet (10 mg total) by mouth daily 30 tablet 1    hydrochlorothiazide (HYDRODIURIL) 25 mg tablet Take 1 tablet (25 mg total) by mouth daily 90 tablet 2    metoprolol tartrate (LOPRESSOR) 25 mg tablet Take 1 tablet (25 mg total) by mouth every 12 (twelve) hours 180 tablet 2    nitroglycerin (NITROSTAT) 0 4 mg SL tablet Place 1 tablet (0 4 mg total) under the tongue every 5 (five) minutes as needed for chest pain If no relief after 3 tablets, come immediately to  tablet 1    ticagrelor (Brilinta) 60 MG Take 1 tablet (60 mg total) by mouth every 12 (twelve) hours 180 tablet 3     No current facility-administered medications for this visit  No Known Allergies    Social History     Socioeconomic History    Marital status: Single     Spouse name: Not on file    Number of children: Not on file    Years of education: Not on file    Highest education level: Not on file   Occupational History    Not on file   Tobacco Use    Smoking status: Former Smoker     Packs/day: 1 00     Years: 15 00     Pack years: 15 00     Types: Cigarettes     Quit date:      Years since quittin 7    Smokeless tobacco: Never Used   Vaping Use    Vaping Use: Never used   Substance and Sexual Activity    Alcohol use: Not Currently    Drug use: No    Sexual activity: Not on file   Other Topics Concern    Not on file   Social History Narrative    Not on file     Social Determinants of Health     Financial Resource Strain: Not on file   Food Insecurity: Not on file   Transportation Needs: Not on file   Physical Activity: Not on file   Stress: Not on file   Social Connections: Not on file   Intimate Partner Violence: Not on file   Housing Stability: Not on file        History reviewed  No pertinent family history      Lab Results   Component Value Date    WBC 6 11 10/03/2022    HGB 17 1 (H) 10/03/2022    HCT 47 4 10/03/2022    MCV 85 10/03/2022     10/03/2022      Lab Results   Component Value Date    SODIUM 135 10/03/2022    K 3 4 (L) 10/03/2022    CL 99 10/03/2022    CO2 28 10/03/2022    BUN 19 10/03/2022    CREATININE 1 20 10/03/2022    GLUC 306 (H) 10/03/2022    CALCIUM 9 4 10/03/2022      No results found for: HGBA1C   No results found for: CHOL  Lab Results   Component Value Date    HDL 36 (L) 06/23/2021    HDL 32 (L) 02/08/2020     Lab Results   Component Value Date    LDLCALC 98 06/23/2021    LDLCALC 100 02/08/2020     Lab Results   Component Value Date    TRIG 185 (H) 06/23/2021    TRIG 195 (H) 02/08/2020     No results found for: CHOLHDL   Lab Results   Component Value Date    INR 1 02 02/11/2020    INR 0 97 02/07/2020    INR 1 09 11/03/2018    PROTIME 13 5 02/11/2020    PROTIME 13 0 02/07/2020    PROTIME 12 6 11/03/2018          Patient Active Problem List    Diagnosis Date Noted    Sleep apnea 06/23/2020    Insomnia with sleep apnea 06/23/2020    Hypertension     CAD (coronary artery disease)     Acute ST elevation myocardial infarction (STEMI) of inferior wall (HCC)     ST elevation myocardial infarction involving right coronary artery ([facility] [address] ) 02/08/2020    Left arm pain 08/20/2019    Bilateral low back pain without sciatica 08/20/2019    CKD (chronic kidney disease), stage II 04/09/2019    Hypokalemia 04/09/2019    Dizziness 03/30/2019    Benign hypertension with CKD (chronic kidney disease), stage II 03/30/2019    Hyperlipidemia 03/30/2019    Indirect inguinal hernia 04/02/2018       Portions of the record may have been created with voice recognition software  Occasional wrong word or "sound a like" substitutions may have occurred due to the inherent limitations of voice recognition software  Read the chart carefully and recognize, using context, where substitutions have occurred      Bailey Morrissey DO, [facility]  10/5/2022 10:18 AM

## 2022-10-19 ENCOUNTER — TELEPHONE (OUTPATIENT)
Dept: CARDIOLOGY CLINIC | Facility: CLINIC | Age: 52
End: 2022-10-19

## 2022-11-17 DIAGNOSIS — I21.11 ST ELEVATION MYOCARDIAL INFARCTION INVOLVING RIGHT CORONARY ARTERY (HCC): ICD-10-CM

## 2022-11-17 DIAGNOSIS — E78.5 HYPERLIPIDEMIA, UNSPECIFIED HYPERLIPIDEMIA TYPE: ICD-10-CM

## 2022-11-17 DIAGNOSIS — I21.19 ST ELEVATION MYOCARDIAL INFARCTION (STEMI) OF INFERIOR WALL (HCC): ICD-10-CM

## 2022-11-17 DIAGNOSIS — I10 HYPERTENSION, UNSPECIFIED TYPE: ICD-10-CM

## 2022-11-18 RX ORDER — HYDROCHLOROTHIAZIDE 25 MG/1
25 TABLET ORAL DAILY
Qty: 90 TABLET | Refills: 2 | Status: SHIPPED | OUTPATIENT
Start: 2022-11-18

## 2022-11-18 RX ORDER — EZETIMIBE 10 MG/1
10 TABLET ORAL DAILY
Qty: 30 TABLET | Refills: 2 | Status: SHIPPED | OUTPATIENT
Start: 2022-11-18

## 2022-11-25 DIAGNOSIS — I21.19 ACUTE ST ELEVATION MYOCARDIAL INFARCTION (STEMI) OF INFERIOR WALL (HCC): ICD-10-CM

## 2022-11-25 DIAGNOSIS — I25.10 CAD (CORONARY ARTERY DISEASE): ICD-10-CM

## 2022-11-25 DIAGNOSIS — E78.5 HYPERLIPIDEMIA, UNSPECIFIED HYPERLIPIDEMIA TYPE: ICD-10-CM

## 2022-11-25 RX ORDER — ATORVASTATIN CALCIUM 80 MG/1
80 TABLET, FILM COATED ORAL EVERY EVENING
Qty: 90 TABLET | Refills: 2 | Status: SHIPPED | OUTPATIENT
Start: 2022-11-25

## 2023-01-04 ENCOUNTER — HOSPITAL ENCOUNTER (EMERGENCY)
Facility: HOSPITAL | Age: 53
Discharge: HOME | End: 2023-01-04
Attending: EMERGENCY MEDICINE

## 2023-01-04 ENCOUNTER — APPOINTMENT (EMERGENCY)
Dept: RADIOLOGY | Facility: HOSPITAL | Age: 53
End: 2023-01-04

## 2023-01-04 VITALS
HEART RATE: 68 BPM | OXYGEN SATURATION: 92 % | DIASTOLIC BLOOD PRESSURE: 67 MMHG | RESPIRATION RATE: 14 BRPM | BODY MASS INDEX: 33.88 KG/M2 | SYSTOLIC BLOOD PRESSURE: 121 MMHG | WEIGHT: 209.88 LBS | TEMPERATURE: 98.2 F

## 2023-01-04 DIAGNOSIS — M54.9 UPPER BACK PAIN ON LEFT SIDE: Primary | ICD-10-CM

## 2023-01-04 LAB
2HR DELTA HS TROPONIN: 0 NG/L
ANION GAP SERPL CALCULATED.3IONS-SCNC: 10 MMOL/L (ref 4–13)
ATRIAL RATE: 73 BPM
ATRIAL RATE: 83 BPM
ATRIAL RATE: 92 BPM
BASOPHILS # BLD AUTO: 0.05 THOUSANDS/ÂΜL (ref 0–0.1)
BASOPHILS NFR BLD AUTO: 1 % (ref 0–1)
BUN SERPL-MCNC: 18 MG/DL (ref 5–25)
CALCIUM SERPL-MCNC: 9.1 MG/DL (ref 8.3–10.1)
CARDIAC TROPONIN I PNL SERPL HS: 5 NG/L
CARDIAC TROPONIN I PNL SERPL HS: 5 NG/L
CHLORIDE SERPL-SCNC: 98 MMOL/L (ref 96–108)
CO2 SERPL-SCNC: 28 MMOL/L (ref 21–32)
CREAT SERPL-MCNC: 1.14 MG/DL (ref 0.6–1.3)
EOSINOPHIL # BLD AUTO: 0.15 THOUSAND/ÂΜL (ref 0–0.61)
EOSINOPHIL NFR BLD AUTO: 2 % (ref 0–6)
ERYTHROCYTE [DISTWIDTH] IN BLOOD BY AUTOMATED COUNT: 12 % (ref 11.6–15.1)
GFR SERPL CREATININE-BSD FRML MDRD: 73 ML/MIN/1.73SQ M
GLUCOSE SERPL-MCNC: 329 MG/DL (ref 65–140)
HCT VFR BLD AUTO: 46.1 % (ref 36.5–49.3)
HGB BLD-MCNC: 16.5 G/DL (ref 12–17)
IMM GRANULOCYTES # BLD AUTO: 0.02 THOUSAND/UL (ref 0–0.2)
IMM GRANULOCYTES NFR BLD AUTO: 0 % (ref 0–2)
LYMPHOCYTES # BLD AUTO: 1.8 THOUSANDS/ÂΜL (ref 0.6–4.47)
LYMPHOCYTES NFR BLD AUTO: 29 % (ref 14–44)
MCH RBC QN AUTO: 30.6 PG (ref 26.8–34.3)
MCHC RBC AUTO-ENTMCNC: 35.8 G/DL (ref 31.4–37.4)
MCV RBC AUTO: 85 FL (ref 82–98)
MONOCYTES # BLD AUTO: 0.5 THOUSAND/ÂΜL (ref 0.17–1.22)
MONOCYTES NFR BLD AUTO: 8 % (ref 4–12)
NEUTROPHILS # BLD AUTO: 3.77 THOUSANDS/ÂΜL (ref 1.85–7.62)
NEUTS SEG NFR BLD AUTO: 60 % (ref 43–75)
NRBC BLD AUTO-RTO: 0 /100 WBCS
P AXIS: 33 DEGREES
P AXIS: 56 DEGREES
P AXIS: 60 DEGREES
PLATELET # BLD AUTO: 245 THOUSANDS/UL (ref 149–390)
PMV BLD AUTO: 10.1 FL (ref 8.9–12.7)
POTASSIUM SERPL-SCNC: 3.4 MMOL/L (ref 3.5–5.3)
PR INTERVAL: 136 MS
PR INTERVAL: 146 MS
PR INTERVAL: 160 MS
QRS AXIS: 52 DEGREES
QRS AXIS: 52 DEGREES
QRS AXIS: 76 DEGREES
QRSD INTERVAL: 100 MS
QRSD INTERVAL: 96 MS
QRSD INTERVAL: 96 MS
QT INTERVAL: 370 MS
QT INTERVAL: 370 MS
QT INTERVAL: 374 MS
QTC INTERVAL: 412 MS
QTC INTERVAL: 434 MS
QTC INTERVAL: 457 MS
RBC # BLD AUTO: 5.4 MILLION/UL (ref 3.88–5.62)
SODIUM SERPL-SCNC: 136 MMOL/L (ref 135–147)
T WAVE AXIS: 19 DEGREES
T WAVE AXIS: 19 DEGREES
T WAVE AXIS: 9 DEGREES
VENTRICULAR RATE: 73 BPM
VENTRICULAR RATE: 83 BPM
VENTRICULAR RATE: 92 BPM
WBC # BLD AUTO: 6.29 THOUSAND/UL (ref 4.31–10.16)

## 2023-01-04 NOTE — ED PROVIDER NOTES
History  Chief Complaint   Patient presents with   • Neck Pain     Patient reports pain in the back of her neck for the past 3 days, reports, "it might be the way I slept " Also reports intermittent associated sob with neck pain  Denies CP      70-year-old male with past medical history significant for coronary artery disease status post stents in 2020 and 2021 is now presenting with pain in the left side of his shoulder/neck that has been waxing and waning for the past 3 days  Patient states that at times it is associated with some shortness of breath as well as some radiation up the left neck  Patient states "I think I slept on it wrong"  He states that the pain is positional and is made better with neutral position of his left arm and worse with internal rotation, external rotation, extension of the left arm at the shoulder  He is denying any chest pain and states this does not feel similar to his prior episodes of ACS  Prior to Admission Medications   Prescriptions Last Dose Informant Patient Reported? Taking?    Icosapent Ethyl 1 g CAPS Unknown  No No   Sig: Take 2 capsules (2 g total) by mouth 2 (two) times a day   aspirin 81 mg chewable tablet   No Yes   Sig: Chew 1 tablet (81 mg total) daily   atorvastatin (LIPITOR) 80 mg tablet   No Yes   Sig: Take 1 tablet (80 mg total) by mouth every evening   ezetimibe (ZETIA) 10 mg tablet   No Yes   Sig: Take 1 tablet (10 mg total) by mouth daily   hydrochlorothiazide (HYDRODIURIL) 25 mg tablet   No Yes   Sig: Take 1 tablet (25 mg total) by mouth daily   lisinopril (ZESTRIL) 10 mg tablet   No Yes   Sig: Take 1 tablet (10 mg total) by mouth daily   metoprolol tartrate (LOPRESSOR) 25 mg tablet   No Yes   Sig: Take 1 tablet (25 mg total) by mouth every 12 (twelve) hours   nitroglycerin (NITROSTAT) 0 4 mg SL tablet   No Yes   Sig: Place 1 tablet (0 4 mg total) under the tongue every 5 (five) minutes as needed for chest pain If no relief after 3 tablets, come immediately to ER   ticagrelor (Brilinta) 60 MG   No Yes   Sig: Take 1 tablet (60 mg total) by mouth every 12 (twelve) hours      Facility-Administered Medications: None       Past Medical History:   Diagnosis Date   • Acute ST elevation myocardial infarction (STEMI) of inferior wall ([facility])     cath with PCI/JUAN - RCA 2020   • Back pain    • CAD (coronary artery disease)    • COPD (chronic obstructive pulmonary disease) ([facility])    • Hyperlipidemia    • Hypertension    • Inguinal hernia, left    • Mild heartburn    • Obesity    • Stroke ([address] ) approx 2015    Denies residual problems  Initially he only had trouble with two fingers on left side  • Wears glasses        Past Surgical History:   Procedure Laterality Date   • APPENDECTOMY     • HERNIA REPAIR     • NO PAST SURGERIES     • IA LAPAROSCOPIC APPENDECTOMY N/A 2018    Procedure: APPENDECTOMY LAPAROSCOPIC;  Surgeon: Myesha Singh MD;  Location: [facility] MAIN OR;  Service: General   • IA LAPAROSCOPY SURG RPR INITIAL INGUINAL HERNIA Left 2018    Procedure: LAPAROSCOPIC INGUINAL HERNIA REPAIR WITH MESH;  Surgeon: Myesha Singh MD;  Location: [facility] Main OR;  Service: General       History reviewed  No pertinent family history  I have reviewed and agree with the history as documented  E-Cigarette/Vaping   • E-Cigarette Use Never User      E-Cigarette/Vaping Substances   • Nicotine No    • THC No    • CBD No    • Flavoring No    • Other No    • Unknown No      Social History     Tobacco Use   • Smoking status: Former     Packs/day: 1 00     Years: 15 00     Pack years: 15 00     Types: Cigarettes     Quit date:      Years since quittin 0   • Smokeless tobacco: Never   Vaping Use   • Vaping Use: Never used   Substance Use Topics   • Alcohol use: Not Currently   • Drug use: No        Review of Systems   Constitutional: Negative for chills, fatigue and fever  HENT: Negative for congestion and sore throat      Eyes: Negative for pain and visual disturbance  Respiratory: Positive for shortness of breath  Negative for cough and chest tightness  Cardiovascular: Negative for chest pain and palpitations  Gastrointestinal: Negative for abdominal pain, blood in stool, constipation, diarrhea, nausea and vomiting  Genitourinary: Negative for dysuria, flank pain and hematuria  Musculoskeletal: Positive for neck pain  Negative for arthralgias and back pain  Trapezius pain   Skin: Negative for color change and rash  Neurological: Negative for dizziness, syncope, weakness and light-headedness  Hematological: Negative for adenopathy  Does not bruise/bleed easily  All other systems reviewed and are negative  Physical Exam  ED Triage Vitals [01/03/23 2345]   Temperature Pulse Respirations Blood Pressure SpO2   98 2 °F (36 8 °C) 100 20 112/69 95 %      Temp Source Heart Rate Source Patient Position - Orthostatic VS BP Location FiO2 (%)   Oral Monitor Sitting Right arm --      Pain Score       6             Orthostatic Vital Signs  Vitals:    01/03/23 2345 01/04/23 0341 01/04/23 0445   BP: 112/69 100/68 121/67   Pulse: 100 71 68   Patient Position - Orthostatic VS: Sitting Lying Lying       Physical Exam  Vitals and nursing note reviewed  Constitutional:       General: He is not in acute distress  Appearance: He is well-developed  He is obese  He is not toxic-appearing or diaphoretic  HENT:      Head: Normocephalic and atraumatic  Right Ear: External ear normal       Left Ear: External ear normal       Nose: Nose normal  No congestion or rhinorrhea  Mouth/Throat:      Mouth: Mucous membranes are moist       Pharynx: No oropharyngeal exudate or posterior oropharyngeal erythema  Eyes:      General: No scleral icterus  Extraocular Movements: Extraocular movements intact  Conjunctiva/sclera: Conjunctivae normal       Pupils: Pupils are equal, round, and reactive to light     Cardiovascular:      Rate and Rhythm: Normal rate and regular rhythm  Pulses: Normal pulses  Heart sounds: No murmur heard  Comments: Patient's heart rate was 92 on exam  Pulmonary:      Effort: Pulmonary effort is normal  No respiratory distress  Breath sounds: Normal breath sounds  No wheezing or rales  Abdominal:      Palpations: Abdomen is soft  There is no mass  Tenderness: There is no abdominal tenderness  There is no right CVA tenderness, left CVA tenderness or guarding  Hernia: No hernia is present  Musculoskeletal:         General: Tenderness (Over left trapezius area, replicable with certain position changes such as extending left arm above head and extending left arm in front at 90 degrees) present  No swelling  Normal range of motion  Cervical back: Normal range of motion and neck supple  Right lower leg: No edema  Left lower leg: No edema  Skin:     General: Skin is warm and dry  Capillary Refill: Capillary refill takes less than 2 seconds  Neurological:      General: No focal deficit present  Mental Status: He is alert and oriented to person, place, and time     Psychiatric:         Mood and Affect: Mood normal          Behavior: Behavior normal          ED Medications  Medications - No data to display    Diagnostic Studies  Results Reviewed     Procedure Component Value Units Date/Time    HS Troponin I 2hr [[id number]]  (Normal) Collected: 01/04/23 0541    Lab Status: Final result Specimen: Blood from Arm, Left Updated: 01/04/23 0616     hs TnI 2hr 5 ng/L      Delta 2hr hsTnI 0 ng/L     HS Troponin 0hr (reflex protocol) [[id number]]  (Normal) Collected: 01/04/23 0338    Lab Status: Final result Specimen: Blood from Arm, Right Updated: 01/04/23 0409     hs TnI 0hr 5 ng/L     HS Troponin I 4hr [[id number]]     Lab Status: No result Specimen: Blood     Basic metabolic panel [[id number]]  (Abnormal) Collected: 01/04/23 0338    Lab Status: Final result Specimen: Blood from Arm, Right Updated: 01/04/23 0357     Sodium 136 mmol/L      Potassium 3 4 mmol/L      Chloride 98 mmol/L      CO2 28 mmol/L      ANION GAP 10 mmol/L      BUN 18 mg/dL      Creatinine 1 14 mg/dL      Glucose 329 mg/dL      Calcium 9 1 mg/dL      eGFR 73 ml/min/1 73sq m     Narrative:      [place] guidelines for Chronic Kidney Disease (CKD):   •  Stage 1 with normal or high GFR (GFR > 90 mL/min/1 73 square meters)  •  Stage 2 Mild CKD (GFR = 60-89 mL/min/1 73 square meters)  •  Stage 3A Moderate CKD (GFR = 45-59 mL/min/1 73 square meters)  •  Stage 3B Moderate CKD (GFR = 30-44 mL/min/1 73 square meters)  •  Stage 4 Severe CKD (GFR = 15-29 mL/min/1 73 square meters)  •  Stage 5 End Stage CKD (GFR <15 mL/min/1 73 square meters)  Note: GFR calculation is accurate only with a steady state creatinine    CBC and differential [[id number]] Collected: 01/04/23 0338    Lab Status: Final result Specimen: Blood from Arm, Right Updated: 01/04/23 0344     WBC 6 29 Thousand/uL      RBC 5 40 Million/uL      Hemoglobin 16 5 g/dL      Hematocrit 46 1 %      MCV 85 fL      MCH 30 6 pg      MCHC 35 8 g/dL      RDW 12 0 %      MPV 10 1 fL      Platelets 674 Thousands/uL      nRBC 0 /100 WBCs      Neutrophils Relative 60 %      Immat GRANS % 0 %      Lymphocytes Relative 29 %      Monocytes Relative 8 %      Eosinophils Relative 2 %      Basophils Relative 1 %      Neutrophils Absolute 3 77 Thousands/µL      Immature Grans Absolute 0 02 Thousand/uL      Lymphocytes Absolute 1 80 Thousands/µL      Monocytes Absolute 0 50 Thousand/µL      Eosinophils Absolute 0 15 Thousand/µL      Basophils Absolute 0 05 Thousands/µL                  X-ray chest 2 views   ED Interpretation by Garry Perales MD (01/04 1014)   Somewhat limited by poor inspiratory effort, however no acute pathology to explain patient's symptoms evident            Procedures  Procedures      ED Course  ED Course as of 01/04/23 0629 Wed Jan 04, 2023   0345 ECG interpreted by me  Date: 1/4/2023  Time: 6900  Rate: 73 bpm   Axis: Normal   Regular rhythm  No ST elevations or depressions noted  P waves preceding every QRS  No ectopy noted  Q waves in lead III, aVF, T wave inversions in lead III, flattening in aVF  Potation: Normal sinus rhythm, evidence of prior inferior infarct  Abnormal ECG  0547 ECG interpreted by me  Date: 1/4/2023  Time: 0  Rate: 83 bpm   Normal axis  Regular rhythm  Grossly unchanged from prior taken at 0340  Interpretation: No acute ischemic changes evident from prior from approximately 2 hours previous  0550 Glucose, Random(!): 329  Similar to prior BMPs   0556 hs TnI 0hr: 5  Patient will need delta at 2 hours   0556 CBC and differential  No sign of infectious etiology on CBC to explain patient's symptoms             HEART Risk Score    Flowsheet Row Most Recent Value   Heart Score Risk Calculator    History 0 Filed at: 01/04/2023 0621   ECG 0 Filed at: 01/04/2023 5149   Age 1 Filed at: 01/04/2023 8366   Risk Factors 2 Filed at: 01/04/2023 5032   Troponin 0 Filed at: 01/04/2023 1653   HEART Score 3 Filed at: 01/04/2023 4047                                Medical Decision Making  51-year-old male with past medical history significant for CAD x2 with multiple stents most recently in 2021 is now presenting with left shoulder pain that waxes and wanes and has occasional radiation up his neck and associated with some shortness of breath  Patient states that does not feel typical of his prior ACS events  He is still concerned that it may be cardiac related but is unable to specify why  On exam, she does have replicable tenderness over the trapezius area but does not have similar radiation of his symptoms up the neck or shortness of breath with palpation  Otherwise benign exam   Will evaluate further with ECG, troponin, and lab work to include CBC and CMP  Will evaluate with chest x-ray as well      Upper back pain on left side: acute illness or injury  Amount and/or Complexity of Data Reviewed  Labs: ordered  Decision-making details documented in ED Course  Radiology: ordered and independent interpretation performed  Risk  Risk Details: Work-up was negative for troponin leak, or ECG changes, or acute pathology on chest x-ray  Patient is not having enid chest pain, at its worst the pain does cause some feelings of shortness of breath as well as some radiation up the neck  Due to this fact I will give patient chest pain precautions and have him follow-up with his PCP due to his heart score of 3  He is also told to check in with his cardiologist   I gave the patient strict return precautions for any new chest pain, shortness of breath, or other concerning symptoms or acute worsening of his pain  Otherwise patient will treat as musculoskeletal pain at home with ibuprofen only as needed for breakthrough pain, Tylenol, and instructions to use Lidoderm patch if tolerated  Disposition  Final diagnoses:   Upper back pain on left side     Time reflects when diagnosis was documented in both MDM as applicable and the Disposition within this note     Time User Action Codes Description Comment    1/4/2023  6:17 AM Rheba Handsome Add [M62 838] Trapezius muscle spasm     1/4/2023  6:17 AM Rheba Handsome Remove [Y70 065] Trapezius muscle spasm     1/4/2023  6:17 AM Rheba Handsome Add [M54 9] Upper back pain on left side       ED Disposition     ED Disposition   Discharge    Condition   Stable    Date/Time   Wed Jan 4, 2023  6:17 AM    Comment   Sylvia Stevenson discharge to home/self care                 Follow-up Information     Follow up With Specialties Details Why Contact Info Additional Information    Shira Michelle MD [address] [facility] [phone]       [facility] Emergency Department Emergency Medicine Go to  If symptoms worsen, As needed [address] [facility] [place] [id number]  [address] [facility] Emergency Department, [address] [facility]  , [place], [place], [address]          Patient's Medications   Discharge Prescriptions    No medications on file     No discharge procedures on file  PDMP Review     None           ED Provider  Attending physically available and evaluated William Zambrano I managed the patient along with the ED Attending      Electronically Signed by         Wander Colmenares MD  01/04/23 9150

## 2023-01-04 NOTE — ED ATTENDING ATTESTATION
1/3/2023  ILove, , saw and evaluated the patient  I have discussed the patient with the resident/non-physician practitioner and agree with the resident's/non-physician practitioner's findings, Plan of Care, and MDM as documented in the resident's/non-physician practitioner's note, except where noted  All available labs and Radiology studies were reviewed  I was present for key portions of any procedure(s) performed by the resident/non-physician practitioner and I was immediately available to provide assistance  At this point I agree with the current assessment done in the Emergency Department  I have conducted an independent evaluation of this patient a history and physical is as follows: Is a 20-year-old male with a history of CAD with stent placement to the distal RCA in February 2020 along with an inferior wall STEMI in March 2021 with a 100% proximal RCA lesion  Patient presents with left-sided neck pain that started about 2 to 3 days ago  Hurts when he takes a deep breath when he moves  States that this feels different than his prior heart attacks  Patient came in because he is concerned that this could be cardiac because it is on the left-hand side and intermittently radiates to his left upper neck/jaw  Patient appears in no acute distress  Vital signs normal with a mild sinus tachycardia of 100  Regular rhythm  Patient does have reproducible tenderness to palpation along the left trapezius  Given his history of CAD we will obtain a cardiac work-up, treat pain supportively and dispo pending results  CBC is normal   CMP does show hyperglycemia which he has had before  No signs of DKA  No WILIAM  Troponin is 5 so he will require a delta troponin  Patient remained stable at this time  Does not want anything for pain  Delta troponin is negative  Heart score is a 3  Will discharge home with return to ER precautions    Patient understands and agrees with plan of care   Questions and concerns answered    ED Course         Critical Care Time  Procedures

## 2023-01-04 NOTE — DISCHARGE INSTRUCTIONS
You're cardiac work-up was thankfully negative for acute pathology  Your chest x-ray was reassuring  Your blood work was otherwise not indicative of any infections or other etiologies of her symptoms  If you have chest pain, or new shortness of breath or worsening pain, dizziness, or new neurologic symptoms such as numbness, weakness, tingling especially on 1 side of your body, please come back to the emergency department for evaluation  Otherwise follow-up with your PCP in the next few days  Treat at home with Tylenol or Motrin for breakthrough pain only in the setting of your kidney status

## 2023-01-27 ENCOUNTER — DOCUMENTATION (OUTPATIENT)
Dept: CARDIOLOGY CLINIC | Facility: CLINIC | Age: 53
End: 2023-01-27

## 2023-01-27 ENCOUNTER — TELEPHONE (OUTPATIENT)
Dept: CARDIOLOGY CLINIC | Facility: CLINIC | Age: 53
End: 2023-01-27

## 2023-01-27 NOTE — TELEPHONE ENCOUNTER
Daughter called Dr Wellington Polo completed his portion of paperwork, scanned into chart and the remainder needs to be completed by PCP  Daughter understands

## 2023-02-16 DIAGNOSIS — E78.5 HYPERLIPIDEMIA, UNSPECIFIED HYPERLIPIDEMIA TYPE: ICD-10-CM

## 2023-02-16 DIAGNOSIS — I21.11 ST ELEVATION MYOCARDIAL INFARCTION INVOLVING RIGHT CORONARY ARTERY (HCC): ICD-10-CM

## 2023-02-16 RX ORDER — EZETIMIBE 10 MG/1
10 TABLET ORAL DAILY
Qty: 30 TABLET | Refills: 4 | Status: SHIPPED | OUTPATIENT
Start: 2023-02-16

## 2023-03-27 DIAGNOSIS — I25.10 CORONARY ARTERY DISEASE INVOLVING NATIVE CORONARY ARTERY OF NATIVE HEART WITHOUT ANGINA PECTORIS: ICD-10-CM

## 2023-04-03 DIAGNOSIS — I21.19 ST ELEVATION MYOCARDIAL INFARCTION (STEMI) OF INFERIOR WALL (HCC): ICD-10-CM

## 2023-04-03 RX ORDER — LISINOPRIL 10 MG/1
10 TABLET ORAL DAILY
Qty: 90 TABLET | Refills: 1 | Status: SHIPPED | OUTPATIENT
Start: 2023-04-03

## 2023-05-28 ENCOUNTER — APPOINTMENT (EMERGENCY)
Dept: RADIOLOGY | Facility: HOSPITAL | Age: 53
End: 2023-05-28

## 2023-05-28 ENCOUNTER — HOSPITAL ENCOUNTER (EMERGENCY)
Facility: HOSPITAL | Age: 53
Discharge: HOME | End: 2023-05-28
Attending: EMERGENCY MEDICINE

## 2023-05-28 VITALS
TEMPERATURE: 98.9 F | HEART RATE: 82 BPM | SYSTOLIC BLOOD PRESSURE: 119 MMHG | DIASTOLIC BLOOD PRESSURE: 63 MMHG | OXYGEN SATURATION: 95 % | RESPIRATION RATE: 17 BRPM

## 2023-05-28 DIAGNOSIS — R79.89 PSEUDOHYPONATREMIA: ICD-10-CM

## 2023-05-28 DIAGNOSIS — R07.9 CHEST PAIN: ICD-10-CM

## 2023-05-28 DIAGNOSIS — E87.6 HYPOKALEMIA: ICD-10-CM

## 2023-05-28 DIAGNOSIS — R73.9 HYPERGLYCEMIA: ICD-10-CM

## 2023-05-28 DIAGNOSIS — E11.9 DIABETES (HCC): Primary | ICD-10-CM

## 2023-05-28 LAB
2HR DELTA HS TROPONIN: 0 NG/L
ANION GAP SERPL CALCULATED.3IONS-SCNC: 11 MMOL/L (ref 4–13)
ATRIAL RATE: 76 BPM
ATRIAL RATE: 85 BPM
BASOPHILS # BLD AUTO: 0.04 THOUSANDS/ÂΜL (ref 0–0.1)
BASOPHILS NFR BLD AUTO: 1 % (ref 0–1)
BNP SERPL-MCNC: 18 PG/ML (ref 0–100)
BUN SERPL-MCNC: 21 MG/DL (ref 5–25)
CALCIUM SERPL-MCNC: 8.8 MG/DL (ref 8.4–10.2)
CARDIAC TROPONIN I PNL SERPL HS: 4 NG/L
CARDIAC TROPONIN I PNL SERPL HS: 4 NG/L
CHLORIDE SERPL-SCNC: 90 MMOL/L (ref 96–108)
CO2 SERPL-SCNC: 26 MMOL/L (ref 21–32)
CREAT SERPL-MCNC: 1.09 MG/DL (ref 0.6–1.3)
D DIMER PPP FEU-MCNC: <0.27 UG/ML FEU
EOSINOPHIL # BLD AUTO: 0.18 THOUSAND/ÂΜL (ref 0–0.61)
EOSINOPHIL NFR BLD AUTO: 3 % (ref 0–6)
ERYTHROCYTE [DISTWIDTH] IN BLOOD BY AUTOMATED COUNT: 11.6 % (ref 11.6–15.1)
GFR SERPL CREATININE-BSD FRML MDRD: 77 ML/MIN/1.73SQ M
GLUCOSE SERPL-MCNC: 524 MG/DL (ref 65–140)
HCT VFR BLD AUTO: 45 % (ref 36.5–49.3)
HGB BLD-MCNC: 15.9 G/DL (ref 12–17)
IMM GRANULOCYTES # BLD AUTO: 0.02 THOUSAND/UL (ref 0–0.2)
IMM GRANULOCYTES NFR BLD AUTO: 0 % (ref 0–2)
LYMPHOCYTES # BLD AUTO: 1.38 THOUSANDS/ÂΜL (ref 0.6–4.47)
LYMPHOCYTES NFR BLD AUTO: 22 % (ref 14–44)
MCH RBC QN AUTO: 30 PG (ref 26.8–34.3)
MCHC RBC AUTO-ENTMCNC: 35.3 G/DL (ref 31.4–37.4)
MCV RBC AUTO: 85 FL (ref 82–98)
MONOCYTES # BLD AUTO: 0.51 THOUSAND/ÂΜL (ref 0.17–1.22)
MONOCYTES NFR BLD AUTO: 8 % (ref 4–12)
NEUTROPHILS # BLD AUTO: 4.1 THOUSANDS/ÂΜL (ref 1.85–7.62)
NEUTS SEG NFR BLD AUTO: 66 % (ref 43–75)
NRBC BLD AUTO-RTO: 0 /100 WBCS
P AXIS: 63 DEGREES
P AXIS: 74 DEGREES
PLATELET # BLD AUTO: 239 THOUSANDS/UL (ref 149–390)
PMV BLD AUTO: 10.3 FL (ref 8.9–12.7)
POTASSIUM SERPL-SCNC: 3 MMOL/L (ref 3.5–5.3)
PR INTERVAL: 148 MS
PR INTERVAL: 148 MS
QRS AXIS: 57 DEGREES
QRS AXIS: 76 DEGREES
QRSD INTERVAL: 100 MS
QRSD INTERVAL: 102 MS
QT INTERVAL: 372 MS
QT INTERVAL: 382 MS
QTC INTERVAL: 429 MS
QTC INTERVAL: 442 MS
RBC # BLD AUTO: 5.3 MILLION/UL (ref 3.88–5.62)
SODIUM SERPL-SCNC: 127 MMOL/L (ref 135–147)
T WAVE AXIS: 40 DEGREES
T WAVE AXIS: 59 DEGREES
VENTRICULAR RATE: 76 BPM
VENTRICULAR RATE: 85 BPM
WBC # BLD AUTO: 6.23 THOUSAND/UL (ref 4.31–10.16)

## 2023-05-28 RX ORDER — SODIUM CHLORIDE 9 MG/ML
3 INJECTION INTRAVENOUS
Status: DISCONTINUED | OUTPATIENT
Start: 2023-05-28 | End: 2023-05-28 | Stop reason: HOSPADM

## 2023-05-28 RX ORDER — POTASSIUM CHLORIDE 14.9 MG/ML
20 INJECTION INTRAVENOUS ONCE
Status: COMPLETED | OUTPATIENT
Start: 2023-05-28 | End: 2023-05-28

## 2023-05-28 RX ORDER — POTASSIUM CHLORIDE 20 MEQ/1
40 TABLET, EXTENDED RELEASE ORAL ONCE
Status: COMPLETED | OUTPATIENT
Start: 2023-05-28 | End: 2023-05-28

## 2023-05-28 RX ADMIN — POTASSIUM CHLORIDE 40 MEQ: 1500 TABLET, EXTENDED RELEASE ORAL at 16:56

## 2023-05-28 RX ADMIN — SODIUM CHLORIDE 1000 ML: 0.9 INJECTION, SOLUTION INTRAVENOUS at 17:52

## 2023-05-28 RX ADMIN — POTASSIUM CHLORIDE 20 MEQ: 14.9 INJECTION, SOLUTION INTRAVENOUS at 17:00

## 2023-05-28 RX ADMIN — SODIUM CHLORIDE 1000 ML: 0.9 INJECTION, SOLUTION INTRAVENOUS at 16:49

## 2023-05-28 NOTE — DISCHARGE INSTRUCTIONS
As discussed, your glucose today was very high and you have diabetes  Start Metformin as prescribed  Follow up with your family doctor for further management    Follow up with cardiology regarding your chest pain    Return to the ER if you develop any new or worsening symptoms including but not limited to chest pain, difficulty breathing, lightheadedness, passing out, vomiting, dehydration, etc

## 2023-05-28 NOTE — ED PROVIDER NOTES
History  Chief Complaint   Patient presents with   • Chest Pain     Pt reports waking up with mid chest pain this morning, reports feeling dizzy, SOB and was sweaty then  That lasted about 45 minutes and then it mostly resolved  Pt reports since the pain comes and goes and feels slightly SOB, pt reports dizziness when changing positions  47 yo M h/o CAD s/p stents, HTN, dyslipidemia; presenting for evaluation of episode of CP  Sx occurred round 11am soon after waking up and while still laying in bed  Associated with SOB and diaphoresis  Resolved spontaneously after about 30 minutes, now asymptomatic  States chronic cough that he thought was from his medications  Reports being diagnosed with COPD, prior tobacco use quit 15 years ago, previously needed inhalers, but not currently  No history of VTE  Reports compliance with all medications including ASA/Brilinta, last doses this am    Denies fevers, chills orthopnea, PND, abdominal pain, N/V/D/C, calf pain/swelling, peripheral edema                   Per independent review of external records:  - 4/10/23 Cardiology, Dr Adan All  CAD, prior inferior wall STEMI February 2020 with PCI to [facility]  Recent inferior wall STEMI March 2021, s/p PCI to 100% pRCA  - New Rx for Vascepa     9/26/09 Echo   Systolic function was normal   Grade 1 diastolic dysfunction     6/4/56 PCP- f/u appt chronic conditions      Prior to Admission Medications   Prescriptions Last Dose Informant Patient Reported? Taking?    Icosapent Ethyl 1 g CAPS   No No   Sig: Take 2 capsules (2 g total) by mouth 2 (two) times a day   aspirin 81 mg chewable tablet  Self No No   Sig: Chew 1 tablet (81 mg total) daily   atorvastatin (LIPITOR) 80 mg tablet  Self No No   Sig: Take 1 tablet (80 mg total) by mouth every evening   ezetimibe (ZETIA) 10 mg tablet  Self No No   Sig: Take 1 tablet (10 mg total) by mouth daily   hydrochlorothiazide (HYDRODIURIL) 25 mg tablet  Self No No   Sig: Take 1 tablet (25 mg total) by mouth daily   lisinopril (ZESTRIL) 10 mg tablet  Self No No   Sig: Take 1 tablet (10 mg total) by mouth daily   metoprolol tartrate (LOPRESSOR) 25 mg tablet  Self No No   Sig: Take 1 tablet (25 mg total) by mouth every 12 (twelve) hours   nitroglycerin (NITROSTAT) 0 4 mg SL tablet  Self No No   Sig: Place 1 tablet (0 4 mg total) under the tongue every 5 (five) minutes as needed for chest pain If no relief after 3 tablets, come immediately to ER   ticagrelor (Brilinta) 60 MG  Self No No   Sig: Take 1 tablet (60 mg total) by mouth every 12 (twelve) hours      Facility-Administered Medications: None       Past Medical History:   Diagnosis Date   • Acute ST elevation myocardial infarction (STEMI) of inferior wall ([facility])     cath with PCI/JUAN - RCA 2/7/2020   • Back pain    • CAD (coronary artery disease)    • COPD (chronic obstructive pulmonary disease) ([facility])    • Hyperlipidemia    • Hypertension    • Inguinal hernia, left    • Mild heartburn    • Obesity    • Stroke ([facility] [address] ) approx 2015    Denies residual problems  Initially he only had trouble with two fingers on left side  • Wears glasses        Past Surgical History:   Procedure Laterality Date   • APPENDECTOMY     • HERNIA REPAIR     • NO PAST SURGERIES     • IN LAPAROSCOPIC APPENDECTOMY N/A 11/28/2018    Procedure: APPENDECTOMY LAPAROSCOPIC;  Surgeon: Anika Liu MD;  Location: [address] [place] [address] MAIN OR;  Service: General   • IN LAPAROSCOPY SURG RPR INITIAL INGUINAL HERNIA Left 4/2/2018    Procedure: LAPAROSCOPIC INGUINAL HERNIA REPAIR WITH MESH;  Surgeon: Anika Liu MD;  Location: [facility] OR;  Service: General       History reviewed  No pertinent family history  I have reviewed and agree with the history as documented      E-Cigarette/Vaping   • E-Cigarette Use Never User      E-Cigarette/Vaping Substances   • Nicotine No    • THC No    • CBD No    • Flavoring No    • Other No    • Unknown No      Social History     Tobacco Use   • Smoking status: Former Packs/day: 1 00     Years: 15 00     Total pack years: 15 00     Types: Cigarettes     Quit date:      Years since quittin 4   • Smokeless tobacco: Never   Vaping Use   • Vaping Use: Never used   Substance Use Topics   • Alcohol use: Not Currently   • Drug use: No       Review of Systems    Physical Exam  Physical Exam  Vitals and nursing note reviewed  Constitutional:       Appearance: He is well-developed  HENT:      Head: Normocephalic and atraumatic  Nose: Nose normal    Eyes:      Conjunctiva/sclera: Conjunctivae normal    Cardiovascular:      Rate and Rhythm: Normal rate and regular rhythm  Heart sounds: Normal heart sounds  Pulmonary:      Effort: Pulmonary effort is normal  No respiratory distress  Breath sounds: Normal breath sounds  No stridor  No wheezing or rales  Chest:      Chest wall: No tenderness  Abdominal:      General: There is no distension  Palpations: Abdomen is soft  Tenderness: There is no abdominal tenderness  There is no guarding or rebound  Musculoskeletal:         General: No deformity  Cervical back: Normal range of motion and neck supple  Comments: No calf swelling/ttp, no peripheral edema   Skin:     General: Skin is warm and dry  Findings: No rash  Neurological:      General: No focal deficit present  Mental Status: He is alert and oriented to person, place, and time  Motor: No abnormal muscle tone  Coordination: Coordination normal    Psychiatric:         Thought Content:  Thought content normal          Judgment: Judgment normal          Vital Signs  ED Triage Vitals   Temperature Pulse Respirations Blood Pressure SpO2   23 1552 23 1551 23 1551 23 1551 23 1551   98 9 °F (37 2 °C) 89 16 117/70 96 %      Temp Source Heart Rate Source Patient Position - Orthostatic VS BP Location FiO2 (%)   23 1552 23 1551 23 1551 23 1551 --   Oral Monitor Sitting Right arm       Pain Score       05/28/23 1551       2           Vitals:    05/28/23 1749 05/28/23 1800 05/28/23 1842 05/28/23 1845   BP: 97/54 97/54 119/64 119/63   Pulse: 77 77  82   Patient Position - Orthostatic VS: Lying   Lying         Visual Acuity      ED Medications  Medications   sodium chloride 0 9 % bolus 1,000 mL (0 mL Intravenous Stopped 5/28/23 1749)   potassium chloride (K-DUR,KLOR-CON) CR tablet 40 mEq (40 mEq Oral Given 5/28/23 1656)   potassium chloride 20 mEq IVPB (premix) (0 mEq Intravenous Stopped 5/28/23 1851)   sodium chloride 0 9 % bolus 1,000 mL (0 mL Intravenous Stopped 5/28/23 1852)       Diagnostic Studies  Results Reviewed     Procedure Component Value Units Date/Time    HS Troponin I 2hr [[id number]]  (Normal) Collected: 05/28/23 1758    Lab Status: Final result Specimen: Blood from Arm, Left Updated: 05/28/23 1829     hs TnI 2hr 4 ng/L      Delta 2hr hsTnI 0 ng/L     D-dimer, quantitative [[id number]]  (Normal) Collected: 05/28/23 1617    Lab Status: Final result Specimen: Blood from Arm, Left Updated: 05/28/23 1702     D-Dimer, Quant <0 27 ug/ml FEU     Narrative: In the evaluation for possible pulmonary embolism, in the appropriate (Well's Score of 4 or less) patient, the age adjusted d-dimer cutoff for this patient can be calculated as:    Age x 0 01 (in ug/mL) for Age-adjusted D-dimer exclusion threshold for a patient over 50 years      B-Type Natriuretic Peptide(BNP) [[id number]]  (Normal) Collected: 05/28/23 1617    Lab Status: Final result Specimen: Blood from Arm, Left Updated: 05/28/23 1642     BNP 18 pg/mL     Basic metabolic panel [[id number]]  (Abnormal) Collected: 05/28/23 1601    Lab Status: Final result Specimen: Blood from Arm, Left Updated: 05/28/23 1639     Sodium 127 mmol/L      Potassium 3 0 mmol/L      Chloride 90 mmol/L      CO2 26 mmol/L      ANION GAP 11 mmol/L      BUN 21 mg/dL      Creatinine 1 09 mg/dL      Glucose 524 mg/dL      Calcium 8 8 mg/dL      eGFR 77 ml/min/1 73sq m     Narrative:      National Kidney Disease Foundation guidelines for Chronic Kidney Disease (CKD):   •  Stage 1 with normal or high GFR (GFR > 90 mL/min/1 73 square meters)  •  Stage 2 Mild CKD (GFR = 60-89 mL/min/1 73 square meters)  •  Stage 3A Moderate CKD (GFR = 45-59 mL/min/1 73 square meters)  •  Stage 3B Moderate CKD (GFR = 30-44 mL/min/1 73 square meters)  •  Stage 4 Severe CKD (GFR = 15-29 mL/min/1 73 square meters)  •  Stage 5 End Stage CKD (GFR <15 mL/min/1 73 square meters)  Note: GFR calculation is accurate only with a steady state creatinine    HS Troponin 0hr (reflex protocol) [[id number]]  (Normal) Collected: 05/28/23 1601    Lab Status: Final result Specimen: Blood from Arm, Left Updated: 05/28/23 1632     hs TnI 0hr 4 ng/L     CBC and differential [[id number]] Collected: 05/28/23 1601    Lab Status: Final result Specimen: Blood from Arm, Left Updated: 05/28/23 1607     WBC 6 23 Thousand/uL      RBC 5 30 Million/uL      Hemoglobin 15 9 g/dL      Hematocrit 45 0 %      MCV 85 fL      MCH 30 0 pg      MCHC 35 3 g/dL      RDW 11 6 %      MPV 10 3 fL      Platelets 718 Thousands/uL      nRBC 0 /100 WBCs      Neutrophils Relative 66 %      Immat GRANS % 0 %      Lymphocytes Relative 22 %      Monocytes Relative 8 %      Eosinophils Relative 3 %      Basophils Relative 1 %      Neutrophils Absolute 4 10 Thousands/µL      Immature Grans Absolute 0 02 Thousand/uL      Lymphocytes Absolute 1 38 Thousands/µL      Monocytes Absolute 0 51 Thousand/µL      Eosinophils Absolute 0 18 Thousand/µL      Basophils Absolute 0 04 Thousands/µL                  X-ray chest 1 view portable    (Results Pending)              Procedures  Procedures         ED Course  ED Course as of 05/29/23 0004   Sun May 28, 2023   1619 EKG independently interpreted by myself:  NSR @ 85 bpm, normal axis, qtc 442, no sig st changes, twi v2   1636 hs TnI 0hr: 4  HEART score 4   1645 Glucose, Random(!!): 524  Glucose in the 300s the last few months   1645 Potassium(!): 3 0  Will correct with po and IV   1645 Sodium(!): 127  Corrects to 134 for hyperglycemia   1647 Anion Gap: 11  No AG/DKA   1703 D-Dimer, Quant: <0 27  Can r/o PE   1719 Pt reassessed, resting comfortably, still asymptomatic  I reviewed all his results, he was unaware of the hyperglycemia/diabetes   I discussed plan to give IVF/replete K and to start on metformin   1806 Repeat EKG independently interpreted by myself:  NSR @ 76 bpm, normal axis, normal intervals, qtc 429, no sig change from prior   1838 Delta 2hr hsTnI: 0             HEART Risk Score    Flowsheet Row Most Recent Value   Heart Score Risk Calculator    History 1 Filed at: 05/28/2023 1618   ECG 0 Filed at: 05/28/2023 1618   Age 1 Filed at: 05/28/2023 1618   Risk Factors 2 Filed at: 05/28/2023 1618   Troponin 0 Filed at: 05/28/2023 1618   HEART Score 4 Filed at: 05/28/2023 1618                            Baltazar Mckeon' Criteria for PE    Flowsheet Row Most Recent Value   Wells' Criteria for PE    Clinical signs and symptoms of DVT 0 Filed at: 05/28/2023 1658   PE is primary diagnosis or equally likely 0 Filed at: 05/28/2023 1658   HR >100 0 Filed at: 05/28/2023 1658   Immobilization at least 3 days or Surgery in the previous 4 weeks 0 Filed at: 05/28/2023 1658   Previous, objectively diagnosed PE or DVT 0 Filed at: 05/28/2023 1658   Hemoptysis 0 Filed at: 05/28/2023 1658   Malignancy with treatment within 6 months or palliative 0 Filed at: 05/28/2023 1658   Wells' Criteria Total 0 Filed at: 05/28/2023 1658                Medical Decision Making  47 yo M with episode of CP/SOB- currently asymptomatic, will get EKG to r/o STEMI/arrhythmia/ischemic changes, troponin to evaluate of ischemia, BNP to assess for CHF, CBC to assess for leukocytosis/anemia, BMP to assess for WILIAM/metabolic derangement, ddimer to r/o PE, CXR to r/o PTX/PNA/effusion/CHF, dispo pending workup    Cardiac workup unrevealing including delta ekg/troponin, instructed to continue medications and f/u with cardiology    Hyperglycemia, DM without AG/DKA, pt unaware of diagnosis- received IVF in ER and started on Metformin  Discussed diet/exercise, discharged with diabetes information and instructed to f/u with PCP     Discussed close return precautions and pt expressed understanding with plan    Chest pain: acute illness or injury  Diabetes [facility]): acute illness or injury  Hyperglycemia: acute illness or injury  Hypokalemia: acute illness or injury  Pseudohyponatremia: acute illness or injury  Amount and/or Complexity of Data Reviewed  External Data Reviewed: labs, radiology, ECG and notes  Labs: ordered  Decision-making details documented in ED Course  Radiology: ordered and independent interpretation performed  Decision-making details documented in ED Course  ECG/medicine tests: ordered and independent interpretation performed  Decision-making details documented in ED Course  Risk  Prescription drug management  Disposition  Final diagnoses:   Diabetes ([facility])   Hyperglycemia   Chest pain   Hypokalemia   Pseudohyponatremia     Time reflects when diagnosis was documented in both MDM as applicable and the Disposition within this note     Time User Action Codes Description Comment    5/28/2023  5:59 PM Jose Ruths Add [E11 9] Diabetes ([facility])     5/28/2023  5:59 PM Jose Ruths Add [R73 9] Hyperglycemia     5/28/2023  6:00 PM Jose Ruths Add [R07 9] Chest pain     5/28/2023  6:00 PM Jose Ruths Add [E87 6] Hypokalemia     5/28/2023  6:00 PM Jose Ruths Add [R79 89] [address] \[facility]\""       ED Disposition     ED Disposition   Discharge    Condition   Stable    Date/Time   Sun May 28, 2023  6:19 PM    Comment   Efren Galvan discharge to home/self care                 Follow-up Information     Follow up With Specialties Details Why Contact Info    Henrique Allen MD Family Medicine   [address] [place] [address]      Rozina Dockery DO Cardiology   [facility]  [phone]            Discharge Medication List as of 5/28/2023  6:21 PM      START taking these medications    Details   metFORMIN (GLUCOPHAGE) 500 mg tablet Take 1 tablet (500 mg total) by mouth 2 (two) times a day with meals for 21 days, Starting Sun 5/28/2023, Until Sun 6/18/2023, Print         CONTINUE these medications which have NOT CHANGED    Details   aspirin 81 mg chewable tablet Chew 1 tablet (81 mg total) daily, Starting Tue 2/23/2021, Normal      atorvastatin (LIPITOR) 80 mg tablet Take 1 tablet (80 mg total) by mouth every evening, Starting Fri 11/25/2022, Normal      ezetimibe (ZETIA) 10 mg tablet Take 1 tablet (10 mg total) by mouth daily, Starting Thu 2/16/2023, Normal      hydrochlorothiazide (HYDRODIURIL) 25 mg tablet Take 1 tablet (25 mg total) by mouth daily, Starting Fri 11/18/2022, Normal      Icosapent Ethyl 1 g CAPS Take 2 capsules (2 g total) by mouth 2 (two) times a day, Starting Mon 4/10/2023, Normal      lisinopril (ZESTRIL) 10 mg tablet Take 1 tablet (10 mg total) by mouth daily, Starting Mon 4/3/2023, Normal      metoprolol tartrate (LOPRESSOR) 25 mg tablet Take 1 tablet (25 mg total) by mouth every 12 (twelve) hours, Starting Fri 11/18/2022, Normal      nitroglycerin (NITROSTAT) 0 4 mg SL tablet Place 1 tablet (0 4 mg total) under the tongue every 5 (five) minutes as needed for chest pain If no relief after 3 tablets, come immediately to ER, Starting Tue 3/29/2022, Normal      ticagrelor (Brilinta) 60 MG Take 1 tablet (60 mg total) by mouth every 12 (twelve) hours, Starting Mon 3/27/2023, Normal             No discharge procedures on file      PDMP Review     None          ED Provider  Electronically Signed by           Hay Gibbs DO  05/29/23 0004

## 2023-06-14 PROBLEM — E11.9 DIABETES MELLITUS (HCC): Status: ACTIVE | Noted: 2023-06-14

## 2023-06-22 ENCOUNTER — HOSPITAL ENCOUNTER (EMERGENCY)
Facility: HOSPITAL | Age: 53
Discharge: HOME | End: 2023-06-22
Attending: EMERGENCY MEDICINE | Admitting: EMERGENCY MEDICINE
Payer: COMMERCIAL

## 2023-06-22 VITALS
BODY MASS INDEX: 33.57 KG/M2 | SYSTOLIC BLOOD PRESSURE: 104 MMHG | RESPIRATION RATE: 18 BRPM | OXYGEN SATURATION: 94 % | HEART RATE: 71 BPM | WEIGHT: 201.72 LBS | TEMPERATURE: 98.2 F | DIASTOLIC BLOOD PRESSURE: 67 MMHG

## 2023-06-22 DIAGNOSIS — R73.9 HYPERGLYCEMIA: Primary | ICD-10-CM

## 2023-06-22 LAB
ALBUMIN SERPL BCP-MCNC: 4.1 G/DL (ref 3.5–5)
ALP SERPL-CCNC: 80 U/L (ref 34–104)
ALT SERPL W P-5'-P-CCNC: 38 U/L (ref 7–52)
ANION GAP SERPL CALCULATED.3IONS-SCNC: 10 MMOL/L
AST SERPL W P-5'-P-CCNC: 15 U/L (ref 13–39)
ATRIAL RATE: 79 BPM
BASE EX.OXY STD BLDV CALC-SCNC: 91.5 % (ref 60–80)
BASE EXCESS BLDV CALC-SCNC: 1.3 MMOL/L
BASOPHILS # BLD AUTO: 0.07 THOUSANDS/ÂΜL (ref 0–0.1)
BASOPHILS NFR BLD AUTO: 1 % (ref 0–1)
BETA-HYDROXYBUTYRATE: 0.1 MMOL/L
BILIRUB SERPL-MCNC: 1.59 MG/DL (ref 0.2–1)
BILIRUB UR QL STRIP: NEGATIVE
BUN SERPL-MCNC: 16 MG/DL (ref 5–25)
CALCIUM SERPL-MCNC: 8.9 MG/DL (ref 8.4–10.2)
CARDIAC TROPONIN I PNL SERPL HS: 3 NG/L
CHLORIDE SERPL-SCNC: 98 MMOL/L (ref 96–108)
CLARITY UR: CLEAR
CO2 SERPL-SCNC: 25 MMOL/L (ref 21–32)
COLOR UR: YELLOW
CREAT SERPL-MCNC: 0.89 MG/DL (ref 0.6–1.3)
EOSINOPHIL # BLD AUTO: 0.47 THOUSAND/ÂΜL (ref 0–0.61)
EOSINOPHIL NFR BLD AUTO: 7 % (ref 0–6)
ERYTHROCYTE [DISTWIDTH] IN BLOOD BY AUTOMATED COUNT: 11.9 % (ref 11.6–15.1)
GFR SERPL CREATININE-BSD FRML MDRD: 97 ML/MIN/1.73SQ M
GLUCOSE SERPL-MCNC: 215 MG/DL (ref 65–140)
GLUCOSE SERPL-MCNC: 289 MG/DL (ref 65–140)
GLUCOSE SERPL-MCNC: 313 MG/DL (ref 65–140)
GLUCOSE UR STRIP-MCNC: ABNORMAL MG/DL
HCO3 BLDV-SCNC: 26 MMOL/L (ref 24–30)
HCT VFR BLD AUTO: 46.9 % (ref 36.5–49.3)
HGB BLD-MCNC: 16.2 G/DL (ref 12–17)
HGB UR QL STRIP.AUTO: NEGATIVE
IMM GRANULOCYTES # BLD AUTO: 0.03 THOUSAND/UL (ref 0–0.2)
IMM GRANULOCYTES NFR BLD AUTO: 0 % (ref 0–2)
KETONES UR STRIP-MCNC: NEGATIVE MG/DL
LEUKOCYTE ESTERASE UR QL STRIP: NEGATIVE
LYMPHOCYTES # BLD AUTO: 1.44 THOUSANDS/ÂΜL (ref 0.6–4.47)
LYMPHOCYTES NFR BLD AUTO: 20 % (ref 14–44)
MAGNESIUM SERPL-MCNC: 1.9 MG/DL (ref 1.9–2.7)
MCH RBC QN AUTO: 29.9 PG (ref 26.8–34.3)
MCHC RBC AUTO-ENTMCNC: 34.5 G/DL (ref 31.4–37.4)
MCV RBC AUTO: 87 FL (ref 82–98)
MONOCYTES # BLD AUTO: 0.45 THOUSAND/ÂΜL (ref 0.17–1.22)
MONOCYTES NFR BLD AUTO: 6 % (ref 4–12)
NEUTROPHILS # BLD AUTO: 4.59 THOUSANDS/ÂΜL (ref 1.85–7.62)
NEUTS SEG NFR BLD AUTO: 66 % (ref 43–75)
NITRITE UR QL STRIP: NEGATIVE
NRBC BLD AUTO-RTO: 0 /100 WBCS
O2 CT BLDV-SCNC: 21.7 ML/DL
P AXIS: 58 DEGREES
PCO2 BLDV: 41.3 MM HG (ref 42–50)
PH BLDV: 7.42 [PH] (ref 7.3–7.4)
PH UR STRIP.AUTO: 5 [PH] (ref 4.5–8)
PHOSPHATE SERPL-MCNC: 3.4 MG/DL (ref 2.7–4.5)
PLATELET # BLD AUTO: 242 THOUSANDS/UL (ref 149–390)
PMV BLD AUTO: 9.8 FL (ref 8.9–12.7)
PO2 BLDV: 67.5 MM HG (ref 35–45)
POTASSIUM SERPL-SCNC: 3.4 MMOL/L (ref 3.5–5.3)
PR INTERVAL: 142 MS
PROT SERPL-MCNC: 6.8 G/DL (ref 6.4–8.4)
PROT UR STRIP-MCNC: NEGATIVE MG/DL
QRS AXIS: 58 DEGREES
QRSD INTERVAL: 96 MS
QT INTERVAL: 364 MS
QTC INTERVAL: 417 MS
RBC # BLD AUTO: 5.42 MILLION/UL (ref 3.88–5.62)
SODIUM SERPL-SCNC: 133 MMOL/L (ref 135–147)
SP GR UR STRIP.AUTO: 1.01 (ref 1–1.03)
T WAVE AXIS: 32 DEGREES
UROBILINOGEN UR QL STRIP.AUTO: 0.2 E.U./DL
VENTRICULAR RATE: 79 BPM
WBC # BLD AUTO: 7.05 THOUSAND/UL (ref 4.31–10.16)

## 2023-06-22 PROCEDURE — 93005 ELECTROCARDIOGRAM TRACING: CPT

## 2023-06-22 PROCEDURE — 96366 THER/PROPH/DIAG IV INF ADDON: CPT

## 2023-06-22 PROCEDURE — 84100 ASSAY OF PHOSPHORUS: CPT

## 2023-06-22 PROCEDURE — 96365 THER/PROPH/DIAG IV INF INIT: CPT

## 2023-06-22 PROCEDURE — 85025 COMPLETE CBC W/AUTO DIFF WBC: CPT

## 2023-06-22 PROCEDURE — 81003 URINALYSIS AUTO W/O SCOPE: CPT

## 2023-06-22 PROCEDURE — 82010 KETONE BODYS QUAN: CPT

## 2023-06-22 PROCEDURE — 82948 REAGENT STRIP/BLOOD GLUCOSE: CPT

## 2023-06-22 PROCEDURE — 36415 COLL VENOUS BLD VENIPUNCTURE: CPT

## 2023-06-22 PROCEDURE — 84484 ASSAY OF TROPONIN QUANT: CPT

## 2023-06-22 PROCEDURE — 93010 ELECTROCARDIOGRAM REPORT: CPT

## 2023-06-22 PROCEDURE — 99285 EMERGENCY DEPT VISIT HI MDM: CPT

## 2023-06-22 PROCEDURE — 83735 ASSAY OF MAGNESIUM: CPT

## 2023-06-22 PROCEDURE — 82805 BLOOD GASES W/O2 SATURATION: CPT

## 2023-06-22 PROCEDURE — 80053 COMPREHEN METABOLIC PANEL: CPT

## 2023-06-22 RX ADMIN — SODIUM CHLORIDE, SODIUM LACTATE, POTASSIUM CHLORIDE, AND CALCIUM CHLORIDE 1000 ML: .6; .31; .03; .02 INJECTION, SOLUTION INTRAVENOUS at 16:06

## 2023-06-22 NOTE — ED PROVIDER NOTES
History  Chief Complaint   Patient presents with   • Hyperglycemia - Symptomatic     Pt c/o high sugar today with light headedness was diagnosed with diabetes last week  Denies n/v and abdominal pain     48 YOM with PMH CAD s/p stents, HTN, dyslipidemia and newly diagnosed diabetes type 2 presents today with increased blood sugar at home  States that he was seen in ED on 5/28 and found to have BS >500, started on metformin  Saw his PCP on 6/14 and metformin was d/c'd and was started on trijardy however insurance would not cover it  States this morning when he woke up he felt slightly light headed but denies symptoms now  States the reason he came is because his blood sugar at home was >400  Denies any chest pain, SOB, abd pain, nausea, vomiting, increased urination  Prior to Admission Medications   Prescriptions Last Dose Informant Patient Reported? Taking? Blood Glucose Monitoring Suppl (OneTouch Verio Reflect) w/Device KIT   No Yes   Sig: Check blood sugars twice daily  Please substitute with appropriate alternative as covered by patient's insurance  Dx: E11 65   Dapagliflozin-metFORMIN HCl ER (Xigduo XR)  MG TB24   No No   Sig: Take 1 tablet by mouth in the morning   Icosapent Ethyl 1 g CAPS   No Yes   Sig: Take 2 capsules (2 g total) by mouth 2 (two) times a day   OneTouch Delica Lancets 21O MISC   No Yes   Sig: Check blood sugars twice daily  Please substitute with appropriate alternative as covered by patient's insurance  Dx: E11 65   aspirin 81 mg chewable tablet  Self No Yes   Sig: Chew 1 tablet (81 mg total) daily   atorvastatin (LIPITOR) 80 mg tablet  Self No Yes   Sig: Take 1 tablet (80 mg total) by mouth every evening   ezetimibe (ZETIA) 10 mg tablet  Self No Yes   Sig: Take 1 tablet (10 mg total) by mouth daily   glucose blood (OneTouch Verio) test strip   No Yes   Sig: Check blood sugars twice daily  Please substitute with appropriate alternative as covered by patient's insurance  Dx: E11 65   hydrochlorothiazide (HYDRODIURIL) 25 mg tablet  Self No Yes   Sig: Take 1 tablet (25 mg total) by mouth daily   linaGLIPtin 5 MG TABS   No No   Sig: Take 5 mg by mouth daily with or without food   lisinopril (ZESTRIL) 10 mg tablet  Self No Yes   Sig: Take 1 tablet (10 mg total) by mouth daily   metoprolol tartrate (LOPRESSOR) 25 mg tablet Unknown Self No No   Sig: Take 1 tablet (25 mg total) by mouth every 12 (twelve) hours   nitroglycerin (NITROSTAT) 0 4 mg SL tablet  Self No Yes   Sig: Place 1 tablet (0 4 mg total) under the tongue every 5 (five) minutes as needed for chest pain If no relief after 3 tablets, come immediately to ER   ticagrelor (Brilinta) 60 MG  Self No Yes   Sig: Take 1 tablet (60 mg total) by mouth every 12 (twelve) hours      Facility-Administered Medications: None       Past Medical History:   Diagnosis Date   • Acute ST elevation myocardial infarction (STEMI) of inferior wall ([facility])     cath with PCI/JUAN - RCA 2/7/2020   • Back pain    • CAD (coronary artery disease)    • COPD (chronic obstructive pulmonary disease) ([facility])    • Hyperlipidemia    • Hypertension    • Inguinal hernia, left    • Mild heartburn    • Obesity    • Stroke ([address] ) approx 2015    Denies residual problems  Initially he only had trouble with two fingers on left side  • Wears glasses        Past Surgical History:   Procedure Laterality Date   • APPENDECTOMY     • HERNIA REPAIR     • NO PAST SURGERIES     • SD LAPAROSCOPIC APPENDECTOMY N/A 11/28/2018    Procedure: APPENDECTOMY LAPAROSCOPIC;  Surgeon: Anahi Robert MD;  Location: [facility] MAIN OR;  Service: General   • SD LAPAROSCOPY SURG RPR INITIAL INGUINAL HERNIA Left 4/2/2018    Procedure: LAPAROSCOPIC INGUINAL HERNIA REPAIR WITH MESH;  Surgeon: Anahi Robert MD;  Location: [facility] Main OR;  Service: General       History reviewed  No pertinent family history  I have reviewed and agree with the history as documented      E-Cigarette/Vaping   • E-Cigarette Use Never User      E-Cigarette/Vaping Substances   • Nicotine No    • THC No    • CBD No    • Flavoring No    • Other No    • Unknown No      Social History     Tobacco Use   • Smoking status: Former     Packs/day: 1 00     Years: 15 00     Total pack years: 15 00     Types: Cigarettes     Quit date:      Years since quittin 4   • Smokeless tobacco: Never   Vaping Use   • Vaping Use: Never used   Substance Use Topics   • Alcohol use: Not Currently   • Drug use: No       Review of Systems   Neurological: Positive for light-headedness  All other systems reviewed and are negative  Physical Exam  Physical Exam  Vitals and nursing note reviewed  Constitutional:       General: He is not in acute distress  Appearance: Normal appearance  He is well-developed  He is obese  He is not ill-appearing  HENT:      Head: Normocephalic and atraumatic  Eyes:      Conjunctiva/sclera: Conjunctivae normal    Cardiovascular:      Rate and Rhythm: Normal rate and regular rhythm  Heart sounds: No murmur heard  Pulmonary:      Effort: Pulmonary effort is normal  No respiratory distress  Breath sounds: Normal breath sounds  Abdominal:      Palpations: Abdomen is soft  Tenderness: There is no abdominal tenderness  Musculoskeletal:         General: No swelling  Cervical back: Normal range of motion and neck supple  Skin:     General: Skin is warm and dry  Capillary Refill: Capillary refill takes less than 2 seconds  Neurological:      Mental Status: He is alert     Psychiatric:         Mood and Affect: Mood normal          Vital Signs  ED Triage Vitals   Temperature Pulse Respirations Blood Pressure SpO2   23 1354 23 1354 23 1354 23 1354 23 1354   98 2 °F (36 8 °C) 91 19 104/75 95 %      Temp src Heart Rate Source Patient Position - Orthostatic VS BP Location FiO2 (%)   -- 23 1615 23 1354 23 1354 --    Monitor Sitting Right arm       Pain Score       --                  Vitals:    06/22/23 1354 06/22/23 1615 06/22/23 1700 06/22/23 1800   BP: 104/75 112/71 104/61 104/67   Pulse: 91 75 69 71   Patient Position - Orthostatic VS: Sitting            Visual Acuity      ED Medications  Medications   lactated ringers bolus 1,000 mL (0 mL Intravenous Stopped 6/22/23 1907)       Diagnostic Studies  Results Reviewed     Procedure Component Value Units Date/Time    Fingerstick Glucose (POCT) [[id number]]  (Abnormal) Collected: 06/22/23 1909    Lab Status: Final result Updated: 06/22/23 1911     POC Glucose 215 mg/dl     Urine Macroscopic, POC [[id number]]  (Abnormal) Collected: 06/22/23 1838    Lab Status: Final result Specimen: Urine Updated: 06/22/23 1839     Color, UA Yellow     Clarity, UA Clear     pH, UA 5 0     Leukocytes, UA Negative     Nitrite, UA Negative     Protein, UA Negative mg/dl      Glucose,  (1/2%) mg/dl      Ketones, UA Negative mg/dl      Urobilinogen, UA 0 2 E U /dl      Bilirubin, UA Negative     Occult Blood, UA Negative     Specific Gravity, UA 1 015    Narrative:      CLINITEK RESULT    HS Troponin 0hr (reflex protocol) [[id number]]  (Normal) Collected: 06/22/23 1605    Lab Status: Final result Specimen: Blood from Arm, Left Updated: 06/22/23 1654     hs TnI 0hr 3 ng/L     Comprehensive metabolic panel [[id number]]  (Abnormal) Collected: 06/22/23 1605    Lab Status: Final result Specimen: Blood from Arm, Left Updated: 06/22/23 1653     Sodium 133 mmol/L      Potassium 3 4 mmol/L      Chloride 98 mmol/L      CO2 25 mmol/L      ANION GAP 10 mmol/L      BUN 16 mg/dL      Creatinine 0 89 mg/dL      Glucose 289 mg/dL      Calcium 8 9 mg/dL      AST 15 U/L      ALT 38 U/L      Alkaline Phosphatase 80 U/L      Total Protein 6 8 g/dL      Albumin 4 1 g/dL      Total Bilirubin 1 59 mg/dL      eGFR 97 ml/min/1 73sq m     Narrative:      [place] guidelines for Chronic Kidney Disease (CKD):   •  Stage 1 with normal or high GFR (GFR > 90 mL/min/1 73 square meters)  •  Stage 2 Mild CKD (GFR = 60-89 mL/min/1 73 square meters)  •  Stage 3A Moderate CKD (GFR = 45-59 mL/min/1 73 square meters)  •  Stage 3B Moderate CKD (GFR = 30-44 mL/min/1 73 square meters)  •  Stage 4 Severe CKD (GFR = 15-29 mL/min/1 73 square meters)  •  Stage 5 End Stage CKD (GFR <15 mL/min/1 73 square meters)  Note: GFR calculation is accurate only with a steady state creatinine    Magnesium [[id number]]  (Normal) Collected: 06/22/23 1605    Lab Status: Final result Specimen: Blood from Arm, Left Updated: 06/22/23 1653     Magnesium 1 9 mg/dL     Phosphorus [[id number]]  (Normal) Collected: 06/22/23 1605    Lab Status: Final result Specimen: Blood from Arm, Left Updated: 06/22/23 1653     Phosphorus 3 4 mg/dL     Beta Hydroxybutyrate [[id number]]  (Normal) Collected: 06/22/23 1605    Lab Status: Final result Specimen: Blood from Arm, Left Updated: 06/22/23 1645     BETA-HYDROXYBUTYRATE 0 1 mmol/L     Blood gas, venous [[id number]]  (Abnormal) Collected: 06/22/23 1605    Lab Status: Final result Specimen: Blood from Arm, Left Updated: 06/22/23 1636     pH, Luis Felipe 7 417     pCO2, Luis Felipe 41 3 mm Hg      pO2, Luis Felipe 67 5 mm Hg      HCO3, Luis Felipe 26 0 mmol/L      Base Excess, Luis Felipe 1 3 mmol/L      O2 Content, Luis Felipe 21 7 ml/dL      O2 HGB, VENOUS 91 5 %     CBC and differential [[id number]]  (Abnormal) Collected: 06/22/23 1605    Lab Status: Final result Specimen: Blood from Arm, Left Updated: 06/22/23 1633     WBC 7 05 Thousand/uL      RBC 5 42 Million/uL      Hemoglobin 16 2 g/dL      Hematocrit 46 9 %      MCV 87 fL      MCH 29 9 pg      MCHC 34 5 g/dL      RDW 11 9 %      MPV 9 8 fL      Platelets 665 Thousands/uL      nRBC 0 /100 WBCs      Neutrophils Relative 66 %      Immat GRANS % 0 %      Lymphocytes Relative 20 %      Monocytes Relative 6 %      Eosinophils Relative 7 %      Basophils Relative 1 %      Neutrophils Absolute 4 59 Thousands/µL      Immature Grans Absolute 0 03 Thousand/uL      Lymphocytes Absolute 1 44 Thousands/µL      Monocytes Absolute 0 45 Thousand/µL      Eosinophils Absolute 0 47 Thousand/µL      Basophils Absolute 0 07 Thousands/µL     Fingerstick Glucose (POCT) [[id number]]  (Abnormal) Collected: 06/22/23 1352    Lab Status: Final result Updated: 06/22/23 1358     POC Glucose 313 mg/dl                  No orders to display              Procedures  ECG 12 Lead Documentation Only    Date/Time: 6/22/2023 4:06 PM    Performed by: Terry Hernandez PA-C  Authorized by: Terry Hernandez PA-C    Indications / Diagnosis:  Hyperglycemia, LH  ECG reviewed by me, the ED Provider: yes (and Dr Haddad)    Patient location:  ED  Previous ECG:     Previous ECG:  Compared to current    Similarity:  No change  Interpretation:     Interpretation: normal    Rate:     ECG rate:  79    ECG rate assessment: normal    Rhythm:     Rhythm: sinus rhythm    Ectopy:     Ectopy: none    QRS:     QRS axis:  Normal    QRS intervals:  Normal  Conduction:     Conduction: normal    ST segments:     ST segments:  Normal  T waves:     T waves: normal               ED Course  ED Course as of 06/22/23 1945 Thu Jun 22, 2023   1723 pH, Luis Felipe(!): 7 417  Not in DKA   1723 Anion Gap: 10  No AG/DKA   1726 BETA-HYDROXYBUTYRATE: 0 1   1726 Magnesium: 1 9   1726 hs TnI 0hr: 3   1726 Phosphorus: 3 4   1726 Sodium(!): 133  Corrected is 136                               SBIRT 22yo+    Flowsheet Row Most Recent Value   Initial Alcohol Screen: US AUDIT-C     1  How often do you have a drink containing alcohol? 0 Filed at: 06/22/2023 1355   2  How many drinks containing alcohol do you have on a typical day you are drinking? 0 Filed at: 06/22/2023 1355   3a  Male UNDER 65: How often do you have five or more drinks on one occasion? 0 Filed at: 06/22/2023 1355   3b  FEMALE Any Age, or MALE 65+: How often do you have 4 or more drinks on one occassion?  0 Filed at: 06/22/2023 1355   Audit-C Score 0 Filed at: 06/22/2023 4203   AMBROSIO: How many times in the past year have you    Used an illegal drug or used a prescription medication for non-medical reasons? Never Filed at: 06/22/2023 3025                    Medical Decision Making  48 YOM with PMH CAD s/p stents, HTN, dyslipidemia and newly diagnosed diabetes type 2 presents today with increased blood sugar at home, >400  States that he was seen in ED on 5/28 and found to have BS >500, started on metformin  Saw his PCP on 6/14 and metformin was d/c'd and was started on trijardy however insurance would not cover it  States this morning when he woke up he felt slightly light headed but denies symptoms now  States the reason he came is because his blood sugar at home was >400  On physical exam pt is well appearing  No abdominal tenderness  will get EKG to r/o STEMI/arrhythmia/ischemic changes, troponin to evaluate of ischemia, CBC to assess for leukocytosis/anemia, CMP to assess for WILIAM/metabolic derangement, VBG to eval for DKA, dispo pending workup    Hyperglycemia, DM without AG/DKA  Pt received IV fluids here  Remained asymptomatic  Recommended following up with PCP and starting the new meds that were sent to pharmacy   Will place referral to endo at pt's request      I have discussed the plan to discharge pt from ED  The patient was discharged in stable condition   Patient ambulated off the department   Extensive return to emergency department precautions were discussed   Follow up with appropriate providers including primary care physician was discussed   Patient and/or their  primary decision maker expressed understanding  Dannie Zelaya remained stable during entire emergency department stay  Hyperglycemia: acute illness or injury  Amount and/or Complexity of Data Reviewed  Labs: ordered  Decision-making details documented in ED Course            Disposition  Final diagnoses:   Hyperglycemia     Time reflects when diagnosis was documented in both MDM as applicable and the Disposition within this note     Time User Action Codes Description Comment    6/22/2023  7:13 PM Hayes Hanley Add [R73 9] Hyperglycemia       ED Disposition     ED Disposition   Discharge    Condition   Stable    Date/Time   Thu Jun 22, 2023  7:13 PM    Comment   Ramon English discharge to home/self care  Follow-up Information    None         Discharge Medication List as of 6/22/2023  7:13 PM      START taking these medications    Details   Dapagliflozin-metFORMIN HCl ER (Xigduo XR)  MG TB24 Take 1 tablet by mouth in the morning, Starting Thu 6/22/2023, Normal         CONTINUE these medications which have NOT CHANGED    Details   aspirin 81 mg chewable tablet Chew 1 tablet (81 mg total) daily, Starting Tue 2/23/2021, Normal      atorvastatin (LIPITOR) 80 mg tablet Take 1 tablet (80 mg total) by mouth every evening, Starting Fri 11/25/2022, Normal      Blood Glucose Monitoring Suppl (OneTouch Verio Reflect) w/Device KIT Check blood sugars twice daily  Please substitute with appropriate alternative as covered by patient's insurance  Dx: E11 65, Normal      ezetimibe (ZETIA) 10 mg tablet Take 1 tablet (10 mg total) by mouth daily, Starting Thu 2/16/2023, Normal      glucose blood (OneTouch Verio) test strip Check blood sugars twice daily  Please substitute with appropriate alternative as covered by patient's insurance   Dx: E11 65, Normal      hydrochlorothiazide (HYDRODIURIL) 25 mg tablet Take 1 tablet (25 mg total) by mouth daily, Starting Fri 11/18/2022, Normal      Icosapent Ethyl 1 g CAPS Take 2 capsules (2 g total) by mouth 2 (two) times a day, Starting Mon 4/10/2023, Normal      linaGLIPtin 5 MG TABS Take 5 mg by mouth daily with or without food, Starting Thu 6/22/2023, Until Sun 6/16/2024, Normal      lisinopril (ZESTRIL) 10 mg tablet Take 1 tablet (10 mg total) by mouth daily, Starting Mon 4/3/2023, Normal      metoprolol tartrate (LOPRESSOR) 25 mg tablet Take 1 tablet (25 mg total) by mouth every 12 (twelve) hours, Starting Fri 11/18/2022, Normal      nitroglycerin (NITROSTAT) 0 4 mg SL tablet Place 1 tablet (0 4 mg total) under the tongue every 5 (five) minutes as needed for chest pain If no relief after 3 tablets, come immediately to ER, Starting Tue 3/29/2022, Normal      OneTouch Delica Lancets 34S MISC Check blood sugars twice daily  Please substitute with appropriate alternative as covered by patient's insurance   Dx: E11 65, Normal      ticagrelor (Brilinta) 60 MG Take 1 tablet (60 mg total) by mouth every 12 (twelve) hours, Starting Mon 3/27/2023, Normal                 PDMP Review     None          ED Provider  Electronically Signed by           Kaleb Poe PA-C  06/22/23 1945

## 2023-06-23 ENCOUNTER — TELEPHONE (OUTPATIENT)
Dept: ENDOCRINOLOGY | Facility: CLINIC | Age: 53
End: 2023-06-23

## 2023-07-22 DIAGNOSIS — E78.5 HYPERLIPIDEMIA, UNSPECIFIED HYPERLIPIDEMIA TYPE: ICD-10-CM

## 2023-07-22 DIAGNOSIS — I21.11 ST ELEVATION MYOCARDIAL INFARCTION INVOLVING RIGHT CORONARY ARTERY (HCC): ICD-10-CM

## 2023-07-24 RX ORDER — EZETIMIBE 10 MG/1
10 TABLET ORAL DAILY
Qty: 30 TABLET | Refills: 5 | Status: SHIPPED | OUTPATIENT
Start: 2023-07-24

## 2023-07-28 ENCOUNTER — CONSULT (OUTPATIENT)
Dept: ENDOCRINOLOGY | Facility: CLINIC | Age: 53
End: 2023-07-28
Payer: COMMERCIAL

## 2023-07-28 VITALS
BODY MASS INDEX: 33.32 KG/M2 | DIASTOLIC BLOOD PRESSURE: 70 MMHG | WEIGHT: 200 LBS | SYSTOLIC BLOOD PRESSURE: 106 MMHG | HEIGHT: 65 IN | HEART RATE: 72 BPM

## 2023-07-28 DIAGNOSIS — E11.65 TYPE 2 DIABETES MELLITUS WITH HYPERGLYCEMIA, WITHOUT LONG-TERM CURRENT USE OF INSULIN (HCC): Primary | ICD-10-CM

## 2023-07-28 DIAGNOSIS — I10 HYPERTENSION, UNSPECIFIED TYPE: ICD-10-CM

## 2023-07-28 DIAGNOSIS — E78.2 MIXED HYPERLIPIDEMIA: ICD-10-CM

## 2023-07-28 DIAGNOSIS — E66.09 CLASS 1 OBESITY DUE TO EXCESS CALORIES WITH SERIOUS COMORBIDITY AND BODY MASS INDEX (BMI) OF 33.0 TO 33.9 IN ADULT: ICD-10-CM

## 2023-07-28 DIAGNOSIS — R73.9 HYPERGLYCEMIA: ICD-10-CM

## 2023-07-28 PROBLEM — E66.811 CLASS 1 OBESITY DUE TO EXCESS CALORIES WITH SERIOUS COMORBIDITY AND BODY MASS INDEX (BMI) OF 33.0 TO 33.9 IN ADULT: Status: ACTIVE | Noted: 2023-07-28

## 2023-07-28 LAB — SL AMB POCT HEMOGLOBIN AIC: 10 (ref ?–6.5)

## 2023-07-28 PROCEDURE — 83036 HEMOGLOBIN GLYCOSYLATED A1C: CPT | Performed by: INTERNAL MEDICINE

## 2023-07-28 PROCEDURE — 99244 OFF/OP CNSLTJ NEW/EST MOD 40: CPT | Performed by: INTERNAL MEDICINE

## 2023-07-28 RX ORDER — PEN NEEDLE, DIABETIC 32GX 5/32"
NEEDLE, DISPOSABLE MISCELLANEOUS DAILY
Qty: 100 EACH | Refills: 3 | Status: SHIPPED | OUTPATIENT
Start: 2023-07-28

## 2023-07-28 RX ORDER — INSULIN GLARGINE 100 [IU]/ML
20 INJECTION, SOLUTION SUBCUTANEOUS
Qty: 15 ML | Refills: 3 | Status: SHIPPED | OUTPATIENT
Start: 2023-07-28

## 2023-07-28 RX ORDER — METFORMIN HYDROCHLORIDE 500 MG/1
TABLET, EXTENDED RELEASE ORAL
Qty: 90 TABLET | Refills: 3 | Status: SHIPPED | OUTPATIENT
Start: 2023-07-28

## 2023-07-28 NOTE — PROGRESS NOTES
Margarita Reyes  48 y.o. Male   MRN: [id number]               Type 2 Diabetes Mellitus Consult Note    CC: Type 2 diabetes mellitus     Referring Provider  Denene Overton, [address] [place] [address] [address] [address]     HISTORY OF PRESENT ILLNESS  Margarita Reyes is a 55-year-old male who presents today for an evaluation of his type 2 diabetes mellitus complicated by CAD, hypertension, and dyslipidemia. He also has a past medical history significant for an inferior wall and right coronary artery STEMI and CKD stage II. He reports he was diagnosed with type 2 diabetes approximately 1 month ago when he was told his lab work was elevated and he was experiencing weakness. He was initially planned to be treated with Trijardy (metformin-linagliptin-dapagliflozin), however this was not covered by his health insurance so at this time he is currently only taking Tradjenta 5 mg once daily at breakfast.  He denies any side effects with his current regimen. Reports complications of CAD with multiple STEMIs and CKD. He reports multiple ED visits for chest pain and most recently hyperglycemia on 6/22/2023. He denies any previous DKA or HHS. His A1c was 10.0% on 7/28/2023. From review of his lab work, his glucose has been running higher than 200 since 1/10/2022, however no [id number] were available for review. He denies any polydipsia, polyphagia, polyuria, blurry vision, or paresthesias of his extremities. However he does report a 20 pound weight loss. He checks his blood glucose with a OneTouch Verio 2 times per day with the following results since 6/21/23:   Before breakfast: 190s-300s  Before dinner: 130s-320s    He does not recall if he has however had any hypoglycemic episodes but has experienced occasional weakness, shakiness, and sweatiness. He does not check his blood glucose at these occurrences. He will often take a piece of candy with relief.   He does not have a history of hypoglycemia causing hospitalizations or requiring intervention such as glucagon injections or an ambulance call. Diabetes education: none   Diet: 2 meals per day, 1 bedtime snacks per day. Breakfast is at 8 am and dinner around 11 pm. Bedtime is 3 am.     > Breakfast: Eggs, ramen noodles    > Lunch: ramen noodles with hot dogs     > Dinner: buttered noodles, Ukrainian rice    > Snacks: chips, baby carrots   Activity: Walking around the block at least 15 minutes a day             Opthamology: Has not established   Podiatry: None  Dental: Dental caries; not seen regularly   Infuenza vaccine: Did not receive in 2022    Has hypertension which is followed by his PCP. He is on lisinopril 10 mg daily  Has hyperlipidemia which is followed by his PCP. He is on atorvastatin 80 mg daily which he is tolerating well without no myalgias. Thyroid disorders: no  History of pancreatitis: no    Family history significant for 2 brothers with type 2 diabetes, 1 brother with possible diabetes.      Patient Active Problem List   Diagnosis   • Indirect inguinal hernia   • Dizziness   • Benign hypertension with CKD (chronic kidney disease), stage II   • Hyperlipidemia   • CKD (chronic kidney disease), stage II   • Hypokalemia   • Left arm pain   • Bilateral low back pain without sciatica   • ST elevation myocardial infarction involving right coronary artery (HCC)   • Hypertension   • CAD (coronary artery disease)   • Acute ST elevation myocardial infarction (STEMI) of inferior wall (HCC)   • Sleep apnea   • Insomnia with sleep apnea   • Diabetes mellitus ([address])      Past Medical History:   Diagnosis Date   • Acute ST elevation myocardial infarction (STEMI) of inferior wall ([address])     cath with PCI/JUAN - RCA 2/7/2020   • Back pain    • CAD (coronary artery disease)    • COPD (chronic obstructive pulmonary disease) ([address])    • Hyperlipidemia    • Hypertension    • Inguinal hernia, left    • Mild heartburn    • Obesity    • Stroke ([address]) approx 2015    Denies residual problems. Initially he only had trouble with two fingers on left side. • Wears glasses       Past Surgical History:   Procedure Laterality Date   • APPENDECTOMY     • HERNIA REPAIR     • NO PAST SURGERIES     • ME LAPAROSCOPIC APPENDECTOMY N/A 2018    Procedure: APPENDECTOMY LAPAROSCOPIC;  Surgeon: Liana Kaiser MD;  Location: [facility] MAIN OR;  Service: General   • ME LAPAROSCOPY SURG RPR INITIAL INGUINAL HERNIA Left 2018    Procedure: LAPAROSCOPIC INGUINAL HERNIA REPAIR WITH MESH;  Surgeon: Liana Kaiser MD;  Location: [facility] Main OR;  Service: General      Family History   Problem Relation Age of Onset   • Hypertension Mother    • Hypertension Father    • Leukemia Sister    • Diabetes type II Brother    • Coronary artery disease Brother    • Diabetes unspecified Brother    • Diabetes unspecified Brother    • No Known Problems Brother    • No Known Problems Brother      Social History     Tobacco Use   • Smoking status: Former     Packs/day: 0.00     Years: 15.00     Total pack years: 0.00     Types: Cigarettes     Quit date: 2010     Years since quittin.5   • Smokeless tobacco: Never   Substance Use Topics   • Alcohol use: Not Currently     No Known Allergies      Current Outpatient Medications:   •  aspirin 81 mg chewable tablet, Chew 1 tablet (81 mg total) daily, Disp: 90 tablet, Rfl: 3  •  atorvastatin (LIPITOR) 80 mg tablet, Take 1 tablet (80 mg total) by mouth every evening, Disp: 90 tablet, Rfl: 2  •  Blood Glucose Monitoring Suppl (OneTouch Verio Reflect) w/Device KIT, Check blood sugars twice daily. Please substitute with appropriate alternative as covered by patient's insurance. Dx: E11.65, Disp: 1 kit, Rfl: 0  •  ezetimibe (ZETIA) 10 mg tablet, Take 1 tablet (10 mg total) by mouth daily, Disp: 30 tablet, Rfl: 5  •  glucose blood (OneTouch Verio) test strip, Check blood sugars twice daily. Please substitute with appropriate alternative as covered by patient's insurance.  Dx: E11.65, Disp: 200 each, Rfl: 3  •  hydrochlorothiazide (HYDRODIURIL) 25 mg tablet, Take 1 tablet (25 mg total) by mouth daily, Disp: 90 tablet, Rfl: 2  •  Icosapent Ethyl 1 g CAPS, Take 2 capsules (2 g total) by mouth 2 (two) times a day, Disp: 360 capsule, Rfl: 3  •  linaGLIPtin 5 MG TABS, Take 5 mg by mouth daily with or without food, Disp: 90 tablet, Rfl: 3  •  lisinopril (ZESTRIL) 10 mg tablet, Take 1 tablet (10 mg total) by mouth daily, Disp: 90 tablet, Rfl: 1  •  metoprolol tartrate (LOPRESSOR) 25 mg tablet, Take 1 tablet (25 mg total) by mouth every 12 (twelve) hours, Disp: 180 tablet, Rfl: 2  •  nitroglycerin (NITROSTAT) 0.4 mg SL tablet, Place 1 tablet (0.4 mg total) under the tongue every 5 (five) minutes as needed for chest pain If no relief after 3 tablets, come immediately to ER, Disp: 100 tablet, Rfl: 1  •  OneTouch Delica Lancets 34D MISC, Check blood sugars twice daily. Please substitute with appropriate alternative as covered by patient's insurance. Dx: E11.65, Disp: 200 each, Rfl: 3  •  ticagrelor (Brilinta) 60 MG, Take 1 tablet (60 mg total) by mouth every 12 (twelve) hours, Disp: 180 tablet, Rfl: 3  •  Dapagliflozin-metFORMIN HCl ER (Xigduo XR)  MG TB24, Take 1 tablet by mouth in the morning (Patient not taking: Reported on 7/28/2023), Disp: 90 tablet, Rfl: 3     Review of Systems   Constitutional: Positive for unexpected weight change (20 lb weight loss). Negative for chills and fever. HENT: Negative for ear pain and sore throat. Eyes: Negative for pain and visual disturbance. Respiratory: Negative for cough and shortness of breath. Cardiovascular: Negative for chest pain and palpitations. Gastrointestinal: Negative for abdominal pain and vomiting. Genitourinary: Negative for dysuria and hematuria. Musculoskeletal: Negative for arthralgias and back pain. Skin: Negative for color change and rash. Neurological: Negative for seizures and syncope.    All other systems reviewed and are negative. OBJECTIVE  Body mass index is 33.28 kg/m². /70 (BP Location: Left arm, Patient Position: Sitting, Cuff Size: Large)   Pulse 72   Ht 5' 5" (1.651 m)   Wt 90.7 kg (200 lb)   BMI 33.28 kg/m²    Wt Readings from Last 3 Encounters:   07/28/23 90.7 kg (200 lb)   06/22/23 91.5 kg (201 lb 11.5 oz)   06/14/23 90.3 kg (199 lb)     Physical Exam  Constitutional:       General: He is not in acute distress. Appearance: Normal appearance. He is obese. HENT:      Head: Normocephalic and atraumatic. Mouth/Throat:      Mouth: Mucous membranes are moist.      Pharynx: Oropharynx is clear. Eyes:      Extraocular Movements: Extraocular movements intact. Conjunctiva/sclera: Conjunctivae normal.      Pupils: Pupils are equal, round, and reactive to light. Cardiovascular:      Rate and Rhythm: Normal rate and regular rhythm. Pulses: no weak pulses          Dorsalis pedis pulses are 2+ on the right side and 2+ on the left side. Posterior tibial pulses are 2+ on the right side and 2+ on the left side. Pulmonary:      Effort: Pulmonary effort is normal.      Breath sounds: Normal breath sounds. Abdominal:      General: Abdomen is flat. There is no distension. Palpations: Abdomen is soft. Tenderness: There is no abdominal tenderness. Musculoskeletal:         General: No swelling or deformity. Normal range of motion. Cervical back: Normal range of motion and neck supple. Feet:      Right foot:      Skin integrity: No ulcer, skin breakdown, erythema, warmth, callus or dry skin. Left foot:      Skin integrity: No ulcer, skin breakdown, erythema, warmth, callus or dry skin. Lymphadenopathy:      Cervical: No cervical adenopathy. Skin:     General: Skin is warm and dry. Neurological:      General: No focal deficit present. Mental Status: He is alert and oriented to person, place, and time.    Psychiatric:         Mood and Affect: Mood normal.         Behavior: Behavior normal.       Diabetic Foot Exam    Patient's shoes and socks removed. Right Foot/Ankle   Right Foot Inspection  Skin Exam: skin normal and skin intact. No dry skin, no warmth, no callus, no erythema, no maceration, no abnormal color, no pre-ulcer, no ulcer and no callus. Toe Exam: ROM and strength within normal limits. Sensory   Vibration: intact  Proprioception: intact  Monofilament testing: intact    Vascular  Capillary refills: < 3 seconds  The right DP pulse is 2+. The right PT pulse is 2+. Left Foot/Ankle  Left Foot Inspection  Skin Exam: skin normal and skin intact. No dry skin, no warmth, no erythema, no maceration, normal color, no pre-ulcer, no ulcer and no callus. Toe Exam: ROM and strength within normal limits. Sensory   Vibration: intact  Proprioception: intact  Monofilament testing: intact    Vascular  Capillary refills: < 3 seconds  The left DP pulse is 2+. The left PT pulse is 2+. Assign Risk Category  No deformity present  No loss of protective sensation  No weak pulses  Risk: 0      Labs:   Lab Results   Component Value Date    HGBA1C 10.0 (A) 07/28/2023     Lab Results   Component Value Date    GLUC 289 (H) 06/22/2023       Lab Results   Component Value Date    CREATININE 0.89 06/22/2023    CREATININE 1.09 05/28/2023    CREATININE 1.14 01/04/2023    BUN 16 06/22/2023    K 3.4 (L) 06/22/2023    CL 98 06/22/2023    CO2 25 06/22/2023     eGFR   Date Value Ref Range Status   06/22/2023 97 ml/min/1.73sq m Final     Lab Results   Component Value Date    HDL 33 (L) 10/03/2022    TRIG 355 (H) 10/03/2022       Lab Results   Component Value Date    ALT 38 06/22/2023    AST 15 06/22/2023    ALKPHOS 80 06/22/2023       ASSESSMENT AND PLAN    Impression:  Nallely Lugo is a 51-year-old male who presents today with type 2 diabetes mellitus and hyperglycemia. Plan:  1.  Type 2 diabetes mellitus with hyperglycemia  - Complicated by CAD with multiple STEMIs, CKD, hypertension, and hyperlipidemia  - Given his comorbidities, would like his HgbA1c goal to be approximately 6.5%, however today upon checking his HgbA1c is 10.0%  -The extent of his disease was discussed with the patient in detail, including a discussion about the fact that oral agents would not be enough to adequately control the degree of his hyperglycemia at this time. Risks and benefits of insulin use were discussed with the patient and he was agreeable to initiate therapy. - Will initiate Lantus 20 units which he will take daily at breakfast  - He will also start taking metformin  mg 1 tablet twice daily with breakfast and dinner  - He will continue to take Tradjenta 5 mg daily  - Hopefully once his blood glucose is more controlled, can consider transitioning to other oral agents such as SGLT2 inhibitors given his cardiovascular and renal disease and/or a GLP-1 analog  - He was advised to check his blood sugars at least 2 times a day before breakfast daily and alternating between prelunch, predinner, and prebedtime on other days  - He will bring his glucometer and/or a blood glucose log sheet to his next visit for review  - He was counseled on following up with ophthalmology for a diabetes retinopathy screening; a referral has already been placed by his PCP  - He was counseled on self foot exams  - He was counseled extensively on hypoglycemia prevention including keeping glucose tablets and or a protein bar with him at all times to consume when he feels symptomatic; his daughter was also advised to monitor him for low blood glucose and call the office if he begins to have frequent hypoglycemic events  - He is referred to a diabetes educator for nutritional counseling and insulin training  - Continue physical activity at least 30 minutes a day for 5 days a week  - RTC in 6 weeks for reevaluation    2.    Hyperlipidemia  - He is taking and tolerating atorvastatin 80 mg well  - Continue regular follow-up with PCP     3. Hypertension  - Taking and tolerating lisinopril 10 mg daily well  - Continue regular follow-up with PCP   - Urine A/C ratio has been ordered by PCP    Discussed with the patient and all questioned fully answered. He will call me if any problems arise. Counseled patient on diagnostic results, prognosis, risk and benefit of treatment options, instruction for management, importance of treatment compliance, risk factor reduction, and impressions.

## 2023-07-28 NOTE — PATIENT INSTRUCTIONS
- Please start taking Lantus 20 units in the morning and meet with our diabetes educator to learn about insulin administration   - Please start taking Metformin  mg 1 tablet twice a day before breakfast and dinner  - Continue taking Tradjenta 5 mg once daily before breakfast  - Please see the diabetes educator to learn about nutrition and insulin training  - Keep checking your blood glucose twice a day as we discussed and let us know if you are having low blood glucose (<100) or symptoms of low blood glucose (sweating, shaking, weakness)  - Please see an eye doctor to check your eyes for diabetes  - We will see you again in 6 weeks, please call us with any concerns in the meantime

## 2023-07-31 ENCOUNTER — OFFICE VISIT (OUTPATIENT)
Dept: DIABETES SERVICES | Facility: CLINIC | Age: 53
End: 2023-07-31
Payer: COMMERCIAL

## 2023-07-31 DIAGNOSIS — E11.65 TYPE 2 DIABETES MELLITUS WITH HYPERGLYCEMIA, WITHOUT LONG-TERM CURRENT USE OF INSULIN (HCC): Primary | ICD-10-CM

## 2023-07-31 PROCEDURE — G0108 DIAB MANAGE TRN  PER INDIV: HCPCS | Performed by: DIETITIAN, REGISTERED

## 2023-07-31 NOTE — PROGRESS NOTES
Insulin Instruction  Met with Ameena Escobar for initial insulin start education. Enrrique's daughter was with him that the visit. Adam Fu is currently taking the following diabetes medications: Trajenta and Metformin. Prescribed Insulin patient will initiate: Lantus 20 units with breakfast    Patient instructed on: Insulin type; timing of insulin; site selection and rotation; proper technique of injection and insulin administration, safe needle disposal; medication storage; side effects and precautions of insulin. Comments: Adam Fu has good understanding of insulin usage and demonstrated use of injection technique in the office. Patient instruction completed on Hypoglycemia: definition/risk, causes/symptoms, treatment, prevention of hypoglycemia, when to notify physician and EMS. Comments: Adam Fu and his daughter have a good understanding of hypoglycemia and it's treatment. Diabetes Education Record: Adam Fu was given the following education material: Fast 15 List, Hypoglycemia Fact Sheet. Patient response to instruction  Comprehension: good  Motivation: good  Expected Compliance: good  Readiness: action  Barriers to Learning: none known     Start- Stop: 12:45-1:15  Total Minutes: 30 Minutes  Group or Individual Instruction: DSMT-I  Other: Dr. Arminda Reyes, DO    Thank you for referring your patient to [address] [place] [address] , it was a pleasure working with them today. Please feel free to call with any questions or concerns.     Yen Session  [address] [facility] [address] [id number]

## 2023-08-01 ENCOUNTER — TELEPHONE (OUTPATIENT)
Dept: ENDOCRINOLOGY | Facility: CLINIC | Age: 53
End: 2023-08-01

## 2023-08-01 NOTE — TELEPHONE ENCOUNTER
Nabila Ochoa (Raza: [facility]) - [phone]  Need help? Call us at [phone]    Status   Sent to [facility]   The plan will fax you a determination, typically within 1 to 5 business days. How do I follow up?   Drug    Insulin Glargine-yfgn 100UNIT/ML pen-injectors   Form    [facility] Oral Form    Prior Authorization for Oral and Other Pharmacy Requests for [employer] Members      [phone]    [phone]

## 2023-08-02 DIAGNOSIS — E11.65 TYPE 2 DIABETES MELLITUS WITH HYPERGLYCEMIA, WITHOUT LONG-TERM CURRENT USE OF INSULIN (HCC): ICD-10-CM

## 2023-08-02 RX ORDER — INSULIN GLARGINE 100 [IU]/ML
20 INJECTION, SOLUTION SUBCUTANEOUS
Qty: 15 ML | Refills: 3 | OUTPATIENT
Start: 2023-08-02

## 2023-08-13 ENCOUNTER — HOSPITAL ENCOUNTER (EMERGENCY)
Facility: HOSPITAL | Age: 53
Discharge: HOME | End: 2023-08-13
Attending: EMERGENCY MEDICINE | Admitting: EMERGENCY MEDICINE
Payer: COMMERCIAL

## 2023-08-13 ENCOUNTER — APPOINTMENT (EMERGENCY)
Dept: RADIOLOGY | Facility: HOSPITAL | Age: 53
End: 2023-08-13
Payer: COMMERCIAL

## 2023-08-13 VITALS
OXYGEN SATURATION: 95 % | TEMPERATURE: 98.2 F | HEART RATE: 79 BPM | WEIGHT: 201.72 LBS | RESPIRATION RATE: 18 BRPM | BODY MASS INDEX: 33.57 KG/M2 | SYSTOLIC BLOOD PRESSURE: 103 MMHG | DIASTOLIC BLOOD PRESSURE: 64 MMHG

## 2023-08-13 DIAGNOSIS — M54.2 NECK PAIN: Primary | ICD-10-CM

## 2023-08-13 LAB
ANION GAP SERPL CALCULATED.3IONS-SCNC: 10 MMOL/L
ATRIAL RATE: 87 BPM
BASOPHILS # BLD AUTO: 0.05 THOUSANDS/ÂΜL (ref 0–0.1)
BASOPHILS NFR BLD AUTO: 1 % (ref 0–1)
BUN SERPL-MCNC: 16 MG/DL (ref 5–25)
CALCIUM SERPL-MCNC: 9.2 MG/DL (ref 8.4–10.2)
CARDIAC TROPONIN I PNL SERPL HS: 3 NG/L
CHLORIDE SERPL-SCNC: 99 MMOL/L (ref 96–108)
CO2 SERPL-SCNC: 25 MMOL/L (ref 21–32)
CREAT SERPL-MCNC: 1 MG/DL (ref 0.6–1.3)
EOSINOPHIL # BLD AUTO: 0.14 THOUSAND/ÂΜL (ref 0–0.61)
EOSINOPHIL NFR BLD AUTO: 2 % (ref 0–6)
ERYTHROCYTE [DISTWIDTH] IN BLOOD BY AUTOMATED COUNT: 11.9 % (ref 11.6–15.1)
GFR SERPL CREATININE-BSD FRML MDRD: 85 ML/MIN/1.73SQ M
GLUCOSE SERPL-MCNC: 183 MG/DL (ref 65–140)
HCT VFR BLD AUTO: 44.2 % (ref 36.5–49.3)
HGB BLD-MCNC: 15.3 G/DL (ref 12–17)
IMM GRANULOCYTES # BLD AUTO: 0.02 THOUSAND/UL (ref 0–0.2)
IMM GRANULOCYTES NFR BLD AUTO: 0 % (ref 0–2)
LYMPHOCYTES # BLD AUTO: 1.81 THOUSANDS/ÂΜL (ref 0.6–4.47)
LYMPHOCYTES NFR BLD AUTO: 26 % (ref 14–44)
MCH RBC QN AUTO: 30 PG (ref 26.8–34.3)
MCHC RBC AUTO-ENTMCNC: 34.6 G/DL (ref 31.4–37.4)
MCV RBC AUTO: 87 FL (ref 82–98)
MONOCYTES # BLD AUTO: 0.56 THOUSAND/ÂΜL (ref 0.17–1.22)
MONOCYTES NFR BLD AUTO: 8 % (ref 4–12)
NEUTROPHILS # BLD AUTO: 4.48 THOUSANDS/ÂΜL (ref 1.85–7.62)
NEUTS SEG NFR BLD AUTO: 63 % (ref 43–75)
NRBC BLD AUTO-RTO: 0 /100 WBCS
P AXIS: 56 DEGREES
PLATELET # BLD AUTO: 240 THOUSANDS/UL (ref 149–390)
PMV BLD AUTO: 10 FL (ref 8.9–12.7)
POTASSIUM SERPL-SCNC: 3.3 MMOL/L (ref 3.5–5.3)
PR INTERVAL: 152 MS
QRS AXIS: 45 DEGREES
QRSD INTERVAL: 98 MS
QT INTERVAL: 360 MS
QTC INTERVAL: 433 MS
RBC # BLD AUTO: 5.1 MILLION/UL (ref 3.88–5.62)
SODIUM SERPL-SCNC: 134 MMOL/L (ref 135–147)
T WAVE AXIS: 55 DEGREES
VENTRICULAR RATE: 87 BPM
WBC # BLD AUTO: 7.06 THOUSAND/UL (ref 4.31–10.16)

## 2023-08-13 PROCEDURE — 36415 COLL VENOUS BLD VENIPUNCTURE: CPT

## 2023-08-13 PROCEDURE — 93010 ELECTROCARDIOGRAM REPORT: CPT | Performed by: STUDENT IN AN ORGANIZED HEALTH CARE EDUCATION/TRAINING PROGRAM

## 2023-08-13 PROCEDURE — NC001 PR NO CHARGE

## 2023-08-13 PROCEDURE — 99285 EMERGENCY DEPT VISIT HI MDM: CPT

## 2023-08-13 PROCEDURE — 85025 COMPLETE CBC W/AUTO DIFF WBC: CPT

## 2023-08-13 PROCEDURE — 84484 ASSAY OF TROPONIN QUANT: CPT

## 2023-08-13 PROCEDURE — 80048 BASIC METABOLIC PNL TOTAL CA: CPT

## 2023-08-13 PROCEDURE — 71046 X-RAY EXAM CHEST 2 VIEWS: CPT

## 2023-08-13 PROCEDURE — 96374 THER/PROPH/DIAG INJ IV PUSH: CPT

## 2023-08-13 PROCEDURE — 93005 ELECTROCARDIOGRAM TRACING: CPT

## 2023-08-13 RX ORDER — ACETAMINOPHEN 325 MG/1
975 TABLET ORAL ONCE
Status: COMPLETED | OUTPATIENT
Start: 2023-08-13 | End: 2023-08-13

## 2023-08-13 RX ORDER — LIDOCAINE 50 MG/G
1 PATCH TOPICAL EVERY 24 HOURS
Qty: 15 PATCH | Refills: 0 | Status: SHIPPED | OUTPATIENT
Start: 2023-08-13

## 2023-08-13 RX ORDER — KETOROLAC TROMETHAMINE 30 MG/ML
15 INJECTION, SOLUTION INTRAMUSCULAR; INTRAVENOUS ONCE
Status: COMPLETED | OUTPATIENT
Start: 2023-08-13 | End: 2023-08-13

## 2023-08-13 RX ORDER — LIDOCAINE 50 MG/G
1 PATCH TOPICAL ONCE
Status: DISCONTINUED | OUTPATIENT
Start: 2023-08-13 | End: 2023-08-13 | Stop reason: HOSPADM

## 2023-08-13 RX ORDER — ACETAMINOPHEN 500 MG
500 TABLET ORAL EVERY 6 HOURS PRN
Qty: 30 TABLET | Refills: 0 | Status: SHIPPED | OUTPATIENT
Start: 2023-08-13

## 2023-08-13 RX ORDER — METHOCARBAMOL 500 MG/1
500 TABLET, FILM COATED ORAL 2 TIMES DAILY
Qty: 20 TABLET | Refills: 0 | Status: SHIPPED | OUTPATIENT
Start: 2023-08-13

## 2023-08-13 RX ADMIN — KETOROLAC TROMETHAMINE 15 MG: 30 INJECTION, SOLUTION INTRAMUSCULAR; INTRAVENOUS at 03:42

## 2023-08-13 RX ADMIN — LIDOCAINE 1 PATCH: 50 PATCH TOPICAL at 03:41

## 2023-08-13 RX ADMIN — ACETAMINOPHEN 325MG 975 MG: 325 TABLET ORAL at 03:41

## 2023-08-13 NOTE — DISCHARGE INSTRUCTIONS
Take Robaxin as prescribed, as needed for moderate pain- do not drive, operate heavy machinery, or combine with alcohol/any other sedative medication after taking    Take Naprosyn as needed for pain. Do not take on an empty stomach.  Do not combine with Motrin, Ibuprofen, or Advil (it is the same class of medication)     You may also take Tylenol as prescribed (500 mg every 6 hours as needed for pain)    Use Lidocaine patches as prescribed as needed for pain - 12 hours on, 12 hours off    Return to the ER for worsening pain, fever, chills, numbness, weakness of arms/face, vision changes, dizziness, lightheadedness, or any other new or concerning symptoms

## 2023-08-13 NOTE — ED PROVIDER NOTES
History  Chief Complaint   Patient presents with   • Neck Pain     Neck pain radiating into left shoulder starting few days ago. No medication for pain. Denies injury     The patient is a 66-year-old male with history of STEMI, hypertension, hyperlipidemia, COPD, CVA who presents to the ED for evaluation of left sided neck pain that he has had for the past several days. He reports it is intermittent in nature, but he has it for most of today. He reports the pain occasionally radiates into his left ear, but denies outright ear pain or otorrhea. He reports that due to his history of MI, he was concerned and wanted to be evaluated. He denies alleviating or exacerbating factors. He has not taken any medication for the symptoms. He believes he may have slept abnormally, and this may be the cause of the pain. He otherwise denies fever, chills, sore throat, dysphagia, numbness, vision changes, extremity/facial weakness, dizziness, chest pain, back pain, dyspnea, leg swelling. Prior to Admission Medications   Prescriptions Last Dose Informant Patient Reported? Taking? Blood Glucose Monitoring Suppl (OneTouch Verio Reflect) w/Device KIT  Self No No   Sig: Check blood sugars twice daily. Please substitute with appropriate alternative as covered by patient's insurance. Dx: E11.65   Icosapent Ethyl 1 g CAPS  Self No No   Sig: Take 2 capsules (2 g total) by mouth 2 (two) times a day   Insulin Glargine Solostar (Lantus SoloStar) 100 UNIT/ML SOPN   No No   Sig: Inject 0.2 mL (20 Units total) under the skin daily with breakfast   Insulin Pen Needle (BD Pen Needle Alida U/F) 32G X 4 MM MISC   No No   Sig: Use in the morning   OneTouch Delica Lancets 47U MISC  Self No No   Sig: Check blood sugars twice daily. Please substitute with appropriate alternative as covered by patient's insurance.  Dx: E11.65   aspirin 81 mg chewable tablet  Self No No   Sig: Chew 1 tablet (81 mg total) daily   atorvastatin (LIPITOR) 80 mg tablet  Self No No   Sig: Take 1 tablet (80 mg total) by mouth every evening   ezetimibe (ZETIA) 10 mg tablet  Self No No   Sig: Take 1 tablet (10 mg total) by mouth daily   glucose blood (OneTouch Verio) test strip  Self No No   Sig: Check blood sugars twice daily. Please substitute with appropriate alternative as covered by patient's insurance. Dx: E11.65   hydrochlorothiazide (HYDRODIURIL) 25 mg tablet  Self No No   Sig: Take 1 tablet (25 mg total) by mouth daily   linaGLIPtin 5 MG TABS  Self No No   Sig: Take 5 mg by mouth daily with or without food   lisinopril (ZESTRIL) 10 mg tablet  Self No No   Sig: Take 1 tablet (10 mg total) by mouth daily   metFORMIN (GLUCOPHAGE-XR) 500 mg 24 hr tablet   No No   Sig: Take 1 tablet twice a day before breakfast and dinner   metoprolol tartrate (LOPRESSOR) 25 mg tablet  Self No No   Sig: Take 1 tablet (25 mg total) by mouth every 12 (twelve) hours   nitroglycerin (NITROSTAT) 0.4 mg SL tablet  Self No No   Sig: Place 1 tablet (0.4 mg total) under the tongue every 5 (five) minutes as needed for chest pain If no relief after 3 tablets, come immediately to ER   ticagrelor (Brilinta) 60 MG  Self No No   Sig: Take 1 tablet (60 mg total) by mouth every 12 (twelve) hours      Facility-Administered Medications: None       Past Medical History:   Diagnosis Date   • Acute ST elevation myocardial infarction (STEMI) of inferior wall ([facility])     cath with PCI/JUAN - RCA 2/7/2020   • Back pain    • CAD (coronary artery disease)    • COPD (chronic obstructive pulmonary disease) ([facility])    • Hyperlipidemia    • Hypertension    • Inguinal hernia, left    • Mild heartburn    • Obesity    • Stroke ([address]) approx 2015    Denies residual problems. Initially he only had trouble with two fingers on left side.    • Wears glasses        Past Surgical History:   Procedure Laterality Date   • APPENDECTOMY     • HERNIA REPAIR     • NO PAST SURGERIES     • IN LAPAROSCOPIC APPENDECTOMY N/A 11/28/2018 Procedure: APPENDECTOMY LAPAROSCOPIC;  Surgeon: Radha Church MD;  Location: [facility] MAIN OR;  Service: General   • [place] LAPAROSCOPY SURG RPR INITIAL INGUINAL HERNIA Left 2018    Procedure: LAPAROSCOPIC INGUINAL HERNIA REPAIR WITH MESH;  Surgeon: Radha Church MD;  Location: [facility] Main OR;  Service: General       Family History   Problem Relation Age of Onset   • Hypertension Mother    • Hypertension Father    • Leukemia Sister    • Diabetes type II Brother    • Coronary artery disease Brother    • Diabetes unspecified Brother    • Diabetes unspecified Brother    • No Known Problems Brother    • No Known Problems Brother      I have reviewed and agree with the history as documented. E-Cigarette/Vaping   • E-Cigarette Use Never User      E-Cigarette/Vaping Substances   • Nicotine No    • THC No    • CBD No    • Flavoring No    • Other No    • Unknown No      Social History     Tobacco Use   • Smoking status: Former     Packs/day: 0.00     Years: 15.00     Total pack years: 0.00     Types: Cigarettes     Quit date: 2010     Years since quittin.6   • Smokeless tobacco: Never   Vaping Use   • Vaping Use: Never used   Substance Use Topics   • Alcohol use: Not Currently   • Drug use: No       Review of Systems   Constitutional: Negative for chills and fever. HENT: Positive for ear pain. Negative for congestion and rhinorrhea. Eyes: Negative for visual disturbance. Respiratory: Negative for cough and shortness of breath. Cardiovascular: Negative for chest pain and leg swelling. Gastrointestinal: Negative for abdominal pain, constipation, diarrhea, nausea and vomiting. Genitourinary: Negative for dysuria and flank pain. Musculoskeletal: Positive for neck pain. Negative for arthralgias and myalgias. Skin: Negative for rash and wound. Neurological: Negative for dizziness, weakness, numbness and headaches. Physical Exam  Physical Exam  Vitals and nursing note reviewed.    Constitutional: General: He is not in acute distress. Appearance: He is well-developed. He is not ill-appearing or toxic-appearing. HENT:      Head: Normocephalic and atraumatic. Right Ear: External ear normal.      Left Ear: Tympanic membrane, ear canal and external ear normal.      Nose: Nose normal.      Mouth/Throat:      Mouth: Mucous membranes are moist. No angioedema. Pharynx: Uvula midline. Eyes:      Extraocular Movements: Extraocular movements intact. Conjunctiva/sclera: Conjunctivae normal.      Pupils: Pupils are equal, round, and reactive to light. Neck:      Trachea: Trachea and phonation normal.      Comments: Normal phonation. Tolerating oral secretions  Cardiovascular:      Rate and Rhythm: Normal rate and regular rhythm. Heart sounds: No murmur heard. Pulmonary:      Effort: Pulmonary effort is normal. No respiratory distress. Breath sounds: Normal breath sounds. Abdominal:      Palpations: Abdomen is soft. Tenderness: There is no abdominal tenderness. There is no guarding or rebound. Musculoskeletal:         General: No swelling. Normal range of motion. Cervical back: Normal range of motion and neck supple. No rigidity or tenderness. No spinous process tenderness or muscular tenderness. Normal range of motion. Skin:     General: Skin is warm and dry. Capillary Refill: Capillary refill takes less than 2 seconds. Neurological:      General: No focal deficit present. Mental Status: He is alert and oriented to person, place, and time. GCS: GCS eye subscore is 4. GCS verbal subscore is 5. GCS motor subscore is 6. Cranial Nerves: No cranial nerve deficit, dysarthria or facial asymmetry. Sensory: Sensation is intact. No sensory deficit. Motor: Motor function is intact. No weakness or pronator drift. Coordination: Coordination is intact.  Coordination normal. Finger-Nose-Finger Test normal.      Gait: Gait normal.   Psychiatric: Mood and Affect: Mood normal.         Vital Signs  ED Triage Vitals   Temperature Pulse Respirations Blood Pressure SpO2   08/13/23 0100 08/13/23 0100 08/13/23 0100 08/13/23 0100 08/13/23 0100   98.2 °F (36.8 °C) 84 18 109/81 96 %      Temp Source Heart Rate Source Patient Position - Orthostatic VS BP Location FiO2 (%)   08/13/23 0100 08/13/23 0100 08/13/23 0100 08/13/23 0100 --   Oral Monitor Sitting Right arm       Pain Score       08/13/23 0342       7           Vitals:    08/13/23 0100 08/13/23 0153 08/13/23 0342   BP: 109/81 99/58 103/64   Pulse: 84 83 79   Patient Position - Orthostatic VS: Sitting Lying Lying         Visual Acuity      ED Medications  Medications   ketorolac (TORADOL) injection 15 mg (15 mg Intravenous Given 8/13/23 0342)   acetaminophen (TYLENOL) tablet 975 mg (975 mg Oral Given 8/13/23 0341)       Diagnostic Studies  Results Reviewed     Procedure Component Value Units Date/Time    HS Troponin 0hr (reflex protocol) [[id number]]  (Normal) Collected: 08/13/23 0216    Lab Status: Final result Specimen: Blood from Arm, Left Updated: 08/13/23 0254     hs TnI 0hr 3 ng/L     Basic metabolic panel [[id number]]  (Abnormal) Collected: 08/13/23 0216    Lab Status: Final result Specimen: Blood from Arm, Left Updated: 08/13/23 0245     Sodium 134 mmol/L      Potassium 3.3 mmol/L      Chloride 99 mmol/L      CO2 25 mmol/L      ANION GAP 10 mmol/L      BUN 16 mg/dL      Creatinine 1.00 mg/dL      Glucose 183 mg/dL      Calcium 9.2 mg/dL      eGFR 85 ml/min/1.73sq m     Narrative:      [place] guidelines for Chronic Kidney Disease (CKD):   •  Stage 1 with normal or high GFR (GFR > 90 mL/min/1.73 square meters)  •  Stage 2 Mild CKD (GFR = 60-89 mL/min/1.73 square meters)  •  Stage 3A Moderate CKD (GFR = 45-59 mL/min/1.73 square meters)  •  Stage 3B Moderate CKD (GFR = 30-44 mL/min/1.73 square meters)  •  Stage 4 Severe CKD (GFR = 15-29 mL/min/1.73 square meters)  •  Stage 5 End Stage CKD (GFR <15 mL/min/1.73 square meters)  Note: GFR calculation is accurate only with a steady state creatinine    CBC and differential [[id number]] Collected: 08/13/23 0216    Lab Status: Final result Specimen: Blood from Arm, Left Updated: 08/13/23 0232     WBC 7.06 Thousand/uL      RBC 5.10 Million/uL      Hemoglobin 15.3 g/dL      Hematocrit 44.2 %      MCV 87 fL      MCH 30.0 pg      MCHC 34.6 g/dL      RDW 11.9 %      MPV 10.0 fL      Platelets 106 Thousands/uL      nRBC 0 /100 WBCs      Neutrophils Relative 63 %      Immat GRANS % 0 %      Lymphocytes Relative 26 %      Monocytes Relative 8 %      Eosinophils Relative 2 %      Basophils Relative 1 %      Neutrophils Absolute 4.48 Thousands/µL      Immature Grans Absolute 0.02 Thousand/uL      Lymphocytes Absolute 1.81 Thousands/µL      Monocytes Absolute 0.56 Thousand/µL      Eosinophils Absolute 0.14 Thousand/µL      Basophils Absolute 0.05 Thousands/µL                  XR chest 2 views   ED Interpretation by Reymundo David PA-C (08/13 0201)   No evidence of infiltrate, pneumothorax, or acute cardiopulmonary disease as interpreted by me                 Procedures  Procedures         ED Course  ED Course as of 08/13/23 0614   [facility] Aug 13, 2023   0228 EKG normal sinus rhythm 87 bpm, normal axis, , QTc 433, , QTc 433, no ST elevation or depression as interpreted by me and reviewed by Dr. Claudette Boor    0300 hs TnI 0hr: 3         SBIRT 20yo+    Flowsheet Row Most Recent Value   Initial Alcohol Screen: US AUDIT-C     1. How often do you have a drink containing alcohol? 0 Filed at: 08/13/2023 0218   2. How many drinks containing alcohol do you have on a typical day you are drinking? 0 Filed at: 08/13/2023 0218   3a. Male UNDER 65: How often do you have five or more drinks on one occasion? 0 Filed at: 08/13/2023 7173   Audit-C Score 0 Filed at: 08/13/2023 9706   AMBROSIO: How many times in the past year have you. ..     Used an illegal drug or used a prescription medication for non-medical reasons? Never Filed at: 08/13/2023 1075          Medical Decision Making  DDx including but not limited to: strain, sprain, arthritis, spinal stenosis herniated disc, radiculopathy, ACS/MI; doubt epidural abscess or fracture or meningitis or carotid dissection. On exam, the patient is in no acute distress. VSS. HRRR. Lungs CTA. Abdomen soft and nontender. Patient without neurologic deficit; CN II-XII grossly intact, EOMI, PERRLA, strength 5/5 in all extremities, sensation grossly intact. Patient a and o x 3. Coordination intact. Will obtain EKG, troponin to evaluate for ACS. Will obtain chest x-ray to rule out cardiopulmonary abnormality including pneumonia, pneumothorax. Will obtain CBC to evaluate for leukocytosis, anemia. Will obtain CMP to evaluate kidney function, for electrolyte disturbance. Patient with mild hypokalemia and hyponatremia, unlikely to be clinically significant. EKG without ischemic changes. Troponin within normal limits. Patient reports significant improvement in pain following Toradol, Tylenol, and Lidoderm patch. He remains without neurologic deficit. Will discharge    At the time of discharge, the patient is in no acute distress. I discussed with the patient the diagnosis, treatment plan, follow-up, return precautions, and discharge instructions; they were given the opportunity to ask questions and verbalized understanding. Neck pain: acute illness or injury  Amount and/or Complexity of Data Reviewed  External Data Reviewed: labs, radiology, ECG and notes. Labs: ordered. Decision-making details documented in ED Course. Radiology: ordered and independent interpretation performed. Decision-making details documented in ED Course. ECG/medicine tests: ordered and independent interpretation performed. Decision-making details documented in ED Course.   Discussion of management or test interpretation with external provider(s): ED Attending    Risk  OTC drugs. Prescription drug management. Disposition  Final diagnoses:   Neck pain     Time reflects when diagnosis was documented in both MDM as applicable and the Disposition within this note     Time User Action Codes Description Comment    8/13/2023  3:46 AM Selene Heath Add [M54.2] Neck pain       ED Disposition     ED Disposition   Discharge    Condition   Stable    Date/Time   Sun Aug 13, 2023  3:46 AM    Comment   Tami Cárdenas discharge to home/self care. Follow-up Information     Follow up With Specialties Details Why Contact Info    Marino Arechiga, [address] [facility]  [phone]            Discharge Medication List as of 8/13/2023  3:47 AM      START taking these medications    Details   acetaminophen (TYLENOL) 500 mg tablet Take 1 tablet (500 mg total) by mouth every 6 (six) hours as needed for mild pain or moderate pain, Starting Sun 8/13/2023, Normal      lidocaine (LIDODERM) 5 % Apply 1 patch topically over 12 hours every 24 hours Remove & Discard patch within 12 hours or as directed by MD, Starting Sun 8/13/2023, Normal      methocarbamol (ROBAXIN) 500 mg tablet Take 1 tablet (500 mg total) by mouth 2 (two) times a day, Starting Sun 8/13/2023, Normal         CONTINUE these medications which have NOT CHANGED    Details   aspirin 81 mg chewable tablet Chew 1 tablet (81 mg total) daily, Starting Tue 2/23/2021, Normal      atorvastatin (LIPITOR) 80 mg tablet Take 1 tablet (80 mg total) by mouth every evening, Starting Fri 11/25/2022, Normal      Blood Glucose Monitoring Suppl (OneTouch Verio Reflect) w/Device KIT Check blood sugars twice daily. Please substitute with appropriate alternative as covered by patient's insurance.  Dx: E11.65, Normal      ezetimibe (ZETIA) 10 mg tablet Take 1 tablet (10 mg total) by mouth daily, Starting Mon 7/24/2023, Normal      glucose blood (OneTouch Verio) test strip Check blood sugars twice daily. Please substitute with appropriate alternative as covered by patient's insurance. Dx: E11.65, Normal      hydrochlorothiazide (HYDRODIURIL) 25 mg tablet Take 1 tablet (25 mg total) by mouth daily, Starting Fri 11/18/2022, Normal      Icosapent Ethyl 1 g CAPS Take 2 capsules (2 g total) by mouth 2 (two) times a day, Starting Mon 4/10/2023, Normal      Insulin Glargine Solostar (Lantus SoloStar) 100 UNIT/ML SOPN Inject 0.2 mL (20 Units total) under the skin daily with breakfast, Starting Fri 7/28/2023, Normal      Insulin Pen Needle (BD Pen Needle Alida U/F) 32G X 4 MM MISC Use in the morning, Starting Fri 7/28/2023, Normal      linaGLIPtin 5 MG TABS Take 5 mg by mouth daily with or without food, Starting Thu 6/22/2023, Until Sun 6/16/2024, Normal      lisinopril (ZESTRIL) 10 mg tablet Take 1 tablet (10 mg total) by mouth daily, Starting Mon 4/3/2023, Normal      metFORMIN (GLUCOPHAGE-XR) 500 mg 24 hr tablet Take 1 tablet twice a day before breakfast and dinner, Normal      metoprolol tartrate (LOPRESSOR) 25 mg tablet Take 1 tablet (25 mg total) by mouth every 12 (twelve) hours, Starting Fri 11/18/2022, Normal      nitroglycerin (NITROSTAT) 0.4 mg SL tablet Place 1 tablet (0.4 mg total) under the tongue every 5 (five) minutes as needed for chest pain If no relief after 3 tablets, come immediately to ER, Starting Tue 3/29/2022, Normal      OneTouch Delica Lancets 71N MISC Check blood sugars twice daily. Please substitute with appropriate alternative as covered by patient's insurance. Dx: E11.65, Normal      ticagrelor (Brilinta) 60 MG Take 1 tablet (60 mg total) by mouth every 12 (twelve) hours, Starting Mon 3/27/2023, Normal             No discharge procedures on file.     PDMP Review     None          ED Provider  Electronically Signed by           Pop Pinto PA-C  08/13/23 3832

## 2023-08-15 ENCOUNTER — OFFICE VISIT (OUTPATIENT)
Dept: DIABETES SERVICES | Facility: CLINIC | Age: 53
End: 2023-08-15
Payer: COMMERCIAL

## 2023-08-15 VITALS — WEIGHT: 199.4 LBS | BODY MASS INDEX: 33.18 KG/M2

## 2023-08-15 DIAGNOSIS — E11.65 TYPE 2 DIABETES MELLITUS WITH HYPERGLYCEMIA, WITHOUT LONG-TERM CURRENT USE OF INSULIN (HCC): Primary | ICD-10-CM

## 2023-08-15 PROCEDURE — 97802 MEDICAL NUTRITION INDIV IN: CPT | Performed by: DIETITIAN, REGISTERED

## 2023-08-15 NOTE — PATIENT INSTRUCTIONS
Consume 3 meals a day about 4-5 hours apart (NO SKIPPING). 45 grams of carbohydrate per meal and no more than 15 grams per evening snack. Make low fat food choices. Initiate regular aerobic exercise. Physical Therapy Daily Progress Note    Patient: Onel Campbell   : 1949  Diagnosis/ICD-10 Code:  Acute bilateral knee pain [M25.561, M25.562]  Referring practitioner: Fredy Lomas MD  Date of Initial Visit: Type: THERAPY  Noted: 2021  Today's Date: 2021  Patient seen for 2 sessions           Subjective knee pain better today 3/10    Objective   See Exercise, Manual, and Modality Logs for complete treatment.       Assessment/Plan  Progressed with knee AAROM/ mild LE strengthening per tolerance. No pain with pre.  Mild cuing with hep. Decreased pain today with gait.         Timed:    Manual Therapy:        mins  61792;  Therapeutic Exercise:     40    mins  95241;     Neuromuscular Narendra:        mins  78856;    Therapeutic Activity:          mins  28240;     Gait Training:           mins  43633;     Ultrasound:          mins  09287;    Electrical Stimulation:         mins  20379 ( );  Iontophoresis         mins 09915;  Aquatic Therapy         mins 78490;  Dry Needling                   mins    Untimed:  Electrical Stimulation:         mins  60793 ( );  Mechanical Traction:         mins  59171;     Timed Treatment:   40   mins   Total Treatment:     40   mins  Scot Barney, PT  Physical Therapist

## 2023-08-15 NOTE — PROGRESS NOTES
Medical Nutrition Therapy        Assessment    Visit Type: Initial visit  Chief complaint/Medical Diagnosis/reason for visit Type 2 diabetes mellitus with hyperglycemia, without long-term current use of insulin ([address])    HPI Claudine Thompson was seen today for his initial MNT visit. Patient's daughter was with him at the visit. Claudine Thompson reports that he goes to bed around 3:00-3:30 a.m. and gets up at 7:30 a.m. Patient is open to making dietary modifications to improve glycemic control. Problems identified in food recall include inconsistent carbohydrate intake at meals, meal skipping/poorly timed meals, large portions, excess calories coming from high fat food choices and processed foods, low intake of plant based foods and general lack of balance. Provided patient with carbohydrate guidelines of a 1500 calorie meal plan to assist with consistency, balance and portion control. Encouraged the consumption of regular meals at regular times 4-5 hours apart. Meal skipping was discouraged. Advised patient to keep carbohydrate intake to 45 grams per meal and 15 grams per evening snack to assist with glycemic control. Suggested making low fat to assist with lipid management and calorie control. Claudine Thompson would benefit from initiating regular aerobic exercise. Portion booklet and food labels were used to teach basic carbohydrate counting. Patient agreed to keep daily food logs. Follow-up was scheduled in 6 weeks. RD will remain available for further dietary questions/concerns.      Ht Readings from Last 1 Encounters:   07/28/23 5' 5" (1.651 m)     Wt Readings from Last 3 Encounters:   08/15/23 90.4 kg (199 lb 6.4 oz)   08/13/23 91.5 kg (201 lb 11.5 oz)   07/28/23 90.7 kg (200 lb)     Weight Change: No    Barriers to Learning: no barriers    Do you follow any special diet presently?: No  Who shops: patient  Who cooks: patient    Food Log: Completed via the method of food recall    Breakfast:8:00AM SKIPS once a week; 4 eggs scrambled OR 2 packets of Adiel noodles, coffee with no sugar creamer  Morning Snack:none  Lunch:1:00-3:00PM SKIPS 1-2 times a week; leftovers (3-4 cups of pasta with red sauce or buttered noodles or with cream of mushroom soup) OR 2 slices of white bread with 1 slice of cheese and either ham or turkey, 1/2 Tbsp mayonnaise, water  Afternoon Snack: none  Dinner:6:00-11:00PM 3-4 cups of some kind of noodle and meat sauce OR 1 box of Kraft macaroni and cheese and 2 hotdogs, water  Evening Snack:11:00-12:00PM 10 potato chips or one pack of 4 PB sandwich crackers, 8 oz whole milk  Beverages: coffee and water and whole milk  Eating out/Take out:rare  Exercise no exercise beyond daily activities     Calorie needs 1500 kcals/day Carbs: 45g/meal, 15g/evening snack         Nutrition Diagnosis:  Food and nutrition related knowledge deficit  related to Lack of prior exposure to accurate nutrition related information as evidenced by No prior knowledge of need for food and nutrition related recommendations    Intervention: reduced fat intake, label reading, behavior modification strategies, carbohydrate counting, increased plant based foods, meal timing, meal planning, monitoring portion control, exercise guidelines and food diary     Treatment Goals: Patient will monitor fat intake, Patient will consume 3 meals a day, Patient will count carbohydrates and Patient will exercise    Monitoring and evaluation:    Term code indicator  FH 1.3.2 Food Intake Criteria: 1. Consume 3 meals a day about 4-5 hours apart (NO SKIPPING). Term code indicator  FH 1.6.3 Carbohydrate Intake Criteria: 45 grams of carbohydrate per meal and no more than 15 grams per evening snack. Term code indicator  FH 1.6.1 Fat and Cholesterol Intake Criteria: 3. Make low fat food choices. Term code indicator  CH 2.2 Treatments/Therapy/Alternative Medicine Criteria: 4. Initiate regular aerobic exercise.      Materials Provided: portion booklet and food logs    Patient’s Response to Instruction:  Comprehensiongood  Motivationgood  Expected Compliancegood    Start- Stop: 12:55-1:40  Total Minutes: 39 Minutes  Group or Individual Instruction: MNT-I  Other: Erin Ochoa, DO    Thank you for coming to the [address] [place] [address] for education today. Please feel free to call with any questions or concerns.     [place]  [address] [id number] [address] [id number]

## 2023-08-23 DIAGNOSIS — I25.10 CAD (CORONARY ARTERY DISEASE): ICD-10-CM

## 2023-08-23 DIAGNOSIS — I10 HYPERTENSION, UNSPECIFIED TYPE: ICD-10-CM

## 2023-08-23 DIAGNOSIS — I21.19 ST ELEVATION MYOCARDIAL INFARCTION (STEMI) OF INFERIOR WALL (HCC): ICD-10-CM

## 2023-08-23 DIAGNOSIS — E78.5 HYPERLIPIDEMIA, UNSPECIFIED HYPERLIPIDEMIA TYPE: ICD-10-CM

## 2023-08-23 DIAGNOSIS — I21.19 ACUTE ST ELEVATION MYOCARDIAL INFARCTION (STEMI) OF INFERIOR WALL (HCC): ICD-10-CM

## 2023-08-23 RX ORDER — ATORVASTATIN CALCIUM 80 MG/1
80 TABLET, FILM COATED ORAL EVERY EVENING
Qty: 90 TABLET | Refills: 0 | Status: SHIPPED | OUTPATIENT
Start: 2023-08-23

## 2023-08-30 RX ORDER — HYDROCHLOROTHIAZIDE 25 MG/1
25 TABLET ORAL DAILY
Qty: 90 TABLET | Refills: 0 | Status: SHIPPED | OUTPATIENT
Start: 2023-08-30

## 2023-09-14 ENCOUNTER — OFFICE VISIT (OUTPATIENT)
Dept: ENDOCRINOLOGY | Facility: CLINIC | Age: 53
End: 2023-09-14
Payer: COMMERCIAL

## 2023-09-14 VITALS
WEIGHT: 201.8 LBS | SYSTOLIC BLOOD PRESSURE: 114 MMHG | HEART RATE: 86 BPM | HEIGHT: 65 IN | DIASTOLIC BLOOD PRESSURE: 86 MMHG | BODY MASS INDEX: 33.62 KG/M2

## 2023-09-14 DIAGNOSIS — E78.2 MIXED HYPERLIPIDEMIA: ICD-10-CM

## 2023-09-14 DIAGNOSIS — I10 HYPERTENSION, UNSPECIFIED TYPE: ICD-10-CM

## 2023-09-14 DIAGNOSIS — Z79.4 TYPE 2 DIABETES MELLITUS WITH HYPERGLYCEMIA, WITH LONG-TERM CURRENT USE OF INSULIN (HCC): Primary | ICD-10-CM

## 2023-09-14 DIAGNOSIS — E11.65 TYPE 2 DIABETES MELLITUS WITH HYPERGLYCEMIA, WITH LONG-TERM CURRENT USE OF INSULIN (HCC): Primary | ICD-10-CM

## 2023-09-14 PROCEDURE — 99214 OFFICE O/P EST MOD 30 MIN: CPT | Performed by: INTERNAL MEDICINE

## 2023-09-14 RX ORDER — METFORMIN HYDROCHLORIDE 500 MG/1
TABLET, EXTENDED RELEASE ORAL
Qty: 180 TABLET | Refills: 3 | Status: SHIPPED | OUTPATIENT
Start: 2023-09-14

## 2023-09-14 RX ORDER — INSULIN GLARGINE 100 [IU]/ML
20 INJECTION, SOLUTION SUBCUTANEOUS
Qty: 15 ML | Refills: 3 | Status: SHIPPED | OUTPATIENT
Start: 2023-09-14

## 2023-09-14 NOTE — ASSESSMENT & PLAN NOTE
Lab Results   Component Value Date    HGBA1C 10.0 (A) 07/28/2023   Reporting significantly improved fingersticks. For now increase metformin to 1000 mg twice daily with meals. Discontinue Tradjenta and start SGLT2 inhibitor. Side effects discussed. Repeat BMP in 2 weeks after starting SGLT2 inhibitor.   Advised to keep well-hydrated

## 2023-09-14 NOTE — PROGRESS NOTES
Lisa Figueroa 48 y.o. male MRN: [id number]    Encounter: [id number]      Assessment/Plan     Problem List Items Addressed This Visit        Endocrine    Type 2 diabetes mellitus with hyperglycemia, with long-term current use of insulin ([address]) - Primary       Lab Results   Component Value Date    HGBA1C 10.0 (A) 07/28/2023   Reporting significantly improved fingersticks. For now increase metformin to 1000 mg twice daily with meals. Discontinue Tradjenta and start SGLT2 inhibitor. Side effects discussed. Repeat BMP in 2 weeks after starting SGLT2 inhibitor.   Advised to keep well-hydrated         Relevant Medications    metFORMIN (GLUCOPHAGE-XR) 500 mg 24 hr tablet    Insulin Glargine Solostar (Lantus SoloStar) 100 UNIT/ML SOPN    dapagliflozin (Farxiga) 5 MG TABS    Other Relevant Orders    Comprehensive metabolic panel Lab Collect    HEMOGLOBIN A1C W/ EAG ESTIMATION Lab Collect    Albumin / creatinine urine ratio    Basic metabolic panel Lab Collect       Cardiovascular and Mediastinum    Hypertension     Blood pressure at goal-continue current regimen         Relevant Orders    Comprehensive metabolic panel Lab Collect       Other    Hyperlipidemia     Continue statins         Relevant Orders    Comprehensive metabolic panel Lab Collect    Lipid panel Lab Collect Lab Collect       CC: Diabetes    History of Present Illness     HPI:  60-year-old male with type 2 diabetes on insulin therapy seen in follow-up     Current regimen   tradjenta   lantus 20 units in morning   metfromin 500 mg BID    Checks f.s twice a day-did not bring in log or meter to the visit so By memory   Fasting 120-140  predinner 120-140s  No hypoglycemia     No polyuria , polydpsia , no blurry vision , no numbness and tingling in feet   Made some dietary modifications     Review of Systems    Historical Information   Past Medical History:   Diagnosis Date   • Acute ST elevation myocardial infarction (STEMI) of inferior wall (HCC)     cath with PCI/JUAN - RCA 2020   • Back pain    • CAD (coronary artery disease)    • COPD (chronic obstructive pulmonary disease) ([facility])    • Hyperlipidemia    • Hypertension    • Inguinal hernia, left    • Mild heartburn    • Obesity    • Stroke ([address]) approx 2015    Denies residual problems. Initially he only had trouble with two fingers on left side.    • Wears glasses      Past Surgical History:   Procedure Laterality Date   • APPENDECTOMY     • HERNIA REPAIR     • NO PAST SURGERIES     • RI LAPAROSCOPIC APPENDECTOMY N/A 2018    Procedure: APPENDECTOMY LAPAROSCOPIC;  Surgeon: Sina Victor MD;  Location: [address] [place] [address] MAIN OR;  Service: General   • RI LAPAROSCOPY SURG RPR INITIAL INGUINAL HERNIA Left 2018    Procedure: LAPAROSCOPIC INGUINAL HERNIA REPAIR WITH MESH;  Surgeon: Sina Victor MD;  Location: [facility] Main OR;  Service: General     Social History   Social History     Substance and Sexual Activity   Alcohol Use Not Currently     Social History     Substance and Sexual Activity   Drug Use No     Social History     Tobacco Use   Smoking Status Former   • Packs/day: 0.00   • Years: 15.00   • Total pack years: 0.00   • Types: Cigarettes   • Quit date: 2010   • Years since quittin.7   Smokeless Tobacco Never     Family History:   Family History   Problem Relation Age of Onset   • Hypertension Mother    • Hypertension Father    • Leukemia Sister    • Diabetes type II Brother    • Coronary artery disease Brother    • Diabetes unspecified Brother    • Diabetes unspecified Brother    • No Known Problems Brother    • No Known Problems Brother        Meds/Allergies   Current Outpatient Medications   Medication Sig Dispense Refill   • acetaminophen (TYLENOL) 500 mg tablet Take 1 tablet (500 mg total) by mouth every 6 (six) hours as needed for mild pain or moderate pain 30 tablet 0   • aspirin 81 mg chewable tablet Chew 1 tablet (81 mg total) daily 90 tablet 3   • atorvastatin (LIPITOR) 80 mg tablet Take 1 tablet (80 mg total) by mouth every evening 90 tablet 0   • Blood Glucose Monitoring Suppl (OneTouch Verio Reflect) w/Device KIT Check blood sugars twice daily. Please substitute with appropriate alternative as covered by patient's insurance. Dx: E11.65 1 kit 0   • dapagliflozin (Farxiga) 5 MG TABS Take 1 tablet (5 mg total) by mouth daily 30 tablet 3   • ezetimibe (ZETIA) 10 mg tablet Take 1 tablet (10 mg total) by mouth daily 30 tablet 5   • glucose blood (OneTouch Verio) test strip Check blood sugars twice daily. Please substitute with appropriate alternative as covered by patient's insurance. Dx: E11.65 200 each 3   • hydrochlorothiazide (HYDRODIURIL) 25 mg tablet Take 1 tablet (25 mg total) by mouth daily 90 tablet 0   • Icosapent Ethyl 1 g CAPS Take 2 capsules (2 g total) by mouth 2 (two) times a day 360 capsule 3   • Insulin Glargine Solostar (Lantus SoloStar) 100 UNIT/ML SOPN Inject 0.2 mL (20 Units total) under the skin daily with breakfast 15 mL 3   • Insulin Pen Needle (BD Pen Needle Alida U/F) 32G X 4 MM MISC Use in the morning 100 each 3   • lidocaine (LIDODERM) 5 % Apply 1 patch topically over 12 hours every 24 hours Remove & Discard patch within 12 hours or as directed by MD 15 patch 0   • lisinopril (ZESTRIL) 10 mg tablet Take 1 tablet (10 mg total) by mouth daily 90 tablet 1   • metFORMIN (GLUCOPHAGE-XR) 500 mg 24 hr tablet Take 2 tablet twice a day before breakfast and dinner 180 tablet 3   • methocarbamol (ROBAXIN) 500 mg tablet Take 1 tablet (500 mg total) by mouth 2 (two) times a day 20 tablet 0   • metoprolol tartrate (LOPRESSOR) 25 mg tablet Take 1 tablet (25 mg total) by mouth every 12 (twelve) hours 180 tablet 0   • nitroglycerin (NITROSTAT) 0.4 mg SL tablet Place 1 tablet (0.4 mg total) under the tongue every 5 (five) minutes as needed for chest pain If no relief after 3 tablets, come immediately to  tablet 1   • OneTouch Delica Lancets 72T MISC Check blood sugars twice daily.  Please substitute with appropriate alternative as covered by patient's insurance. Dx: E11.65 200 each 3   • ticagrelor (Brilinta) 60 MG Take 1 tablet (60 mg total) by mouth every 12 (twelve) hours 180 tablet 3     No current facility-administered medications for this visit. No Known Allergies    Objective   Vitals: Blood pressure 114/86, pulse 86, height 5' 5" (1.651 m), weight 91.5 kg (201 lb 12.8 oz). Physical Exam  Vitals reviewed. Constitutional:       General: He is not in acute distress. Appearance: Normal appearance. He is obese. He is not ill-appearing, toxic-appearing or diaphoretic. HENT:      Head: Normocephalic and atraumatic. Eyes:      General: No scleral icterus. Extraocular Movements: Extraocular movements intact. Cardiovascular:      Rate and Rhythm: Normal rate and regular rhythm. Heart sounds: Normal heart sounds. No murmur heard. Pulmonary:      Effort: Pulmonary effort is normal. No respiratory distress. Breath sounds: Normal breath sounds. No wheezing or rales. Abdominal:      General: There is no distension. Palpations: Abdomen is soft. Tenderness: There is no abdominal tenderness. Musculoskeletal:      Cervical back: Neck supple. Right lower leg: No edema. Left lower leg: No edema. Lymphadenopathy:      Cervical: No cervical adenopathy. Skin:     General: Skin is warm and dry. Neurological:      General: No focal deficit present. Mental Status: He is alert and oriented to person, place, and time. Gait: Gait normal.   Psychiatric:         Mood and Affect: Mood normal.         Behavior: Behavior normal.         Thought Content: Thought content normal.         Judgment: Judgment normal.         The history was obtained from the review of the chart, patient.     Lab Results:   Lab Results   Component Value Date/Time    Hemoglobin A1C 10.0 (A) 07/28/2023 12:09 PM    WBC 7.06 08/13/2023 02:16 AM    WBC 7.05 06/22/2023 04:05 PM    WBC 6.23 05/28/2023 04:01 PM    Hemoglobin 15.3 08/13/2023 02:16 AM    Hemoglobin 16.2 06/22/2023 04:05 PM    Hemoglobin 15.9 05/28/2023 04:01 PM    Hematocrit 44.2 08/13/2023 02:16 AM    Hematocrit 46.9 06/22/2023 04:05 PM    Hematocrit 45.0 05/28/2023 04:01 PM    MCV 87 08/13/2023 02:16 AM    MCV 87 06/22/2023 04:05 PM    MCV 85 05/28/2023 04:01 PM    Platelets 256 84/41/1684 02:16 AM    Platelets 860 06/21/3377 04:05 PM    Platelets 417 38/78/7153 04:01 PM    BUN 16 08/13/2023 02:16 AM    BUN 16 06/22/2023 04:05 PM    BUN 21 05/28/2023 04:01 PM    Potassium 3.3 (L) 08/13/2023 02:16 AM    Potassium 3.4 (L) 06/22/2023 04:05 PM    Potassium 3.0 (L) 05/28/2023 04:01 PM    Chloride 99 08/13/2023 02:16 AM    Chloride 98 06/22/2023 04:05 PM    Chloride 90 (L) 05/28/2023 04:01 PM    CO2 25 08/13/2023 02:16 AM    CO2 25 06/22/2023 04:05 PM    CO2 26 05/28/2023 04:01 PM    Creatinine 1.00 08/13/2023 02:16 AM    Creatinine 0.89 06/22/2023 04:05 PM    Creatinine 1.09 05/28/2023 04:01 PM    AST 15 06/22/2023 04:05 PM    AST 19 10/03/2022 07:45 PM    ALT 38 06/22/2023 04:05 PM    ALT 64 10/03/2022 07:45 PM    Total Protein 6.8 06/22/2023 04:05 PM    Total Protein 7.4 10/03/2022 07:45 PM    Albumin 4.1 06/22/2023 04:05 PM    Albumin 3.5 10/03/2022 07:45 PM    HDL, Direct 33 (L) 10/03/2022 07:45 PM    Triglycerides 355 (H) 10/03/2022 07:45 PM           Portions of the record may have been created with voice recognition software. Occasional wrong word or "sound a like" substitutions may have occurred due to the inherent limitations of voice recognition software. Read the chart carefully and recognize, using context, where substitutions have occurred.

## 2023-09-14 NOTE — PATIENT INSTRUCTIONS
Stop Tradjenta and start Farxiga 5 mg daily  Keep very well-hydrated  Repeat labs-basic metabolic panel in 2 weeks and then the rest of the labs in 6 weeks

## 2023-09-19 ENCOUNTER — TELEPHONE (OUTPATIENT)
Dept: ENDOCRINOLOGY | Facility: CLINIC | Age: 53
End: 2023-09-19

## 2023-09-26 ENCOUNTER — OFFICE VISIT (OUTPATIENT)
Dept: DIABETES SERVICES | Facility: CLINIC | Age: 53
End: 2023-09-26
Payer: COMMERCIAL

## 2023-09-26 VITALS — BODY MASS INDEX: 33.48 KG/M2 | WEIGHT: 201.2 LBS

## 2023-09-26 DIAGNOSIS — E11.65 TYPE 2 DIABETES MELLITUS WITH HYPERGLYCEMIA, WITHOUT LONG-TERM CURRENT USE OF INSULIN (HCC): Primary | ICD-10-CM

## 2023-09-26 PROCEDURE — 97803 MED NUTRITION INDIV SUBSEQ: CPT | Performed by: DIETITIAN, REGISTERED

## 2023-09-26 NOTE — PATIENT INSTRUCTIONS
Continue 3 meals a day about 5 hours apart. Continue with reduced portion. Include a variety of carbohydrate (fruit, low fat dairy, or whole grains). Limit carbohydrate to 45 grams per meal and no more than 15 grams per evening snack. Continue with exercise.

## 2023-09-26 NOTE — PROGRESS NOTES
Medical Nutrition Therapy        Assessment    Visit Type: Follow-up visit  Chief complaint/Medical Diagnosis/reason for visit Type 2 diabetes mellitus with hyperglycemia, without long-term current use of insulin ([address])    MIGUEL Wynn was seen today for a follow-up MNT visit. Basilia Wynn left his food records at home. Dietary changes reported as improved meal timing, consuming 3 meals a day and monitoring portion control. Patient feels that his blood glucose has improved since making these changes with pre-meal BG ranging between 125-249. Problems identified in food recall include inconsistent carbohydrate intake at meals and low intake of plant based foods. Advised patient to keep carbohydrate intake to 45 grams per meal and 15 grams per snack to assist with glycemic control. Basilia Wynn tends to choose starchy carbohydrates. Suggested including more fruit and low fat dairy for variety. Patient agreed to keep daily food logs. Follow-up was scheduled in 3 months. RD will remain available for further dietary questions/concerns.      Ht Readings from Last 1 Encounters:   09/14/23 5' 5" (1.651 m)     Wt Readings from Last 3 Encounters:   09/26/23 91.3 kg (201 lb 3.2 oz)   09/14/23 91.5 kg (201 lb 12.8 oz)   08/15/23 90.4 kg (199 lb 6.4 oz)     Weight Change: No    Food Log: Completed via the method of food recall    Breakfast:8:00PM 1 packet of Adiel noodles or 4 eggs, coffee with no sugar creamer  Morning Snack:1 small caramel candy  Lunch:1:00PM 2 cups pasta or a sandwich, water  Afternoon Snack: none  Dinner:6:00PM 2 cups pasta OR 1/2 box of macaroni and cheese, water  Evening Snack:11:00-12:00PM 8 oz milk  Exercise walks 4-5 times a week for about 30 minutes    Intervention: behavior modification strategies, carbohydrate counting, increased plant based foods, meal timing, meal planning, exercise guidelines and food diary     Treatment Goals: Patient will consume 3 meals a day, Patient will monitor portion control, Patient will increase their intake of plant based foods, Patient will count carbohydrates and Patient will exercise    Monitoring and evaluation:    Term code indicator  FH 1.3.2 Food Intake Criteria: 1. Continue 3 meals a day about 5 hours apart. Term code indicator  FH 1.3.2 Food Intake Criteria: 2. Continue with reduced portion. Term code indicator  FH 1.6.3 Carbohydrate Intake Criteria: 3. Include a variety of carbohydrate (fruit, low fat dairy, or whole grains). Term code indicator  FH 1.6.3 Carbohydrate Intake Criteria: 4. Limit carbohydrate to 45 grams per meal and no more than 15 grams per evening snack. Term code indicator  CH 2.2 Treatments/Therapy/Alternative Medicine Criteria: 5. Continue with exercise. Materials Provided: Food logs    Patient’s Response to Instruction:  Comprehensiongood  Motivationgood  Expected Compliancegood    Start- Stop: 1:00-1:30  Total Minutes: 30 Minutes  Group or Individual Instruction: MNT-I  Other: Osorio Soda, DO    Thank you for coming to the [address] [place] [address] for education today. Please feel free to call with any questions or concerns.     Laura Velasquez  [address] [place], [place] [address] [address] [id number]

## 2023-10-27 DIAGNOSIS — I10 HYPERTENSION, UNSPECIFIED TYPE: ICD-10-CM

## 2023-10-27 RX ORDER — HYDROCHLOROTHIAZIDE 25 MG/1
25 TABLET ORAL DAILY
Qty: 90 TABLET | Refills: 1 | Status: SHIPPED | OUTPATIENT
Start: 2023-10-27

## 2023-11-14 ENCOUNTER — TELEPHONE (OUTPATIENT)
Dept: CARDIOLOGY CLINIC | Facility: CLINIC | Age: 53
End: 2023-11-14

## 2023-11-14 NOTE — TELEPHONE ENCOUNTER
Faxed received from [phone] [facility] that medication was denied. The request is denied because they need documentation that fasting triglycerides has decreased since starting the medication. There is a lipid panel order by another physician from Sept 2023 that has not been done yet as well as an order from Dr. Glen Khan in April 2023 for a lipid panel that has not been done. Placed call to patient to get blood work done to submit to insurance for reauthorization. As of right now, the vascepa is denied by insurance. Spoke with daughter, she verbalized understanding.

## 2023-11-14 NOTE — TELEPHONE ENCOUNTER
Fax received from [employer] requesting prior authorization for Icosapent Ethyl 1GM Capsules. Key: [id number] to plan with office note 4/10/23.

## 2023-11-28 DIAGNOSIS — I21.19 ACUTE ST ELEVATION MYOCARDIAL INFARCTION (STEMI) OF INFERIOR WALL (HCC): ICD-10-CM

## 2023-11-28 DIAGNOSIS — I10 HYPERTENSION, UNSPECIFIED TYPE: ICD-10-CM

## 2023-11-28 DIAGNOSIS — I25.10 CAD (CORONARY ARTERY DISEASE): ICD-10-CM

## 2023-11-28 DIAGNOSIS — E11.65 TYPE 2 DIABETES MELLITUS WITH HYPERGLYCEMIA, WITH LONG-TERM CURRENT USE OF INSULIN (HCC): ICD-10-CM

## 2023-11-28 DIAGNOSIS — I21.19 ST ELEVATION MYOCARDIAL INFARCTION (STEMI) OF INFERIOR WALL (HCC): ICD-10-CM

## 2023-11-28 DIAGNOSIS — Z79.4 TYPE 2 DIABETES MELLITUS WITH HYPERGLYCEMIA, WITH LONG-TERM CURRENT USE OF INSULIN (HCC): ICD-10-CM

## 2023-11-28 DIAGNOSIS — E78.5 HYPERLIPIDEMIA, UNSPECIFIED HYPERLIPIDEMIA TYPE: ICD-10-CM

## 2023-11-28 RX ORDER — METFORMIN HYDROCHLORIDE 500 MG/1
TABLET, EXTENDED RELEASE ORAL
Qty: 180 TABLET | Refills: 0 | Status: SHIPPED | OUTPATIENT
Start: 2023-11-28

## 2023-12-01 RX ORDER — HYDROCHLOROTHIAZIDE 25 MG/1
25 TABLET ORAL DAILY
Qty: 90 TABLET | Refills: 1 | Status: SHIPPED | OUTPATIENT
Start: 2023-12-01

## 2023-12-01 RX ORDER — ATORVASTATIN CALCIUM 80 MG/1
80 TABLET, FILM COATED ORAL EVERY EVENING
Qty: 90 TABLET | Refills: 1 | Status: SHIPPED | OUTPATIENT
Start: 2023-12-01

## 2024-01-04 ENCOUNTER — OFFICE VISIT (OUTPATIENT)
Dept: CARDIOLOGY CLINIC | Facility: CLINIC | Age: 54
End: 2024-01-04
Payer: COMMERCIAL

## 2024-01-04 VITALS
DIASTOLIC BLOOD PRESSURE: 50 MMHG | BODY MASS INDEX: 30.95 KG/M2 | WEIGHT: 186 LBS | HEART RATE: 100 BPM | SYSTOLIC BLOOD PRESSURE: 70 MMHG

## 2024-01-04 DIAGNOSIS — I12.9 BENIGN HYPERTENSION WITH CKD (CHRONIC KIDNEY DISEASE), STAGE II: ICD-10-CM

## 2024-01-04 DIAGNOSIS — Z79.4 TYPE 2 DIABETES MELLITUS WITH HYPERGLYCEMIA, WITH LONG-TERM CURRENT USE OF INSULIN (HCC): ICD-10-CM

## 2024-01-04 DIAGNOSIS — E11.65 TYPE 2 DIABETES MELLITUS WITH HYPERGLYCEMIA, WITH LONG-TERM CURRENT USE OF INSULIN (HCC): ICD-10-CM

## 2024-01-04 DIAGNOSIS — I21.11 ST ELEVATION MYOCARDIAL INFARCTION INVOLVING RIGHT CORONARY ARTERY (HCC): Primary | ICD-10-CM

## 2024-01-04 DIAGNOSIS — I25.10 CORONARY ARTERY DISEASE INVOLVING NATIVE CORONARY ARTERY OF NATIVE HEART WITHOUT ANGINA PECTORIS: ICD-10-CM

## 2024-01-04 DIAGNOSIS — I21.19 ACUTE ST ELEVATION MYOCARDIAL INFARCTION (STEMI) OF INFERIOR WALL (HCC): ICD-10-CM

## 2024-01-04 DIAGNOSIS — N18.2 BENIGN HYPERTENSION WITH CKD (CHRONIC KIDNEY DISEASE), STAGE II: ICD-10-CM

## 2024-01-04 PROCEDURE — 99214 OFFICE O/P EST MOD 30 MIN: CPT

## 2024-01-04 NOTE — PROGRESS NOTES
Cardiology   MD Scott Jaramillo MD, FACC  Adrian Avilez DO, [facility]  MD Bello Salcedo DO, [facility]  Enrrique SusannahDO, [facility], ANGELES  -------------------------------------------------------------------  [facility]  [address] [place] [address]  Phone: [phone]  Fax: [phone]  01/04/24  Enrrique Valderrama  YOB: 1970   MRN: [id number]      Referring Physician: Maria Fernanda Oshea MD  [address] [place] [id number]     HPI: Enrrique Valderrama is a 53 y.o. male with:   1. CAD, prior inferior wall STEMI February 2020 with PCI to dRCA  2. Recent inferior wall STEMI March 2021, s/p PCI to 100% pRCA  3. Hypertension  4. Dyslipidemia  5. Palpitations    He presents today for follow up. He has been feeling lightheaded and with near syncope. BP low 70/50. Has lost 14 lbs by being more active.    Review of Systems   Constitutional:  Negative for chills and fever.   HENT:  Negative for facial swelling and sore throat.    Eyes:  Negative for visual disturbance.   Respiratory:  Negative for cough, chest tightness, shortness of breath and wheezing.    Cardiovascular:  Negative for chest pain, palpitations and leg swelling.   Gastrointestinal:  Negative for abdominal pain, blood in stool, constipation, diarrhea, nausea and vomiting.   Endocrine: Negative for cold intolerance and heat intolerance.   Genitourinary:  Negative for decreased urine volume, difficulty urinating, dysuria and hematuria.   Musculoskeletal:  Negative for arthralgias, back pain and myalgias.   Skin:  Negative for rash.   Neurological:  Negative for dizziness, syncope, weakness and numbness.   Psychiatric/Behavioral:  Negative for agitation, behavioral problems and confusion. The patient is not nervous/anxious.         OBJECTIVE  Vitals:    01/04/24 1248   BP: (!) 70/50   Pulse: 100       Physical Exam  Constitutional: awake, alert and oriented, in no acute distress, no obvious  deformities  Head: Normocephalic, without obvious abnormality, atraumatic  Eyes: conjunctivae clear and moist. Sclera anicteric.  No xanthelasmas. Pupils equal bilaterally.  Extraocular motions are full.  Ear nose mouth and throat: ears are symmetrical bilaterally, hearing appears to be equal bilaterally, no nasal discharge or epistaxis, oropharynx is clear with moist mucous membranes  Neck:  Trachea is midline, neck is supple, no thyromegaly or significant lymphadenopathy, there is full range of motion.  Lungs: clear to auscultation bilaterally, no wheezes, no rales, no rhonchi, no accessory muscle use, breathing is nonlabored  Heart: regular rate and rhythm, S1, S2 normal, no murmur, no click, rub or gallop, no lower extremity edema  Abdomen: soft, non-tender; bowel sounds normal; no masses,  no organomegaly  Psychiatric:  Patient is oriented to time, place, person, mood/affect is negative for depression, anxiety, agitation, appears to have appropriate insight  Skin: Skin is warm, dry, intact. No obvious rashes or lesions on exposed extremities.  Nail beds are pink with no cyanosis or clubbing.    EKG:  No results found for this visit on 01/04/24.     IMPRESSION:  1. CAD, prior inferior wall STEMI February 2020 with PCI to dRCA  2. Recent inferior wall STEMI March 2021, s/p PCI to 100% pRCA  3. Hypertension  4. Dyslipidemia  5. palpitations    DISCUSSION/RECOMMENDATIONS:  BP low today, symptomatic. Most recent EF normal.  Will stop HCTZ, STOP Lisinopril  Continue with aspirin, Brilinta, Lipitor 80, Zetia 10, metoprolol  - will check new lipid panel  Hold off on repeat imaging.    Enrrique Davalos DO, [facility], [facility]  --------------------------------------------------------------------------------  TREADMILL STRESS  No results found for this or any previous visit.     ----------------------------------------------------------------------------------------------  NUCLEAR STRESS TEST: No results found for this or any  previous visit.    No results found for this or any previous visit.      --------------------------------------------------------------------------------  CATH:  Results for orders placed during the hospital encounter of 21    Cardiac catheterization    Narrative  [address] [address] [phone]    ([place])    Invasive Cardiovascular Lab Complete Report    Patient: PRESTON CUELLO  MR number: [id number]  Account number: [id number]  Study date: 2021  Gender: Male  : 1970  Height: 66.1 in  Weight: 224.4 lb  BSA: 2.11 mï¾²    Diagnostic Cardiologist:  Kali Alfaro DO  Interventional Cardiologist:  Kali Alfaro DO    SUMMARY    CORONARY CIRCULATION:  Proximal RCA: There was a 100 % stenosis.  Distal RCA: There was a 10 % stenosis at the site of a prior stent.  Right PDA: There was a 60 % stenosis.    1ST LESION INTERVENTIONS:  A balloon angioplasty with stent and balloon angioplasty procedure was performed on the 100 % lesion in the proximal RCA. Following intervention there was a 0 % residual stenosis.  A Resolute [place] Rx 4.0 x 26mm drug-eluting stent was placed across the lesion and deployed at a maximum inflation pressure of 16 bayron.    INDICATIONS:  --  Possible CAD: myocardial infarction with ST elevation (STEMI).    PROCEDURES PERFORMED    --  Left coronary angiography.  --  Right coronary angiography.  --  Acute Myocardial Infarct.  --  Mod Sedation Same Physician Initial 15min.  --  Mod Sedation Same Physician Add 15min.  --  Coronary Catheterization (w/o [facility]).  --  AMI PCI (JUAN, PTCRA, PTCA) Single.  --  Intervention on proximal RCA: balloon angioplasty, stent, balloon angioplasty.    PROCEDURE: The risks and alternatives of the procedures and conscious sedation were explained to the patient and informed consent was obtained. The patient was brought to the cath lab and placed on the table. The planned puncture  sites  were prepped and draped in the usual sterile fashion.    --  Right radial artery access. After performing an Nomi's test to verify adequate ulnar artery supply to the hand, the radial site was prepped. The puncture site was infiltrated with local anesthetic. The vessel was accessed using the  modified Seldinger technique, a wire was advanced into the vessel, and a sheath was advanced over the wire into the vessel.    --  Left coronary artery angiography. A catheter was advanced over a guidewire into the aorta and positioned in the left coronary artery ostium under fluoroscopic guidance. Angiography was performed.    --  Right coronary artery angiography. A catheter was advanced over a guidewire into the aorta and positioned in the right coronary artery ostium under fluoroscopic guidance. Angiography was performed.    --  Acute Myocardial Infarct.    --  Mod Sedation Same Physician Initial 15min.    --  Mod Sedation Same Physician Add 15min.    --  Coronary Catheterization (w/o [facility]).    LESION INTERVENTION: A balloon angioplasty with stent and balloon angioplasty procedure was performed on the 100 % lesion in the proximal RCA. Following intervention there was a 0 % residual stenosis. There was KATHLEEN 0 flow before the  procedure and KATHLEEN 3 flow after the procedure. There was no dissection.    --  Vessel setup was performed. A Runthrough NS 180cm wire was used to cross the lesion.    --  Vessel setup was performed. A 6Fr. Launcher JR 4.0 100cm guiding catheter was used to cannulate the vessel.    --  Balloon angioplasty was performed, using a Trek Rx 2.5 x 15mm balloon, with 1 inflations and a maximum inflation pressure of 8 bayron.    --  A Resolute Andre Rx 4.0 x 26mm drug-eluting stent was placed across the lesion and deployed at a maximum inflation pressure of 16 bayron.    --  Balloon angioplasty was performed, using a NC Trek Rx 4.5 x 20mm balloon, with 2 inflations and a maximum inflation pressure of 16  bayron.    INTERVENTIONS:  --  AMI PCI (JUAN, PTCRA, PTCA) Single.    PROCEDURE COMPLETION: The patient tolerated the procedure well and was discharged from the cath lab. TIMING: Test started at 12:16. Test concluded at 12:42. HEMOSTASIS: The sheath was removed. The site was compressed with a Hemoband  device. Hemostasis was obtained. MEDICATIONS GIVEN: Fentanyl (1OOmcg/2 ml), 50 mcg, IV, at 12:13. Versed (2mg/2ml), 2 mg, IV, at 12:13. 1% Lidocaine, 1 ml, subcutaneously, at 12:16. Nitroglycerin (200mcg/ml), 200 mcg, at 12:16. Verapamil  (5mg/2ml), 2.5 mg, IV, at 12:16. Heparin 1000 units/ml, 6,000 units, at 12:16. Heparin 1000 units/ml, 5,000 units, IV, at 12:42. CONTRAST GIVEN: 105 ml Omnipaque (350 mg I /ml). RADIATION EXPOSURE: Fluoroscopy time: 7.88 min.    CORONARY VESSELS:   --  The coronary circulation is right dominant.  --  Left main: Angiography showed minor luminal irregularities.  --  Proximal LAD: Angiography showed minor luminal irregularities.  --  Circumflex: Angiography showed minor luminal irregularities.  --  Ramus intermedius: Angiography showed minor luminal irregularities.  --  Proximal RCA: There was a 100 % stenosis.  --  Distal RCA: There was a 10 % stenosis at the site of a prior stent.  --  Right PDA: There was a 60 % stenosis.    IMPRESSIONS:  Inferior STEMI, culprit proximal RCA, successful revascularization with JUAN. Patent distal RCA stent.    RECOMMENDATIONS  GDMT CAD, dapt (consider 2 year dapt as ticagrelor stopped few weeks ago).    DISPOSITION:  The patient left the catheterization laboratory in stable condition.    Prepared and signed by    Kali Alfaro DO  Signed 03/31/2021 12:52:39    Study diagram    Angiographic findings  Native coronary lesions:  ï¾·Proximal RCA: Lesion 1: 100 % stenosis.  ï¾·Distal RCA: Lesion 1: 10 % stenosis, site of prior stent.  ï¾·RPDA: Lesion 1: 60 % stenosis.  Intervention results  Native coronary lesions:  ï¾·balloon angioplasty, stent, and  balloon angioplasty of the 100 % stenosis in proximal RCA. 0 % residual stenosis. Stent: Resolute Andre Rx 4.0 x 26mm drug-eluting.    Hemodynamic tables    Pressures:  Baseline  Pressures:  - HR: 76  Pressures:  - Rhythm:  Pressures:  -- Aortic Pressure (S/D/M): 83/63/71    Outputs:  Baseline  Outputs:  -- CALCULATIONS: [age] 50.87  Outputs:  -- CALCULATIONS: Body Surface Area: 2.11  Outputs:  -- CALCULATIONS: Height in cm: 168.00  Outputs:  -- CALCULATIONS: Sex: Male  Outputs:  -- CALCULATIONS: Weight in k.00    --------------------------------------------------------------------------------  ECHO:   Results for orders placed during the hospital encounter of 21    Echo complete with contrast if indicated    [place] [address]  [phone]    Transthoracic Echocardiogram  2D, M-mode, Doppler, and Color Doppler    Study date:  31-Mar-2021    Patient: PRESTON CUELLO  MR number: [id number]  Account number: [id number]  : 1970  Age: 50 years  Gender: Male  Status: Inpatient  Location: Cath lab  Height: 66 in  Weight: 225 lb  BP: 95/ 61 mmHg    Indications: MI.    Diagnoses: I21.3 - ST elevation (STEMI) myocardial infarction of unspecified site    Sonographer:  Cari Dockery RDCS  Referring Physician:  JOSLYN Jerez  Group:  [facility]  Cardiology Fellow:  Helena Shrestha MD  Interpreting Physician:  Kali Kenney DO    SUMMARY    PROCEDURE INFORMATION:  This was a technically difficult study.    LEFT VENTRICLE:  Systolic function was normal.  There were no regional wall motion abnormalities.  Wall thickness was mildly increased.  There was mild concentric hypertrophy.  Doppler parameters were consistent with abnormal left ventricular relaxation (grade 1 diastolic dysfunction).    RIGHT VENTRICLE:  The ventricle was mildly to moderately dilated.    RIGHT ATRIUM:  The atrium was mildly  dilated.    MITRAL VALVE:  There was mild annular calcification.  There was trace regurgitation.    HISTORY: PRIOR HISTORY: CKD-2. HLD. STEMI. Hypertension. CAD. Sleep apnea. COPD. Former smoker.    PROCEDURE: The procedure was performed in the catheterization laboratory. This was a routine study. The transthoracic approach was used. The study included complete 2D imaging, M-mode, complete spectral Doppler, and color Doppler. The  heart rate was 72 bpm, at the start of the study. Images were obtained from the parasternal, apical, subcostal, and suprasternal notch acoustic windows. Intravenous contrast ( 0.6mL/min of Definity in NSS) was administered to opacify the  left ventricle. Echocardiographic views were limited due to decreased penetration and lung interference. This was a technically difficult study.    LEFT VENTRICLE: Size was normal. Systolic function was normal. There were no regional wall motion abnormalities. Wall thickness was mildly increased. There was mild concentric hypertrophy. DOPPLER: Doppler parameters were consistent with  abnormal left ventricular relaxation (grade 1 diastolic dysfunction).    VENTRICULAR SEPTUM: Thickness was normal. There was normal motion. There was normal contour. The septum was intact.    RIGHT VENTRICLE: The ventricle was mildly to moderately dilated. Systolic function was normal.    LEFT ATRIUM: Size was at the upper limits of normal.    RIGHT ATRIUM: The atrium was mildly dilated.    MITRAL VALVE: There was mild annular calcification. There was normal leaflet separation. DOPPLER: The transmitral velocity was within the normal range. There was no evidence for stenosis. There was trace regurgitation.    AORTIC VALVE: The valve was trileaflet. Leaflets exhibited sclerosis. DOPPLER: Transaortic velocity was within the normal range. There was no evidence for stenosis. There was no regurgitation.    TRICUSPID VALVE: The valve structure was normal. There was normal leaflet  separation. DOPPLER: The transtricuspid velocity was within the normal range. There was no evidence for stenosis. There was mild regurgitation. The tricuspid jet  envelope definition was inadequate for estimation of RV systolic pressure.    PULMONIC VALVE: DOPPLER: The transpulmonic velocity was within the normal range. There was no regurgitation.    PERICARDIUM: There was no pericardial effusion.    AORTA: The root exhibited normal size.    SYSTEM MEASUREMENT TABLES    2D  %FS: 28.78 %  Ao Diam: 3.02 cm  EDV(Teich): 96.65 ml  EF(Teich): 55.46 %  ESV(Teich): 43.04 ml  IVSd: 1.12 cm  LA Area: 12.98 cm2  LA Diam: 3.42 cm  LVEDV MOD A4C: 90.84 ml  LVEF MOD A4C: 54.09 %  LVESV MOD A4C: 41.71 ml  LVIDd: 4.59 cm  LVIDs: 3.27 cm  LVLd A4C: 6.75 cm  LVLs A4C: 6.11 cm  LVPWd: 0.86 cm  RA Area: 18.18 cm2  RVIDd: 3.96 cm  SV MOD A4C: 49.13 ml  SV(Teich): 53.61 ml    MM  TAPSE: 1.61 cm    PW  E' Sept: 0.07 m/s  E/E' Sept: 8.73  MV A Maury: 0.67 m/s  MV Dec Newton: 4.38 m/s2  MV DecT: 144.84 ms  MV E Maury: 0.63 m/s  MV E/A Ratio: 0.95    [facility]    Prepared and electronically signed by    Kali Kenney DO  Signed 31-Mar-2021 16:26:48    No results found for this or any previous visit.    --------------------------------------------------------------------------------  HOLTER  No results found for this or any previous visit.    No results found for this or any previous visit.    --------------------------------------------------------------------------------  CAROTIDS  No results found for this or any previous visit.     --------------------------------------------------------------------------------  Diagnoses and all orders for this visit:    ST elevation myocardial infarction involving right coronary artery (HCC)    Benign hypertension with CKD (chronic kidney disease), stage II    Type 2 diabetes mellitus with hyperglycemia, with long-term current use of insulin  ([facility])    Coronary artery disease involving native coronary artery of native heart without angina pectoris  -     Lipid Panel with Direct LDL reflex; Future    Acute ST elevation myocardial infarction (STEMI) of inferior wall ([facility])       ======================================================    Past Medical History:   Diagnosis Date   • Acute ST elevation myocardial infarction (STEMI) of inferior wall ([facility])     cath with PCI/JUAN - RCA 2/7/2020   • Back pain    • CAD (coronary artery disease)    • COPD (chronic obstructive pulmonary disease) ([facility])    • Hyperlipidemia    • Hypertension    • Inguinal hernia, left    • Mild heartburn    • Obesity    • Stroke ([facility]) approx 2015    Denies residual problems. Initially he only had trouble with two fingers on left side.   • Wears glasses      Past Surgical History:   Procedure Laterality Date   • APPENDECTOMY     • HERNIA REPAIR     • NO PAST SURGERIES     • WV LAPAROSCOPIC APPENDECTOMY N/A 11/28/2018    Procedure: APPENDECTOMY LAPAROSCOPIC;  Surgeon: Enrrique Álvarez MD;  Location:  MAIN OR;  Service: General   • W[place] LAPAROSCOPY SURG RPR INITIAL INGUINAL HERNIA Left 4/2/2018    Procedure: LAPAROSCOPIC INGUINAL HERNIA REPAIR WITH MESH;  Surgeon: Enrrique Álvarez MD;  Location: [facility] Main OR;  Service: General         Medications  Current Outpatient Medications   Medication Sig Dispense Refill   • acetaminophen (TYLENOL) 500 mg tablet Take 1 tablet (500 mg total) by mouth every 6 (six) hours as needed for mild pain or moderate pain 30 tablet 0   • aspirin 81 mg chewable tablet Chew 1 tablet (81 mg total) daily 90 tablet 3   • atorvastatin (LIPITOR) 80 mg tablet Take 1 tablet (80 mg total) by mouth every evening 90 tablet 1   • Blood Glucose Monitoring Suppl (OneTouch Verio Reflect) w/Device KIT Check blood sugars twice daily. Please substitute with appropriate alternative as covered by patient's insurance. Dx: E11.65 1 kit 0   • dapagliflozin (Farxiga) 5 MG TABS Take 1 tablet  (5 mg total) by mouth daily 30 tablet 3   • ezetimibe (ZETIA) 10 mg tablet Take 1 tablet (10 mg total) by mouth daily 30 tablet 5   • glucose blood (OneTouch Verio) test strip Check blood sugars twice daily. Please substitute with appropriate alternative as covered by patient's insurance. Dx: E11.65 200 each 3   • Insulin Glargine Solostar (Lantus SoloStar) 100 UNIT/ML SOPN Inject 0.2 mL (20 Units total) under the skin daily with breakfast 15 mL 3   • Insulin Pen Needle (BD Pen Needle Alida U/F) 32G X 4 MM MISC Use in the morning 100 each 3   • metFORMIN (GLUCOPHAGE-XR) 500 mg 24 hr tablet Take 2 tablet twice a day before breakfast and dinner 180 tablet 0   • methocarbamol (ROBAXIN) 500 mg tablet Take 1 tablet (500 mg total) by mouth 2 (two) times a day 20 tablet 0   • metoprolol tartrate (LOPRESSOR) 25 mg tablet Take 1 tablet (25 mg total) by mouth every 12 (twelve) hours 180 tablet 1   • nitroglycerin (NITROSTAT) 0.4 mg SL tablet Place 1 tablet (0.4 mg total) under the tongue every 5 (five) minutes as needed for chest pain If no relief after 3 tablets, come immediately to  tablet 1   • OneTouch Delica Lancets 33G MISC Check blood sugars twice daily. Please substitute with appropriate alternative as covered by patient's insurance. Dx: E11.65 200 each 3   • ticagrelor (Brilinta) 60 MG Take 1 tablet (60 mg total) by mouth every 12 (twelve) hours 180 tablet 3   • Icosapent Ethyl 1 g CAPS Take 2 capsules (2 g total) by mouth 2 (two) times a day (Patient not taking: Reported on 1/4/2024) 360 capsule 3   • lidocaine (LIDODERM) 5 % Apply 1 patch topically over 12 hours every 24 hours Remove & Discard patch within 12 hours or as directed by MD (Patient not taking: Reported on 1/4/2024) 15 patch 0     No current facility-administered medications for this visit.        No Known Allergies    Social History     Socioeconomic History   • Marital status: Single     Spouse name: Not on file   • Number of children: Not on  "file   • Years of education: Not on file   • Highest education level: Not on file   Occupational History   • Not on file   Tobacco Use   • Smoking status: Former     Current packs/day: 0.00     Types: Cigarettes     Quit date: 1995     Years since quittin.0   • Smokeless tobacco: Never   Vaping Use   • Vaping status: Never Used   Substance and Sexual Activity   • Alcohol use: Not Currently   • Drug use: No   • Sexual activity: Not Currently     Birth control/protection: None   Other Topics Concern   • Not on file   Social History Narrative   • Not on file     Social Determinants of Health     Financial Resource Strain: Not on file   Food Insecurity: Not on file   Transportation Needs: Not on file   Physical Activity: Not on file   Stress: Not on file   Social Connections: Not on file   Intimate Partner Violence: Not on file   Housing Stability: Not on file        Family History   Problem Relation Age of Onset   • Hypertension Mother    • Hypertension Father    • Leukemia Sister    • Diabetes type II Brother    • Coronary artery disease Brother    • Diabetes unspecified Brother    • Diabetes unspecified Brother    • No Known Problems Brother    • No Known Problems Brother        Lab Results   Component Value Date    WBC 7.06 2023    HGB 15.3 2023    HCT 44.2 2023    MCV 87 2023     2023      Lab Results   Component Value Date    SODIUM 134 (L) 2023    K 3.3 (L) 2023    CL 99 2023    CO2 25 2023    BUN 16 2023    CREATININE 1.00 2023    GLUC 183 (H) 2023    CALCIUM 9.2 2023      Lab Results   Component Value Date    HGBA1C 10.0 (A) 2023      No results found for: \"CHOL\"  Lab Results   Component Value Date    HDL 33 (L) 10/03/2022    HDL 36 (L) 2021    HDL 32 (L) 2020     Lab Results   Component Value Date    LDLCALC 31 10/03/2022    LDLCALC 98 2021    LDLCALC 100 2020     Lab Results   Component " "Value Date    TRIG 355 (H) 10/03/2022    TRIG 185 (H) 06/23/2021    TRIG 195 (H) 02/08/2020     No results found for: \"CHOLHDL\"   Lab Results   Component Value Date    INR 1.02 02/11/2020    INR 0.97 02/07/2020    INR 1.09 11/03/2018    PROTIME 13.5 02/11/2020    PROTIME 13.0 02/07/2020    PROTIME 12.6 11/03/2018          Patient Active Problem List    Diagnosis Date Noted   • Class 1 obesity due to excess calories with serious comorbidity and body mass index (BMI) of 33.0 to 33.9 in adult 07/28/2023   • Type 2 diabetes mellitus with hyperglycemia, with long-term current use of insulin ([facility]) 06/14/2023   • Sleep apnea 06/23/2020   • Insomnia with sleep apnea 06/23/2020   • Hypertension    • CAD (coronary artery disease)    • Acute ST elevation myocardial infarction (STEMI) of inferior wall ([facility])    • ST elevation myocardial infarction involving right coronary artery ([facility]) 02/08/2020   • Left arm pain 08/20/2019   • Bilateral low back pain without sciatica 08/20/2019   • CKD (chronic kidney disease), stage II 04/09/2019   • Hypokalemia 04/09/2019   • Dizziness 03/30/2019   • Benign hypertension with CKD (chronic kidney disease), stage II 03/30/2019   • Hyperlipidemia 03/30/2019   • Indirect inguinal hernia 04/02/2018       Portions of the record may have been created with voice recognition software. Occasional wrong word or \"sound a like\" substitutions may have occurred due to the inherent limitations of voice recognition software. Read the chart carefully and recognize, using context, where substitutions have occurred.    Enrrique Davalos DO, [facility]  1/4/2024 1:09 PM          "

## 2024-01-08 ENCOUNTER — TELEPHONE (OUTPATIENT)
Dept: DIABETES SERVICES | Facility: CLINIC | Age: 54
End: 2024-01-08

## 2024-01-17 DIAGNOSIS — I21.11 ST ELEVATION MYOCARDIAL INFARCTION INVOLVING RIGHT CORONARY ARTERY (HCC): ICD-10-CM

## 2024-01-17 DIAGNOSIS — E11.65 TYPE 2 DIABETES MELLITUS WITH HYPERGLYCEMIA, WITH LONG-TERM CURRENT USE OF INSULIN (HCC): ICD-10-CM

## 2024-01-17 DIAGNOSIS — Z79.4 TYPE 2 DIABETES MELLITUS WITH HYPERGLYCEMIA, WITH LONG-TERM CURRENT USE OF INSULIN (HCC): ICD-10-CM

## 2024-01-17 DIAGNOSIS — E78.5 HYPERLIPIDEMIA, UNSPECIFIED HYPERLIPIDEMIA TYPE: ICD-10-CM

## 2024-01-17 RX ORDER — EZETIMIBE 10 MG/1
10 TABLET ORAL DAILY
Qty: 30 TABLET | Refills: 0 | Status: SHIPPED | OUTPATIENT
Start: 2024-01-17

## 2024-01-17 RX ORDER — METFORMIN HYDROCHLORIDE 500 MG/1
TABLET, EXTENDED RELEASE ORAL
Qty: 180 TABLET | Refills: 0 | Status: SHIPPED | OUTPATIENT
Start: 2024-01-17

## 2024-01-17 NOTE — TELEPHONE ENCOUNTER
Requested medication(s) are due for refill today: Yes  Patient has already received a courtesy refill: No  Other reason request has been forwarded to provider: Failed protocol

## 2024-02-15 DIAGNOSIS — Z79.4 TYPE 2 DIABETES MELLITUS WITH HYPERGLYCEMIA, WITH LONG-TERM CURRENT USE OF INSULIN (HCC): ICD-10-CM

## 2024-02-15 DIAGNOSIS — E78.5 HYPERLIPIDEMIA, UNSPECIFIED HYPERLIPIDEMIA TYPE: ICD-10-CM

## 2024-02-15 DIAGNOSIS — E11.65 TYPE 2 DIABETES MELLITUS WITH HYPERGLYCEMIA, WITH LONG-TERM CURRENT USE OF INSULIN (HCC): ICD-10-CM

## 2024-02-15 DIAGNOSIS — I21.11 ST ELEVATION MYOCARDIAL INFARCTION INVOLVING RIGHT CORONARY ARTERY (HCC): ICD-10-CM

## 2024-02-16 RX ORDER — EZETIMIBE 10 MG/1
10 TABLET ORAL DAILY
Qty: 30 TABLET | Refills: 5 | Status: SHIPPED | OUTPATIENT
Start: 2024-02-16

## 2024-02-28 NOTE — RAPID RESPONSE
Rapid Response Note  Cheyanne Celeste 48 y o  male MRN: [id number]  Unit/Bed#: [facility] 5 OVR Encounter: [id number]    Rapid Response Notification(s):   Response called date/time:  3/31/2021 1:54 PM  Response team arrival date/time:  3/31/2021 1:54 PM  Response end date/time:  3/31/2021 2:05 PM  Rapid response location:  Cardiology/cath lab  Primary reason for rapid response call:  Acute change in neuro status    Rapid Response Intervention(s):   Airway:  None  Breathing:  None  Circulation:  None  Fluids administered:  None  Medications administered:  None  Comments:  Update to stroke alert   CTH/CTA  Spot EEG  No tPA (low NIHSS)       Background/Situation:   Cheyanne Celeste is a 48 y o  male who presented with STEMI underwent PCI to RCA shortly after procedure developed amnesia with loss of short term memory and confusion to situation  RRT called for change in mental status  Review of Systems   Psychiatric/Behavioral: Positive for confusion  Objective:   Vitals:    03/31/21 1258 03/31/21 1327 03/31/21 1356   BP: 95/61 99/61 149/55   BP Location: Left arm Left arm    Pulse: 72 70 77   Resp: 18 18 12   SpO2: 98% 98% 99%     Physical Exam  Constitutional:       Comments: Alert and oriented to self, hospital, year  Unaware of month  Confused to situation  HENT:      Head: Normocephalic  Mouth/Throat:      Mouth: Mucous membranes are moist    Eyes:      Extraocular Movements: Extraocular movements intact  Pupils: Pupils are equal, round, and reactive to light  Neck:      Musculoskeletal: Neck supple  Cardiovascular:      Rate and Rhythm: Normal rate and regular rhythm  Pulses: Normal pulses  Heart sounds: No murmur  No friction rub  No gallop  Pulmonary:      Effort: Pulmonary effort is normal       Breath sounds: Normal breath sounds  No wheezing, rhonchi or rales  Abdominal:      General: Bowel sounds are normal       Palpations: Abdomen is soft     Musculoskeletal: Normal range of motion  General: No swelling  Skin:     General: Skin is warm and dry  Capillary Refill: Capillary refill takes less than 2 seconds  Comments: TR band in place R wrist   Neurological:      Comments: A& O to self and hospital, year unaware of date  Unaware of situation  Heike Dickens to do immediate recall unable to complete 5 min recall  NIHSS 1 for ocnfusion  NFD  5/5 strength and sensation intact bilaterally  No pronator drift  CN II-XII grossly intact  Assessment:   · Amnesia  · STEMI s/p PCI to RCA    Plan:   · Updated to stroke alert, NIHSS 1 for confusion, CTH/CTA  · No tPA due to low NIHSS  · Spot EEG  · Neurology consult     Rapid Response Outcome:   Condition:  In stable condition  Progression:  Unchanged  Transfer:  Transfer to step-down  Primary service notified of transfer: Yes    Code Status: Level 1 (Full Code)      Family notified of transfer: yes  Family member contacted: Daughter José Luis mason to be contacted by primary team     Portions of the record may have been created with voice recognition software  Occasional wrong word or "sound a like" substitutions may have occurred due to the inherent limitations of voice recognition software  Read the chart carefully and recognize, using context, where substitutions have occurred      Douglas Reynoso PA-C Discharged

## 2024-03-04 ENCOUNTER — OFFICE VISIT (OUTPATIENT)
Dept: ENDOCRINOLOGY | Facility: CLINIC | Age: 54
End: 2024-03-04
Payer: COMMERCIAL

## 2024-03-04 VITALS
SYSTOLIC BLOOD PRESSURE: 120 MMHG | BODY MASS INDEX: 30.16 KG/M2 | HEART RATE: 70 BPM | WEIGHT: 181 LBS | HEIGHT: 65 IN | DIASTOLIC BLOOD PRESSURE: 80 MMHG

## 2024-03-04 DIAGNOSIS — I10 HYPERTENSION, UNSPECIFIED TYPE: ICD-10-CM

## 2024-03-04 DIAGNOSIS — Z79.4 TYPE 2 DIABETES MELLITUS WITH HYPERGLYCEMIA, WITH LONG-TERM CURRENT USE OF INSULIN (HCC): Primary | ICD-10-CM

## 2024-03-04 DIAGNOSIS — E66.09 CLASS 1 OBESITY DUE TO EXCESS CALORIES WITH SERIOUS COMORBIDITY AND BODY MASS INDEX (BMI) OF 33.0 TO 33.9 IN ADULT: ICD-10-CM

## 2024-03-04 DIAGNOSIS — E78.2 MIXED HYPERLIPIDEMIA: ICD-10-CM

## 2024-03-04 DIAGNOSIS — E11.65 TYPE 2 DIABETES MELLITUS WITH HYPERGLYCEMIA, WITH LONG-TERM CURRENT USE OF INSULIN (HCC): Primary | ICD-10-CM

## 2024-03-04 LAB — SL AMB POCT HEMOGLOBIN AIC: 5.3 (ref ?–6.5)

## 2024-03-04 PROCEDURE — 99214 OFFICE O/P EST MOD 30 MIN: CPT | Performed by: INTERNAL MEDICINE

## 2024-03-04 PROCEDURE — 83036 HEMOGLOBIN GLYCOSYLATED A1C: CPT | Performed by: INTERNAL MEDICINE

## 2024-03-04 RX ORDER — METFORMIN HYDROCHLORIDE 500 MG/1
TABLET, EXTENDED RELEASE ORAL
Qty: 180 TABLET | Refills: 0 | Status: SHIPPED | OUTPATIENT
Start: 2024-03-04

## 2024-03-04 NOTE — PROGRESS NOTES
Enrrique Valderrama 53 y.o. male MRN: [id number]    Encounter: [id number]      Assessment/Plan     Problem List Items Addressed This Visit          Endocrine    Type 2 diabetes mellitus with hyperglycemia, with long-term current use of insulin (formerly [facility]) - Primary       Lab Results   Component Value Date    HGBA1C 5.3 03/04/2024   A1c is low-he denies any hypoglycemic episodes.  Has been off Farxiga for a few weeks and did not have any labs done after starting Farxiga.  For now stay off Farxiga, advised to have labs done including a CMP right away.  Continue metformin and Lantus for now, monitor blood sugar 3 times a day and send over log in the next 2 weeks or sooner if he notices hypoglycemic episodes         Relevant Medications    metFORMIN (GLUCOPHAGE-XR) 500 mg 24 hr tablet    Other Relevant Orders    POCT hemoglobin A1c (Completed)       Cardiovascular and Mediastinum    Hypertension     Has been off lisinopril hydrochlorothiazide since January due to hypotension-blood pressure has improved            Other    Class 1 obesity due to excess calories with serious comorbidity and body mass index (BMI) of 33.0 to 33.9 in adult    Hyperlipidemia     Continue statins, check fasting lipid profile             CC: Diabetes    History of Present Illness     HPI:  53-year-old male with type 2 diabetes on insulin therapy seen in [id number] was last seen in the office in September 2023-at that time was started on Farxiga-plan was to repeat a BMP in 1 month and follow-up in 3 months however he has not had any labs done since August of last year and did not follow-up after September    Current regimen   farxiga-off for the past few weeks that she did not have any refills  lantus 20 units in morning   metfromin 1000 mg BID    Lost 20 pounds since last visit    Checks f.s  3/week - most numbers 110-150s , occasional 200s , no hypoglycemia     No polyuria , polydipsia , no blurry vision , no numbness and tingling in feet     Has been  off lisinopril /hctz due to hypotension since January    Review of Systems    Historical Information   Past Medical History:   Diagnosis Date    Acute ST elevation myocardial infarction (STEMI) of inferior wall ([facility])     cath with PCI/JUAN - RCA 2020    Back pain     CAD (coronary artery disease)     COPD (chronic obstructive pulmonary disease) ([facility])     Hyperlipidemia     Hypertension     Inguinal hernia, left     Mild heartburn     Obesity     Stroke ([facility]) approx 2015    Denies residual problems. Initially he only had trouble with two fingers on left side.    Wears glasses      Past Surgical History:   Procedure Laterality Date    APPENDECTOMY      HERNIA REPAIR      NO PAST SURGERIES      CA LAPAROSCOPIC APPENDECTOMY N/A 2018    Procedure: APPENDECTOMY LAPAROSCOPIC;  Surgeon: Enrriqeu Álvarez MD;  Location:  MAIN OR;  Service: General    CA LAPAROSCOPY SURG RPR INITIAL INGUINAL HERNIA Left 2018    Procedure: LAPAROSCOPIC INGUINAL HERNIA REPAIR WITH MESH;  Surgeon: Enrrique Álvarez MD;  Location: [facility];  Service: General     Social History   Social History     Substance and Sexual Activity   Alcohol Use Not Currently     Social History     Substance and Sexual Activity   Drug Use No     Social History     Tobacco Use   Smoking Status Former    Current packs/day: 0.00    Types: Cigarettes    Quit date: 1995    Years since quittin.1   Smokeless Tobacco Never     Family History:   Family History   Problem Relation Age of Onset    Hypertension Mother     Hypertension Father     Leukemia Sister     Diabetes type II Brother     Coronary artery disease Brother     Diabetes unspecified Brother     Diabetes unspecified Brother     No Known Problems Brother     No Known Problems Brother        Meds/Allergies   Current Outpatient Medications   Medication Sig Dispense Refill    acetaminophen (TYLENOL) 500 mg tablet Take 1 tablet (500 mg total) by mouth every 6 (six) hours as needed for mild pain  or moderate pain 30 tablet 0    aspirin 81 mg chewable tablet Chew 1 tablet (81 mg total) daily 90 tablet 3    atorvastatin (LIPITOR) 80 mg tablet Take 1 tablet (80 mg total) by mouth every evening 90 tablet 1    Blood Glucose Monitoring Suppl (OneTouch Verio Reflect) w/Device KIT Check blood sugars twice daily. Please substitute with appropriate alternative as covered by patient's insurance. Dx: E11.65 1 kit 0    ezetimibe (ZETIA) 10 mg tablet Take 1 tablet (10 mg total) by mouth daily 30 tablet 5    glucose blood (OneTouch Verio) test strip Check blood sugars twice daily. Please substitute with appropriate alternative as covered by patient's insurance. Dx: E11.65 200 each 3    Insulin Glargine Solostar (Lantus SoloStar) 100 UNIT/ML SOPN Inject 0.2 mL (20 Units total) under the skin daily with breakfast 15 mL 3    Insulin Pen Needle (BD Pen Needle Alida U/F) 32G X 4 MM MISC Use in the morning 100 each 3    metFORMIN (GLUCOPHAGE-XR) 500 mg 24 hr tablet Take 2 tablet twice a day before breakfast and dinner 180 tablet 0    methocarbamol (ROBAXIN) 500 mg tablet Take 1 tablet (500 mg total) by mouth 2 (two) times a day 20 tablet 0    metoprolol tartrate (LOPRESSOR) 25 mg tablet Take 1 tablet (25 mg total) by mouth every 12 (twelve) hours 180 tablet 1    nitroglycerin (NITROSTAT) 0.4 mg SL tablet Place 1 tablet (0.4 mg total) under the tongue every 5 (five) minutes as needed for chest pain If no relief after 3 tablets, come immediately to  tablet 1    OneTouch Delica Lancets 33G MISC Check blood sugars twice daily. Please substitute with appropriate alternative as covered by patient's insurance. Dx: E11.65 200 each 3    ticagrelor (Brilinta) 60 MG Take 1 tablet (60 mg total) by mouth every 12 (twelve) hours 180 tablet 3    dapagliflozin (Farxiga) 5 MG TABS Take 1 tablet (5 mg total) by mouth daily 30 tablet 0    Icosapent Ethyl 1 g CAPS Take 2 capsules (2 g total) by mouth 2 (two) times a day (Patient not taking:  "Reported on 1/4/2024) 360 capsule 3    lidocaine (LIDODERM) 5 % Apply 1 patch topically over 12 hours every 24 hours Remove & Discard patch within 12 hours or as directed by MD (Patient not taking: Reported on 1/4/2024) 15 patch 0     No current facility-administered medications for this visit.     No Known Allergies    Objective   Vitals: Blood pressure 120/80, pulse 70, height 5' 5\" (1.651 m), weight 82.1 kg (181 lb).    Physical Exam  Vitals reviewed.   Constitutional:       General: He is not in acute distress.     Appearance: Normal appearance. He is obese. He is not ill-appearing, toxic-appearing or diaphoretic.   HENT:      Head: Normocephalic and atraumatic.   Eyes:      General: No scleral icterus.     Extraocular Movements: Extraocular movements intact.   Cardiovascular:      Rate and Rhythm: Normal rate and regular rhythm.      Heart sounds: Normal heart sounds. No murmur heard.  Pulmonary:      Effort: Pulmonary effort is normal. No respiratory distress.      Breath sounds: Normal breath sounds. No wheezing or rales.   Musculoskeletal:      Cervical back: Neck supple.      Right lower leg: No edema.      Left lower leg: No edema.   Lymphadenopathy:      Cervical: No cervical adenopathy.   Skin:     General: Skin is warm and dry.   Neurological:      General: No focal deficit present.      Mental Status: He is alert and oriented to person, place, and time.      Gait: Gait normal.   Psychiatric:         Mood and Affect: Mood normal.         Behavior: Behavior normal.         Thought Content: Thought content normal.         Judgment: Judgment normal.         The history was obtained from the review of the chart, patient and family.    Lab Results:   Lab Results   Component Value Date/Time    Hemoglobin A1C 5.3 03/04/2024 03:08 PM    Hemoglobin A1C 10.0 (A) 07/28/2023 12:09 PM    WBC 7.06 08/13/2023 02:16 AM    WBC 7.05 06/22/2023 04:05 PM    WBC 6.23 05/28/2023 04:01 PM    Hemoglobin 15.3 08/13/2023 02:16 " "AM    Hemoglobin 16.2 06/22/2023 04:05 PM    Hemoglobin 15.9 05/28/2023 04:01 PM    Hematocrit 44.2 08/13/2023 02:16 AM    Hematocrit 46.9 06/22/2023 04:05 PM    Hematocrit 45.0 05/28/2023 04:01 PM    MCV 87 08/13/2023 02:16 AM    MCV 87 06/22/2023 04:05 PM    MCV 85 05/28/2023 04:01 PM    Platelets 240 08/13/2023 02:16 AM    Platelets 242 06/22/2023 04:05 PM    Platelets 239 05/28/2023 04:01 PM    BUN 16 08/13/2023 02:16 AM    BUN 16 06/22/2023 04:05 PM    BUN 21 05/28/2023 04:01 PM    Potassium 3.3 (L) 08/13/2023 02:16 AM    Potassium 3.4 (L) 06/22/2023 04:05 PM    Potassium 3.0 (L) 05/28/2023 04:01 PM    Chloride 99 08/13/2023 02:16 AM    Chloride 98 06/22/2023 04:05 PM    Chloride 90 (L) 05/28/2023 04:01 PM    CO2 25 08/13/2023 02:16 AM    CO2 25 06/22/2023 04:05 PM    CO2 26 05/28/2023 04:01 PM    Creatinine 1.00 08/13/2023 02:16 AM    Creatinine 0.89 06/22/2023 04:05 PM    Creatinine 1.09 05/28/2023 04:01 PM    AST 15 06/22/2023 04:05 PM    ALT 38 06/22/2023 04:05 PM    Total Protein 6.8 06/22/2023 04:05 PM    Albumin 4.1 06/22/2023 04:05 PM           Imaging Studies: I have personally reviewed pertinent reports.      Portions of the record may have been created with voice recognition software. Occasional wrong word or \"sound a like\" substitutions may have occurred due to the inherent limitations of voice recognition software. Read the chart carefully and recognize, using context, where substitutions have occurred.    "

## 2024-03-05 NOTE — ASSESSMENT & PLAN NOTE
Lab Results   Component Value Date    HGBA1C 5.3 03/04/2024   A1c is low-he denies any hypoglycemic episodes.  Has been off Farxiga for a few weeks and did not have any labs done after starting Farxiga.  For now stay off Farxiga, advised to have labs done including a CMP right away.  Continue metformin and Lantus for now, monitor blood sugar 3 times a day and send over log in the next 2 weeks or sooner if he notices hypoglycemic episodes

## 2024-03-05 NOTE — ASSESSMENT & PLAN NOTE
Has been off lisinopril hydrochlorothiazide since January due to hypotension-blood pressure has improved

## 2024-04-09 DIAGNOSIS — I25.10 CORONARY ARTERY DISEASE INVOLVING NATIVE CORONARY ARTERY OF NATIVE HEART WITHOUT ANGINA PECTORIS: ICD-10-CM

## 2024-04-09 DIAGNOSIS — Z79.4 TYPE 2 DIABETES MELLITUS WITH HYPERGLYCEMIA, WITH LONG-TERM CURRENT USE OF INSULIN (HCC): ICD-10-CM

## 2024-04-09 DIAGNOSIS — E11.65 TYPE 2 DIABETES MELLITUS WITH HYPERGLYCEMIA, WITH LONG-TERM CURRENT USE OF INSULIN (HCC): ICD-10-CM

## 2024-04-10 RX ORDER — METFORMIN HYDROCHLORIDE 500 MG/1
1000 TABLET, EXTENDED RELEASE ORAL 2 TIMES DAILY WITH MEALS
Qty: 360 TABLET | Refills: 0 | Status: SHIPPED | OUTPATIENT
Start: 2024-04-10 | End: 2024-07-09

## 2024-04-12 ENCOUNTER — TELEPHONE (OUTPATIENT)
Dept: CARDIOLOGY CLINIC | Facility: CLINIC | Age: 54
End: 2024-04-12

## 2024-04-12 NOTE — TELEPHONE ENCOUNTER
Received a call Pt hasn't been able get medication filled at pharmacy. Has a few pills left but no refills. Pt needs new script sent to pharmacy     [facility] [address]   [phone]    ticagrelor (Brilinta) 60 MG   180 tablet

## 2024-05-09 ENCOUNTER — HOSPITAL ENCOUNTER (EMERGENCY)
Facility: HOSPITAL | Age: 54
Discharge: HOME | End: 2024-05-09
Attending: EMERGENCY MEDICINE
Payer: COMMERCIAL

## 2024-05-09 ENCOUNTER — APPOINTMENT (EMERGENCY)
Dept: RADIOLOGY | Facility: HOSPITAL | Age: 54
End: 2024-05-09
Payer: COMMERCIAL

## 2024-05-09 VITALS
TEMPERATURE: 97.6 F | SYSTOLIC BLOOD PRESSURE: 143 MMHG | BODY MASS INDEX: 30.6 KG/M2 | HEART RATE: 79 BPM | DIASTOLIC BLOOD PRESSURE: 83 MMHG | WEIGHT: 183.86 LBS | OXYGEN SATURATION: 98 % | RESPIRATION RATE: 18 BRPM

## 2024-05-09 DIAGNOSIS — S83.91XA RIGHT KNEE SPRAIN: ICD-10-CM

## 2024-05-09 DIAGNOSIS — M25.561 ACUTE PAIN OF RIGHT KNEE: ICD-10-CM

## 2024-05-09 PROCEDURE — 99284 EMERGENCY DEPT VISIT MOD MDM: CPT

## 2024-05-09 PROCEDURE — 73564 X-RAY EXAM KNEE 4 OR MORE: CPT

## 2024-05-09 RX ORDER — KETOROLAC TROMETHAMINE 30 MG/ML
15 INJECTION, SOLUTION INTRAMUSCULAR; INTRAVENOUS ONCE
Status: DISCONTINUED | OUTPATIENT
Start: 2024-05-09 | End: 2024-05-09

## 2024-05-09 RX ORDER — ACETAMINOPHEN 325 MG/1
975 TABLET ORAL ONCE
Status: COMPLETED | OUTPATIENT
Start: 2024-05-09 | End: 2024-05-09

## 2024-05-09 RX ORDER — ACETAMINOPHEN 500 MG
500 TABLET ORAL EVERY 6 HOURS PRN
Qty: 20 TABLET | Refills: 0 | Status: SHIPPED | OUTPATIENT
Start: 2024-05-09

## 2024-05-09 RX ADMIN — ACETAMINOPHEN 975 MG: 325 TABLET, FILM COATED ORAL at 13:39

## 2024-05-09 NOTE — DISCHARGE INSTRUCTIONS
Patient advised to follow-up with PCP for today's ED visit.  Patient advised to return to the ED with any worsening symptoms that were explained on discharge.  Patient advised to wear knee brace or supportive treatment.  Patient advised take prescribed medication as prescribed.

## 2024-05-09 NOTE — ED PROVIDER NOTES
History  Chief Complaint   Patient presents with    Knee Pain     R knee pain x1 week. No injury/trauma. Denies any redness or tenderness to palpation. No hx DVT. Start biking recently. Hurts with bending and walking, relieved with rest.     Patient is a 53-year-old female presented ED with chief complaint knee pain.  He states his pains been going on for around a week now.  Patient denies experiences pain before.  States that the pain is worse with ambulation.  Patient did state that he has been starting to bike over the last month which is a new activity for him.  He states that when he is walking on her knee sometimes feels like the knee is going to give out.  He denies any erythema edema or ecchymosis.  He denies any recent illness, chills, fevers, diaphoresis.  Denies any acute injuries to the area.  Patient states he is able to flex and extend the knee but this does elicit pain for him.  He states that the pain is worse after long periods of ambulation.  He rates the pain a 10 out of 10.  States that over-the-counter medications are not helping to relieve the pain.Patient denies any chest pain, shortness of breath, vomiting, diarrhea, chills, diaphoresis, fevers, loss of consciousness, syncope, urinary and bowel changes, abdominal pain, visual symptoms, vision loss, loss of function, loss of sensation, decreased oral intake, hemoptysis, hematochezia, hematemesis, melena, confusion.         Prior to Admission Medications   Prescriptions Last Dose Informant Patient Reported? Taking?   Blood Glucose Monitoring Suppl (OneTouch Verio Reflect) w/Device KIT  Self No No   Sig: Check blood sugars twice daily. Please substitute with appropriate alternative as covered by patient's insurance. Dx: E11.65   Icosapent Ethyl 1 g CAPS  Self No No   Sig: Take 2 capsules (2 g total) by mouth 2 (two) times a day   Patient not taking: Reported on 1/4/2024   Insulin Glargine Solostar (Lantus SoloStar) 100 UNIT/ML SOPN  Self No No    Sig: Inject 0.2 mL (20 Units total) under the skin daily with breakfast   Insulin Pen Needle (BD Pen Needle Alida U/F) 32G X 4 MM MISC  Self No No   Sig: Use in the morning   OneTouch Delica Lancets 33G MISC  Self No No   Sig: Check blood sugars twice daily. Please substitute with appropriate alternative as covered by patient's insurance. Dx: E11.65   acetaminophen (TYLENOL) 500 mg tablet  Self No No   Sig: Take 1 tablet (500 mg total) by mouth every 6 (six) hours as needed for mild pain or moderate pain   aspirin 81 mg chewable tablet  Self No No   Sig: Chew 1 tablet (81 mg total) daily   atorvastatin (LIPITOR) 80 mg tablet  Self No No   Sig: Take 1 tablet (80 mg total) by mouth every evening   dapagliflozin (Farxiga) 5 MG TABS  Self No No   Sig: Take 1 tablet (5 mg total) by mouth daily   ezetimibe (ZETIA) 10 mg tablet  Self No No   Sig: Take 1 tablet (10 mg total) by mouth daily   glucose blood (OneTouch Verio) test strip  Self No No   Sig: Check blood sugars twice daily. Please substitute with appropriate alternative as covered by patient's insurance. Dx: E11.65   lidocaine (LIDODERM) 5 %  Self No No   Sig: Apply 1 patch topically over 12 hours every 24 hours Remove & Discard patch within 12 hours or as directed by MD   Patient not taking: Reported on 1/4/2024   metFORMIN (GLUCOPHAGE-XR) 500 mg 24 hr tablet   No No   Sig: Take 2 tablets (1,000 mg total) by mouth 2 (two) times a day with meals   methocarbamol (ROBAXIN) 500 mg tablet  Self No No   Sig: Take 1 tablet (500 mg total) by mouth 2 (two) times a day   metoprolol tartrate (LOPRESSOR) 25 mg tablet  Self No No   Sig: Take 1 tablet (25 mg total) by mouth every 12 (twelve) hours   nitroglycerin (NITROSTAT) 0.4 mg SL tablet  Self No No   Sig: Place 1 tablet (0.4 mg total) under the tongue every 5 (five) minutes as needed for chest pain If no relief after 3 tablets, come immediately to ER   ticagrelor (Brilinta) 60 MG   No No   Sig: Take 1 tablet (60 mg total)  by mouth every 12 (twelve) hours      Facility-Administered Medications: None       Past Medical History:   Diagnosis Date    Acute ST elevation myocardial infarction (STEMI) of inferior wall (HCC)     cath with PCI/JUAN - RCA 2020    Back pain     CAD (coronary artery disease)     COPD (chronic obstructive pulmonary disease) ([facility])     Hyperlipidemia     Hypertension     Inguinal hernia, left     Mild heartburn     Obesity     Stroke (HCC) approx 2015    Denies residual problems. Initially he only had trouble with two fingers on left side.    Wears glasses        Past Surgical History:   Procedure Laterality Date    APPENDECTOMY      HERNIA REPAIR      NO PAST SURGERIES      CA LAPAROSCOPIC APPENDECTOMY N/A 2018    Procedure: APPENDECTOMY LAPAROSCOPIC;  Surgeon: Enrrique Álvarez MD;  Location:  MAIN OR;  Service: General    CA LAPAROSCOPY SURG RPR INITIAL INGUINAL HERNIA Left 2018    Procedure: LAPAROSCOPIC INGUINAL HERNIA REPAIR WITH MESH;  Surgeon: Enrrique Álvarez MD;  Location: [facility];  Service: General       Family History   Problem Relation Age of Onset    Hypertension Mother     Hypertension Father     Leukemia Sister     Diabetes type II Brother     Coronary artery disease Brother     Diabetes unspecified Brother     Diabetes unspecified Brother     No Known Problems Brother     No Known Problems Brother      I have reviewed and agree with the history as documented.    E-Cigarette/Vaping    E-Cigarette Use Never User      E-Cigarette/Vaping Substances    Nicotine No     THC No     CBD No     Flavoring No     Other No     Unknown No      Social History     Tobacco Use    Smoking status: Former     Current packs/day: 0.00     Types: Cigarettes     Quit date: 1995     Years since quittin.3    Smokeless tobacco: Never   Vaping Use    Vaping status: Never Used   Substance Use Topics    Alcohol use: Not Currently    Drug use: No       Review of Systems   Constitutional:  Negative for  chills, diaphoresis, fatigue and fever.   HENT:  Negative for congestion, ear discharge, ear pain, postnasal drip, rhinorrhea, sinus pressure, sinus pain and sore throat.    Eyes:  Negative for photophobia and visual disturbance.   Respiratory:  Negative for cough, chest tightness and shortness of breath.    Cardiovascular:  Negative for chest pain and palpitations.   Gastrointestinal:  Negative for abdominal distention, abdominal pain, constipation, diarrhea, nausea and vomiting.   Genitourinary:  Negative for difficulty urinating, dysuria, flank pain, frequency and hematuria.   Musculoskeletal:  Positive for arthralgias. Negative for back pain, joint swelling, myalgias, neck pain and neck stiffness.   Skin:  Negative for rash and wound.   Neurological:  Negative for dizziness, tremors, syncope, facial asymmetry, weakness, light-headedness, numbness and headaches.       Physical Exam  Physical Exam  Vitals and nursing note reviewed.   Constitutional:       General: He is not in acute distress.     Appearance: Normal appearance. He is normal weight. He is not ill-appearing, toxic-appearing or diaphoretic.   HENT:      Head: Normocephalic.      Right Ear: External ear normal.      Left Ear: External ear normal.      Nose: Nose normal.      Mouth/Throat:      Mouth: Mucous membranes are moist.   Eyes:      Conjunctiva/sclera: Conjunctivae normal.   Cardiovascular:      Rate and Rhythm: Normal rate and regular rhythm.      Pulses: Normal pulses.   Pulmonary:      Effort: Pulmonary effort is normal. No respiratory distress.      Breath sounds: Normal breath sounds. No stridor. No wheezing, rhonchi or rales.   Chest:      Chest wall: No tenderness.   Abdominal:      General: Bowel sounds are normal.      Palpations: Abdomen is soft.      Tenderness: There is no abdominal tenderness.   Musculoskeletal:         General: Tenderness present. Normal range of motion.      Cervical back: Normal range of motion.      Comments:  Tenderness palpation over the medial joint line of the right knee.  No erythema ecchymosis or edema noted on exam.  No signs of joint effusion on exam.  Patient neurovascularly intact below the injury.  DP PT pulses 2+ bilateral laterally.  No color change or swelling of the lower extremity.  Patient able to flex and extend the knee with pain.  Lachman and posterior drawer test both negative.  No calf tenderness on exam.   Skin:     General: Skin is warm and dry.      Findings: No rash.   Neurological:      Mental Status: He is alert and oriented to person, place, and time.   Psychiatric:         Mood and Affect: Mood normal.         Vital Signs  ED Triage Vitals   Temperature Pulse Respirations Blood Pressure SpO2   05/09/24 1314 05/09/24 1312 05/09/24 1312 05/09/24 1312 05/09/24 1312   97.6 °F (36.4 °C) 79 18 143/83 98 %      Temp Source Heart Rate Source Patient Position - Orthostatic VS BP Location FiO2 (%)   05/09/24 1314 05/09/24 1312 05/09/24 1312 05/09/24 1312 --   Oral Monitor Sitting Right arm       Pain Score       05/09/24 1312       5           Vitals:    05/09/24 1312   BP: 143/83   Pulse: 79   Patient Position - Orthostatic VS: Sitting         Visual Acuity      ED Medications  Medications   acetaminophen (TYLENOL) tablet 975 mg (975 mg Oral Given 5/9/24 1339)       Diagnostic Studies  Results Reviewed       None                   XR knee 4+ views Right injury   ED Interpretation by Kev Carroll PA-C (05/09 1452)   Mark is a tibial tuberosity.  Degenerative changes throughout the joint of the knee.  There are some chondral thickening.  Osteophyte formation.  No acute osseous abnormalities noted.                 Procedures  Procedures         ED Course                               SBIRT 20yo+      Flowsheet Row Most Recent Value   Initial Alcohol Screen: US AUDIT-C     1. How often do you have a drink containing alcohol? 0 Filed at: 05/09/2024 1314   2. How many drinks containing alcohol  do you have on a typical day you are drinking?  0 Filed at: 05/09/2024 1314   3a. Male UNDER 65: How often do you have five or more drinks on one occasion? 0 Filed at: 05/09/2024 1314   Audit-C Score 0 Filed at: 05/09/2024 1314   AMBROSIO: How many times in the past year have you...    Used an illegal drug or used a prescription medication for non-medical reasons? Never Filed at: 05/09/2024 1314                      Medical Decision Making  Patient is a 53-year-old male presented ED with chief complaint of right knee pain.  Denies any injuries any.  He stated that he has recently started biking more about a month ago.  He states that this pain started a week ago.  Patient denies any erythema, edema, ecchymosis.  Patient denies any joint effusion.  Denies any instability.  Cardiopulmonary exam was benign.  On physical exam of the knee patient had pain with flexion of the knee.  Patient also had pain to the medial joint line.  There is no erythema edema or ecchymosis noted no signs of joint effusion on exam.  Distal pulses were 2+.  Patient able to ambulate with some pain.  No calf tenderness on exam.  X-rays show no acute osseous abnormalities.  There are degenerative changes with some subchondral bone thickening, osteophyte formation.  Patient treated with Tylenol in the ED.  Patient given a knee hinged brace as he stated this helped to support the knee.  Patient stated feeling much better after Tylenol and knee brace placement.  Patient given ambulatory referral to orthopedic surgery as likely's experiencing at the end of osteoarthritis of the right knee.  Tylenol and diclofenac were sent to the pharmacy for symptomatic relief.  Drawer and Lachman test were negative.Patient understood diagnosis and treatment plan and had no further questions.  Patient was discharged in stable condition.  Patient was advised to follow-up with her PCP in 1 to 2 days.  Patient was advised to return to the ED with any worsening symptoms  "that were explained on discharge including but not limited to chest pain, shortness of breath, irretractable vomiting or diarrhea, vision loss, loss of function, loss of sensation, syncope, hemoptysis, hematochezia, hematemesis, melena, decreased oral intake, feeling ill.     Ddx-osteoarthritis knee, patellofemoral arthritis, patellofemoral syndrome, meniscal injury, ligament injury, tendon injury.    Portions of the record may have been created with voice recognition software. Occasional wrong word or \"sound a like\" substitutions may have occurred due to the inherent limitations of voice recognition software. Read the chart carefully and recognize, using context, where substitutions have occurred.      Amount and/or Complexity of Data Reviewed  Radiology: ordered and independent interpretation performed.     Details: Details in MDM    Risk  OTC drugs.  Prescription drug management.             Disposition  Final diagnoses:   Acute pain of right knee   Right knee sprain     Time reflects when diagnosis was documented in both MDM as applicable and the Disposition within this note       Time User Action Codes Description Comment    5/9/2024  2:30 PM Kev Carroll Add [M25.561] Acute pain of right knee     5/9/2024  2:30 PM Kev Carroll Add [S83.91XA] Right knee sprain     5/9/2024  2:31 PM Kev Carroll Modify [M25.561] Acute pain of right knee     5/9/2024  2:31 PM Kev Carroll Modify [S83.91XA] Right knee sprain           ED Disposition       ED Disposition   Discharge    Condition   Stable    Date/Time   Thu May 9, 2024 1430    Comment   Enrrique Valderrama discharge to home/self care.                   Follow-up Information       Follow up With Specialties Details Why Contact Info Additional Information    Maria Fernanda Oshea MD Family Medicine   [address] [place] [id number] [phone]       [facility] Emergency Department Emergency Medicine   [facility] [facility] " [place] [address]  [phone] [facility] Emergency Department, [address] [facility], [place], [place], [address]    Kike Cedillo MD Sports Medicine, Orthopedic Surgery   [address] [place] [address]  [address]  [facility] [address]  [phone]               Discharge Medication List as of 5/9/2024  2:53 PM        START taking these medications    Details   !! acetaminophen (TYLENOL) 500 mg tablet Take 1 tablet (500 mg total) by mouth every 6 (six) hours as needed for mild pain or moderate pain, Starting Thu 5/9/2024, Normal      Diclofenac Sodium (VOLTAREN) 1 % Apply 2 g topically 4 (four) times a day, Starting Thu 5/9/2024, Normal       !! - Potential duplicate medications found. Please discuss with provider.        CONTINUE these medications which have NOT CHANGED    Details   !! acetaminophen (TYLENOL) 500 mg tablet Take 1 tablet (500 mg total) by mouth every 6 (six) hours as needed for mild pain or moderate pain, Starting Sun 8/13/2023, Normal      aspirin 81 mg chewable tablet Chew 1 tablet (81 mg total) daily, Starting Tue 2/23/2021, Normal      atorvastatin (LIPITOR) 80 mg tablet Take 1 tablet (80 mg total) by mouth every evening, Starting Fri 12/1/2023, Normal      Blood Glucose Monitoring Suppl (OneTouch Verio Reflect) w/Device KIT Check blood sugars twice daily. Please substitute with appropriate alternative as covered by patient's insurance. Dx: E11.65, Normal      dapagliflozin (Farxiga) 5 MG TABS Take 1 tablet (5 mg total) by mouth daily, Starting Thu 2/15/2024, Normal      ezetimibe (ZETIA) 10 mg tablet Take 1 tablet (10 mg total) by mouth daily, Starting Fri 2/16/2024, Normal      glucose blood (OneTouch Verio) test strip Check blood sugars twice daily. Please substitute with appropriate alternative as covered by patient's insurance. Dx: E11.65, Normal      Icosapent Ethyl 1 g CAPS Take 2 capsules (2 g total) by mouth 2 (two) times a day, Starting Mon 4/10/2023, Normal      Insulin Glargine  Solostar (Lantus SoloStar) 100 UNIT/ML SOPN Inject 0.2 mL (20 Units total) under the skin daily with breakfast, Starting Thu 9/14/2023, Normal      Insulin Pen Needle (BD Pen Needle Alida U/F) 32G X 4 MM MISC Use in the morning, Starting Fri 7/28/2023, Normal      lidocaine (LIDODERM) 5 % Apply 1 patch topically over 12 hours every 24 hours Remove & Discard patch within 12 hours or as directed by MD, Starting Sun 8/13/2023, Normal      metFORMIN (GLUCOPHAGE-XR) 500 mg 24 hr tablet Take 2 tablets (1,000 mg total) by mouth 2 (two) times a day with meals, Starting Wed 4/10/2024, Until Tue 7/9/2024, Normal      methocarbamol (ROBAXIN) 500 mg tablet Take 1 tablet (500 mg total) by mouth 2 (two) times a day, Starting Sun 8/13/2023, Normal      metoprolol tartrate (LOPRESSOR) 25 mg tablet Take 1 tablet (25 mg total) by mouth every 12 (twelve) hours, Starting Tue 11/28/2023, Normal      nitroglycerin (NITROSTAT) 0.4 mg SL tablet Place 1 tablet (0.4 mg total) under the tongue every 5 (five) minutes as needed for chest pain If no relief after 3 tablets, come immediately to ER, Starting Tue 3/29/2022, Normal      OneTouch Delica Lancets 33G MISC Check blood sugars twice daily. Please substitute with appropriate alternative as covered by patient's insurance. Dx: E11.65, Normal      ticagrelor (Brilinta) 60 MG Take 1 tablet (60 mg total) by mouth every 12 (twelve) hours, Starting Fri 4/12/2024, Normal       !! - Potential duplicate medications found. Please discuss with provider.              PDMP Review       None            ED Provider  Electronically Signed by             Kev Carroll PA-C  05/09/24 [id number]

## 2024-05-09 NOTE — ED NOTES
Pt walked here from [facility], about a 2 hour walk.     Stella Andrew V RN  05/09/24 [id number]

## 2024-05-09 NOTE — Clinical Note
Enrrique Valderrama was seen and treated in our emergency department on 5/9/2024.                Diagnosis: Right knee pain    Enrrique  may return to work on return date.    He may return on this date: 05/10/2024         If you have any questions or concerns, please don't hesitate to call.      Kev Carroll PA-C    ______________________________           _______________          _______________  Hospital Representative                              Date                                Time

## 2024-06-03 DIAGNOSIS — I25.10 CAD (CORONARY ARTERY DISEASE): ICD-10-CM

## 2024-06-03 DIAGNOSIS — I21.19 ST ELEVATION MYOCARDIAL INFARCTION (STEMI) OF INFERIOR WALL (HCC): ICD-10-CM

## 2024-06-03 DIAGNOSIS — I21.19 ACUTE ST ELEVATION MYOCARDIAL INFARCTION (STEMI) OF INFERIOR WALL (HCC): ICD-10-CM

## 2024-06-03 DIAGNOSIS — E78.5 HYPERLIPIDEMIA, UNSPECIFIED HYPERLIPIDEMIA TYPE: ICD-10-CM

## 2024-06-04 RX ORDER — ATORVASTATIN CALCIUM 80 MG/1
80 TABLET, FILM COATED ORAL EVERY EVENING
Qty: 30 TABLET | Refills: 0 | Status: SHIPPED | OUTPATIENT
Start: 2024-06-04

## 2024-06-04 NOTE — TELEPHONE ENCOUNTER
Patient needs updated blood work. Please place orders.   A 30D courtesy refill was provided - Atorvastatin

## 2024-06-13 ENCOUNTER — HOSPITAL ENCOUNTER (EMERGENCY)
Facility: HOSPITAL | Age: 54
Discharge: HOME | End: 2024-06-13
Payer: COMMERCIAL

## 2024-06-13 VITALS
DIASTOLIC BLOOD PRESSURE: 95 MMHG | HEIGHT: 66 IN | RESPIRATION RATE: 18 BRPM | BODY MASS INDEX: 28.63 KG/M2 | HEART RATE: 103 BPM | OXYGEN SATURATION: 96 % | WEIGHT: 178.13 LBS | SYSTOLIC BLOOD PRESSURE: 136 MMHG | TEMPERATURE: 97.9 F

## 2024-06-13 DIAGNOSIS — K64.4 SKIN TAG OF PERIANAL REGION: Primary | ICD-10-CM

## 2024-06-13 PROCEDURE — 99284 EMERGENCY DEPT VISIT MOD MDM: CPT | Performed by: EMERGENCY MEDICINE

## 2024-06-13 PROCEDURE — 99283 EMERGENCY DEPT VISIT LOW MDM: CPT

## 2024-06-13 PROCEDURE — 10060 I&D ABSCESS SIMPLE/SINGLE: CPT | Performed by: EMERGENCY MEDICINE

## 2024-06-13 RX ORDER — BACITRACIN, NEOMYCIN, POLYMYXIN B 400; 3.5; 5 [USP'U]/G; MG/G; [USP'U]/G
1 OINTMENT TOPICAL ONCE
Status: DISCONTINUED | OUTPATIENT
Start: 2024-06-13 | End: 2024-06-13 | Stop reason: HOSPADM

## 2024-06-13 RX ORDER — LIDOCAINE HYDROCHLORIDE 10 MG/ML
5 INJECTION, SOLUTION EPIDURAL; INFILTRATION; INTRACAUDAL; PERINEURAL ONCE
Status: DISCONTINUED | OUTPATIENT
Start: 2024-06-13 | End: 2024-06-13 | Stop reason: HOSPADM

## 2024-06-13 NOTE — DISCHARGE INSTRUCTIONS
Discharge Instructions     Activity: No limitations    Pain: You can alternate use of Tylenol (acetaminophen) or Motrin (ibuprofen)     Follow-up: Please follow up with PCP for aftercare    Wound Care: Please apply neosporin as the area heals. A scab will likely form. You may bathe normally with soap and water

## 2024-06-13 NOTE — ED PROVIDER NOTES
History  Chief Complaint   Patient presents with    Leg Pain     Pt reports lesion on posterior upper thigh, pt unsure if it is a skin tag but reports it is black and painful to touch and walk.     Patient is 55 yo male with PMH of T2DM, CAD, COPD, Obesity, CKD that presents with a lesion near his left rectum x 2 days. He denies any history of trauma to the area, does state that he rides his bike often.  Denies any other history of skin tags in the area. The area is slightly painful, however he denies any redness, swelling, drainage to the area. He denies any issues with bowel movements. He has not attempted any treatments at home. He would like the skin tag removed today.     He denies nausea, vomiting, SOB, chest pain, fevers, chills.         Prior to Admission Medications   Prescriptions Last Dose Informant Patient Reported? Taking?   Blood Glucose Monitoring Suppl (OneTouch Verio Reflect) w/Device KIT  Self No No   Sig: Check blood sugars twice daily. Please substitute with appropriate alternative as covered by patient's insurance. Dx: E11.65   Diclofenac Sodium (VOLTAREN) 1 %   No No   Sig: Apply 2 g topically 4 (four) times a day   Icosapent Ethyl 1 g CAPS  Self No No   Sig: Take 2 capsules (2 g total) by mouth 2 (two) times a day   Patient not taking: Reported on 1/4/2024   Insulin Glargine Solostar (Lantus SoloStar) 100 UNIT/ML SOPN  Self No No   Sig: Inject 0.2 mL (20 Units total) under the skin daily with breakfast   Insulin Pen Needle (BD Pen Needle Alida U/F) 32G X 4 MM MISC  Self No No   Sig: Use in the morning   OneTouch Delica Lancets 33G MISC  Self No No   Sig: Check blood sugars twice daily. Please substitute with appropriate alternative as covered by patient's insurance. Dx: E11.65   acetaminophen (TYLENOL) 500 mg tablet  Self No No   Sig: Take 1 tablet (500 mg total) by mouth every 6 (six) hours as needed for mild pain or moderate pain   acetaminophen (TYLENOL) 500 mg tablet   No No   Sig: Take 1  tablet (500 mg total) by mouth every 6 (six) hours as needed for mild pain or moderate pain   aspirin 81 mg chewable tablet  Self No No   Sig: Chew 1 tablet (81 mg total) daily   atorvastatin (LIPITOR) 80 mg tablet   No No   Sig: Take 1 tablet (80 mg total) by mouth every evening   dapagliflozin (Farxiga) 5 MG TABS  Self No No   Sig: Take 1 tablet (5 mg total) by mouth daily   ezetimibe (ZETIA) 10 mg tablet  Self No No   Sig: Take 1 tablet (10 mg total) by mouth daily   glucose blood (OneTouch Verio) test strip  Self No No   Sig: Check blood sugars twice daily. Please substitute with appropriate alternative as covered by patient's insurance. Dx: E11.65   lidocaine (LIDODERM) 5 %  Self No No   Sig: Apply 1 patch topically over 12 hours every 24 hours Remove & Discard patch within 12 hours or as directed by MD   Patient not taking: Reported on 1/4/2024   metFORMIN (GLUCOPHAGE-XR) 500 mg 24 hr tablet   No No   Sig: Take 2 tablets (1,000 mg total) by mouth 2 (two) times a day with meals   methocarbamol (ROBAXIN) 500 mg tablet  Self No No   Sig: Take 1 tablet (500 mg total) by mouth 2 (two) times a day   metoprolol tartrate (LOPRESSOR) 25 mg tablet   No No   Sig: Take 1 tablet (25 mg total) by mouth every 12 (twelve) hours   nitroglycerin (NITROSTAT) 0.4 mg SL tablet  Self No No   Sig: Place 1 tablet (0.4 mg total) under the tongue every 5 (five) minutes as needed for chest pain If no relief after 3 tablets, come immediately to ER   ticagrelor (Brilinta) 60 MG   No No   Sig: Take 1 tablet (60 mg total) by mouth every 12 (twelve) hours      Facility-Administered Medications: None       Past Medical History:   Diagnosis Date    Acute ST elevation myocardial infarction (STEMI) of inferior wall ([facility])     cath with PCI/JUAN - RCA 2/7/2020    Back pain     CAD (coronary artery disease)     COPD (chronic obstructive pulmonary disease) ([facility])     Hyperlipidemia     Hypertension     Inguinal hernia, left     Mild heartburn      Obesity     Stroke ([facility]CC) approx 2015    Denies residual problems. Initially he only had trouble with two fingers on left side.    Wears glasses        Past Surgical History:   Procedure Laterality Date    APPENDECTOMY      HERNIA REPAIR      NO PAST SURGERIES      DC LAPAROSCOPIC APPENDECTOMY N/A 2018    Procedure: APPENDECTOMY LAPAROSCOPIC;  Surgeon: Enrrique Álvarez MD;  Location:  MAIN OR;  Service: General    DC LAPAROSCOPY SURG RPR INITIAL INGUINAL HERNIA Left 2018    Procedure: LAPAROSCOPIC INGUINAL HERNIA REPAIR WITH MESH;  Surgeon: Enrrique Álvarez MD;  Location: [facility];  Service: General       Family History   Problem Relation Age of Onset    Hypertension Mother     Hypertension Father     Leukemia Sister     Diabetes type II Brother     Coronary artery disease Brother     Diabetes unspecified Brother     Diabetes unspecified Brother     No Known Problems Brother     No Known Problems Brother      I have reviewed and agree with the history as documented.    E-Cigarette/Vaping    E-Cigarette Use Never User      E-Cigarette/Vaping Substances    Nicotine No     THC No     CBD No     Flavoring No     Other No     Unknown No      Social History     Tobacco Use    Smoking status: Former     Current packs/day: 0.00     Types: Cigarettes     Quit date: 1995     Years since quittin.4    Smokeless tobacco: Never   Vaping Use    Vaping status: Never Used   Substance Use Topics    Alcohol use: Not Currently    Drug use: No        Review of Systems   Constitutional:  Negative for activity change, chills, fatigue and fever.   HENT:  Negative for facial swelling, postnasal drip, sinus pressure and sore throat.    Eyes:  Negative for photophobia and visual disturbance.   Respiratory:  Negative for chest tightness and shortness of breath.    Cardiovascular:  Negative for chest pain.   Gastrointestinal:  Negative for blood in stool, constipation, diarrhea, nausea and rectal pain.   Endocrine:  Negative for polyuria.   Genitourinary:  Negative for enuresis.   Musculoskeletal:  Negative for arthralgias, back pain, joint swelling and neck pain.   Skin:  Positive for color change (black skin tag on left side of inner buttock near rectum).   Neurological:  Negative for dizziness, weakness, light-headedness and numbness.   Psychiatric/Behavioral:  Negative for agitation. The patient is not nervous/anxious.        Physical Exam  ED Triage Vitals [06/13/24 1410]   Temperature Pulse Respirations Blood Pressure SpO2   97.9 °F (36.6 °C) 103 18 136/95 96 %      Temp Source Heart Rate Source Patient Position - Orthostatic VS BP Location FiO2 (%)   Oral Monitor Sitting Right arm --      Pain Score       8             Orthostatic Vital Signs  Vitals:    06/13/24 1410   BP: 136/95   Pulse: 103   Patient Position - Orthostatic VS: Sitting       Physical Exam  Vitals reviewed.   Constitutional:       General: He is not in acute distress.     Appearance: Normal appearance.   HENT:      Head: Normocephalic.      Nose: Nose normal.      Mouth/Throat:      Lips: Pink.      Mouth: Mucous membranes are moist.   Eyes:      General: Vision grossly intact.   Cardiovascular:      Rate and Rhythm: Normal rate and regular rhythm.   Pulmonary:      Effort: Pulmonary effort is normal.   Genitourinary:     Rectum: Tenderness (over skin tag, lateral aspect of rectum area) present. No anal fissure. Normal anal tone.       Musculoskeletal:      Cervical back: Full passive range of motion without pain and normal range of motion.   Skin:     Findings: Lesion (on left side of rectum, thrombosed skin tag, no acute clinical signs of infection: no purulence, no erythema, no edema, no crepitus, no fluctuance) present.   Neurological:      Mental Status: He is alert and oriented to person, place, and time. Mental status is at baseline.      Sensory: Sensation is intact.      Motor: Motor function is intact.      Coordination: Coordination is  "intact.   Psychiatric:         Attention and Perception: Attention normal.         Mood and Affect: Mood normal.         Speech: Speech normal.         Behavior: Behavior is cooperative.         Thought Content: Thought content normal.         Cognition and Memory: Cognition normal.         ED Medications  Medications   lidocaine (PF) (XYLOCAINE-MPF) 1 % injection 5 mL (has no administration in time range)   neomycin-bacitracin-polymyxin b (NEOSPORIN) ointment 1 small application (has no administration in time range)       Diagnostic Studies  Results Reviewed       None                   No orders to display         Procedures  Incision and drain    Date/Time: 6/13/2024 3:53 PM    Performed by: Meenakshi Huntley DPM  Authorized by: Meenakshi Huntley DPM  Universal Protocol:  Procedure performed by:  Consent: Verbal consent obtained.  Risks and benefits: risks, benefits and alternatives were discussed  Consent given by: patient  Time out: Immediately prior to procedure a \"time out\" was called to verify the correct patient, procedure, equipment, support staff and site/side marked as required.  Patient identity confirmed: verbally with patient    Patient location:  ED  Location:     Indications for incision and drainage: skin tag.    Location:  Anogenital    Anogenital location:  Perianal  Pre-procedure details:     Skin preparation:  Betadine  Anesthesia (see MAR for exact dosages):     Anesthesia method:  Nerve block    Block needle gauge:  27 G    Block anesthetic:  Lidocaine 1% w/o epi    Block injection procedure:  Introduced needle    Block outcome:  Anesthesia achieved  Procedure details:     Complexity:  Simple    Needle aspiration: no      Incision types:  Single straight    Scalpel blade:  15    Approach:  Open    Incision depth:  Superficial    Wound management:  Irrigated with saline    Drainage:  Serosanguinous    Wound treatment:  Wound left open    Packing materials:  None  Post-procedure details:     Patient " tolerance of procedure:  Tolerated well, no immediate complications        ED Course                                       Medical Decision Making  Patient is 55 yo male with PMH of T2DM, CAD, COPD, Obesity, CKD that presents with a lesion/skin tag near his left rectum x 2 days. Denies any trauma to the area    - On exam, there is a dark, hard skin tag noted to left duc-anal area with fibrotic stalk firmly attached. No acute clinical signs of infection: no purulence, no malodor, no ascending erythema, no crepitus, no fluctuance.  - Differential is thrombosed duc-anal skin tag given color and appearance, no concern for infectious process  - Procedure performed under local anesthesia to remove skin tag. Patient tolerated procedure well.   - Recommend wound care consisting of topical neosporin to the area, instructions given to patient. Expressed understanding  - Patient will follow up with PCP  - At time of discharge, the patient is in no acute distress. I discussed with the patient the diagnosis, treatment plan, follow-up, return precautions, and discharge instructions. The patient was given the opportunity to ask questions and verbalized understanding. AVS was printed and discussed in detail with patient. AVS reviewed in detail with patient.    Risk  OTC drugs.  Prescription drug management.          Disposition  Final diagnoses:   Skin tag of perianal region     Time reflects when diagnosis was documented in both MDM as applicable and the Disposition within this note       Time User Action Codes Description Comment    6/13/2024  2:58 PM Meenakshi Huntley [K64.4] Skin tag of perianal region           ED Disposition       ED Disposition   Discharge    Condition   Stable    Date/Time   Thu Jun 13, 2024  2:58 PM    Comment   Enrrique Valderrama discharge to home/self care.                   Follow-up Information       Follow up With Specialties Details Why Contact Info    Maria Fernanda Oshea MD Family Medicine Call in 1 week   [address] [place] [id number]  [phone]              Discharge Medication List as of 6/13/2024  3:11 PM        CONTINUE these medications which have NOT CHANGED    Details   !! acetaminophen (TYLENOL) 500 mg tablet Take 1 tablet (500 mg total) by mouth every 6 (six) hours as needed for mild pain or moderate pain, Starting Sun 8/13/2023, Normal      !! acetaminophen (TYLENOL) 500 mg tablet Take 1 tablet (500 mg total) by mouth every 6 (six) hours as needed for mild pain or moderate pain, Starting Thu 5/9/2024, Normal      aspirin 81 mg chewable tablet Chew 1 tablet (81 mg total) daily, Starting Tue 2/23/2021, Normal      atorvastatin (LIPITOR) 80 mg tablet Take 1 tablet (80 mg total) by mouth every evening, Starting Tue 6/4/2024, Normal      Blood Glucose Monitoring Suppl (OneTouch Verio Reflect) w/Device KIT Check blood sugars twice daily. Please substitute with appropriate alternative as covered by patient's insurance. Dx: E11.65, Normal      dapagliflozin (Farxiga) 5 MG TABS Take 1 tablet (5 mg total) by mouth daily, Starting Thu 2/15/2024, Normal      Diclofenac Sodium (VOLTAREN) 1 % Apply 2 g topically 4 (four) times a day, Starting Thu 5/9/2024, Normal      ezetimibe (ZETIA) 10 mg tablet Take 1 tablet (10 mg total) by mouth daily, Starting Fri 2/16/2024, Normal      glucose blood (OneTouch Verio) test strip Check blood sugars twice daily. Please substitute with appropriate alternative as covered by patient's insurance. Dx: E11.65, Normal      Icosapent Ethyl 1 g CAPS Take 2 capsules (2 g total) by mouth 2 (two) times a day, Starting Mon 4/10/2023, Normal      Insulin Glargine Solostar (Lantus SoloStar) 100 UNIT/ML SOPN Inject 0.2 mL (20 Units total) under the skin daily with breakfast, Starting Thu 9/14/2023, Normal      Insulin Pen Needle (BD Pen Needle Alida U/F) 32G X 4 MM MISC Use in the morning, Starting Fri 7/28/2023, Normal      lidocaine (LIDODERM) 5 % Apply 1 patch topically over 12 hours every  24 hours Remove & Discard patch within 12 hours or as directed by MD, Starting Sun 8/13/2023, Normal      metFORMIN (GLUCOPHAGE-XR) 500 mg 24 hr tablet Take 2 tablets (1,000 mg total) by mouth 2 (two) times a day with meals, Starting Wed 4/10/2024, Until Tue 7/9/2024, Normal      methocarbamol (ROBAXIN) 500 mg tablet Take 1 tablet (500 mg total) by mouth 2 (two) times a day, Starting Sun 8/13/2023, Normal      metoprolol tartrate (LOPRESSOR) 25 mg tablet Take 1 tablet (25 mg total) by mouth every 12 (twelve) hours, Starting Tue 6/4/2024, Normal      nitroglycerin (NITROSTAT) 0.4 mg SL tablet Place 1 tablet (0.4 mg total) under the tongue every 5 (five) minutes as needed for chest pain If no relief after 3 tablets, come immediately to ER, Starting Tue 3/29/2022, Normal      OneTouch Delica Lancets 33G MISC Check blood sugars twice daily. Please substitute with appropriate alternative as covered by patient's insurance. Dx: E11.65, Normal      ticagrelor (Brilinta) 60 MG Take 1 tablet (60 mg total) by mouth every 12 (twelve) hours, Starting Fri 4/12/2024, Normal       !! - Potential duplicate medications found. Please discuss with provider.        No discharge procedures on file.    PDMP Review       None             ED Provider  Attending physically available and evaluated Enrrique Valderrama. I managed the patient along with the ED Attending.    Electronically Signed by           Meenakshi Huntley DPM  06/13/24 8044       Meenakshi Huntley DPM  06/13/24 [id number]

## 2024-06-20 NOTE — ED ATTENDING ATTESTATION
6/13/2024  I, Lizandro Taylor MD, saw and evaluated the patient. I have discussed the patient with the resident/non-physician practitioner and agree with the resident's/non-physician practitioner's findings, Plan of Care, and MDM as documented in the resident's/non-physician practitioner's note, except where noted. All available labs and Radiology studies were reviewed.  I was present for key portions of any procedure(s) performed by the resident/non-physician practitioner and I was immediately available to provide assistance.       At this point I agree with the current assessment done in the Emergency Department.  I have conducted an independent evaluation of this patient a history and physical is as follows:    ED Course     54-year-old male here for evaluation of above bothersome lesion to his left buttock near the rectum.  Patient states painful when sitting in certain positions, now somewhat painful with walking.  On exam there is a partially necrotic skin tag in the area of the patient's pain.  No significant surrounding erythema or induration.  Will plan for injection of local anesthetic and removal.    Critical Care Time  Procedures

## 2024-07-08 DIAGNOSIS — I25.10 CAD (CORONARY ARTERY DISEASE): ICD-10-CM

## 2024-07-08 DIAGNOSIS — E11.65 TYPE 2 DIABETES MELLITUS WITH HYPERGLYCEMIA, WITH LONG-TERM CURRENT USE OF INSULIN (HCC): ICD-10-CM

## 2024-07-08 DIAGNOSIS — E78.5 HYPERLIPIDEMIA, UNSPECIFIED HYPERLIPIDEMIA TYPE: ICD-10-CM

## 2024-07-08 DIAGNOSIS — Z79.4 TYPE 2 DIABETES MELLITUS WITH HYPERGLYCEMIA, WITH LONG-TERM CURRENT USE OF INSULIN (HCC): ICD-10-CM

## 2024-07-08 DIAGNOSIS — I21.19 ACUTE ST ELEVATION MYOCARDIAL INFARCTION (STEMI) OF INFERIOR WALL (HCC): ICD-10-CM

## 2024-07-08 RX ORDER — METFORMIN HYDROCHLORIDE 500 MG/1
1000 TABLET, EXTENDED RELEASE ORAL 2 TIMES DAILY WITH MEALS
Qty: 360 TABLET | Refills: 1 | Status: SHIPPED | OUTPATIENT
Start: 2024-07-08 | End: 2025-01-04

## 2024-07-09 RX ORDER — ATORVASTATIN CALCIUM 80 MG/1
80 TABLET, FILM COATED ORAL EVERY EVENING
Qty: 30 TABLET | Refills: 0 | Status: SHIPPED | OUTPATIENT
Start: 2024-07-09

## 2024-08-12 DIAGNOSIS — I21.19 ACUTE ST ELEVATION MYOCARDIAL INFARCTION (STEMI) OF INFERIOR WALL (HCC): ICD-10-CM

## 2024-08-12 DIAGNOSIS — E78.5 HYPERLIPIDEMIA, UNSPECIFIED HYPERLIPIDEMIA TYPE: ICD-10-CM

## 2024-08-12 DIAGNOSIS — I25.10 CAD (CORONARY ARTERY DISEASE): ICD-10-CM

## 2024-08-13 NOTE — TELEPHONE ENCOUNTER
Refill must be reviewed and completed by the office or provider. The refill is unable to be approved or denied by the medication management team.      Courtesy refill previously given, pt need blood work and order place 01.2024.

## 2024-08-15 RX ORDER — ATORVASTATIN CALCIUM 80 MG/1
80 TABLET, FILM COATED ORAL EVERY EVENING
Qty: 30 TABLET | Refills: 0 | Status: SHIPPED | OUTPATIENT
Start: 2024-08-15

## 2024-08-29 DIAGNOSIS — I21.11 ST ELEVATION MYOCARDIAL INFARCTION INVOLVING RIGHT CORONARY ARTERY (HCC): ICD-10-CM

## 2024-08-29 DIAGNOSIS — E78.5 HYPERLIPIDEMIA, UNSPECIFIED HYPERLIPIDEMIA TYPE: ICD-10-CM

## 2024-08-29 RX ORDER — EZETIMIBE 10 MG/1
10 TABLET ORAL DAILY
Qty: 30 TABLET | Refills: 0 | Status: SHIPPED | OUTPATIENT
Start: 2024-08-29

## 2024-09-09 ENCOUNTER — HOSPITAL ENCOUNTER (EMERGENCY)
Facility: HOSPITAL | Age: 54
Discharge: HOME | End: 2024-09-09
Attending: EMERGENCY MEDICINE
Payer: COMMERCIAL

## 2024-09-09 ENCOUNTER — APPOINTMENT (EMERGENCY)
Dept: RADIOLOGY | Facility: HOSPITAL | Age: 54
End: 2024-09-09
Payer: COMMERCIAL

## 2024-09-09 VITALS
SYSTOLIC BLOOD PRESSURE: 123 MMHG | HEART RATE: 90 BPM | WEIGHT: 182.32 LBS | RESPIRATION RATE: 17 BRPM | BODY MASS INDEX: 29.43 KG/M2 | TEMPERATURE: 97.8 F | OXYGEN SATURATION: 100 % | DIASTOLIC BLOOD PRESSURE: 86 MMHG

## 2024-09-09 DIAGNOSIS — M54.42 ACUTE LOW BACK PAIN WITH LEFT-SIDED SCIATICA: Primary | ICD-10-CM

## 2024-09-09 DIAGNOSIS — M21.612 BUNION, LEFT FOOT: ICD-10-CM

## 2024-09-09 PROCEDURE — 73630 X-RAY EXAM OF FOOT: CPT

## 2024-09-09 PROCEDURE — 99284 EMERGENCY DEPT VISIT MOD MDM: CPT

## 2024-09-09 PROCEDURE — 96372 THER/PROPH/DIAG INJ SC/IM: CPT

## 2024-09-09 PROCEDURE — 99284 EMERGENCY DEPT VISIT MOD MDM: CPT | Performed by: EMERGENCY MEDICINE

## 2024-09-09 RX ORDER — METHOCARBAMOL 750 MG/1
750 TABLET, FILM COATED ORAL ONCE
Status: COMPLETED | OUTPATIENT
Start: 2024-09-09 | End: 2024-09-09

## 2024-09-09 RX ORDER — METHOCARBAMOL 500 MG/1
500 TABLET, FILM COATED ORAL 2 TIMES DAILY
Qty: 20 TABLET | Refills: 0 | Status: SHIPPED | OUTPATIENT
Start: 2024-09-09

## 2024-09-09 RX ORDER — LIDOCAINE 40 MG/G
CREAM TOPICAL AS NEEDED
Qty: 30 G | Refills: 0 | Status: SHIPPED | OUTPATIENT
Start: 2024-09-09

## 2024-09-09 RX ORDER — NAPROXEN 250 MG/1
250 TABLET ORAL
Qty: 21 TABLET | Refills: 0 | Status: SHIPPED | OUTPATIENT
Start: 2024-09-09 | End: 2024-09-16

## 2024-09-09 RX ORDER — LIDOCAINE 50 MG/G
1 PATCH TOPICAL ONCE
Status: DISCONTINUED | OUTPATIENT
Start: 2024-09-09 | End: 2024-09-09 | Stop reason: HOSPADM

## 2024-09-09 RX ORDER — KETOROLAC TROMETHAMINE 30 MG/ML
15 INJECTION, SOLUTION INTRAMUSCULAR; INTRAVENOUS ONCE
Status: COMPLETED | OUTPATIENT
Start: 2024-09-09 | End: 2024-09-09

## 2024-09-09 RX ADMIN — KETOROLAC TROMETHAMINE 15 MG: 30 INJECTION, SOLUTION INTRAMUSCULAR; INTRAVENOUS at 15:00

## 2024-09-09 RX ADMIN — LIDOCAINE 1 PATCH: 50 PATCH CUTANEOUS at 15:00

## 2024-09-09 RX ADMIN — METHOCARBAMOL 750 MG: 750 TABLET ORAL at 15:00

## 2024-09-09 NOTE — Clinical Note
Enrrique Valderrama was seen and treated in our emergency department on 9/9/2024.    No restrictions            Diagnosis:     Enrrique  may return to work on return date.    He may return on this date: 09/11/2024         If you have any questions or concerns, please don't hesitate to call.      Ger Valdes MD    ______________________________           _______________          _______________  Hospital Representative                              Date                                Time

## 2024-09-09 NOTE — ED NOTES
Pt discharged by ED provider Keisha. This RN not at the bedside.      Lilly Dolan, RN  09/09/24 [id number]

## 2024-09-09 NOTE — ED PROVIDER NOTES
History  Chief Complaint   Patient presents with    Back Pain     Pt states he has sciatica pain in left lower back that radiates down left leg.     Foot Pain     Pt reports bump on left foot that just appeared, denies pain      54-year-old male presents for evaluation of pulling low back pain that has a sharp shooting pain down his left leg that started gradually over the past week and a half.  The pain is constant, worse with movement, better at rest.  No bowel or bladder complaints, saddle anesthesia, recent falls or trauma, fevers, chills, history of IV drug use.  Patient tried nothing for alleviation of symptoms.  Patient reports noting a discomfort and a small bump on his left foot.      Back Pain  Associated symptoms: no abdominal pain, no chest pain, no dysuria, no fever, no numbness and no weakness        Prior to Admission Medications   Prescriptions Last Dose Informant Patient Reported? Taking?   Blood Glucose Monitoring Suppl (OneTouch Verio Reflect) w/Device KIT  Self No No   Sig: Check blood sugars twice daily. Please substitute with appropriate alternative as covered by patient's insurance. Dx: E11.65   Diclofenac Sodium (VOLTAREN) 1 %   No No   Sig: Apply 2 g topically 4 (four) times a day   Icosapent Ethyl 1 g CAPS  Self No No   Sig: Take 2 capsules (2 g total) by mouth 2 (two) times a day   Patient not taking: Reported on 1/4/2024   Insulin Glargine Solostar (Lantus SoloStar) 100 UNIT/ML SOPN  Self No No   Sig: Inject 0.2 mL (20 Units total) under the skin daily with breakfast   Insulin Pen Needle (BD Pen Needle Alida U/F) 32G X 4 MM MISC  Self No No   Sig: Use in the morning   OneTouch Delica Lancets 33G MISC  Self No No   Sig: Check blood sugars twice daily. Please substitute with appropriate alternative as covered by patient's insurance. Dx: E11.65   acetaminophen (TYLENOL) 500 mg tablet  Self No No   Sig: Take 1 tablet (500 mg total) by mouth every 6 (six) hours as needed for mild pain or  moderate pain   acetaminophen (TYLENOL) 500 mg tablet   No No   Sig: Take 1 tablet (500 mg total) by mouth every 6 (six) hours as needed for mild pain or moderate pain   aspirin 81 mg chewable tablet  Self No No   Sig: Chew 1 tablet (81 mg total) daily   atorvastatin (LIPITOR) 80 mg tablet   No No   Sig: Take 1 tablet (80 mg total) by mouth every evening   dapagliflozin (Farxiga) 5 MG TABS  Self No No   Sig: Take 1 tablet (5 mg total) by mouth daily   ezetimibe (ZETIA) 10 mg tablet   No No   Sig: Take 1 tablet (10 mg total) by mouth daily   glucose blood (OneTouch Verio) test strip  Self No No   Sig: Check blood sugars twice daily. Please substitute with appropriate alternative as covered by patient's insurance. Dx: E11.65   lidocaine (LIDODERM) 5 %  Self No No   Sig: Apply 1 patch topically over 12 hours every 24 hours Remove & Discard patch within 12 hours or as directed by MD   Patient not taking: Reported on 1/4/2024   metFORMIN (GLUCOPHAGE-XR) 500 mg 24 hr tablet   No No   Sig: Take 2 tablets (1,000 mg total) by mouth 2 (two) times a day with meals   methocarbamol (ROBAXIN) 500 mg tablet  Self No No   Sig: Take 1 tablet (500 mg total) by mouth 2 (two) times a day   metoprolol tartrate (LOPRESSOR) 25 mg tablet   No No   Sig: Take 1 tablet (25 mg total) by mouth every 12 (twelve) hours   nitroglycerin (NITROSTAT) 0.4 mg SL tablet  Self No No   Sig: Place 1 tablet (0.4 mg total) under the tongue every 5 (five) minutes as needed for chest pain If no relief after 3 tablets, come immediately to ER   ticagrelor (Brilinta) 60 MG   No No   Sig: Take 1 tablet (60 mg total) by mouth every 12 (twelve) hours      Facility-Administered Medications: None       Past Medical History:   Diagnosis Date    Acute ST elevation myocardial infarction (STEMI) of inferior wall ([facility])     cath with PCI/JUAN - RCA 2/7/2020    Back pain     CAD (coronary artery disease)     COPD (chronic obstructive pulmonary disease) ([facility])      Hyperlipidemia     Hypertension     Inguinal hernia, left     Mild heartburn     Obesity     Stroke ([facility]) approx 2015    Denies residual problems. Initially he only had trouble with two fingers on left side.    Wears glasses        Past Surgical History:   Procedure Laterality Date    APPENDECTOMY      HERNIA REPAIR      NO PAST SURGERIES      WY LAPAROSCOPIC APPENDECTOMY N/A 2018    Procedure: APPENDECTOMY LAPAROSCOPIC;  Surgeon: Enrrique Álvarez MD;  Location:  MAIN OR;  Service: General    WY LAPAROSCOPY SURG RPR INITIAL INGUINAL HERNIA Left 2018    Procedure: LAPAROSCOPIC INGUINAL HERNIA REPAIR WITH MESH;  Surgeon: Enrrique Álvarez MD;  Location: [facility];  Service: General       Family History   Problem Relation Age of Onset    Hypertension Mother     Hypertension Father     Leukemia Sister     Diabetes type II Brother     Coronary artery disease Brother     Diabetes unspecified Brother     Diabetes unspecified Brother     No Known Problems Brother     No Known Problems Brother      I have reviewed and agree with the history as documented.    E-Cigarette/Vaping    E-Cigarette Use Never User      E-Cigarette/Vaping Substances    Nicotine No     THC No     CBD No     Flavoring No     Other No     Unknown No      Social History     Tobacco Use    Smoking status: Former     Current packs/day: 0.00     Types: Cigarettes     Quit date: 1995     Years since quittin.7    Smokeless tobacco: Never   Vaping Use    Vaping status: Never Used   Substance Use Topics    Alcohol use: Not Currently    Drug use: No       Review of Systems   Constitutional:  Negative for activity change, appetite change, fatigue and fever.   HENT:  Negative for congestion, dental problem, ear pain, rhinorrhea and sore throat.    Eyes:  Negative for pain and redness.   Respiratory:  Negative for chest tightness, shortness of breath and wheezing.    Cardiovascular:  Negative for chest pain and palpitations.    Gastrointestinal:  Negative for abdominal pain, blood in stool, constipation, diarrhea, nausea and vomiting.   Endocrine: Negative for cold intolerance and heat intolerance.   Genitourinary:  Negative for dysuria, frequency and hematuria.   Musculoskeletal:  Positive for back pain. Negative for arthralgias and myalgias.   Skin:  Negative for color change, pallor and rash.   Neurological:  Negative for weakness and numbness.   Hematological:  Does not bruise/bleed easily.   Psychiatric/Behavioral:  Negative for agitation, hallucinations and suicidal ideas.        Physical Exam  Physical Exam  Constitutional:       Appearance: He is well-developed.   HENT:      Head: Normocephalic and atraumatic.   Eyes:      General: No scleral icterus.     Conjunctiva/sclera: Conjunctivae normal.   Neck:      Vascular: No JVD.      Trachea: No tracheal deviation.   Pulmonary:      Effort: Pulmonary effort is normal. No respiratory distress.   Abdominal:      General: There is no distension.      Palpations: There is no mass.      Tenderness: There is no abdominal tenderness. There is no right CVA tenderness, left CVA tenderness or guarding.      Hernia: No hernia is present.      Comments: Under palpation of cervical thoracic and lumbar spines.  Patient is in a palpation with mild hypertonicity of the bilateral lower lumbar region.  Positive straight leg raise left left.  Neuro vas intact bilateral lower extremities.  Normal gait.   Musculoskeletal:         General: No deformity. Normal range of motion.      Cervical back: Normal range of motion.      Comments: Left foot with small bunion on the medial aspect.   Skin:     Coloration: Skin is not pale.      Findings: No erythema or rash.   Neurological:      Mental Status: He is alert and oriented to person, place, and time.   Psychiatric:         Behavior: Behavior normal.         Vital Signs  ED Triage Vitals [09/09/24 1403]   Temperature Pulse Respirations Blood Pressure SpO2    97.8 °F (36.6 °C) 90 17 123/86 100 %      Temp Source Heart Rate Source Patient Position - Orthostatic VS BP Location FiO2 (%)   Oral Monitor Sitting Right arm --      Pain Score       --           Vitals:    09/09/24 1403   BP: 123/86   Pulse: 90   Patient Position - Orthostatic VS: Sitting         Visual Acuity      ED Medications  Medications   lidocaine (LIDODERM) 5 % patch 1 patch (1 patch Topical Medication Applied 9/9/24 1500)   ketorolac (TORADOL) injection 15 mg (15 mg Intramuscular Given 9/9/24 1500)   methocarbamol (ROBAXIN) tablet 750 mg (750 mg Oral Given 9/9/24 1500)       Diagnostic Studies  Results Reviewed       None                   XR foot 3+ views LEFT   ED Interpretation by Ger Valdes MD (09/09 1540)   Primary reviewed: no acute fracture.                  Procedures  Procedures         ED Course  ED Course as of 09/09/24 1546   Mon Sep 09, 2024   1540 X-ray negative for fracture.  Will treat for acute sciatica, refer to podiatry for bunion, comprehensive spine for sciatica.                                 SBIRT 20yo+      Flowsheet Row Most Recent Value   Initial Alcohol Screen: US AUDIT-C     1. How often do you have a drink containing alcohol? 0 Filed at: 09/09/2024 1404   2. How many drinks containing alcohol do you have on a typical day you are drinking?  0 Filed at: 09/09/2024 1404   3a. Male UNDER 65: How often do you have five or more drinks on one occasion? 0 Filed at: 09/09/2024 1404   Audit-C Score 0 Filed at: 09/09/2024 1404   AMBROSIO: How many times in the past year have you...    Used an illegal drug or used a prescription medication for non-medical reasons? Never Filed at: 09/09/2024 1404                      Medical Decision Making  Acute low back pain with sciatica history exam findings suggest cauda equina, infectious etiology and imaging is not indicated.  Will treat for sciatica, outpatient follow.    Painful bump on left foot.  Will do x-ray to rule out fracture likely  bunion, podiatry follow-up.    Amount and/or Complexity of Data Reviewed  Radiology: ordered and independent interpretation performed.    Risk  Prescription drug management.                 Disposition  Final diagnoses:   Acute low back pain with left-sided sciatica   Bunion, left foot     Time reflects when diagnosis was documented in both MDM as applicable and the Disposition within this note       Time User Action Codes Description Comment    9/9/2024  3:41 PM Ger Valdes Add [M54.42] Acute low back pain with left-sided sciatica     9/9/2024  3:41 PM Ger Valdes Add [M21.612] Bunion, left foot           ED Disposition       ED Disposition   Discharge    Condition   Stable    Date/Time   Mon Sep 9, 2024 1541    Comment   Enrrique Jannie discharge to home/self care.                   Follow-up Information       Follow up With Specialties Details Why Contact Info Additional Information    [facility] Physical Therapy Schedule an appointment as soon as possible for a visit in 2 days  [phone]    [facility] Schedule an appointment as soon as possible for a visit in 2 days  [address] [place] [id number] [phone] [facility] [place] [address], [place], [address] [phone]            Patient's Medications   Discharge Prescriptions    LIDOCAINE (LMX) 4 % CREAM    Apply topically as needed for moderate pain       Start Date: 9/9/2024  End Date: --       Order Dose: --       Quantity: 30 g    Refills: 0    METHOCARBAMOL (ROBAXIN) 500 MG TABLET    Take 1 tablet (500 mg total) by mouth 2 (two) times a day       Start Date: 9/9/2024  End Date: --       Order Dose: 500 mg       Quantity: 20 tablet    Refills: 0    NAPROXEN (NAPROSYN) 250 MG TABLET    Take 1 tablet (250 mg total) by mouth 3 (three) times a day with meals for 7 days       Start Date: 9/9/2024  End Date: 9/16/2024        Order Dose: 250 mg       Quantity: 21 tablet    Refills: 0           PDMP Review       None            ED Provider  Electronically Signed by             Ger Valdes MD  09/09/24 8281

## 2024-09-10 ENCOUNTER — TELEPHONE (OUTPATIENT)
Dept: PHYSICAL THERAPY | Facility: OTHER | Age: 54
End: 2024-09-10

## 2024-09-10 DIAGNOSIS — I21.19 ST ELEVATION MYOCARDIAL INFARCTION (STEMI) OF INFERIOR WALL (HCC): ICD-10-CM

## 2024-09-10 RX ORDER — METOPROLOL TARTRATE 25 MG/1
25 TABLET, FILM COATED ORAL EVERY 12 HOURS SCHEDULED
Qty: 180 TABLET | Refills: 0 | Status: SHIPPED | OUTPATIENT
Start: 2024-09-10

## 2024-09-13 NOTE — TELEPHONE ENCOUNTER
Call placed to the patient per [facility] referral.    Voice message left for patient to call back. Phone number and hours of business provided.     Per comp spine protocol will close referral

## 2024-09-23 DIAGNOSIS — I25.10 CAD (CORONARY ARTERY DISEASE): ICD-10-CM

## 2024-09-23 DIAGNOSIS — E78.5 HYPERLIPIDEMIA, UNSPECIFIED HYPERLIPIDEMIA TYPE: ICD-10-CM

## 2024-09-23 DIAGNOSIS — I21.19 ACUTE ST ELEVATION MYOCARDIAL INFARCTION (STEMI) OF INFERIOR WALL (HCC): ICD-10-CM

## 2024-09-25 RX ORDER — ATORVASTATIN CALCIUM 80 MG/1
80 TABLET, FILM COATED ORAL EVERY EVENING
Qty: 30 TABLET | Refills: 0 | Status: SHIPPED | OUTPATIENT
Start: 2024-09-25

## 2024-10-07 DIAGNOSIS — I21.11 ST ELEVATION MYOCARDIAL INFARCTION INVOLVING RIGHT CORONARY ARTERY (HCC): ICD-10-CM

## 2024-10-07 DIAGNOSIS — I25.10 CORONARY ARTERY DISEASE INVOLVING NATIVE CORONARY ARTERY OF NATIVE HEART WITHOUT ANGINA PECTORIS: ICD-10-CM

## 2024-10-07 DIAGNOSIS — Z79.4 TYPE 2 DIABETES MELLITUS WITH HYPERGLYCEMIA, WITH LONG-TERM CURRENT USE OF INSULIN (HCC): ICD-10-CM

## 2024-10-07 DIAGNOSIS — E11.65 TYPE 2 DIABETES MELLITUS WITH HYPERGLYCEMIA, WITH LONG-TERM CURRENT USE OF INSULIN (HCC): ICD-10-CM

## 2024-10-07 DIAGNOSIS — E78.5 HYPERLIPIDEMIA, UNSPECIFIED HYPERLIPIDEMIA TYPE: ICD-10-CM

## 2024-10-08 RX ORDER — METFORMIN HCL 500 MG
1000 TABLET, EXTENDED RELEASE 24 HR ORAL 2 TIMES DAILY WITH MEALS
Qty: 360 TABLET | Refills: 0 | Status: SHIPPED | OUTPATIENT
Start: 2024-10-08 | End: 2025-04-06

## 2024-10-09 RX ORDER — EZETIMIBE 10 MG/1
10 TABLET ORAL DAILY
Qty: 90 TABLET | Refills: 1 | Status: SHIPPED | OUTPATIENT
Start: 2024-10-09

## 2024-10-28 DIAGNOSIS — E78.5 HYPERLIPIDEMIA, UNSPECIFIED HYPERLIPIDEMIA TYPE: ICD-10-CM

## 2024-10-28 DIAGNOSIS — I21.19 ACUTE ST ELEVATION MYOCARDIAL INFARCTION (STEMI) OF INFERIOR WALL (HCC): ICD-10-CM

## 2024-10-28 DIAGNOSIS — I25.10 CAD (CORONARY ARTERY DISEASE): ICD-10-CM

## 2024-10-29 RX ORDER — ATORVASTATIN CALCIUM 80 MG/1
80 TABLET, FILM COATED ORAL EVERY EVENING
Qty: 30 TABLET | Refills: 0 | Status: SHIPPED | OUTPATIENT
Start: 2024-10-29

## 2024-12-13 DIAGNOSIS — I21.19 ACUTE ST ELEVATION MYOCARDIAL INFARCTION (STEMI) OF INFERIOR WALL (HCC): ICD-10-CM

## 2024-12-13 DIAGNOSIS — I21.19 ST ELEVATION MYOCARDIAL INFARCTION (STEMI) OF INFERIOR WALL (HCC): ICD-10-CM

## 2024-12-13 DIAGNOSIS — I21.11 ST ELEVATION MYOCARDIAL INFARCTION INVOLVING RIGHT CORONARY ARTERY (HCC): ICD-10-CM

## 2024-12-13 DIAGNOSIS — I25.10 CAD (CORONARY ARTERY DISEASE): ICD-10-CM

## 2024-12-13 DIAGNOSIS — E78.5 HYPERLIPIDEMIA, UNSPECIFIED HYPERLIPIDEMIA TYPE: ICD-10-CM

## 2024-12-13 RX ORDER — EZETIMIBE 10 MG/1
10 TABLET ORAL DAILY
Qty: 90 TABLET | Refills: 0 | Status: SHIPPED | OUTPATIENT
Start: 2024-12-13

## 2024-12-13 RX ORDER — ATORVASTATIN CALCIUM 80 MG/1
80 TABLET, FILM COATED ORAL EVERY EVENING
Qty: 90 TABLET | Refills: 0 | Status: SHIPPED | OUTPATIENT
Start: 2024-12-13

## 2024-12-13 RX ORDER — METOPROLOL TARTRATE 25 MG/1
25 TABLET, FILM COATED ORAL EVERY 12 HOURS SCHEDULED
Qty: 180 TABLET | Refills: 0 | Status: SHIPPED | OUTPATIENT
Start: 2024-12-13

## 2025-01-08 DIAGNOSIS — Z79.4 TYPE 2 DIABETES MELLITUS WITH HYPERGLYCEMIA, WITH LONG-TERM CURRENT USE OF INSULIN (HCC): ICD-10-CM

## 2025-01-08 DIAGNOSIS — E11.65 TYPE 2 DIABETES MELLITUS WITH HYPERGLYCEMIA, WITH LONG-TERM CURRENT USE OF INSULIN (HCC): ICD-10-CM

## 2025-01-08 DIAGNOSIS — I21.11 ST ELEVATION MYOCARDIAL INFARCTION INVOLVING RIGHT CORONARY ARTERY (HCC): ICD-10-CM

## 2025-01-08 DIAGNOSIS — E78.5 HYPERLIPIDEMIA, UNSPECIFIED HYPERLIPIDEMIA TYPE: ICD-10-CM

## 2025-01-08 RX ORDER — METFORMIN HYDROCHLORIDE 500 MG/1
1000 TABLET, EXTENDED RELEASE ORAL 2 TIMES DAILY WITH MEALS
Qty: 360 TABLET | Refills: 1 | Status: SHIPPED | OUTPATIENT
Start: 2025-01-08 | End: 2025-07-07

## 2025-01-08 RX ORDER — INSULIN GLARGINE 100 [IU]/ML
20 INJECTION, SOLUTION SUBCUTANEOUS
Qty: 15 ML | Refills: 1 | Status: SHIPPED | OUTPATIENT
Start: 2025-01-08

## 2025-01-09 RX ORDER — EZETIMIBE 10 MG/1
10 TABLET ORAL DAILY
Qty: 90 TABLET | Refills: 0 | Status: SHIPPED | OUTPATIENT
Start: 2025-01-09

## 2025-03-14 DIAGNOSIS — E78.5 HYPERLIPIDEMIA, UNSPECIFIED HYPERLIPIDEMIA TYPE: ICD-10-CM

## 2025-03-14 DIAGNOSIS — I25.10 CAD (CORONARY ARTERY DISEASE): ICD-10-CM

## 2025-03-14 DIAGNOSIS — I21.19 ACUTE ST ELEVATION MYOCARDIAL INFARCTION (STEMI) OF INFERIOR WALL (HCC): ICD-10-CM

## 2025-03-14 DIAGNOSIS — I21.19 ST ELEVATION MYOCARDIAL INFARCTION (STEMI) OF INFERIOR WALL (HCC): ICD-10-CM

## 2025-03-21 RX ORDER — ATORVASTATIN CALCIUM 80 MG/1
80 TABLET, FILM COATED ORAL EVERY EVENING
Qty: 90 TABLET | Refills: 0 | Status: SHIPPED | OUTPATIENT
Start: 2025-03-21

## 2025-03-21 RX ORDER — METOPROLOL TARTRATE 25 MG/1
25 TABLET, FILM COATED ORAL EVERY 12 HOURS SCHEDULED
Qty: 180 TABLET | Refills: 0 | Status: SHIPPED | OUTPATIENT
Start: 2025-03-21

## 2025-03-24 NOTE — Clinical Note
Enrrique Valderrama was seen and treated in our emergency department on 5/9/2024.                Diagnosis: Right knee pain    Enrrique  may return to work on return date.    He may return on this date: 05/10/2024         If you have any questions or concerns, please don't hesitate to call.      Kev Carroll PA-C    ______________________________           _______________          _______________  Hospital Representative                              Date                                Time Patient asking about daily Exelon patch and requesting one, currently not order. Order received from Paula Moctezuma NP

## 2025-04-21 DIAGNOSIS — Z79.4 TYPE 2 DIABETES MELLITUS WITH HYPERGLYCEMIA, WITH LONG-TERM CURRENT USE OF INSULIN (HCC): ICD-10-CM

## 2025-04-21 DIAGNOSIS — E11.65 TYPE 2 DIABETES MELLITUS WITH HYPERGLYCEMIA, WITH LONG-TERM CURRENT USE OF INSULIN (HCC): ICD-10-CM

## 2025-04-21 DIAGNOSIS — E78.5 HYPERLIPIDEMIA, UNSPECIFIED HYPERLIPIDEMIA TYPE: ICD-10-CM

## 2025-04-21 DIAGNOSIS — I21.11 ST ELEVATION MYOCARDIAL INFARCTION INVOLVING RIGHT CORONARY ARTERY (HCC): ICD-10-CM

## 2025-04-22 RX ORDER — EZETIMIBE 10 MG/1
10 TABLET ORAL DAILY
Qty: 90 TABLET | Refills: 0 | Status: SHIPPED | OUTPATIENT
Start: 2025-04-22

## 2025-04-24 RX ORDER — METFORMIN HYDROCHLORIDE 500 MG/1
1000 TABLET, EXTENDED RELEASE ORAL 2 TIMES DAILY WITH MEALS
Qty: 360 TABLET | Refills: 0 | OUTPATIENT
Start: 2025-04-24 | End: 2025-10-21

## 2025-04-24 NOTE — TELEPHONE ENCOUNTER
Requested medication(s) are due for refill today: Yes  Patient has already received a courtesy refill: Yes  Other reason request has been forwarded to provider: failed protocol; attempts made to schedule appt

## 2025-06-30 DIAGNOSIS — I25.10 CAD (CORONARY ARTERY DISEASE): ICD-10-CM

## 2025-06-30 DIAGNOSIS — I21.19 ST ELEVATION MYOCARDIAL INFARCTION (STEMI) OF INFERIOR WALL (HCC): ICD-10-CM

## 2025-06-30 DIAGNOSIS — I21.19 ACUTE ST ELEVATION MYOCARDIAL INFARCTION (STEMI) OF INFERIOR WALL (HCC): ICD-10-CM

## 2025-06-30 DIAGNOSIS — E78.5 HYPERLIPIDEMIA, UNSPECIFIED HYPERLIPIDEMIA TYPE: ICD-10-CM

## 2025-07-01 RX ORDER — ATORVASTATIN CALCIUM 80 MG/1
80 TABLET, FILM COATED ORAL EVERY EVENING
Qty: 90 TABLET | Refills: 0 | Status: SHIPPED | OUTPATIENT
Start: 2025-07-01

## 2025-07-01 RX ORDER — METOPROLOL TARTRATE 25 MG/1
25 TABLET, FILM COATED ORAL EVERY 12 HOURS SCHEDULED
Qty: 180 TABLET | Refills: 0 | Status: SHIPPED | OUTPATIENT
Start: 2025-07-01

## 2025-08-14 ENCOUNTER — HOSPITAL ENCOUNTER (EMERGENCY)
Facility: HOSPITAL | Age: 55
Discharge: HOME | End: 2025-08-14
Attending: EMERGENCY MEDICINE
Payer: COMMERCIAL

## 2025-08-14 ENCOUNTER — APPOINTMENT (EMERGENCY)
Dept: RADIOLOGY | Facility: HOSPITAL | Age: 55
End: 2025-08-14
Payer: COMMERCIAL

## (undated) DEVICE — ENDOPATH 5MM CURVED SCISSORS WITH MONOPOLAR CAUTERY: Brand: ENDOPATH

## (undated) DEVICE — ENDOPATH XCEL UNIVERSAL TROCAR STABLILITY SLEEVES: Brand: ENDOPATH XCEL

## (undated) DEVICE — 3M™ TEGADERM™ TRANSPARENT FILM DRESSING FRAME STYLE, 1624W, 2-3/8 IN X 2-3/4 IN (6 CM X 7 CM), 100/CT 4CT/CASE: Brand: 3M™ TEGADERM™

## (undated) DEVICE — ASTOUND IMPERVIOUS SURGICAL GOWN: Brand: CONVERTORS

## (undated) DEVICE — TRAY FOLEY 16FR URIMETER SURESTEP

## (undated) DEVICE — 5 MM CURVED DISSECTORS WITH MONOPOLAR CAUTERY: Brand: ENDOPATH

## (undated) DEVICE — [HIGH FLOW INSUFFLATOR,  DO NOT USE IF PACKAGE IS DAMAGED,  KEEP DRY,  KEEP AWAY FROM SUNLIGHT,  PROTECT FROM HEAT AND RADIOACTIVE SOURCES.]: Brand: PNEUMOSURE

## (undated) DEVICE — GAUZE SPONGES,8 PLY: Brand: CURITY

## (undated) DEVICE — Device

## (undated) DEVICE — BLUE HEAT SCOPE WARMER

## (undated) DEVICE — NEEDLE SPINAL 22G X 3.5IN  QUINCKE

## (undated) DEVICE — BLUNT TIP TROCAR: Brand: AUTO SUTURE

## (undated) DEVICE — GLOVE SRG BIOGEL 7

## (undated) DEVICE — LAPAROSCOPY PACK: Brand: MEDLINE INDUSTRIES, INC.

## (undated) DEVICE — CHLORAPREP HI-LITE 26ML ORANGE

## (undated) DEVICE — TROCAR: Brand: KII FIOS FIRST ENTRY

## (undated) DEVICE — ADHESIVE SKN CLSR HISTOACRYL FLEX 0.5ML LF

## (undated) DEVICE — SUT VICRYL 0 UR-6 27 IN J603H

## (undated) DEVICE — ENDOPATH XCEL BLADELESS TROCARS WITH STABILITY SLEEVES: Brand: ENDOPATH XCEL

## (undated) DEVICE — ALLENTOWN LAP CHOLE APP PACK: Brand: CARDINAL HEALTH

## (undated) DEVICE — Device: Brand: THUNDERBEAT 5 MM, 35 CM, FRONT-ACTUATED GRIP

## (undated) DEVICE — STAPLER GIA HANDLE STAND 4MM

## (undated) DEVICE — GLOVE SRG BIOGEL ECLIPSE 7.5

## (undated) DEVICE — GROUNDING PAD UNIVERSAL SLW

## (undated) DEVICE — TROCAR HERNIA BALLON STRUCTURED 10 MM

## (undated) DEVICE — GLOVE INDICATOR UNDERGLOVE SZ 7 GREEN

## (undated) DEVICE — GLOVE INDICATOR PI UNDERGLOVE SZ 8 BLUE

## (undated) DEVICE — GLOVE INDICATOR PI UNDERGLOVE SZ 7 BLUE

## (undated) DEVICE — IRRIG ENDO FLO TUBING

## (undated) DEVICE — GLOVE INDICATOR UNDERGLOVE SZ 7.5 GREEN

## (undated) DEVICE — GLOVE SRG BIOGEL 6.5

## (undated) DEVICE — DRESSING TELFA 2 X 3 IN STRL

## (undated) DEVICE — SCD SEQUENTIAL COMPRESSION COMFORT SLEEVE MEDIUM KNEE LENGTH: Brand: KENDALL SCD

## (undated) DEVICE — SUT MONOCRYL 4-0 PS-2 27 IN Y426H

## (undated) DEVICE — GLOVE INDICATOR PI UNDERGLOVE SZ 7.5 BLUE

## (undated) DEVICE — STANDARD SURGICAL GOWN, L: Brand: CONVERTORS

## (undated) DEVICE — IRRIGATOR DISPOSABLE SUCTION

## (undated) DEVICE — INTENDED FOR TISSUE SEPARATION, AND OTHER PROCEDURES THAT REQUIRE A SHARP SURGICAL BLADE TO PUNCTURE OR CUT.: Brand: BARD-PARKER SAFETY BLADES SIZE 11, STERILE

## (undated) DEVICE — CLIP APPLIER WITH CLIP LOGIC TECHNOLOGY: Brand: ENDO CLIP III

## (undated) DEVICE — GLOVE INDICATOR PI UNDERGLOVE SZ 6.5 BLUE

## (undated) DEVICE — GARMENT,MEDLINE,DVT,INT,CALF,FOAM,MED: Brand: MEDLINE

## (undated) DEVICE — STAPLER ENDO GIA ROTICULATOR 45-2.5

## (undated) DEVICE — SUT VICRYL 2-0 CT-2 27 IN J269H

## (undated) DEVICE — TRANSPOSAL ULTRAFLEX DUO/QUAD ULTRA CART MANIFOLD

## (undated) DEVICE — TROCAR HERNIA OVAL PERITONEAL DISTENTION BALLOON

## (undated) DEVICE — ENDOPOUCH RETRIEVER SPECIMEN RETRIEVAL BAGS: Brand: ENDOPOUCH RETRIEVER

## (undated) DEVICE — PMI DISPOSABLE PUNCTURE CLOSURE DEVICE / SUTURE GRASPER: Brand: PMI

## (undated) DEVICE — ADHESIVE SKIN CLSR DERMABOND NX

## (undated) DEVICE — INTENDED FOR TISSUE SEPARATION, AND OTHER PROCEDURES THAT REQUIRE A SHARP SURGICAL BLADE TO PUNCTURE OR CUT.: Brand: BARD-PARKER ® CARBON RIB-BACK BLADES